# Patient Record
Sex: MALE | Race: WHITE | NOT HISPANIC OR LATINO
[De-identification: names, ages, dates, MRNs, and addresses within clinical notes are randomized per-mention and may not be internally consistent; named-entity substitution may affect disease eponyms.]

---

## 2017-01-04 ENCOUNTER — APPOINTMENT (OUTPATIENT)
Dept: CT IMAGING | Facility: CLINIC | Age: 67
End: 2017-01-04

## 2017-01-04 ENCOUNTER — OUTPATIENT (OUTPATIENT)
Dept: OUTPATIENT SERVICES | Facility: HOSPITAL | Age: 67
LOS: 1 days | End: 2017-01-04
Payer: MEDICARE

## 2017-01-04 DIAGNOSIS — K43.2 INCISIONAL HERNIA WITHOUT OBSTRUCTION OR GANGRENE: ICD-10-CM

## 2017-01-04 PROCEDURE — 74177 CT ABD & PELVIS W/CONTRAST: CPT

## 2017-01-04 PROCEDURE — 82565 ASSAY OF CREATININE: CPT

## 2017-01-05 ENCOUNTER — OUTPATIENT (OUTPATIENT)
Dept: OUTPATIENT SERVICES | Facility: HOSPITAL | Age: 67
LOS: 1 days | End: 2017-01-05
Payer: MEDICARE

## 2017-01-05 VITALS
SYSTOLIC BLOOD PRESSURE: 140 MMHG | WEIGHT: 214.95 LBS | DIASTOLIC BLOOD PRESSURE: 80 MMHG | HEART RATE: 58 BPM | HEIGHT: 70 IN | RESPIRATION RATE: 16 BRPM | TEMPERATURE: 98 F

## 2017-01-05 DIAGNOSIS — K43.2 INCISIONAL HERNIA WITHOUT OBSTRUCTION OR GANGRENE: ICD-10-CM

## 2017-01-05 DIAGNOSIS — R06.83 SNORING: ICD-10-CM

## 2017-01-05 DIAGNOSIS — Z98.890 OTHER SPECIFIED POSTPROCEDURAL STATES: Chronic | ICD-10-CM

## 2017-01-05 DIAGNOSIS — I63.9 CEREBRAL INFARCTION, UNSPECIFIED: ICD-10-CM

## 2017-01-05 DIAGNOSIS — I10 ESSENTIAL (PRIMARY) HYPERTENSION: ICD-10-CM

## 2017-01-05 DIAGNOSIS — F32.9 MAJOR DEPRESSIVE DISORDER, SINGLE EPISODE, UNSPECIFIED: ICD-10-CM

## 2017-01-05 DIAGNOSIS — E11.9 TYPE 2 DIABETES MELLITUS WITHOUT COMPLICATIONS: ICD-10-CM

## 2017-01-05 LAB
ALBUMIN SERPL ELPH-MCNC: 4.4 G/DL — SIGNIFICANT CHANGE UP (ref 3.3–5)
ALP SERPL-CCNC: 68 U/L — SIGNIFICANT CHANGE UP (ref 40–120)
ALT FLD-CCNC: 27 U/L — SIGNIFICANT CHANGE UP (ref 4–41)
AST SERPL-CCNC: 27 U/L — SIGNIFICANT CHANGE UP (ref 4–40)
BILIRUB SERPL-MCNC: 0.3 MG/DL — SIGNIFICANT CHANGE UP (ref 0.2–1.2)
BUN SERPL-MCNC: 18 MG/DL — SIGNIFICANT CHANGE UP (ref 7–23)
CALCIUM SERPL-MCNC: 9.9 MG/DL — SIGNIFICANT CHANGE UP (ref 8.4–10.5)
CHLORIDE SERPL-SCNC: 97 MMOL/L — LOW (ref 98–107)
CO2 SERPL-SCNC: 26 MMOL/L — SIGNIFICANT CHANGE UP (ref 22–31)
CREAT SERPL-MCNC: 0.77 MG/DL — SIGNIFICANT CHANGE UP (ref 0.5–1.3)
GLUCOSE SERPL-MCNC: 186 MG/DL — HIGH (ref 70–99)
HBA1C BLD-MCNC: 7 % — HIGH (ref 4–5.6)
HCT VFR BLD CALC: 44.1 % — SIGNIFICANT CHANGE UP (ref 39–50)
HGB BLD-MCNC: 14.9 G/DL — SIGNIFICANT CHANGE UP (ref 13–17)
MCHC RBC-ENTMCNC: 31.9 PG — SIGNIFICANT CHANGE UP (ref 27–34)
MCHC RBC-ENTMCNC: 33.8 % — SIGNIFICANT CHANGE UP (ref 32–36)
MCV RBC AUTO: 94.4 FL — SIGNIFICANT CHANGE UP (ref 80–100)
PLATELET # BLD AUTO: 232 K/UL — SIGNIFICANT CHANGE UP (ref 150–400)
PMV BLD: 11.2 FL — SIGNIFICANT CHANGE UP (ref 7–13)
POTASSIUM SERPL-MCNC: 4.7 MMOL/L — SIGNIFICANT CHANGE UP (ref 3.5–5.3)
POTASSIUM SERPL-SCNC: 4.7 MMOL/L — SIGNIFICANT CHANGE UP (ref 3.5–5.3)
PROT SERPL-MCNC: 6.8 G/DL — SIGNIFICANT CHANGE UP (ref 6–8.3)
RBC # BLD: 4.67 M/UL — SIGNIFICANT CHANGE UP (ref 4.2–5.8)
RBC # FLD: 13.1 % — SIGNIFICANT CHANGE UP (ref 10.3–14.5)
SODIUM SERPL-SCNC: 137 MMOL/L — SIGNIFICANT CHANGE UP (ref 135–145)
WBC # BLD: 8.23 K/UL — SIGNIFICANT CHANGE UP (ref 3.8–10.5)
WBC # FLD AUTO: 8.23 K/UL — SIGNIFICANT CHANGE UP (ref 3.8–10.5)

## 2017-01-05 PROCEDURE — 93010 ELECTROCARDIOGRAM REPORT: CPT

## 2017-01-05 RX ORDER — SODIUM CHLORIDE 9 MG/ML
1000 INJECTION, SOLUTION INTRAVENOUS
Qty: 0 | Refills: 0 | Status: DISCONTINUED | OUTPATIENT
Start: 2017-01-19 | End: 2017-01-19

## 2017-01-05 NOTE — H&P PST ADULT - PROBLEM SELECTOR PLAN 2
BP in PAST 140/80, no complaints.  Pt instructed to take AM Metoprolol and Losartan morning of surgery, pt verbalized good understanding.

## 2017-01-05 NOTE — H&P PST ADULT - RS GEN PE MLT RESP DETAILS PC
no wheezes/breath sounds equal/clear to auscultation bilaterally/respirations non-labored/good air movement/no rhonchi/airway patent/no rales

## 2017-01-05 NOTE — H&P PST ADULT - PMH
Acid Reflux    Anxiety    Benign Tumor of Adrenal Gland    CAD (coronary artery disease)    CVA (cerebrovascular accident)  04/8/2013- no residual effects  Depression    Diabetic neuropathy    DM type 2 (diabetes mellitus, type 2)    Gout    HTN (Hypertension)    Spinal stenosis of lumbar region Acid Reflux    Anxiety    Benign Tumor of Adrenal Gland    CAD (coronary artery disease)    CVA (cerebrovascular accident)  04/8/2013- no residual effects  Depression    Diabetic neuropathy    DM type 2 (diabetes mellitus, type 2)    Gout    Hernia  umbilical, inguinal, abdominal wall  HTN (Hypertension)    Spinal stenosis of lumbar region

## 2017-01-05 NOTE — H&P PST ADULT - MUSCULOSKELETAL
details… No joint pain, swelling or deformity; no limitation of movement no joint swelling/no joint erythema/no calf tenderness/no joint warmth/normal strength/ROM intact detailed exam

## 2017-01-05 NOTE — H&P PST ADULT - CARDIOVASCULAR SYMPTOMS
dyspnea on exertion/climbing stairs, "I had a full workup, it was normal" dyspnea with climbing stairs past 2 years , "I had a full workup, it was normal"/dyspnea on exertion

## 2017-01-05 NOTE — H&P PST ADULT - NEGATIVE ALLERGY TYPES
no reactions to animals/no reactions to food/no reactions to insect bites/no reactions to medicines/no outdoor environmental allergies/no indoor environmental allergies

## 2017-01-05 NOTE — H&P PST ADULT - NEGATIVE GENERAL SYMPTOMS
no fever/no chills no chills/no polyphagia/no polyuria/no polydipsia/no weight gain/no weight loss/no anorexia/no malaise/no sweating/no fever/no fatigue

## 2017-01-05 NOTE — H&P PST ADULT - ACTIVITY
PT 3 times per week, able to climb 12 steps without exertion PT 1 time per week, can climb a flight of steps without chest pain or palpitations PT 1 time per week, SOB with climbing steps past 2 years

## 2017-01-05 NOTE — H&P PST ADULT - PSH
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

## 2017-01-05 NOTE — H&P PST ADULT - NEGATIVE NEUROLOGICAL SYMPTOMS
no transient paralysis/no vertigo no facial palsy/no confusion/no focal seizures/no syncope/no headache/no vertigo/no tremors/no paresthesias/no loss of consciousness/no generalized seizures/no weakness/no difficulty walking/no transient paralysis

## 2017-01-05 NOTE — H&P PST ADULT - HISTORY OF PRESENT ILLNESS
63 year old male with history of DM, HTN, CAD, CABG, Gout, HLD, c/o low back pain x 3 years- no relief of pain with physical therapy- f/u ortho-neuro evaluation- MRI lumbar spine- spinal stenosis- for bilateral L1-2 L4-5 lumbar laminectomy. 67 yr old male, h/o DM, HTN, CAD, CABG, Gout, HLD, depression, OA, presents for pre-op exam for incisional hernia.  Pt reports adrenalectomy 24 years ago, abdominal hernia many years, recently c/o lower abdominal discomfort with bowel movements and sneezing.  Now  scheduled for incisional hernia repair on 1/19/17.

## 2017-01-05 NOTE — H&P PST ADULT - NEGATIVE OPHTHALMOLOGIC SYMPTOMS
no irritation L/no discharge R/no diplopia/no photophobia/no loss of vision L/no loss of vision R/no scleral injection R/no discharge L/no blurred vision L/no pain R/no pain L/no irritation R/no scleral injection L/no lacrimation L/no lacrimation R

## 2017-01-05 NOTE — H&P PST ADULT - NEGATIVE MUSCULOSKELETAL SYMPTOMS
no stiffness/no muscle weakness/no leg pain R/no neck pain/no arm pain L/no arm pain R/no leg pain L

## 2017-01-05 NOTE — H&P PST ADULT - NSANTHOSAYNRD_GEN_A_CORE
No. FREEDOM screening performed.  STOP BANG Legend: 0-2 = LOW Risk; 3-4 = INTERMEDIATE Risk; 5-8 = HIGH Risk/bite plate mouth guard/No. FREEDOM screening performed.  STOP BANG Legend: 0-2 = LOW Risk; 3-4 = INTERMEDIATE Risk; 5-8 = HIGH Risk

## 2017-01-05 NOTE — H&P PST ADULT - OTHER CARE PROVIDERS
Dr. Sutherland- [phone] Dr. Sutherland, cardiology- [phone]  Dr. Fahad Rosario, neurologist [phone]

## 2017-01-05 NOTE — H&P PST ADULT - NEGATIVE ENMT SYMPTOMS
no post-nasal discharge/no nasal congestion/no dysphagia/no vertigo/no sinus symptoms/no throat pain/no nose bleeds/no tinnitus/no gum bleeding

## 2017-01-05 NOTE — H&P PST ADULT - PROBLEM SELECTOR PLAN 5
HgA1c done, finger stick upon arrival to OR.  Pt instructed to hold PM Metformin and decrease Levemir 20% (56 units) night before surgery, pt verbalized good understanding.

## 2017-01-05 NOTE — H&P PST ADULT - NEGATIVE GENERAL GENITOURINARY SYMPTOMS
no urinary hesitancy/no flank pain R/no bladder infections/normal urinary frequency/no nocturia/no incontinence/no hematuria/no renal colic/no flank pain L/no dysuria

## 2017-01-05 NOTE — H&P PST ADULT - PROBLEM SELECTOR PLAN 6
Pt states he feels well in PAST today, instructed pt to take AM Klonopin, Wellbutrin, Tramadol day of surgery, pt verbalized good understanding.

## 2017-01-05 NOTE — H&P PST ADULT - FAMILY HISTORY
Father  Still living? No  Family history of early CAD, Age at diagnosis: Age Unknown     Mother  Still living? Yes, Estimated age: 81-90  Family history of borderline diabetes mellitus, Age at diagnosis: Age Unknown

## 2017-01-05 NOTE — H&P PST ADULT - PROBLEM SELECTOR PLAN 1
67 yr old male presents for pre-op exam for incisional hernia repair on 1/19/17.  EKG, cbc, cmp, HgA1c done.  Pre-op instructions and chlorhexidine provided, instructed pt to take AM Protonix morning of surgery, pt verbalized good understanding.

## 2017-01-05 NOTE — H&P PST ADULT - NEUROLOGICAL DETAILS
responds to pain/deep reflexes hypoactive/alert and oriented x 3 responds to pain/responds to verbal commands/alert and oriented x 3/normal strength

## 2017-01-18 NOTE — ASU PATIENT PROFILE, ADULT - ABILITY TO HEAR (WITH HEARING AID OR HEARING APPLIANCE IF NORMALLY USED):
Hearing Aid/Mildly to Moderately Impaired: difficulty hearing in some environments or speaker may need to increase volume or speak distinctly

## 2017-01-18 NOTE — ASU PATIENT PROFILE, ADULT - PMH
Acid Reflux    Anxiety    Benign Tumor of Adrenal Gland    CAD (coronary artery disease)    CVA (cerebrovascular accident)  04/8/2013- no residual effects  Depression    Diabetic neuropathy    DM type 2 (diabetes mellitus, type 2)    Gout    Hernia  umbilical, inguinal, abdominal wall  HTN (Hypertension)    Spinal stenosis of lumbar region

## 2017-01-18 NOTE — ASU PATIENT PROFILE, ADULT - PSH
History of Appendectomy    History of lumbar laminectomy  L1-2 & L4-5   2015  S/P CABG X 4  15 years ago  S/P Coronary Angiogram  12/26/2010 & 2015 at [place], [facility]  S/P Hernia Repair    Sudden Adrenal Gland Insufficiency  left adrenalectomy 24 years ago

## 2017-01-19 ENCOUNTER — INPATIENT (INPATIENT)
Facility: HOSPITAL | Age: 67
LOS: 1 days | Discharge: HOME | End: 2017-01-21
Attending: SURGERY | Admitting: SURGERY
Payer: MEDICARE

## 2017-01-19 ENCOUNTER — APPOINTMENT (OUTPATIENT)
Dept: SURGERY | Facility: HOSPITAL | Age: 67
End: 2017-01-19

## 2017-01-19 VITALS
HEIGHT: 70 IN | DIASTOLIC BLOOD PRESSURE: 79 MMHG | SYSTOLIC BLOOD PRESSURE: 154 MMHG | TEMPERATURE: 99 F | HEART RATE: 52 BPM | WEIGHT: 214.95 LBS | RESPIRATION RATE: 17 BRPM | OXYGEN SATURATION: 99 %

## 2017-01-19 DIAGNOSIS — K43.2 INCISIONAL HERNIA WITHOUT OBSTRUCTION OR GANGRENE: ICD-10-CM

## 2017-01-19 DIAGNOSIS — Z98.890 OTHER SPECIFIED POSTPROCEDURAL STATES: Chronic | ICD-10-CM

## 2017-01-19 PROCEDURE — 49568: CPT | Mod: GC

## 2017-01-19 PROCEDURE — 49560: CPT | Mod: GC

## 2017-01-19 RX ORDER — NALOXONE HYDROCHLORIDE 4 MG/.1ML
0.1 SPRAY NASAL
Qty: 0 | Refills: 0 | Status: DISCONTINUED | OUTPATIENT
Start: 2017-01-19 | End: 2017-01-21

## 2017-01-19 RX ORDER — PANTOPRAZOLE SODIUM 20 MG/1
40 TABLET, DELAYED RELEASE ORAL
Qty: 0 | Refills: 0 | Status: DISCONTINUED | OUTPATIENT
Start: 2017-01-19 | End: 2017-01-20

## 2017-01-19 RX ORDER — HYDROMORPHONE HYDROCHLORIDE 2 MG/ML
0.8 INJECTION INTRAMUSCULAR; INTRAVENOUS; SUBCUTANEOUS
Qty: 0 | Refills: 0 | Status: DISCONTINUED | OUTPATIENT
Start: 2017-01-19 | End: 2017-01-19

## 2017-01-19 RX ORDER — ASPIRIN/CALCIUM CARB/MAGNESIUM 324 MG
81 TABLET ORAL DAILY
Qty: 0 | Refills: 0 | Status: DISCONTINUED | OUTPATIENT
Start: 2017-01-20 | End: 2017-01-21

## 2017-01-19 RX ORDER — BUPROPION HYDROCHLORIDE 150 MG/1
150 TABLET, EXTENDED RELEASE ORAL
Qty: 0 | Refills: 0 | Status: DISCONTINUED | OUTPATIENT
Start: 2017-01-19 | End: 2017-01-19

## 2017-01-19 RX ORDER — ENOXAPARIN SODIUM 100 MG/ML
30 INJECTION SUBCUTANEOUS EVERY 24 HOURS
Qty: 0 | Refills: 0 | Status: DISCONTINUED | OUTPATIENT
Start: 2017-01-19 | End: 2017-01-19

## 2017-01-19 RX ORDER — ACETAMINOPHEN 500 MG
1000 TABLET ORAL ONCE
Qty: 0 | Refills: 0 | Status: COMPLETED | OUTPATIENT
Start: 2017-01-19 | End: 2017-01-19

## 2017-01-19 RX ORDER — DEXTROSE 50 % IN WATER 50 %
25 SYRINGE (ML) INTRAVENOUS ONCE
Qty: 0 | Refills: 0 | Status: DISCONTINUED | OUTPATIENT
Start: 2017-01-19 | End: 2017-01-21

## 2017-01-19 RX ORDER — LOSARTAN POTASSIUM 100 MG/1
50 TABLET, FILM COATED ORAL DAILY
Qty: 0 | Refills: 0 | Status: DISCONTINUED | OUTPATIENT
Start: 2017-01-19 | End: 2017-01-21

## 2017-01-19 RX ORDER — METOPROLOL TARTRATE 50 MG
25 TABLET ORAL DAILY
Qty: 0 | Refills: 0 | Status: DISCONTINUED | OUTPATIENT
Start: 2017-01-19 | End: 2017-01-21

## 2017-01-19 RX ORDER — SODIUM CHLORIDE 9 MG/ML
1000 INJECTION, SOLUTION INTRAVENOUS
Qty: 0 | Refills: 0 | Status: DISCONTINUED | OUTPATIENT
Start: 2017-01-19 | End: 2017-01-21

## 2017-01-19 RX ORDER — DEXTROSE 50 % IN WATER 50 %
12.5 SYRINGE (ML) INTRAVENOUS ONCE
Qty: 0 | Refills: 0 | Status: DISCONTINUED | OUTPATIENT
Start: 2017-01-19 | End: 2017-01-21

## 2017-01-19 RX ORDER — HYDROMORPHONE HYDROCHLORIDE 2 MG/ML
30 INJECTION INTRAMUSCULAR; INTRAVENOUS; SUBCUTANEOUS
Qty: 0 | Refills: 0 | Status: DISCONTINUED | OUTPATIENT
Start: 2017-01-19 | End: 2017-01-20

## 2017-01-19 RX ORDER — INSULIN GLARGINE 100 [IU]/ML
35 INJECTION, SOLUTION SUBCUTANEOUS AT BEDTIME
Qty: 0 | Refills: 0 | Status: DISCONTINUED | OUTPATIENT
Start: 2017-01-19 | End: 2017-01-20

## 2017-01-19 RX ORDER — ONDANSETRON 8 MG/1
4 TABLET, FILM COATED ORAL ONCE
Qty: 0 | Refills: 0 | Status: DISCONTINUED | OUTPATIENT
Start: 2017-01-19 | End: 2017-01-19

## 2017-01-19 RX ORDER — HYDROMORPHONE HYDROCHLORIDE 2 MG/ML
0.5 INJECTION INTRAMUSCULAR; INTRAVENOUS; SUBCUTANEOUS
Qty: 0 | Refills: 0 | Status: DISCONTINUED | OUTPATIENT
Start: 2017-01-19 | End: 2017-01-20

## 2017-01-19 RX ORDER — BUPROPION HYDROCHLORIDE 150 MG/1
300 TABLET, EXTENDED RELEASE ORAL DAILY
Qty: 0 | Refills: 0 | Status: DISCONTINUED | OUTPATIENT
Start: 2017-01-19 | End: 2017-01-20

## 2017-01-19 RX ORDER — GLUCAGON INJECTION, SOLUTION 0.5 MG/.1ML
1 INJECTION, SOLUTION SUBCUTANEOUS ONCE
Qty: 0 | Refills: 0 | Status: DISCONTINUED | OUTPATIENT
Start: 2017-01-19 | End: 2017-01-21

## 2017-01-19 RX ORDER — INSULIN LISPRO 100/ML
VIAL (ML) SUBCUTANEOUS AT BEDTIME
Qty: 0 | Refills: 0 | Status: DISCONTINUED | OUTPATIENT
Start: 2017-01-19 | End: 2017-01-21

## 2017-01-19 RX ORDER — ONDANSETRON 8 MG/1
4 TABLET, FILM COATED ORAL EVERY 6 HOURS
Qty: 0 | Refills: 0 | Status: DISCONTINUED | OUTPATIENT
Start: 2017-01-19 | End: 2017-01-21

## 2017-01-19 RX ORDER — ATORVASTATIN CALCIUM 80 MG/1
20 TABLET, FILM COATED ORAL AT BEDTIME
Qty: 0 | Refills: 0 | Status: DISCONTINUED | OUTPATIENT
Start: 2017-01-19 | End: 2017-01-21

## 2017-01-19 RX ORDER — INSULIN LISPRO 100/ML
VIAL (ML) SUBCUTANEOUS
Qty: 0 | Refills: 0 | Status: DISCONTINUED | OUTPATIENT
Start: 2017-01-19 | End: 2017-01-21

## 2017-01-19 RX ORDER — ENOXAPARIN SODIUM 100 MG/ML
40 INJECTION SUBCUTANEOUS DAILY
Qty: 0 | Refills: 0 | Status: DISCONTINUED | OUTPATIENT
Start: 2017-01-19 | End: 2017-01-21

## 2017-01-19 RX ORDER — FENTANYL CITRATE 50 UG/ML
50 INJECTION INTRAVENOUS
Qty: 0 | Refills: 0 | Status: DISCONTINUED | OUTPATIENT
Start: 2017-01-19 | End: 2017-01-19

## 2017-01-19 RX ORDER — DEXTROSE 50 % IN WATER 50 %
1 SYRINGE (ML) INTRAVENOUS ONCE
Qty: 0 | Refills: 0 | Status: DISCONTINUED | OUTPATIENT
Start: 2017-01-19 | End: 2017-01-21

## 2017-01-19 RX ORDER — SODIUM CHLORIDE 9 MG/ML
1000 INJECTION, SOLUTION INTRAVENOUS
Qty: 0 | Refills: 0 | Status: DISCONTINUED | OUTPATIENT
Start: 2017-01-19 | End: 2017-01-20

## 2017-01-19 RX ADMIN — HYDROMORPHONE HYDROCHLORIDE 0.5 MILLIGRAM(S): 2 INJECTION INTRAMUSCULAR; INTRAVENOUS; SUBCUTANEOUS at 18:15

## 2017-01-19 RX ADMIN — Medication 400 MILLIGRAM(S): at 13:38

## 2017-01-19 RX ADMIN — SODIUM CHLORIDE 30 MILLILITER(S): 9 INJECTION, SOLUTION INTRAVENOUS at 13:06

## 2017-01-19 RX ADMIN — HYDROMORPHONE HYDROCHLORIDE 30 MILLILITER(S): 2 INJECTION INTRAMUSCULAR; INTRAVENOUS; SUBCUTANEOUS at 20:33

## 2017-01-19 RX ADMIN — Medication 1000 MILLIGRAM(S): at 13:53

## 2017-01-19 RX ADMIN — SODIUM CHLORIDE 100 MILLILITER(S): 9 INJECTION, SOLUTION INTRAVENOUS at 18:05

## 2017-01-19 RX ADMIN — HYDROMORPHONE HYDROCHLORIDE 30 MILLILITER(S): 2 INJECTION INTRAMUSCULAR; INTRAVENOUS; SUBCUTANEOUS at 17:52

## 2017-01-19 NOTE — PATIENT PROFILE ADULT. - ABILITY TO HEAR (WITH HEARING AID OR HEARING APPLIANCE IF NORMALLY USED):
uses B/l hearing aids/Mildly to Moderately Impaired: difficulty hearing in some environments or speaker may need to increase volume or speak distinctly

## 2017-01-20 ENCOUNTER — TRANSCRIPTION ENCOUNTER (OUTPATIENT)
Age: 67
End: 2017-01-20

## 2017-01-20 LAB
BASOPHILS # BLD AUTO: 0.01 K/UL — SIGNIFICANT CHANGE UP (ref 0–0.2)
BASOPHILS NFR BLD AUTO: 0.1 % — SIGNIFICANT CHANGE UP (ref 0–2)
BUN SERPL-MCNC: 20 MG/DL — SIGNIFICANT CHANGE UP (ref 7–23)
BUN SERPL-MCNC: 21 MG/DL — SIGNIFICANT CHANGE UP (ref 7–23)
CALCIUM SERPL-MCNC: 8.8 MG/DL — SIGNIFICANT CHANGE UP (ref 8.4–10.5)
CALCIUM SERPL-MCNC: 8.9 MG/DL — SIGNIFICANT CHANGE UP (ref 8.4–10.5)
CHLORIDE SERPL-SCNC: 96 MMOL/L — LOW (ref 98–107)
CHLORIDE SERPL-SCNC: 97 MMOL/L — LOW (ref 98–107)
CO2 SERPL-SCNC: 24 MMOL/L — SIGNIFICANT CHANGE UP (ref 22–31)
CO2 SERPL-SCNC: 25 MMOL/L — SIGNIFICANT CHANGE UP (ref 22–31)
CREAT SERPL-MCNC: 0.9 MG/DL — SIGNIFICANT CHANGE UP (ref 0.5–1.3)
CREAT SERPL-MCNC: 0.96 MG/DL — SIGNIFICANT CHANGE UP (ref 0.5–1.3)
EOSINOPHIL # BLD AUTO: 0 K/UL — SIGNIFICANT CHANGE UP (ref 0–0.5)
EOSINOPHIL NFR BLD AUTO: 0 % — SIGNIFICANT CHANGE UP (ref 0–6)
GLUCOSE SERPL-MCNC: 177 MG/DL — HIGH (ref 70–99)
GLUCOSE SERPL-MCNC: 278 MG/DL — HIGH (ref 70–99)
HCT VFR BLD CALC: 40.9 % — SIGNIFICANT CHANGE UP (ref 39–50)
HGB BLD-MCNC: 13.4 G/DL — SIGNIFICANT CHANGE UP (ref 13–17)
IMM GRANULOCYTES NFR BLD AUTO: 0.2 % — SIGNIFICANT CHANGE UP (ref 0–1.5)
LYMPHOCYTES # BLD AUTO: 0.77 K/UL — LOW (ref 1–3.3)
LYMPHOCYTES # BLD AUTO: 6.1 % — LOW (ref 13–44)
MCHC RBC-ENTMCNC: 31.5 PG — SIGNIFICANT CHANGE UP (ref 27–34)
MCHC RBC-ENTMCNC: 32.8 % — SIGNIFICANT CHANGE UP (ref 32–36)
MCV RBC AUTO: 96 FL — SIGNIFICANT CHANGE UP (ref 80–100)
MONOCYTES # BLD AUTO: 0.81 K/UL — SIGNIFICANT CHANGE UP (ref 0–0.9)
MONOCYTES NFR BLD AUTO: 6.4 % — SIGNIFICANT CHANGE UP (ref 2–14)
NEUTROPHILS # BLD AUTO: 11.02 K/UL — HIGH (ref 1.8–7.4)
NEUTROPHILS NFR BLD AUTO: 87.2 % — HIGH (ref 43–77)
PLATELET # BLD AUTO: 215 K/UL — SIGNIFICANT CHANGE UP (ref 150–400)
PMV BLD: 11.3 FL — SIGNIFICANT CHANGE UP (ref 7–13)
POTASSIUM SERPL-MCNC: 4.8 MMOL/L — SIGNIFICANT CHANGE UP (ref 3.5–5.3)
POTASSIUM SERPL-MCNC: 5.8 MMOL/L — HIGH (ref 3.5–5.3)
POTASSIUM SERPL-SCNC: 4.8 MMOL/L — SIGNIFICANT CHANGE UP (ref 3.5–5.3)
POTASSIUM SERPL-SCNC: 5.8 MMOL/L — HIGH (ref 3.5–5.3)
RBC # BLD: 4.26 M/UL — SIGNIFICANT CHANGE UP (ref 4.2–5.8)
RBC # FLD: 13.2 % — SIGNIFICANT CHANGE UP (ref 10.3–14.5)
SODIUM SERPL-SCNC: 137 MMOL/L — SIGNIFICANT CHANGE UP (ref 135–145)
SODIUM SERPL-SCNC: 138 MMOL/L — SIGNIFICANT CHANGE UP (ref 135–145)
WBC # BLD: 12.64 K/UL — HIGH (ref 3.8–10.5)
WBC # FLD AUTO: 12.64 K/UL — HIGH (ref 3.8–10.5)

## 2017-01-20 RX ORDER — INSULIN DETEMIR 100/ML (3)
70 INSULIN PEN (ML) SUBCUTANEOUS AT BEDTIME
Qty: 0 | Refills: 0 | Status: DISCONTINUED | OUTPATIENT
Start: 2017-01-20 | End: 2017-01-21

## 2017-01-20 RX ORDER — BUPROPION HYDROCHLORIDE 150 MG/1
300 TABLET, EXTENDED RELEASE ORAL DAILY
Qty: 0 | Refills: 0 | Status: DISCONTINUED | OUTPATIENT
Start: 2017-01-20 | End: 2017-01-21

## 2017-01-20 RX ORDER — TRAMADOL HYDROCHLORIDE 50 MG/1
50 TABLET ORAL EVERY 8 HOURS
Qty: 0 | Refills: 0 | Status: DISCONTINUED | OUTPATIENT
Start: 2017-01-20 | End: 2017-01-20

## 2017-01-20 RX ORDER — LIDOCAINE 4 G/100G
1 CREAM TOPICAL ONCE
Qty: 0 | Refills: 0 | Status: COMPLETED | OUTPATIENT
Start: 2017-01-20 | End: 2017-01-20

## 2017-01-20 RX ORDER — TRAMADOL HYDROCHLORIDE 50 MG/1
100 TABLET ORAL EVERY 6 HOURS
Qty: 0 | Refills: 0 | Status: DISCONTINUED | OUTPATIENT
Start: 2017-01-20 | End: 2017-01-21

## 2017-01-20 RX ORDER — TAMSULOSIN HYDROCHLORIDE 0.4 MG/1
1 CAPSULE ORAL
Qty: 5 | Refills: 0
Start: 2017-01-20 | End: 2017-01-25

## 2017-01-20 RX ORDER — CLONAZEPAM 1 MG
1 TABLET ORAL THREE TIMES A DAY
Qty: 0 | Refills: 0 | Status: DISCONTINUED | OUTPATIENT
Start: 2017-01-20 | End: 2017-01-21

## 2017-01-20 RX ORDER — TAMSULOSIN HYDROCHLORIDE 0.4 MG/1
0.4 CAPSULE ORAL ONCE
Qty: 0 | Refills: 0 | Status: COMPLETED | OUTPATIENT
Start: 2017-01-20 | End: 2017-01-20

## 2017-01-20 RX ORDER — ACETAMINOPHEN 500 MG
650 TABLET ORAL EVERY 6 HOURS
Qty: 0 | Refills: 0 | Status: DISCONTINUED | OUTPATIENT
Start: 2017-01-20 | End: 2017-01-21

## 2017-01-20 RX ORDER — ACETAMINOPHEN 500 MG
2 TABLET ORAL
Qty: 0 | Refills: 0 | DISCHARGE
Start: 2017-01-20

## 2017-01-20 RX ORDER — TAMSULOSIN HYDROCHLORIDE 0.4 MG/1
0.4 CAPSULE ORAL AT BEDTIME
Qty: 0 | Refills: 0 | Status: DISCONTINUED | OUTPATIENT
Start: 2017-01-20 | End: 2017-01-21

## 2017-01-20 RX ORDER — PANTOPRAZOLE SODIUM 20 MG/1
40 TABLET, DELAYED RELEASE ORAL
Qty: 0 | Refills: 0 | Status: DISCONTINUED | OUTPATIENT
Start: 2017-01-20 | End: 2017-01-21

## 2017-01-20 RX ORDER — TRAMADOL HYDROCHLORIDE 50 MG/1
50 TABLET ORAL ONCE
Qty: 0 | Refills: 0 | Status: DISCONTINUED | OUTPATIENT
Start: 2017-01-20 | End: 2017-01-20

## 2017-01-20 RX ADMIN — PANTOPRAZOLE SODIUM 40 MILLIGRAM(S): 20 TABLET, DELAYED RELEASE ORAL at 21:24

## 2017-01-20 RX ADMIN — TRAMADOL HYDROCHLORIDE 50 MILLIGRAM(S): 50 TABLET ORAL at 15:20

## 2017-01-20 RX ADMIN — Medication 1: at 00:11

## 2017-01-20 RX ADMIN — TRAMADOL HYDROCHLORIDE 50 MILLIGRAM(S): 50 TABLET ORAL at 18:25

## 2017-01-20 RX ADMIN — ATORVASTATIN CALCIUM 20 MILLIGRAM(S): 80 TABLET, FILM COATED ORAL at 21:24

## 2017-01-20 RX ADMIN — PANTOPRAZOLE SODIUM 40 MILLIGRAM(S): 20 TABLET, DELAYED RELEASE ORAL at 06:43

## 2017-01-20 RX ADMIN — BUPROPION HYDROCHLORIDE 300 MILLIGRAM(S): 150 TABLET, EXTENDED RELEASE ORAL at 11:05

## 2017-01-20 RX ADMIN — Medication 1 MILLIGRAM(S): at 21:24

## 2017-01-20 RX ADMIN — Medication 650 MILLIGRAM(S): at 11:05

## 2017-01-20 RX ADMIN — TAMSULOSIN HYDROCHLORIDE 0.4 MILLIGRAM(S): 0.4 CAPSULE ORAL at 21:24

## 2017-01-20 RX ADMIN — Medication 650 MILLIGRAM(S): at 17:41

## 2017-01-20 RX ADMIN — Medication 1 MILLIGRAM(S): at 17:05

## 2017-01-20 RX ADMIN — Medication 650 MILLIGRAM(S): at 12:00

## 2017-01-20 RX ADMIN — Medication 25 MILLIGRAM(S): at 11:04

## 2017-01-20 RX ADMIN — Medication 650 MILLIGRAM(S): at 23:22

## 2017-01-20 RX ADMIN — Medication 650 MILLIGRAM(S): at 23:53

## 2017-01-20 RX ADMIN — Medication 81 MILLIGRAM(S): at 11:05

## 2017-01-20 RX ADMIN — TRAMADOL HYDROCHLORIDE 50 MILLIGRAM(S): 50 TABLET ORAL at 17:05

## 2017-01-20 RX ADMIN — Medication 650 MILLIGRAM(S): at 18:25

## 2017-01-20 RX ADMIN — TRAMADOL HYDROCHLORIDE 50 MILLIGRAM(S): 50 TABLET ORAL at 14:26

## 2017-01-20 RX ADMIN — Medication 4: at 17:41

## 2017-01-20 RX ADMIN — TRAMADOL HYDROCHLORIDE 100 MILLIGRAM(S): 50 TABLET ORAL at 21:22

## 2017-01-20 RX ADMIN — ENOXAPARIN SODIUM 40 MILLIGRAM(S): 100 INJECTION SUBCUTANEOUS at 13:21

## 2017-01-20 RX ADMIN — Medication 6: at 12:44

## 2017-01-20 RX ADMIN — TAMSULOSIN HYDROCHLORIDE 0.4 MILLIGRAM(S): 0.4 CAPSULE ORAL at 04:40

## 2017-01-20 RX ADMIN — SODIUM CHLORIDE 100 MILLILITER(S): 9 INJECTION, SOLUTION INTRAVENOUS at 09:05

## 2017-01-20 RX ADMIN — TRAMADOL HYDROCHLORIDE 100 MILLIGRAM(S): 50 TABLET ORAL at 21:20

## 2017-01-20 RX ADMIN — LIDOCAINE 1 APPLICATION(S): 4 CREAM TOPICAL at 09:05

## 2017-01-20 RX ADMIN — TAMSULOSIN HYDROCHLORIDE 0.4 MILLIGRAM(S): 0.4 CAPSULE ORAL at 11:05

## 2017-01-20 RX ADMIN — INSULIN GLARGINE 35 UNIT(S): 100 INJECTION, SOLUTION SUBCUTANEOUS at 00:12

## 2017-01-20 RX ADMIN — LOSARTAN POTASSIUM 50 MILLIGRAM(S): 100 TABLET, FILM COATED ORAL at 11:04

## 2017-01-20 RX ADMIN — Medication 70 UNIT(S): at 23:23

## 2017-01-20 NOTE — DISCHARGE NOTE ADULT - ABILITY TO HEAR (WITH HEARING AID OR HEARING APPLIANCE IF NORMALLY USED):
Mildly to Moderately Impaired: difficulty hearing in some environments or speaker may need to increase volume or speak distinctly/uses B/l hearing aids

## 2017-01-20 NOTE — DISCHARGE NOTE ADULT - ADDITIONAL INSTRUCTIONS
Please follow up with Dr. Rascon within 1-2 weeks after discharge from the hospital. You may call [phone] to schedule an appointment.     Do not lift anything over ten pounds. Do not strain. Do not drive as your movements will be restricted by pain. You will be discharged with pain medications, please do not drive while taking these. Do not drink alcohol while taking narcotic pain medications. Please follow up with Dr. Rascon within 1-2 weeks after discharge from the hospital. You may call [phone] to schedule an appointment.   Please restart your aspirin and Plavix on Sunday.  Do not lift anything over ten pounds. Do not strain. Do not drive as your movements will be restricted by pain. You will be discharged with pain medications, please do not drive while taking these. Do not drink alcohol while taking narcotic pain medications.    Your incision is covered by strips called steri-strips. These will fall off on their own. Please do not pick at or take them off. You may shower with them, just allow water to run over them and do not scrub or submerge them.

## 2017-01-20 NOTE — DISCHARGE NOTE ADULT - MEDICATION SUMMARY - MEDICATIONS TO TAKE
I will START or STAY ON the medications listed below when I get home from the hospital:    magnesium  -- 500 milligram(s) by mouth 2 times a day  -- Indication: For Home med    traMADol 50 mg oral tablet  -- 1 tab(s) by mouth 3 times a day  -- Indication: For Home Med    Percocet 5/325 oral tablet  -- 1 tab(s) by mouth every 6 hours, As Needed -for moderate pain MDD:8  -- Caution federal law prohibits the transfer of this drug to any person other  than the person for whom it was prescribed.  May cause drowsiness.  Alcohol may intensify this effect.  Use care when operating dangerous machinery.  This prescription cannot be refilled.  This product contains acetaminophen.  Do not use  with any other product containing acetaminophen to prevent possible liver damage.  Using more of this medication than prescribed may cause serious breathing problems.    -- Indication: For As needed for moderate pain    acetaminophen 325 mg oral tablet  -- 2 tab(s) by mouth every 6 hours  -- Indication: For As needed for pain    aspirin 81 mg oral tablet  -- 1 tab(s) by mouth once a day in morning  -- Indication: For Home med    Flomax 0.4 mg oral capsule  -- 1 cap(s) by mouth once a day (at bedtime)  -- It is very important that you take or use this exactly as directed.  Do not skip doses or discontinue unless directed by your doctor.  May cause drowsiness.  Alcohol may intensify this effect.  Use care when operating dangerous machinery.  Some non-prescription drugs may aggravate your condition.  Read all labels carefully.  If a warning appears, check with your doctor before taking.  Swallow whole.  Do not crush.  Take with food or milk.    -- Indication: For Urinary retention    KlonoPIN 0.5 mg oral tablet  --  1 tablet by mouth as needed up to 7 times daily  -- Indication: For Home med    metFORMIN 1000 mg oral tablet  -- 1 tab(s) by mouth 2 times a day  -- Indication: For Home med    Byetta Prefilled Pen 10 mcg/0.04 mL subcutaneous solution  -- 10 microgram(s) subcutaneous 2 times a day  -- Indication: For Home med    Levemir 100 units/mL subcutaneous solution  -- 70 unit(s) subcutaneous once a day (at bedtime)  -- Indication: For Home med    NovoLOG 100 units/mL subcutaneous solution  -- 6 unit(s) subcutaneous 3 times a day (before meals)  -- Indication: For Home med    Crestor 5 mg oral tablet  -- 1 tab(s) by mouth once a week - on Saturday morning  -- Indication: For Home med    losartan-hydroCHLOROthiazide 50mg-12.5mg oral tablet  -- 1 tab(s) by mouth once a day in morning  -- Indication: For Home med    Uloric 40 mg oral tablet  -- 1 tab(s) by mouth once a day in morning  -- Indication: For Home med    Plavix 75 mg oral tablet  -- 1 tab(s) by mouth once a day in morning  -- Indication: For Home med    Lunesta 3 mg oral tablet  -- 1 tab(s) by mouth once a day (at bedtime)  -- Indication: For Home med    metoprolol succinate 25 mg oral tablet, extended release  -- 1 tab(s) by mouth once a day in morning  -- Indication: For Home med    Protonix 40 mg oral granule, enteric coated  -- 1 each by mouth 2 times a day  -- Indication: For Home med    Wellbutrin  mg/24 hours oral tablet, extended release  -- 1 tab(s) by mouth every 24 hours in morning  -- Indication: For Home med    Deplin  -- 1 tab(s) by mouth once a  day in morning  -- Indication: For Home med

## 2017-01-20 NOTE — DISCHARGE NOTE ADULT - CARE PROVIDER_API CALL
Omar Rascon), ColonRectal Surgery; Surgery  [address] [place] [address]  Phone: [phone]  Fax: [phone]

## 2017-01-20 NOTE — DISCHARGE NOTE ADULT - HOSPITAL COURSE
67 yr old male, h/o DM, HTN, CAD, CABG, Gout, HLD, depression, OA, presented on 1/19 for incisional hernia repair. He tolerated the procedure well, was extubated at the end of the case and was transferred to the surgical floor, where his pain was controlled. His noriega was taken out, however he failed a trial of void so his noriega was reinserted and he was started on flomax, which he was instructed to take as an outpatient for 5 day. His diet was advanced and he had GI function on POD 1.    At the time of discharge, the patient was hemodynamically stable, was tolerating PO diet, was voiding urine and passing stool, was ambulating, and was comfortable with adequate pain control. The patient was instructed to follow up with Dr. Rascon  within 1-2 weeks after discharge from the hospital. The patient felt comfortable with discharge. The patient was discharged to home. The patient had no other issues.

## 2017-01-20 NOTE — DISCHARGE NOTE ADULT - CONDITIONS AT DISCHARGE
Vital signs stable, left  abdominal staples neal with 2 TAMIKA's to SS, tolerated consistent cho diet

## 2017-01-20 NOTE — DISCHARGE NOTE ADULT - CARE PLAN
Principal Discharge DX:	Incisional hernia  Goal:	Recover from Surgery  Instructions for follow-up, activity and diet:	1. Please follow up with Dr. Rascon within 1-2 weeks after discharge from the hospital. You may call [phone] to schedule an appointment.   2. Do not lift anything over ten pounds. Do not strain. Do not drive as your movements will be restricted by pain. You will be discharged with pain medications, please do not drive while taking these. Do not drink alcohol while taking narcotic pain medications.  3. Regular diet as tolerated  Goal:	Healthcare Maintenance  Instructions for follow-up, activity and diet:	Please follow up with your primary care physician regarding your hospitalization. Please schedule an appointment with your primary care provider within two weeks after your discharge to review your hospital course. Principal Discharge DX:	Incisional hernia  Goal:	Recover from Surgery  Instructions for follow-up, activity and diet:	1. Please follow up with Dr. Rascon within 1-2 weeks after discharge from the hospital. You may call [phone] to schedule an appointment.   2. Do not lift anything over ten pounds. Do not strain. Do not drive as your movements will be restricted by pain. You will be discharged with pain medications, please do not drive while taking these. Do not drink alcohol while taking narcotic pain medications.  3. Regular diet as tolerated  Goal:	Healthcare Maintenance  Instructions for follow-up, activity and diet:	Please follow up with your primary care physician regarding your hospitalization. Please schedule an appointment with your primary care provider within two weeks after your discharge to review your hospital course. Principal Discharge DX:	Incisional hernia  Goal:	Recover from Surgery  Instructions for follow-up, activity and diet:	1. Please follow up with Dr. Rascon within 1-2 weeks after discharge from the hospital. You may call [phone] to schedule an appointment.   2. Do not lift anything over ten pounds. Do not strain. Do not drive as your movements will be restricted by pain. You will be discharged with pain medications, please do not drive while taking these. Do not drink alcohol while taking narcotic pain medications.  3. Regular diet as tolerated  Goal:	Healthcare Maintenance  Instructions for follow-up, activity and diet:	Please follow up with your primary care physician regarding your hospitalization. Please schedule an appointment with your primary care provider within two weeks after your discharge to review your hospital course. Principal Discharge DX:	Incisional hernia  Goal:	Recover from Surgery  Instructions for follow-up, activity and diet:	1. Please follow up with Dr. Rascon within 1-2 weeks after discharge from the hospital. You may call [phone] to schedule an appointment.   2. Do not lift anything over ten pounds. Do not strain. Do not drive as your movements will be restricted by pain. You will be discharged with pain medications, please do not drive while taking these. Do not drink alcohol while taking narcotic pain medications.  3. Regular diet as tolerated  Goal:	Healthcare Maintenance  Instructions for follow-up, activity and diet:	Please follow up with your primary care physician regarding your hospitalization. Please schedule an appointment with your primary care provider within two weeks after your discharge to review your hospital course.

## 2017-01-20 NOTE — PROVIDER CONTACT NOTE (OTHER) - SITUATION
Patient was due to void at 1am ,couldn't urinate ,bladder scan performed as per order 669ml of urine noted on scan

## 2017-01-20 NOTE — DISCHARGE NOTE ADULT - PATIENT PORTAL LINK FT
“You can access the Follow[facility] Patient Portal, offered by [facility], by registering with the following website: [email or web address]”

## 2017-01-20 NOTE — DISCHARGE NOTE ADULT - OTHER SIGNIFICANT FINDINGS
67 yr old male, h/o DM, HTN, CAD, CABG, Gout, HLD, depression, OA, presented on 1/19 for incisional hernia repair. He tolerated the procedure well, was extubated at the end of the case and was transferred to the surgical floor, where his pain was controlled. His noriega was taken out, however he failed a trial of void so his noriega was reinserted and he was started on flomax, which he was instructed to take as an outpatient for 5 day.

## 2017-01-20 NOTE — DISCHARGE NOTE ADULT - PLAN OF CARE
Recover from Surgery 1. Please follow up with Dr. Rascon within 1-2 weeks after discharge from the hospital. You may call [phone] to schedule an appointment.   2. Do not lift anything over ten pounds. Do not strain. Do not drive as your movements will be restricted by pain. You will be discharged with pain medications, please do not drive while taking these. Do not drink alcohol while taking narcotic pain medications.  3. Regular diet as tolerated Healthcare Maintenance Please follow up with your primary care physician regarding your hospitalization. Please schedule an appointment with your primary care provider within two weeks after your discharge to review your hospital course.

## 2017-01-21 VITALS
HEART RATE: 72 BPM | TEMPERATURE: 97 F | OXYGEN SATURATION: 96 % | RESPIRATION RATE: 18 BRPM | SYSTOLIC BLOOD PRESSURE: 126 MMHG | DIASTOLIC BLOOD PRESSURE: 86 MMHG

## 2017-01-21 RX ORDER — CLOPIDOGREL BISULFATE 75 MG/1
75 TABLET, FILM COATED ORAL DAILY
Qty: 0 | Refills: 0 | Status: DISCONTINUED | OUTPATIENT
Start: 2017-01-21 | End: 2017-01-21

## 2017-01-21 RX ADMIN — Medication 650 MILLIGRAM(S): at 18:15

## 2017-01-21 RX ADMIN — Medication 25 MILLIGRAM(S): at 06:02

## 2017-01-21 RX ADMIN — Medication 650 MILLIGRAM(S): at 07:00

## 2017-01-21 RX ADMIN — Medication 650 MILLIGRAM(S): at 06:03

## 2017-01-21 RX ADMIN — TRAMADOL HYDROCHLORIDE 100 MILLIGRAM(S): 50 TABLET ORAL at 04:03

## 2017-01-21 RX ADMIN — Medication 2: at 08:58

## 2017-01-21 RX ADMIN — Medication 650 MILLIGRAM(S): at 12:30

## 2017-01-21 RX ADMIN — TRAMADOL HYDROCHLORIDE 100 MILLIGRAM(S): 50 TABLET ORAL at 11:47

## 2017-01-21 RX ADMIN — TRAMADOL HYDROCHLORIDE 100 MILLIGRAM(S): 50 TABLET ORAL at 04:17

## 2017-01-21 RX ADMIN — Medication 4: at 13:07

## 2017-01-21 RX ADMIN — Medication 81 MILLIGRAM(S): at 11:43

## 2017-01-21 RX ADMIN — Medication 1 MILLIGRAM(S): at 13:08

## 2017-01-21 RX ADMIN — TRAMADOL HYDROCHLORIDE 100 MILLIGRAM(S): 50 TABLET ORAL at 12:30

## 2017-01-21 RX ADMIN — CLOPIDOGREL BISULFATE 75 MILLIGRAM(S): 75 TABLET, FILM COATED ORAL at 11:43

## 2017-01-21 RX ADMIN — LOSARTAN POTASSIUM 50 MILLIGRAM(S): 100 TABLET, FILM COATED ORAL at 06:02

## 2017-01-21 RX ADMIN — BUPROPION HYDROCHLORIDE 300 MILLIGRAM(S): 150 TABLET, EXTENDED RELEASE ORAL at 11:43

## 2017-01-21 RX ADMIN — Medication 650 MILLIGRAM(S): at 17:36

## 2017-01-21 RX ADMIN — Medication 1 MILLIGRAM(S): at 06:02

## 2017-01-21 RX ADMIN — PANTOPRAZOLE SODIUM 40 MILLIGRAM(S): 20 TABLET, DELAYED RELEASE ORAL at 06:02

## 2017-01-21 RX ADMIN — PANTOPRAZOLE SODIUM 40 MILLIGRAM(S): 20 TABLET, DELAYED RELEASE ORAL at 17:36

## 2017-01-21 RX ADMIN — ENOXAPARIN SODIUM 40 MILLIGRAM(S): 100 INJECTION SUBCUTANEOUS at 11:43

## 2017-01-21 RX ADMIN — Medication 650 MILLIGRAM(S): at 11:42

## 2017-01-25 ENCOUNTER — APPOINTMENT (OUTPATIENT)
Dept: SURGERY | Facility: CLINIC | Age: 67
End: 2017-01-25

## 2017-01-25 VITALS
HEART RATE: 76 BPM | BODY MASS INDEX: 29.4 KG/M2 | HEIGHT: 71 IN | TEMPERATURE: 97.9 F | SYSTOLIC BLOOD PRESSURE: 157 MMHG | DIASTOLIC BLOOD PRESSURE: 98 MMHG | WEIGHT: 210 LBS

## 2017-02-01 ENCOUNTER — APPOINTMENT (OUTPATIENT)
Dept: SURGERY | Facility: CLINIC | Age: 67
End: 2017-02-01

## 2017-02-01 VITALS
HEART RATE: 54 BPM | BODY MASS INDEX: 29.4 KG/M2 | TEMPERATURE: 97.5 F | HEIGHT: 71 IN | SYSTOLIC BLOOD PRESSURE: 158 MMHG | WEIGHT: 210 LBS | DIASTOLIC BLOOD PRESSURE: 91 MMHG

## 2017-02-01 PROBLEM — K46.9 UNSPECIFIED ABDOMINAL HERNIA WITHOUT OBSTRUCTION OR GANGRENE: Chronic | Status: ACTIVE | Noted: 2017-01-05

## 2017-03-01 ENCOUNTER — APPOINTMENT (OUTPATIENT)
Dept: SURGERY | Facility: CLINIC | Age: 67
End: 2017-03-01

## 2017-03-01 VITALS
WEIGHT: 210 LBS | BODY MASS INDEX: 29.4 KG/M2 | SYSTOLIC BLOOD PRESSURE: 135 MMHG | DIASTOLIC BLOOD PRESSURE: 87 MMHG | TEMPERATURE: 97.9 F | HEART RATE: 80 BPM | HEIGHT: 71 IN

## 2017-03-01 DIAGNOSIS — Z09 ENCOUNTER FOR FOLLOW-UP EXAMINATION AFTER COMPLETED TREATMENT FOR CONDITIONS OTHER THAN MALIGNANT NEOPLASM: ICD-10-CM

## 2017-04-05 ENCOUNTER — APPOINTMENT (OUTPATIENT)
Dept: SURGERY | Facility: CLINIC | Age: 67
End: 2017-04-05

## 2017-04-05 VITALS
SYSTOLIC BLOOD PRESSURE: 128 MMHG | DIASTOLIC BLOOD PRESSURE: 84 MMHG | BODY MASS INDEX: 29.4 KG/M2 | HEART RATE: 65 BPM | TEMPERATURE: 98 F | HEIGHT: 71 IN | WEIGHT: 210 LBS

## 2018-09-21 ENCOUNTER — APPOINTMENT (OUTPATIENT)
Dept: SURGERY | Facility: CLINIC | Age: 68
End: 2018-09-21
Payer: MEDICARE

## 2018-09-21 VITALS
HEART RATE: 65 BPM | TEMPERATURE: 98.3 F | BODY MASS INDEX: 28.28 KG/M2 | OXYGEN SATURATION: 99 % | DIASTOLIC BLOOD PRESSURE: 83 MMHG | SYSTOLIC BLOOD PRESSURE: 163 MMHG | WEIGHT: 202 LBS | RESPIRATION RATE: 16 BRPM | HEIGHT: 71 IN

## 2018-09-21 PROCEDURE — 46600 DIAGNOSTIC ANOSCOPY SPX: CPT

## 2018-09-21 PROCEDURE — 99213 OFFICE O/P EST LOW 20 MIN: CPT | Mod: 25

## 2018-09-28 ENCOUNTER — APPOINTMENT (OUTPATIENT)
Dept: SURGERY | Facility: CLINIC | Age: 68
End: 2018-09-28
Payer: MEDICARE

## 2018-09-28 VITALS
TEMPERATURE: 98.1 F | HEART RATE: 63 BPM | OXYGEN SATURATION: 98 % | SYSTOLIC BLOOD PRESSURE: 148 MMHG | RESPIRATION RATE: 16 BRPM | DIASTOLIC BLOOD PRESSURE: 72 MMHG

## 2018-09-28 PROCEDURE — 99213 OFFICE O/P EST LOW 20 MIN: CPT | Mod: 25

## 2018-09-28 PROCEDURE — 46221 LIGATION OF HEMORRHOID(S): CPT

## 2018-10-12 ENCOUNTER — APPOINTMENT (OUTPATIENT)
Dept: SURGERY | Facility: CLINIC | Age: 68
End: 2018-10-12
Payer: MEDICARE

## 2018-10-12 VITALS
SYSTOLIC BLOOD PRESSURE: 134 MMHG | DIASTOLIC BLOOD PRESSURE: 70 MMHG | TEMPERATURE: 98.8 F | RESPIRATION RATE: 17 BRPM | HEART RATE: 80 BPM | OXYGEN SATURATION: 98 %

## 2018-10-12 PROCEDURE — 99213 OFFICE O/P EST LOW 20 MIN: CPT | Mod: 25

## 2018-10-12 PROCEDURE — 46614 ANOSCOPY CONTROL BLEEDING: CPT

## 2018-10-26 ENCOUNTER — TRANSCRIPTION ENCOUNTER (OUTPATIENT)
Age: 68
End: 2018-10-26

## 2019-03-15 ENCOUNTER — APPOINTMENT (OUTPATIENT)
Dept: SURGERY | Facility: CLINIC | Age: 69
End: 2019-03-15
Payer: MEDICARE

## 2019-03-15 VITALS
RESPIRATION RATE: 16 BRPM | OXYGEN SATURATION: 99 % | SYSTOLIC BLOOD PRESSURE: 130 MMHG | DIASTOLIC BLOOD PRESSURE: 84 MMHG | HEART RATE: 76 BPM

## 2019-03-15 PROCEDURE — 99213 OFFICE O/P EST LOW 20 MIN: CPT | Mod: 25

## 2019-03-15 PROCEDURE — 46600 DIAGNOSTIC ANOSCOPY SPX: CPT

## 2019-03-15 RX ORDER — SACUBITRIL AND VALSARTAN 49; 51 MG/1; MG/1
49-51 TABLET, FILM COATED ORAL
Refills: 0 | Status: ACTIVE | COMMUNITY

## 2019-03-15 RX ORDER — INSULIN GLARGINE AND LIXISENATIDE 100; 33 U/ML; UG/ML
INJECTION, SOLUTION SUBCUTANEOUS
Refills: 0 | Status: ACTIVE | COMMUNITY

## 2019-03-15 RX ORDER — FEBUXOSTAT 40 MG/1
40 TABLET ORAL
Refills: 0 | Status: ACTIVE | COMMUNITY

## 2019-03-15 NOTE — REASON FOR VISIT
[Follow-Up: _____] : a [unfilled] follow-up visit [FreeTextEntry1] : RBL done 9/28.18. Last seen 10/12/18 with BRB.

## 2019-03-15 NOTE — HISTORY OF PRESENT ILLNESS
[FreeTextEntry1] : The patient is a 69-year-old gentleman who underwent and ligation for bleeding internal hemorrhoids 6 months ago. He takes Plavix and baby aspirin for history of a stroke 6 years ago. In the office today he states that earlier this week he had bright red blood in the bowl and on the tissue with bowel movements. He also complains of prolapse of tissue with bowel movements in the left lateral position. He denies pain or fever. He last took Plavix and baby aspirin yesterday.

## 2019-03-15 NOTE — ASSESSMENT
[FreeTextEntry1] : In summary the patient has symptomatic prolapsing bleeding internal hemorrhoids. I instructed him to hold his Plavix for the next 5 days and remain on baby aspirin.  He will need to hold his Plavix for several days after the procedure as well. I will see him in the office in one week for rubber band ligations. I explained the risks benefits and alternatives including the risks of bleeding infection and recurrence.

## 2019-03-15 NOTE — CONSULT LETTER
[Dear  ___] : Dear  [unfilled], [Consult Letter:] : I had the pleasure of evaluating your patient, [unfilled]. [Please see my note below.] : Please see my note below. [Consult Closing:] : Thank you very much for allowing me to participate in the care of this patient.  If you have any questions, please do not hesitate to contact me. [Sincerely,] : Sincerely, [FreeTextEntry3] : Franky Martin M.D., F.A.C.S, F.A.S.C.R.S [DrRubi  ___] : Dr. JENNINGS [DrRubi ___] : Dr. JENNINGS

## 2019-03-15 NOTE — PHYSICAL EXAM
[de-identified] : Perianal inspection and digital rectal examination are normal. There is no fissure fistula or abscess. Anoscopy reveals friable enlarged internal hemorrhoids with evidence of bleeding and prolapse in the left lateral position. Because he is on Plavix no rubber band ligations were performed in the office today. There is no active bleeding at the present time.

## 2019-03-22 ENCOUNTER — APPOINTMENT (OUTPATIENT)
Dept: SURGERY | Facility: CLINIC | Age: 69
End: 2019-03-22
Payer: MEDICARE

## 2019-03-22 VITALS
DIASTOLIC BLOOD PRESSURE: 70 MMHG | OXYGEN SATURATION: 97 % | TEMPERATURE: 98.2 F | SYSTOLIC BLOOD PRESSURE: 130 MMHG | RESPIRATION RATE: 17 BRPM | HEART RATE: 80 BPM

## 2019-03-22 DIAGNOSIS — K62.5 HEMORRHAGE OF ANUS AND RECTUM: ICD-10-CM

## 2019-03-22 PROCEDURE — 99213 OFFICE O/P EST LOW 20 MIN: CPT | Mod: 25

## 2019-03-22 PROCEDURE — 46221 LIGATION OF HEMORRHOID(S): CPT

## 2019-03-22 NOTE — ASSESSMENT
[FreeTextEntry1] : In summary the patient has intermittently bleeding internal hemorrhoids. Rubber band ligations were performed in the office today. He will followup with me as needed if his bleeding recurs. He will resume Plavix in 4 days and remain on baby aspirin only until then.

## 2019-03-22 NOTE — HISTORY OF PRESENT ILLNESS
[FreeTextEntry1] : The patient is a 69-year-old gentleman who underwent and ligation for bleeding internal hemorrhoids 6 months ago. He takes Plavix and baby aspirin for history of a stroke 6 years ago. He he presents for a repeat rubber band ligations as he recently had rectal bleeding.

## 2019-03-22 NOTE — PHYSICAL EXAM
[No Rash or Lesion] : No rash or lesion [Alert] : alert [Calm] : calm [de-identified] : Perianal inspection digital rectal examination and anoscopy revealed enlarged internal hemorrhoids with evidence of recent bleeding. Rubber band ligations were performed in the right anterior right posterior and left lateral positions. He tolerated the procedure well and a post procedure instruction he was given. [de-identified] : nad [de-identified] : nl

## 2019-08-27 NOTE — H&P PST ADULT - URINARY CATHETER
Paraesthesias  Paraesthesia is a burning or prickling sensation that is sometimes felt in the hands, arms, legs or feet. It can also occur in other parts of the body. It can also feel like tingling or numbness, skin crawling, or itching. The feeling is not comfortable, but it is not painful. (The \"pins and needles\" feeling that happens when a foot or hand \"falls asleep\" is a temporary paraesthesia.)  Paraesthesias that last or come and go may be caused by medical issues that need to be treated. These include stroke, a bulging disk pressing on a nerve, a trapped nerve, vitamin deficiencies, uncontrolled diabetes, alcohol abuse, or even certain medicines.  Tests are often done. These tests may include blood tests, X-ray, CT (computerized tomography) scan, nerve conduction studies (NCS), or a muscle test (electromyography). Depending on the cause, treatment may include physical therapy.  Home care  · Tell your healthcare provider about all medicines you take. This includes prescription and over-the-counter medicines, vitamins, and herbs. Ask if any of the medicines may be causing your problems. Don't make any changes to prescription medicines without talking to your healthcare provider first.  · You may be prescribed medicines to help relieve the tingling feeling or for pain. Take all medicines as directed.  · A numb hand or foot may be more prone to injury. To help protect it:  ? Always use oven mitts.  ? Test water with an unaffected hand or foot.  ? Use caution when trimming nails. File sharp areas.  ? Wear shoes that fit well to avoid pressure points, blisters, and ulcers.  ? Inspect your hands and feet carefully (including the soles of your feet and between your toes) daily. If you see red areas, sores, or other problems, tell your healthcare provider.  Follow-up care  Follow up with your doctor, or as advised. You may need further testing or evaluation.     When to seek medical advice  Call your healthcare  provider right away if any of the following occur:  · Numbness or weakness of the face, one arm, or one leg  · Slurred speech, confusion, trouble speaking, walking, or seeing  · Severe headache, fainting spell, dizziness, or seizure  · Chest, arm, neck, or upper back pain  · Loss of bladder or bowel control  · Open wound with redness, swelling, or pus         Date Last Reviewed: 4/1/2018  © 6988-8415 [employer]. [address] [place] [id number]. All rights reserved. This information is not intended as a substitute for professional medical care. Always follow your healthcare professional's instructions.         no

## 2019-12-09 ENCOUNTER — APPOINTMENT (OUTPATIENT)
Dept: ORTHOPEDIC SURGERY | Facility: CLINIC | Age: 69
End: 2019-12-09
Payer: MEDICARE

## 2019-12-09 VITALS
HEIGHT: 71 IN | HEART RATE: 90 BPM | SYSTOLIC BLOOD PRESSURE: 144 MMHG | WEIGHT: 208 LBS | BODY MASS INDEX: 29.12 KG/M2 | DIASTOLIC BLOOD PRESSURE: 85 MMHG

## 2019-12-09 DIAGNOSIS — Z82.61 FAMILY HISTORY OF ARTHRITIS: ICD-10-CM

## 2019-12-09 PROCEDURE — 72110 X-RAY EXAM L-2 SPINE 4/>VWS: CPT

## 2019-12-09 PROCEDURE — 99204 OFFICE O/P NEW MOD 45 MIN: CPT

## 2019-12-09 RX ORDER — SILDENAFIL 20 MG/1
TABLET ORAL
Refills: 0 | Status: ACTIVE | COMMUNITY

## 2019-12-09 RX ORDER — ZOLPIDEM TARTRATE 5 MG/1
TABLET, FILM COATED ORAL
Refills: 0 | Status: ACTIVE | COMMUNITY

## 2019-12-09 RX ORDER — TRAMADOL HYDROCHLORIDE 100 MG/1
100 CAPSULE ORAL
Refills: 0 | Status: ACTIVE | COMMUNITY

## 2019-12-09 RX ORDER — FUROSEMIDE 80 MG/1
TABLET ORAL
Refills: 0 | Status: ACTIVE | COMMUNITY

## 2019-12-09 RX ORDER — LOSARTAN POTASSIUM AND HYDROCHLOROTHIAZIDE 12.5; 1 MG/1; MG/1
TABLET ORAL
Refills: 0 | Status: ACTIVE | COMMUNITY

## 2019-12-17 ENCOUNTER — FORM ENCOUNTER (OUTPATIENT)
Age: 69
End: 2019-12-17

## 2019-12-18 ENCOUNTER — APPOINTMENT (OUTPATIENT)
Dept: RADIOLOGY | Facility: HOSPITAL | Age: 69
End: 2019-12-18

## 2019-12-18 ENCOUNTER — OUTPATIENT (OUTPATIENT)
Dept: OUTPATIENT SERVICES | Facility: HOSPITAL | Age: 69
LOS: 1 days | End: 2019-12-18
Payer: MEDICARE

## 2019-12-18 ENCOUNTER — INPATIENT (INPATIENT)
Facility: HOSPITAL | Age: 69
LOS: 4 days | Discharge: HOME | DRG: 194 | End: 2019-12-23
Attending: HOSPITALIST | Admitting: HOSPITALIST
Payer: MEDICARE

## 2019-12-18 VITALS
DIASTOLIC BLOOD PRESSURE: 59 MMHG | TEMPERATURE: 100 F | OXYGEN SATURATION: 96 % | HEART RATE: 89 BPM | SYSTOLIC BLOOD PRESSURE: 94 MMHG | RESPIRATION RATE: 16 BRPM | HEIGHT: 71 IN | WEIGHT: 203.93 LBS

## 2019-12-18 DIAGNOSIS — M54.16 RADICULOPATHY, LUMBAR REGION: ICD-10-CM

## 2019-12-18 DIAGNOSIS — Z98.890 OTHER SPECIFIED POSTPROCEDURAL STATES: Chronic | ICD-10-CM

## 2019-12-18 DIAGNOSIS — Z00.00 ENCOUNTER FOR GENERAL ADULT MEDICAL EXAMINATION WITHOUT ABNORMAL FINDINGS: ICD-10-CM

## 2019-12-18 DIAGNOSIS — M48.061 SPINAL STENOSIS, LUMBAR REGION WITHOUT NEUROGENIC CLAUDICATION: ICD-10-CM

## 2019-12-18 LAB — GLUCOSE BLDC GLUCOMTR-MCNC: 144 MG/DL — HIGH (ref 70–99)

## 2019-12-18 PROCEDURE — 62305 MYELOGRAPHY LUMBAR INJECTION: CPT

## 2019-12-18 PROCEDURE — 72132 CT LUMBAR SPINE W/DYE: CPT | Mod: 26

## 2019-12-18 PROCEDURE — 72129 CT CHEST SPINE W/DYE: CPT | Mod: 26

## 2019-12-18 PROCEDURE — 72129 CT CHEST SPINE W/DYE: CPT

## 2019-12-18 PROCEDURE — 82962 GLUCOSE BLOOD TEST: CPT

## 2019-12-18 PROCEDURE — 99285 EMERGENCY DEPT VISIT HI MDM: CPT

## 2019-12-18 PROCEDURE — 72132 CT LUMBAR SPINE W/DYE: CPT

## 2019-12-18 NOTE — ED PROVIDER NOTE - PHYSICAL EXAMINATION
General: Patient awake alert NAD.   HEENT: normocephalic, atraumatic, EOMI, XOCHITL, no scleral icterus.   Cardiac: RRR, S1, S2, no murmur, rubs, gallop.   LUNGS: + diminished breath sounds diffusely, mild rhonchi, and exp wheeeze at b/l bases. no crackles.   Abdomen: soft NT, ND, no rebound no guarding, no CVA tenderness.   EXT: Moving all extremities, 5/5 strength in all extremities.   Back: +midline Lumbar tenderness, no midline thoracic and cervical spine tenderness.   Neuro: A&Ox3, gait normal, no focal neurological deficits, CN 2-12 intact  Skin: warm, dry, no rash, no lesions

## 2019-12-18 NOTE — ED PROVIDER NOTE - NS ED ROS FT
GENERAL: + fever, no chills  EYES: no vision changes, no discharge.   HEENT: no difficulty swallowing or speaking   CARDIAC: no chest pain/pressure, SOB, lower ex edema  PULMONARY: +cough, no SOB  GI: no abdominal pain, n/v/d/c  : no dysuria, frequency, hematuria  SKIN: no rashes, lesions, vesicles  NEURO: no headache, lightheadedness, paraesthesias.   MSK: + back pain, no myalgia, + bilateral lower extremities weakness.

## 2019-12-18 NOTE — ED ADULT NURSE NOTE - PSH
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

## 2019-12-18 NOTE — ED ADULT NURSE NOTE - OBJECTIVE STATEMENT
Pt is a 69y Male c/o fever since this afternoon at home was 103.3 s/p CT Myelogram this morning. Pt PMH diabetes, CVA, CAD, Anxiety, HTN, Spinal Stenosis of lumbar spine. Pt states he fell out of bed this morning and has a laceration on his L elbow. Pt states after the myelogram this morning he started to get feverish with chills. Pt took his fever and contacted the IR MD who told him to follow up in a few hours and then referred to the ED. Pt has had a cough with sputum for the past 3 days. Pt denies SOB, chest pain, dizziness, N/V/D, constipation. Pt is comfortable with family at bedside. IV started.

## 2019-12-18 NOTE — ED ADULT NURSE NOTE - NSIMPLEMENTINTERV_GEN_ALL_ED
Implemented All Fall Risk Interventions:  [place] to call system. Call bell, personal items and telephone within reach. Instruct patient to call for assistance. Room bathroom lighting operational. Non-slip footwear when patient is off stretcher. Physically safe environment: no spills, clutter or unnecessary equipment. Stretcher in lowest position, wheels locked, appropriate side rails in place. Provide visual cue, wrist band, yellow gown, etc. Monitor gait and stability. Monitor for mental status changes and reorient to person, place, and time. Review medications for side effects contributing to fall risk. Reinforce activity limits and safety measures with patient and family.

## 2019-12-18 NOTE — ED PROVIDER NOTE - CLINICAL SUMMARY MEDICAL DECISION MAKING FREE TEXT BOX
Elderly male pw fever, instrumentation to back and episode of leg weakness causing him to fall. cannot rule out epidural hematoma or abscess- obtain MR. Possible other source of fever respiratory or UTI. Likely require MR and stay in CDU.

## 2019-12-18 NOTE — ED PROVIDER NOTE - ATTENDING CONTRIBUTION TO CARE
Pt s/p myelogram today now with fever and reported fall from standing. 2 days of URi sx but no fever till today. very well appearing on exam, neuro intact. will assess for source of infection, could be flu vs uri vs pna given preceding sx but given procedure today will also need spine imaging to r/o hematoma vs abscess (although low suspicion given timeframe). clinically pt does not appear to have meningitis but if no clear source this may need to be considered. will hold abx for now, expect CDU for MRI, re-eval. will touch base with IR team.

## 2019-12-18 NOTE — ED PROVIDER NOTE - OBJECTIVE STATEMENT
69 M, pmhx Benign adrenal tumor, HTN, CAD, CVA, DM2, CABG, lumbar laminectomy, pw fever sp myelogram this afternoon with Neuroradiologist Dr. Stinson. Fever as high as 103.3 at home. Of note, pt fell out of bed this AM, landed on his L elbow, no LOC, no head trauma + left elbow skin lacertion. 69 M, pmhx Benign adrenal tumor, HTN, CAD, CVA, DM2, CABG, lumbar laminectomy, pw fever sp myelogram this afternoon with Neuroradiologist Dr. Stinson. Fever as high as 103.3 at home. Pt then had an episode of weakness in b/l legs causing him to fall and land on his buttock while trying to get out of bed. Pt endorse mildly productive cough and generalized malaise for last 3 days, pt son is sick contact. Of note, pt also had an episode of fell out of bed this AM (prior to myelogram), landed on his L elbow, no LOC, no head trauma + left elbow skin tear.

## 2019-12-18 NOTE — ED PROVIDER NOTE - PSH
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

## 2019-12-19 DIAGNOSIS — Z29.9 ENCOUNTER FOR PROPHYLACTIC MEASURES, UNSPECIFIED: ICD-10-CM

## 2019-12-19 DIAGNOSIS — I25.10 ATHEROSCLEROTIC HEART DISEASE OF NATIVE CORONARY ARTERY WITHOUT ANGINA PECTORIS: ICD-10-CM

## 2019-12-19 DIAGNOSIS — M48.00 SPINAL STENOSIS, SITE UNSPECIFIED: ICD-10-CM

## 2019-12-19 DIAGNOSIS — Z95.0 PRESENCE OF CARDIAC PACEMAKER: Chronic | ICD-10-CM

## 2019-12-19 DIAGNOSIS — I63.9 CEREBRAL INFARCTION, UNSPECIFIED: ICD-10-CM

## 2019-12-19 DIAGNOSIS — I10 ESSENTIAL (PRIMARY) HYPERTENSION: ICD-10-CM

## 2019-12-19 DIAGNOSIS — J11.1 INFLUENZA DUE TO UNIDENTIFIED INFLUENZA VIRUS WITH OTHER RESPIRATORY MANIFESTATIONS: ICD-10-CM

## 2019-12-19 DIAGNOSIS — M48.061 SPINAL STENOSIS, LUMBAR REGION WITHOUT NEUROGENIC CLAUDICATION: ICD-10-CM

## 2019-12-19 DIAGNOSIS — R06.02 SHORTNESS OF BREATH: ICD-10-CM

## 2019-12-19 DIAGNOSIS — Z02.9 ENCOUNTER FOR ADMINISTRATIVE EXAMINATIONS, UNSPECIFIED: ICD-10-CM

## 2019-12-19 DIAGNOSIS — I27.9 PULMONARY HEART DISEASE, UNSPECIFIED: ICD-10-CM

## 2019-12-19 DIAGNOSIS — R50.9 FEVER, UNSPECIFIED: ICD-10-CM

## 2019-12-19 DIAGNOSIS — E11.9 TYPE 2 DIABETES MELLITUS WITHOUT COMPLICATIONS: ICD-10-CM

## 2019-12-19 DIAGNOSIS — F41.9 ANXIETY DISORDER, UNSPECIFIED: ICD-10-CM

## 2019-12-19 LAB
ALBUMIN SERPL ELPH-MCNC: 3.9 G/DL — SIGNIFICANT CHANGE UP (ref 3.3–5)
ALP SERPL-CCNC: 54 U/L — SIGNIFICANT CHANGE UP (ref 40–120)
ALT FLD-CCNC: 21 U/L — SIGNIFICANT CHANGE UP (ref 10–45)
ANION GAP SERPL CALC-SCNC: 15 MMOL/L — SIGNIFICANT CHANGE UP (ref 5–17)
APPEARANCE UR: CLEAR — SIGNIFICANT CHANGE UP
APTT BLD: 31.1 SEC — SIGNIFICANT CHANGE UP (ref 27.5–36.3)
AST SERPL-CCNC: 27 U/L — SIGNIFICANT CHANGE UP (ref 10–40)
BASOPHILS # BLD AUTO: 0.03 K/UL — SIGNIFICANT CHANGE UP (ref 0–0.2)
BASOPHILS NFR BLD AUTO: 0.4 % — SIGNIFICANT CHANGE UP (ref 0–2)
BILIRUB SERPL-MCNC: 0.1 MG/DL — LOW (ref 0.2–1.2)
BILIRUB UR-MCNC: NEGATIVE — SIGNIFICANT CHANGE UP
BLD GP AB SCN SERPL QL: NEGATIVE — SIGNIFICANT CHANGE UP
BUN SERPL-MCNC: 21 MG/DL — SIGNIFICANT CHANGE UP (ref 7–23)
CALCIUM SERPL-MCNC: 9.8 MG/DL — SIGNIFICANT CHANGE UP (ref 8.4–10.5)
CHLORIDE SERPL-SCNC: 98 MMOL/L — SIGNIFICANT CHANGE UP (ref 96–108)
CO2 SERPL-SCNC: 22 MMOL/L — SIGNIFICANT CHANGE UP (ref 22–31)
COLOR SPEC: SIGNIFICANT CHANGE UP
CREAT SERPL-MCNC: 1.15 MG/DL — SIGNIFICANT CHANGE UP (ref 0.5–1.3)
CRP SERPL-MCNC: 5.34 MG/DL — HIGH (ref 0–0.4)
DIFF PNL FLD: NEGATIVE — SIGNIFICANT CHANGE UP
EOSINOPHIL # BLD AUTO: 0.01 K/UL — SIGNIFICANT CHANGE UP (ref 0–0.5)
EOSINOPHIL NFR BLD AUTO: 0.1 % — SIGNIFICANT CHANGE UP (ref 0–6)
ERYTHROCYTE [SEDIMENTATION RATE] IN BLOOD: 12 MM/HR — SIGNIFICANT CHANGE UP (ref 0–20)
FLUAV H3 RNA SPEC QL NAA+PROBE: DETECTED
GAS PNL BLDV: SIGNIFICANT CHANGE UP
GAS PNL BLDV: SIGNIFICANT CHANGE UP
GLUCOSE BLDC GLUCOMTR-MCNC: 117 MG/DL — HIGH (ref 70–99)
GLUCOSE BLDC GLUCOMTR-MCNC: 212 MG/DL — HIGH (ref 70–99)
GLUCOSE SERPL-MCNC: 136 MG/DL — HIGH (ref 70–99)
GLUCOSE UR QL: ABNORMAL
HCT VFR BLD CALC: 32.9 % — LOW (ref 39–50)
HGB BLD-MCNC: 10.3 G/DL — LOW (ref 13–17)
IMM GRANULOCYTES NFR BLD AUTO: 0.5 % — SIGNIFICANT CHANGE UP (ref 0–1.5)
INR BLD: 1.24 RATIO — HIGH (ref 0.88–1.16)
KETONES UR-MCNC: NEGATIVE — SIGNIFICANT CHANGE UP
LEUKOCYTE ESTERASE UR-ACNC: NEGATIVE — SIGNIFICANT CHANGE UP
LYMPHOCYTES # BLD AUTO: 0.61 K/UL — LOW (ref 1–3.3)
LYMPHOCYTES # BLD AUTO: 7.6 % — LOW (ref 13–44)
MCHC RBC-ENTMCNC: 26.6 PG — LOW (ref 27–34)
MCHC RBC-ENTMCNC: 31.3 GM/DL — LOW (ref 32–36)
MCV RBC AUTO: 85 FL — SIGNIFICANT CHANGE UP (ref 80–100)
MONOCYTES # BLD AUTO: 0.93 K/UL — HIGH (ref 0–0.9)
MONOCYTES NFR BLD AUTO: 11.7 % — SIGNIFICANT CHANGE UP (ref 2–14)
NEUTROPHILS # BLD AUTO: 6.36 K/UL — SIGNIFICANT CHANGE UP (ref 1.8–7.4)
NEUTROPHILS NFR BLD AUTO: 79.7 % — HIGH (ref 43–77)
NITRITE UR-MCNC: NEGATIVE — SIGNIFICANT CHANGE UP
NRBC # BLD: 0 /100 WBCS — SIGNIFICANT CHANGE UP (ref 0–0)
PH UR: 6 — SIGNIFICANT CHANGE UP (ref 5–8)
PLATELET # BLD AUTO: 279 K/UL — SIGNIFICANT CHANGE UP (ref 150–400)
POTASSIUM SERPL-MCNC: 4.4 MMOL/L — SIGNIFICANT CHANGE UP (ref 3.5–5.3)
POTASSIUM SERPL-SCNC: 4.4 MMOL/L — SIGNIFICANT CHANGE UP (ref 3.5–5.3)
PROT SERPL-MCNC: 6.6 G/DL — SIGNIFICANT CHANGE UP (ref 6–8.3)
PROT UR-MCNC: SIGNIFICANT CHANGE UP
PROTHROM AB SERPL-ACNC: 14.3 SEC — HIGH (ref 10–12.9)
RAPID RVP RESULT: DETECTED
RBC # BLD: 3.87 M/UL — LOW (ref 4.2–5.8)
RBC # FLD: 15 % — HIGH (ref 10.3–14.5)
RH IG SCN BLD-IMP: POSITIVE — SIGNIFICANT CHANGE UP
SODIUM SERPL-SCNC: 135 MMOL/L — SIGNIFICANT CHANGE UP (ref 135–145)
SP GR SPEC: 1.02 — SIGNIFICANT CHANGE UP (ref 1.01–1.02)
UROBILINOGEN FLD QL: NEGATIVE — SIGNIFICANT CHANGE UP
WBC # BLD: 7.98 K/UL — SIGNIFICANT CHANGE UP (ref 3.8–10.5)
WBC # FLD AUTO: 7.98 K/UL — SIGNIFICANT CHANGE UP (ref 3.8–10.5)

## 2019-12-19 PROCEDURE — 71046 X-RAY EXAM CHEST 2 VIEWS: CPT | Mod: 26

## 2019-12-19 PROCEDURE — 99223 1ST HOSP IP/OBS HIGH 75: CPT | Mod: GC

## 2019-12-19 PROCEDURE — 70450 CT HEAD/BRAIN W/O DYE: CPT | Mod: 26

## 2019-12-19 PROCEDURE — 99222 1ST HOSP IP/OBS MODERATE 55: CPT

## 2019-12-19 RX ORDER — METFORMIN HYDROCHLORIDE 850 MG/1
1 TABLET ORAL
Qty: 0 | Refills: 0 | DISCHARGE

## 2019-12-19 RX ORDER — BUPROPION HYDROCHLORIDE 150 MG/1
300 TABLET, EXTENDED RELEASE ORAL DAILY
Refills: 0 | Status: DISCONTINUED | OUTPATIENT
Start: 2019-12-19 | End: 2019-12-23

## 2019-12-19 RX ORDER — FAMOTIDINE 10 MG/ML
40 INJECTION INTRAVENOUS
Refills: 0 | Status: DISCONTINUED | OUTPATIENT
Start: 2019-12-19 | End: 2019-12-23

## 2019-12-19 RX ORDER — CLOPIDOGREL BISULFATE 75 MG/1
75 TABLET, FILM COATED ORAL DAILY
Refills: 0 | Status: DISCONTINUED | OUTPATIENT
Start: 2019-12-19 | End: 2019-12-23

## 2019-12-19 RX ORDER — METHYLPREDNISOLONE 4 MG
500 TABLET ORAL
Refills: 0 | Status: DISCONTINUED | OUTPATIENT
Start: 2019-12-19 | End: 2019-12-23

## 2019-12-19 RX ORDER — TRAMADOL HYDROCHLORIDE 50 MG/1
100 TABLET ORAL THREE TIMES A DAY
Refills: 0 | Status: DISCONTINUED | OUTPATIENT
Start: 2019-12-19 | End: 2019-12-19

## 2019-12-19 RX ORDER — SODIUM CHLORIDE 9 MG/ML
1000 INJECTION INTRAMUSCULAR; INTRAVENOUS; SUBCUTANEOUS ONCE
Refills: 0 | Status: COMPLETED | OUTPATIENT
Start: 2019-12-19 | End: 2019-12-19

## 2019-12-19 RX ORDER — ZOLPIDEM TARTRATE 10 MG/1
5 TABLET ORAL AT BEDTIME
Refills: 0 | Status: DISCONTINUED | OUTPATIENT
Start: 2019-12-19 | End: 2019-12-19

## 2019-12-19 RX ORDER — SODIUM CHLORIDE 9 MG/ML
1000 INJECTION, SOLUTION INTRAVENOUS
Refills: 0 | Status: DISCONTINUED | OUTPATIENT
Start: 2019-12-19 | End: 2019-12-23

## 2019-12-19 RX ORDER — TRAMADOL HYDROCHLORIDE 50 MG/1
1 TABLET ORAL
Qty: 0 | Refills: 0 | DISCHARGE

## 2019-12-19 RX ORDER — IPRATROPIUM/ALBUTEROL SULFATE 18-103MCG
3 AEROSOL WITH ADAPTER (GRAM) INHALATION ONCE
Refills: 0 | Status: COMPLETED | OUTPATIENT
Start: 2019-12-19 | End: 2019-12-19

## 2019-12-19 RX ORDER — ASPIRIN/CALCIUM CARB/MAGNESIUM 324 MG
81 TABLET ORAL DAILY
Refills: 0 | Status: DISCONTINUED | OUTPATIENT
Start: 2019-12-19 | End: 2019-12-23

## 2019-12-19 RX ORDER — ACETAMINOPHEN 500 MG
650 TABLET ORAL EVERY 6 HOURS
Refills: 0 | Status: DISCONTINUED | OUTPATIENT
Start: 2019-12-19 | End: 2019-12-23

## 2019-12-19 RX ORDER — TRAMADOL HYDROCHLORIDE 50 MG/1
100 TABLET ORAL THREE TIMES A DAY
Refills: 0 | Status: DISCONTINUED | OUTPATIENT
Start: 2019-12-19 | End: 2019-12-23

## 2019-12-19 RX ORDER — FUROSEMIDE 40 MG
20 TABLET ORAL
Refills: 0 | Status: DISCONTINUED | OUTPATIENT
Start: 2019-12-19 | End: 2019-12-23

## 2019-12-19 RX ORDER — FUROSEMIDE 40 MG
40 TABLET ORAL
Refills: 0 | Status: DISCONTINUED | OUTPATIENT
Start: 2019-12-19 | End: 2019-12-23

## 2019-12-19 RX ORDER — GLUCAGON INJECTION, SOLUTION 0.5 MG/.1ML
1 INJECTION, SOLUTION SUBCUTANEOUS ONCE
Refills: 0 | Status: DISCONTINUED | OUTPATIENT
Start: 2019-12-19 | End: 2019-12-23

## 2019-12-19 RX ORDER — DEXTROSE 50 % IN WATER 50 %
25 SYRINGE (ML) INTRAVENOUS ONCE
Refills: 0 | Status: DISCONTINUED | OUTPATIENT
Start: 2019-12-19 | End: 2019-12-23

## 2019-12-19 RX ORDER — CLONAZEPAM 1 MG
0 TABLET ORAL
Qty: 0 | Refills: 0 | DISCHARGE

## 2019-12-19 RX ORDER — BUDESONIDE AND FORMOTEROL FUMARATE DIHYDRATE 160; 4.5 UG/1; UG/1
2 AEROSOL RESPIRATORY (INHALATION)
Refills: 0 | Status: DISCONTINUED | OUTPATIENT
Start: 2019-12-19 | End: 2019-12-23

## 2019-12-19 RX ORDER — INSULIN LISPRO 100/ML
6 VIAL (ML) SUBCUTANEOUS
Refills: 0 | Status: DISCONTINUED | OUTPATIENT
Start: 2019-12-19 | End: 2019-12-20

## 2019-12-19 RX ORDER — ATORVASTATIN CALCIUM 80 MG/1
20 TABLET, FILM COATED ORAL
Refills: 0 | Status: DISCONTINUED | OUTPATIENT
Start: 2019-12-19 | End: 2019-12-23

## 2019-12-19 RX ORDER — CALCIUM CARBONATE 500(1250)
1 TABLET ORAL THREE TIMES A DAY
Refills: 0 | Status: DISCONTINUED | OUTPATIENT
Start: 2019-12-19 | End: 2019-12-23

## 2019-12-19 RX ORDER — DEXTROSE 50 % IN WATER 50 %
12.5 SYRINGE (ML) INTRAVENOUS ONCE
Refills: 0 | Status: DISCONTINUED | OUTPATIENT
Start: 2019-12-19 | End: 2019-12-23

## 2019-12-19 RX ORDER — CLONAZEPAM 1 MG
1 TABLET ORAL
Refills: 0 | Status: DISCONTINUED | OUTPATIENT
Start: 2019-12-19 | End: 2019-12-20

## 2019-12-19 RX ORDER — ZOLPIDEM TARTRATE 10 MG/1
5 TABLET ORAL AT BEDTIME
Refills: 0 | Status: DISCONTINUED | OUTPATIENT
Start: 2019-12-19 | End: 2019-12-23

## 2019-12-19 RX ORDER — DEXTROSE 50 % IN WATER 50 %
15 SYRINGE (ML) INTRAVENOUS ONCE
Refills: 0 | Status: DISCONTINUED | OUTPATIENT
Start: 2019-12-19 | End: 2019-12-23

## 2019-12-19 RX ORDER — INSULIN GLARGINE 100 [IU]/ML
25 INJECTION, SOLUTION SUBCUTANEOUS AT BEDTIME
Refills: 0 | Status: DISCONTINUED | OUTPATIENT
Start: 2019-12-19 | End: 2019-12-20

## 2019-12-19 RX ORDER — ACETAMINOPHEN 500 MG
650 TABLET ORAL ONCE
Refills: 0 | Status: COMPLETED | OUTPATIENT
Start: 2019-12-19 | End: 2019-12-19

## 2019-12-19 RX ADMIN — Medication 3 MILLILITER(S): at 20:25

## 2019-12-19 RX ADMIN — Medication 75 MILLIGRAM(S): at 20:29

## 2019-12-19 RX ADMIN — Medication 500 MILLIGRAM(S): at 20:28

## 2019-12-19 RX ADMIN — CLOPIDOGREL BISULFATE 75 MILLIGRAM(S): 75 TABLET, FILM COATED ORAL at 13:25

## 2019-12-19 RX ADMIN — Medication 6 UNIT(S): at 09:23

## 2019-12-19 RX ADMIN — Medication 75 MILLIGRAM(S): at 09:22

## 2019-12-19 RX ADMIN — Medication 1 MILLIGRAM(S): at 13:29

## 2019-12-19 RX ADMIN — ZOLPIDEM TARTRATE 5 MILLIGRAM(S): 10 TABLET ORAL at 22:11

## 2019-12-19 RX ADMIN — ZOLPIDEM TARTRATE 5 MILLIGRAM(S): 10 TABLET ORAL at 22:45

## 2019-12-19 RX ADMIN — Medication 650 MILLIGRAM(S): at 20:28

## 2019-12-19 RX ADMIN — Medication 1 MILLIGRAM(S): at 20:42

## 2019-12-19 RX ADMIN — BUPROPION HYDROCHLORIDE 300 MILLIGRAM(S): 150 TABLET, EXTENDED RELEASE ORAL at 20:29

## 2019-12-19 RX ADMIN — FAMOTIDINE 40 MILLIGRAM(S): 10 INJECTION INTRAVENOUS at 13:29

## 2019-12-19 RX ADMIN — INSULIN GLARGINE 25 UNIT(S): 100 INJECTION, SOLUTION SUBCUTANEOUS at 22:13

## 2019-12-19 RX ADMIN — Medication 650 MILLIGRAM(S): at 01:30

## 2019-12-19 RX ADMIN — Medication 650 MILLIGRAM(S): at 11:30

## 2019-12-19 RX ADMIN — FAMOTIDINE 40 MILLIGRAM(S): 10 INJECTION INTRAVENOUS at 22:11

## 2019-12-19 RX ADMIN — Medication 81 MILLIGRAM(S): at 13:25

## 2019-12-19 RX ADMIN — SODIUM CHLORIDE 1000 MILLILITER(S): 9 INJECTION INTRAMUSCULAR; INTRAVENOUS; SUBCUTANEOUS at 01:04

## 2019-12-19 RX ADMIN — Medication 3 MILLILITER(S): at 03:02

## 2019-12-19 RX ADMIN — Medication 650 MILLIGRAM(S): at 01:05

## 2019-12-19 RX ADMIN — BUDESONIDE AND FORMOTEROL FUMARATE DIHYDRATE 2 PUFF(S): 160; 4.5 AEROSOL RESPIRATORY (INHALATION) at 22:12

## 2019-12-19 NOTE — H&P ADULT - PROBLEM SELECTOR PLAN 10
Discharge planning issues.  Plan: Transitions of Care Status:  1.  Name of PCP: Dr. Manolo Rice  2.  PCP Contacted on Admission: [ ] Y    [ ] N    3.  PCP contacted at Discharge: [ ] Y    [ ] N    [ ] N/A  4.  Post-Discharge Appointment Date and Location:  5.  Summary of Handoff given to PCP: Discharge planning issues.  Plan: Transitions of Care Status:  1.  Name of PCP: Dr. Manolo Rice  2.  PCP Contacted on Admission: [ ] Y    [ ] N  - attempted  3.  PCP contacted at Discharge: [ ] Y    [ ] N    [ ] N/A  4.  Post-Discharge Appointment Date and Location:  5.  Summary of Handoff given to PCP:

## 2019-12-19 NOTE — PHYSICAL THERAPY INITIAL EVALUATION ADULT - PLANNED THERAPY INTERVENTIONS, PT EVAL
Pt will negotiate 1 flight of stairs indepenedently in 2 weeks./balance training/bed mobility training/transfer training/gait training

## 2019-12-19 NOTE — H&P ADULT - ASSESSMENT
69 PMHx spinal stenosis s/p lumbar laminectomy (2015), CVA (2013, no residual deficits), CAD s/p CABG (4-vessel), DM2 (on insulin), benign adrenal tumor s/p L adrenalectomy, HTN (no longer on meds to treat), p/w fever and fall s/p myelogram on 12/16 a/f influenza (+RVP) management and r/o of abnormal spinal etiology such as hematoma or abscess.

## 2019-12-19 NOTE — PHYSICAL THERAPY INITIAL EVALUATION ADULT - PERTINENT HX OF CURRENT PROBLEM, REHAB EVAL
69 PMHx spinal stenosis s/p lumbar laminectomy (2015), CVA (2013, no residual deficits), CAD s/p CABG (4-vessel), DM2 (on insulin), benign adrenal tumor s/p L adrenalectomy, HTN (no longer on meds to treat), p/w fever at home to 103.5 and fall s/p myelogram on 12/18 with Neuroradiologist, CXR no clear cut PNA

## 2019-12-19 NOTE — PHYSICAL THERAPY INITIAL EVALUATION ADULT - PRECAUTIONS/LIMITATIONS, REHAB EVAL
falls-chronic back pain-no worsening-myelogram site with no s/s external infection, CT with no new findings to suggest infection/fall precautions

## 2019-12-19 NOTE — H&P ADULT - PROBLEM SELECTOR PLAN 9
DVT: SCDs until CTH negative  Diet: DASH/TLC/CC  Dispo: clinical improvement, ortho clearance & PT eval DVT: SCDs until head imaging negative  Diet: DASH/TLC/CC  Dispo: clinical improvement, ortho clearance & PT eval DVT: SCDs until head imaging negative for acute bleed  Diet: DASH/TLC/CC  Dispo: clinical improvement, ortho clearance & PT eval DVT: SCDs until CTH  negative for acute bleed  Diet: DASH/TLC/CC  Dispo: clinical improvement, ortho clearance & PT eval DVT: SCDs until CTH  negative for acute bleed  Diet: DASH/TLC/CC  GERD ppx: c/w home famotidine, TUMS PRN  Dispo: clinical improvement, ortho clearance & PT eval

## 2019-12-19 NOTE — H&P ADULT - NSHPREVIEWOFSYSTEMS_GEN_ALL_CORE
REVIEW OF SYSTEMS:    CONSTITUTIONAL: +fevers, No weakness or chills  EYES/ENT: +eye burning, No visual changes;  No vertigo or throat pain   NECK: No pain or stiffness  RESPIRATORY: +cough, +SOB, No wheezing, hemoptysis  CARDIOVASCULAR: No chest pain or palpitations  GASTROINTESTINAL: No abdominal or epigastric pain. No nausea, vomiting, or hematemesis; No diarrhea or constipation. No melena or hematochezia.  GENITOURINARY: No dysuria, frequency or hematuria  NEUROLOGICAL: +numbness on LEs b/l and tips of last 3 digits, +weakness from fall  SKIN: No itching, rashes

## 2019-12-19 NOTE — CONSULT NOTE ADULT - ASSESSMENT
69 PMHx spinal stenosis s/p lumbar laminectomy (2015), CVA (2013, no residual deficits), CAD s/p CABG (4-vessel), DM2 (on insulin), benign adrenal tumor s/p L adrenalectomy, HTN (no longer on meds to treat), p/w fever at home to 103.5 and fall s/p myelogram on 12/18 with Neuroradiologist Dr. Stinson.     Fevers  + cough  + rhinorrhea    RVP + for influenza  CXR no clear cut PNA  falls-chronic back pain-no worsening-myelogram site with no s/s external infection, CT with no new findings to suggest infection  Clinically no s/s any other foci    Overall-fevers, influenza, no s/s pna, s/p recent myelogram clinically no s/s any spinal infection or acute process  Also DM, h/o adrenal tumor-not on steroids     hemodynamically stable    Rec:  1) Follow clinically  2) Follow blood Cx, urine Cx  3) If any increase in back pain or other s/s infection obtain MRI  4) Tamiflu   5) Droplet precautions  6) No ant bacterial coverage at present but is any s/s worsening/spinal infection obtain CT chesta nd start on vanco + zosyn  7) Optimize DM control and monitor sugars  8) Will tailor plan for ID issues  per course,results.Will defer to primary team on management of other issues.    case d/w Dr Powers    Will Follow.  Beeper [id number]-gkvu/afterhours/No response-[id number]

## 2019-12-19 NOTE — H&P ADULT - HISTORY OF PRESENT ILLNESS
69 M, pmhx Benign adrenal tumor, HTN, CAD, CVA, DM2, CABG, lumbar laminectomy, pw fever sp myelogram this afternoon with Neuroradiologist Dr. Stinson. Fever as high as 103.3 at home. Pt then had an episode of weakness in b/l legs causing him to fall and land on his buttock while trying to get out of bed. Pt endorse mildly productive cough and generalized malaise for last 3 days, pt son is sick contact. Of note, pt also had an episode of fell out of bed this AM (prior to myelogram), landed on his L elbow, no LOC, no head trauma + left elbow skin tear. 69 PMHx spinal stenosis s/p lumbar laminectomy (2015), CVA (2013, no residual deficits), CAD s/p CABG (4-vessel), DM2 (on insulin), benign adrenal tumor s/p L adrenalectomy, HTN (no longer on meds to treat), p/w fever at home to 103.5 and fall s/p myelogram on 12/18 with Neuroradiologist Dr. Stinson. Prior to the myelogram the patient fell out of bed around 4AM while sleeping and hurt his L elbow. After the myleogram the patient was febrile and fell again while sitting down and was unable to get up on his own. He called his daughter and she called EMT for assistance. Pt endorses cough for a few days that was formerly mildly productive but cannot recall the color of the sputum. Patient denies LOC or feeling dizzy or lightheaded prior to the fall.    Of note, patient sees a pulmonologist for unclear reasons but endorses SOB that is chronic. Per patient he was put on lasix for the SOB and also LE edema. Patient firmly denies h/o of CHF even when asked multiple times. Patient had a PPM put in on April 2019. 69 PMHx spinal stenosis s/p lumbar laminectomy (2015), CVA (2013, no residual deficits), CAD s/p CABG (4-vessel), DM2 (on insulin), benign adrenal tumor s/p L adrenalectomy, HTN (no longer on meds to treat), p/w fever at home to 103.5 and fall s/p myelogram on 12/18 with Neuroradiologist Dr. Stinson. Prior to the myelogram the patient fell out of bed around 4AM while sleeping and hurt his L elbow. After the myleogram the patient was febrile and fell again while sitting down and was unable to get up on his own. He called his daughter and she called EMT for assistance. Pt endorses cough for a few days that was formerly mildly productive but cannot recall the color of the sputum. Patient denies LOC or feeling dizzy or lightheaded prior to the fall.    Of note, patient sees a pulmonologist for unclear reasons but endorses SOB that is chronic. Per patient he was put on lasix for the SOB and also LE edema. Was also given Spiriva to use which helps per patient. Patient firmly denies h/o of CHF even when asked multiple times. Patient had a PPM put in on April 2019 for unclear reasons.

## 2019-12-19 NOTE — CHART NOTE - NSCHARTNOTEFT_GEN_A_CORE
Implanted on 4/4/19 if Medical Questions call Wilver Selby [phone]    Kristina XT DR MRI SureScan Model #[id number] Serial # [id number]  Complete MRI conditional system w/SureScan device and leads  SureScan leads Serial # [id number] Model # 5076-45 and [id number] Model # 5086-52

## 2019-12-19 NOTE — H&P ADULT - NSICDXPASTMEDICALHX_GEN_ALL_CORE_FT
PAST MEDICAL HISTORY:  Acid Reflux     Anxiety     Benign Tumor of Adrenal Gland     CAD (coronary artery disease)     CVA (cerebrovascular accident) 04/8/2013- no residual effects    Depression     Diabetic neuropathy     DM type 2 (diabetes mellitus, type 2)     Gout     Hernia umbilical, inguinal, abdominal wall    HTN (Hypertension)     Spinal stenosis of lumbar region

## 2019-12-19 NOTE — CONSULT NOTE ADULT - SUBJECTIVE AND OBJECTIVE BOX
Patient is a 69y old  Male who presents with a chief complaint of Fever (19 Dec 2019 13:07)    From HPI" HPI:  69 PMHx spinal stenosis s/p lumbar laminectomy (2015), CVA (2013, no residual deficits), CAD s/p CABG (4-vessel), DM2 (on insulin), benign adrenal tumor s/p L adrenalectomy, HTN (no longer on meds to treat), p/w fever at home to 103.5 and fall s/p myelogram on 12/18 with Neuroradiologist Dr. Stinson. Prior to the myelogram the patient fell out of bed around 4AM while sleeping and hurt his L elbow. After the myleogram the patient was febrile and fell again while sitting down and was unable to get up on his own. He called his daughter and she called EMT for assistance. Pt endorses cough for a few days that was formerly mildly productive but cannot recall the color of the sputum. Patient denies LOC or feeling dizzy or lightheaded prior to the fall.    Of note, patient sees a pulmonologist for unclear reasons but endorses SOB that is chronic. Per patient he was put on lasix for the SOB and also LE edema. Was also given Spiriva to use which helps per patient. Patient firmly denies h/o of CHF even when asked multiple times. Patient had a PPM put in on April 2019 for unclear reasons. (19 Dec 2019 07:47)  "    Above verified-agree with above unless noted below.    ID consulted     PAST MEDICAL & SURGICAL HISTORY:  Hernia: umbilical, inguinal, abdominal wall  CVA (cerebrovascular accident): 04/8/2013- no residual effects  Diabetic neuropathy  Spinal stenosis of lumbar region  DM type 2 (diabetes mellitus, type 2)  CAD (coronary artery disease)  Anxiety  Depression  Acid Reflux  Gout  Benign Tumor of Adrenal Gland  HTN (Hypertension)  History of permanent cardiac pacemaker placement: placed [employer] [place] PPM , April 2019  History of lumbar laminectomy: L1-2 &amp; L4-5   2015  S/P Coronary Angiogram: 12/26/2010 &amp; 2015 at [facility], neg  S/P Hernia Repair  History of Appendectomy  S/P CABG X 4: 15 years ago  Sudden Adrenal Gland Insufficiency: left adrenalectomy 24 years ago      Social history:  ex   Smoking,no    ETOH,      IVDU       FAMILY HISTORY:  Family history of borderline diabetes mellitus  Family history of early CAD  - Family history of medical problems in all first degree relatives reviewed.None of these were found to be related to patients current illness.    REVIEW OF SYSTEMS  General:	Denies any malaise fatigue or chills. Fevers+     Skin:No rash  	  Ophthalmologic:Denies any visual complaints,discharge redness or photophobia  	  ENMT:+ nasal discharge,headache,+ sinus congestionno throat pain.No dental complaints    Respiratory and Thorax:+ cough,sputum no  chest pain.Denies shortness of breath  	  Cardiovascular:	No chest pain,palpitaions or dizziness    Gastrointestinal:	NO nausea,abdominal pain or diarrhea.    Genitourinary:	No dysuria,frequency. No flank pain    Musculoskeletal: + back pain-but says chronic  Now new myelogram site pain     Neurological:No confusion,diziness.No extremity weakness.No bladder or bowel incontinence	    Psychiatric:No delusions or hallucinations	    Hematology/Lymphatics:	No LN swelling.No gum bleeding     Endocrine:	No recent weight gain or loss.No abnormal heat/cold intolerance    Allergic/Immunologic:	No hives or rash   Allergies    No Known Allergies    Intolerances        Antimicrobials:    oseltamivir 75 milliGRAM(s) Oral two times a day      Prior Antimicrobials:  MEDICATIONS  (STANDING):    oseltamivir   75 milliGRAM(s) Oral (12-19-19 @ 09:22)      Other medications reviewed  MEDICATIONS  (STANDING):  aspirin enteric coated 81 milliGRAM(s) Oral daily  atorvastatin 20 milliGRAM(s) Oral <User Schedule>  buPROPion XL . 300 milliGRAM(s) Oral daily  clopidogrel Tablet 75 milliGRAM(s) Oral daily  dextrose 5%. 1000 milliLiter(s) (50 mL/Hr) IV Continuous <Continuous>  dextrose 50% Injectable 12.5 Gram(s) IV Push once  dextrose 50% Injectable 25 Gram(s) IV Push once  dextrose 50% Injectable 25 Gram(s) IV Push once  famotidine    Tablet 40 milliGRAM(s) Oral <User Schedule>  furosemide    Tablet 20 milliGRAM(s) Oral <User Schedule>  furosemide    Tablet 40 milliGRAM(s) Oral <User Schedule>  insulin glargine Injectable (LANTUS) 25 Unit(s) SubCutaneous at bedtime  insulin lispro Injectable (HumaLOG) 6 Unit(s) SubCutaneous three times a day before meals  magnesium gluconate 500 milliGRAM(s) Oral two times a day  oseltamivir 75 milliGRAM(s) Oral two times a day        Vital Signs Last 24 Hrs  T(C): 37.9 (19 Dec 2019 07:00), Max: 37.9 (19 Dec 2019 07:00)  T(F): 100.3 (19 Dec 2019 07:00), Max: 100.3 (19 Dec 2019 07:00)  HR: 89 (19 Dec 2019 07:00) (89 - 91)  BP: 150/70 (19 Dec 2019 07:00) (94/59 - 150/70)  BP(mean): --  RR: 16 (19 Dec 2019 07:00) (16 - 16)  SpO2: 95% (19 Dec 2019 07:00) (94% - 96%)    PHYSICAL EXAM:Pleasant patient in no acute distress.      Constitutional:Comfortable.Awake and alert  No cachexia     Eyes:PERRL EOMI.NO discharge or conjunctival injection    ENMT:No sinus tenderness.No thrush.No pharyngeal exudate or erythema.Fair dental hygiene  + nasal discharge   Neck:Supple,No LN,no JVD      Respiratory:Good air entry bilaterally,CTA    Cardiovascular:S1 S2 wnl, No murmurs,rub or gallops    Gastrointestinal:Soft BS(+) no tenderness no masses ,No rebound or guarding    Genitourinary:No CVA tendereness     back myelogram site no discharge or erythema    Extremities:No cyanosis,clubbing or edema.    Vascular:peripheral pulses felt    Neurological:AAO X 3,No grossly focal deficits          Musculoskeletal:No joint swelling or LOM    Psychiatric:Affect normal.          Labs:                            10.3   7.98  )-----------( 279      ( 18 Dec 2019 23:52 )             32.9     WBC Count: 7.98 (12-18-19 @ 23:52)      12-18    135  |  98  |  21  ----------------------------<  136<H>  4.4   |  22  |  1.15    Ca    9.8      18 Dec 2019 23:52    TPro  6.6  /  Alb  3.9  /  TBili  0.1<L>  /  DBili  x   /  AST  27  /  ALT  21  /  AlkPhos  54  12-18          Rapid Respiratory Viral Panel (12.19.19 @ 02:32)    Rapid RVP Result: Detected: This Respiratory Panel uses polymerase chain reaction (PCR) to detect for  adenovirus; coronavirus (HKU1, NL63, 229E, OC43); human metapneumovirus  (hMPV); human enterovirus/rhinovirus (Entero/RV); influenza A; influenza  A/H1; influenza A/H3; influenza A/H1-2009; influenza B; parainfluenza  viruses 1, 2, 3, 4; respiratory syncytial virus; Mycoplasma pneumoniae;  and Chlamydophila pneumoniae.    Influenza AH1 2009 (RapRVP): Detected      < from: Xray Chest 2 Views PA/Lat (12.19.19 @ 00:56) >  IMPRESSION:  No focal consolidation.        < end of copied text >           CXR (films) were independently reviewed as well      < from: CT Thoracic Spine w/ IV Cont (12.18.19 @ 11:36) >  IMPRESSION:    THORACIC SPINE:    Vertebral body height and alignment are maintained. No spinal cord compression. Spinal cord normal in size and contour.    Mild multilevel degenerative changes as detailed in the body the report.    LUMBAR SPINE:    Status post L5 total laminectomy and partial resection of the L4 spinous process.    Similar nonspecific clumping of the descending lumbosacral nerve roots at the L4 and L5 levels.    Multilevel degenerative changes as detailed in the body of the report, concordant with the prior MRI.    L2-L3 moderate thecal sac compression, L3-L4 moderate thecal sac compression.    L1-L2 moderate left neural foraminal narrowing, L2-L3 mild to moderate left neural foraminal narrowing, L3-L4 moderate right and mild to moderate left neural foraminal narrowing, L5-S1 at least moderate right neural foraminal narrowing.      < end of copied text >

## 2019-12-19 NOTE — H&P ADULT - NSHPPHYSICALEXAM_GEN_ALL_CORE
General: NAD, appears comfortable  Skin: Warm and dry, laceration on L elbow c/d/i  Neuro: AAOx3, nonfocal  HEENT: PERRL, EOMI, no oral lesions  Neck: Full range of motion, no cervical lymphadenopathy or JVD  Lungs: CTAB, no wheezes, rales, or rhonchi   Heart: Regular rate and rhythm, no murmurs  Abdomen: Normoactive bowl sounds, soft, nontender, nondistended  Extremities: No lower extremity tenderness, erythema, or edema   Psych: normal mood and affect

## 2019-12-19 NOTE — H&P ADULT - PROBLEM SELECTOR PLAN 6
On Soliqua 50U in AM and novolg 6U qac, endo doc Dr. Joycelyn Avalos [phone]  - c/w humalog 6U qac  - BIA, G qac, qhs s/p CABG (4-vessel)  - c/w home Crestor

## 2019-12-19 NOTE — H&P ADULT - PROBLEM SELECTOR PLAN 7
Per patient On Soliqua 50U in AM and novolg 6U qac, endo doc Dr. Joycelyn Avalos [phone]  - c/w humalog 6U qac  - BIA, G qac, qhs On Soliqua 50U in AM and Novolg 6U qac, endo doc Dr. Joycelyn Avalos [phone]  - c/w humalog 6U qac  - BIA, G qac, qhs

## 2019-12-19 NOTE — CHART NOTE - NSCHARTNOTEFT_GEN_A_CORE
Patient asked to discuss test results. Discussed results with patient at bedside: explained that myelogram showed no sign of collection and that CT head showed no acute pathology with some residual contrast from the myelogram. Explained that fevers were most likely due to flu but there was still an unlikely possibility of a chemical meningitis. Patient requested that his symbicort be restarted, which was clarified with primary team before initiation.

## 2019-12-19 NOTE — H&P ADULT - NSHPOUTPATIENTPROVIDERS_GEN_ALL_CORE
Dr. Manolo Rice (PCP) [phone]  Dr. Joycelyn Avalos (Endocrine) [phone]  Dr. Renato Yanez (Nephro) [phone]  Dr. Gena Rubalcava (pulm) [phone]  Dr. Fahad Rosario (neuro)  Dr. Aston Faustin (ortho)

## 2019-12-19 NOTE — H&P ADULT - PROBLEM SELECTOR PROBLEM 2
HTN (hypertension) Diabetes Spinal stenosis of lumbar region, unspecified whether neurogenic claudication present

## 2019-12-19 NOTE — H&P ADULT - NSICDXPASTSURGICALHX_GEN_ALL_CORE_FT
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

## 2019-12-19 NOTE — H&P ADULT - ATTENDING COMMENTS
Patient seen and examined. Resident assessment and plan reviewed  1. Flu A+: Symptom onset less than 24 hours. will start on tamiflu x 5 days  2. Falls: likely related to acute viral illness less likely spine/neurologic presentation. clinical exam nonfocal. CT head to r/o any cerebellar issues.   3. Spinal stenosis: ortho-spine eval appreciated. No need for additional MRI imaging. doubt infection/hemorrhage s/p recent myelogram  4. CVA: h/o CVA resume back on asa and plavix. CT head to r/o cerebellar findings.  5. HTN: not on home BP meds. monitor  6. DM2: resume back on home insulin regiments.

## 2019-12-19 NOTE — CONSULT NOTE ADULT - SUBJECTIVE AND OBJECTIVE BOX
Patient is a 69y Male w/ hx of benign adrenal tumor, HTN, CAD, CBA, DM, CABG, and L1-2 and L4-5 lumbar laminectomies (Dr. Faustin, 2015) who presents with generalized weakness and fever 1 day s/p CT myelogram of the lumbar spine. Patient fell on buttocks yesterday. Came to ED and was found to have fever of 103.3, influenza positive. Presently denies pain other than his chronic LBP. Denies HS/LOC. Denies pain/injury elsewhere. Denies numbness/tingling/paresthesias. Denies bowel/bladder incontinence. Patient has noted gait instability over the past few days, but does note that he has not been feeling well.    HEALTH ISSUES - PROBLEM Dx:  Cardiopulmonary disorder: Cardiopulmonary disorder  SOB (shortness of breath) on exertion: SOB (shortness of breath) on exertion  Spinal stenosis of lumbar region, unspecified whether neurogenic claudication present: Spinal stenosis of lumbar region, unspecified whether neurogenic claudication present  Influenza: Influenza  Discharge planning issues: Discharge planning issues  Need for prophylactic measure: Need for prophylactic measure  Spinal stenosis: Spinal stenosis  CAD (coronary artery disease): CAD (coronary artery disease)  CVA (cerebral vascular accident): CVA (cerebral vascular accident)  Anxiety and depression: Anxiety and depression  HTN (hypertension): HTN (hypertension)  Diabetes: Diabetes    MEDICATIONS  (STANDING):  atorvastatin 20 milliGRAM(s) Oral <User Schedule>  buPROPion XL . 300 milliGRAM(s) Oral daily  dextrose 5%. 1000 milliLiter(s) IV Continuous <Continuous>  dextrose 50% Injectable 12.5 Gram(s) IV Push once  dextrose 50% Injectable 25 Gram(s) IV Push once  dextrose 50% Injectable 25 Gram(s) IV Push once  famotidine    Tablet 40 milliGRAM(s) Oral <User Schedule>  insulin glargine Injectable (LANTUS) 25 Unit(s) SubCutaneous at bedtime  insulin lispro Injectable (HumaLOG) 6 Unit(s) SubCutaneous three times a day before meals  magnesium gluconate 500 milliGRAM(s) Oral two times a day  oseltamivir 75 milliGRAM(s) Oral two times a day  traMADol 100 milliGRAM(s) Oral three times a day    Allergies  No Known Allergies    PAST MEDICAL & SURGICAL HISTORY:  Hernia: umbilical, inguinal, abdominal wall  CVA (cerebrovascular accident): 2013- no residual effects  Diabetic neuropathy  Spinal stenosis of lumbar region  DM type 2 (diabetes mellitus, type 2)  CAD (coronary artery disease)  Anxiety  Depression  Acid Reflux  Gout  Adrenal Insufficiency  Benign Tumor of Adrenal Gland  HTN (Hypertension)  Diabetes  History of lumbar laminectomy: L1-2 &amp; L4-5     S/P Coronary Angiogram: 2010 &amp;  at [facility] [place]  S/P Hernia Repair  History of Appendectomy  S/P CABG X 4: 15 years ago  Sudden Adrenal Gland Insufficiency: left adrenalectomy 24 years ago                      10.3   7.98  )-----------( 279      ( 18 Dec 2019 23:52 )             32.9     18 Dec 2019 23:52    135    |  98     |  21     ----------------------------<  136    4.4     |  22     |  1.15     Ca    9.8        18 Dec 2019 23:52    TPro  6.6    /  Alb  3.9    /  TBili  0.1    /  DBili  x      /  AST  27     /  ALT  21     /  AlkPhos  54     18 Dec 2019 23:52    PT/INR - ( 18 Dec 2019 23:52 )   PT: 14.3 sec;   INR: 1.24 ratio      PTT - ( 18 Dec 2019 23:52 )  PTT:31.1 sec    Urinalysis Basic - ( 19 Dec 2019 01:51 )  Color: Light Yellow / Appearance: Clear / S.017 / pH: x  Gluc: x / Ketone: Negative  / Bili: Negative / Urobili: Negative   Blood: x / Protein: Trace / Nitrite: Negative   Leuk Esterase: Negative / RBC: x / WBC x   Sq Epi: x / Non Sq Epi: x / Bacteria: x    Vital Signs Last 24 Hrs  T(C): 37.9 (19 @ 07:00), Max: 37.9 (19 @ 07:00)  T(F): 100.3 (19 @ 07:00), Max: 100.3 (19 @ 07:00)  HR: 89 (19 @ 07:00) (89 - 91)  BP: 150/70 (19 @ 07:00) (94/59 - 150/70)  RR: 16 (19 @ 07:00) (16 - 16)  SpO2: 95% (19 @ 07:00) (94% - 96%)    Gen: NAD  Spine PE:  Skin intact  No gross deformity  No midline TTP C/T/L/S spine  No bony step offs  Negative clonus  Negative vazquez  No saddle anesthesia    Motor:                   C5                C6              C7               C8           T1   R            5/5                5/5            5/5             5/5          5/5  L             5/5               5/5             5/5             5/5          5/5                L2             L3             L4               L5            S1  R         5/5           5/5          5/5             5/5           5/5  L          5/5          5/5           5/5             5/5           5/5    Sensory:            C5         C6         C7      C8       T1        (0=absent, 1=impaired, 2=normal, NT=not testable)  R         2            2           2        2         2  L          2            2           2        2         2               L2          L3         L4      L5       S1         (0=absent, 1=impaired, 2=normal, NT=not testable)  R         2            2            2        2        2  L          2            2           2        2         2    Imaging:   CT myelogram 19  MRI L spine 19    Both studies show maintained decompression at levels of L1-2 and L4-5, however there are degenerative changes causing central and foraminal stenosis at L2-3 and L3-4    XRs of the L spine in orthoPACS  L3-L4 lateral listhesis with significant angulation    A/P: 69y Male with generalized weakness and fever 1 day s/p CT myelogram of the lumbar spine. Imaging was performed as part of surgical planning for laminectomy and fusion with Dr. Roberts scheduled for January. Patient is influenza positive, which is the likely cause of his fever and generalized weakness. Given his normal ESR, normal neurologic exam, and lack of findings suggestive of collection on CT myelo, no concern for epidural pathology. Patient's instability likely 2/2 dehydration given current illness.    Pain control  WBAT with assistive devices as needed  No further imaging needed per orthopaedics  FU outpatient  Surgery planned with Dr. Roberts for January    Patient and imaging discussed with Dr. Faustin who agrees with plan    Peter Gerardo MD

## 2019-12-19 NOTE — PHYSICAL THERAPY INITIAL EVALUATION ADULT - CRITERIA FOR SKILLED THERAPEUTIC INTERVENTIONS
predicted duration of therapy intervention/impairments found/rehab potential/anticipated equipment needs at discharge/functional limitations in following categories/risk reduction/prevention/therapy frequency/anticipated discharge recommendation

## 2019-12-19 NOTE — H&P ADULT - PROBLEM SELECTOR PLAN 5
s/p CABG (4-vessel)  - c/w home Crestor h/o of CVA in 2013 w/no residual deficits  - f/u CTH non-con  - c/w home plavix/ASA if CTH negative h/o of CVA in 2013 w/no residual deficits  - f/u MRI head  - c/w home plavix/ASA if imaging negative for acute bleed h/o of CVA in 2013 w/no residual deficits  - f/u CTH non-con  - c/w home DAPT

## 2019-12-19 NOTE — CHART NOTE - NSCHARTNOTEFT_GEN_A_CORE
Reference #: [id number]     Patient Name: Fahad Leon YOB: 1950   Address: [address] [place] [address] Sex: Male       12/16/2019 12/16/2019 tramadol hcl 50 mg tablet  180 30 BeerChris R     11/11/2019 11/11/2019 clonazepam 0.5 mg tablet  180 30 Ashwini, Nicholas FULLER MD     11/11/2019 11/11/2019 zolpidem tartrate 10 mg tablet  30 30 Ashwini, Nicholas FULLER MD     11/11/2019 11/11/2019 tramadol hcl 50 mg tablet  180 30 BeerChris R     10/10/2019 10/11/2019 tramadol hcl 50 mg tablet  180 30 BeerChris R     10/03/2019 10/03/2019 clonazepam 0.5 mg tablet  180 30 Ashwini, Nicholas FULLER MD     10/03/2019 10/03/2019 zolpidem tartrate 10 mg tablet  30 30 Ashwini, Nicholas FULLER MD     09/10/2019 09/10/2019 tramadol hcl 50 mg tablet  180 30 BeerChris R     09/02/2019 09/05/2019 clonazepam 0.5 mg tablet  180 30 Ashwini, Nicholas FULLER MD     09/02/2019 09/05/2019 zolpidem tartrate 10 mg tablet  30 30 Ashwini, Nicholas FULLER MD     08/11/2019 08/11/2019 clonazepam 0.5 mg tablet  180 30 Ashwini, Nicholas FULLER MD     08/11/2019 08/11/2019 zolpidem tartrate 10 mg tablet  30 30 Ashwini, Nicholas FULLER MD     08/08/2019 08/08/2019 tramadol hcl 50 mg tablet  180 30 BeerChris R     07/19/2019 07/19/2019 zolpidem tartrate 10 mg tablet  30 30 Ashwini, Nicholas FULLER MD     07/19/2019 07/19/2019 clonazepam 0.5 mg tablet  180 30 Ashwini, Nicholas FULLER MD     07/09/2019 07/11/2019 tramadol hcl 50 mg tablet  180 30 BeerChris R     06/18/2019 06/18/2019 clonazepam 0.5 mg tablet  180 30 Ashwini, Nicholas FULLER MD     06/18/2019 06/18/2019 zolpidem tartrate 10 mg tablet  30 30 Ashwini, Nicholas FULLER MD     06/11/2019 06/11/2019 tramadol hcl 50 mg tablet  180 30 Beer Chris ARAGON     05/14/2019 05/15/2019 zolpidem tartrate 10 mg tablet  30 30 Ashwini, Nicholas FULLER MD     05/14/2019 05/15/2019 clonazepam 0.5 mg tablet  180 30 Ashwini, Nicholas FULLER MD     05/05/2019 05/05/2019 tramadol hcl 50 mg tablet  180 30 Beer Chris ARAGON     04/12/2019 04/12/2019 tramadol hcl 50 mg tablet  180 30 BeerChris     04/10/2019 04/11/2019 clonazepam 0.5 mg tablet  180 30 Ashwini, Nicholas FULLER MD     04/10/2019 04/11/2019 zolpidem tartrate 10 mg tablet  30 30 Ashwini, Nicholas FULLER MD     03/15/2019 03/17/2019 zolpidem tartrate 10 mg tablet  30 30 Ashwini, Nicholas FULLER MD     03/11/2019 03/11/2019 tramadol hcl 50 mg tablet  180 30 BeerChris     03/06/2019 03/10/2019 clonazepam 0.5 mg tablet  180 30 Ashwini, Nicholas FULLER MD     02/08/2019 02/13/2019 clonazepam 0.5 mg tablet  180 30 Ashwini, Nicholas FULLER MD     02/08/2019 02/13/2019 zolpidem tartrate 10 mg tablet  30 30 Ashwini, Nicholas FULLER MD     02/03/2019 02/04/2019 tramadol hcl 50 mg tablet  180 30 BeerChris     01/10/2019 01/11/2019 zolpidem tartrate 10 mg tablet  30 30 Ashwini, Nicholas FULLER MD     01/08/2019 01/08/2019 clonazepam 0.5 mg tablet  180 30 Ashwini, Nicholas FULLER MD     01/01/2019 01/07/2019 tramadol hcl 50 mg tablet  180 30 BeerChris

## 2019-12-19 NOTE — H&P ADULT - PROBLEM SELECTOR PLAN 1
RVP + for influenza [id number], febrile at home to 103.3 but not febrile this admission  - s/p 1L NS bolus, tylenol 650mg x1, duonebs x1 in ED  - tamiflu 75mg ordered RVP + for influenza [id number], febrile at home to 103.5, not febrile this admission  - s/p 1L NS bolus, tylenol 650mg x1, duonebs x1 in ED  - tamiflu 75mg ordered, will give for 5 days RVP + for influenza [id number], febrile at home to 103.5, not febrile this admission  - s/p 1L NS bolus, tylenol 650mg x1, duonebs x1 in ED  - Tamiflu 75mg ordered, will give for 5 days total BID

## 2019-12-19 NOTE — H&P ADULT - NSHPLABSRESULTS_GEN_ALL_CORE
10.3   7.98  )-----------( 279      ( 18 Dec 2019 23:52 )             32.9     CBC Full  -  ( 18 Dec 2019 23:52 )  WBC Count : 7.98 K/uL  RBC Count : 3.87 M/uL  Hemoglobin : 10.3 g/dL  Hematocrit : 32.9 %  Platelet Count - Automated : 279 K/uL  Mean Cell Volume : 85.0 fl  Mean Cell Hemoglobin : 26.6 pg  Mean Cell Hemoglobin Concentration : 31.3 gm/dL  Auto Neutrophil # : 6.36 K/uL  Auto Lymphocyte # : 0.61 K/uL  Auto Monocyte # : 0.93 K/uL  Auto Eosinophil # : 0.01 K/uL  Auto Basophil # : 0.03 K/uL  Auto Neutrophil % : 79.7 %  Auto Lymphocyte % : 7.6 %  Auto Monocyte % : 11.7 %  Auto Eosinophil % : 0.1 %  Auto Basophil % : 0.4 %        135  |  98  |  21  ----------------------------<  136<H>  4.4   |  22  |  1.15    Ca    9.8      18 Dec 2019 23:52    TPro  6.6  /  Alb  3.9  /  TBili  0.1<L>  /  DBili  x   /  AST  27  /  ALT  21  /  AlkPhos  54      PT/INR - ( 18 Dec 2019 23:52 )   PT: 14.3 sec;   INR: 1.24 ratio         PTT - ( 18 Dec 2019 23:52 )  PTT:31.1 sec    RVP+ for Influenza AH1     Urinalysis Basic - ( 19 Dec 2019 01:51 )    Color: Light Yellow / Appearance: Clear / S.017 / pH: x  Gluc: x / Ketone: Negative  / Bili: Negative / Urobili: Negative   Blood: x / Protein: Trace / Nitrite: Negative   Leuk Esterase: Negative / RBC: x / WBC x   Sq Epi: x / Non Sq Epi: x / Bacteria: x    ESR 12  CRP 5.34        < from: Xray Chest 2 Views PA/Lat (19 @ 00:56) >    ******PRELIMINARY REPORT******    ******PRELIMINARY REPORT******              INTERPRETATION:  No focal consolidation.    Follow up final report.              ******PRELIMINARY REPORT******    ******PRELIMINARY REPORT******          < end of copied text >    < from: CT Thoracic Spine w/ IV Cont (19 @ 11:36) >    IMPRESSION:    THORACIC SPINE:    Vertebral body height and alignment are maintained. No spinal cord compression. Spinal cord normal in size and contour.    Mild multilevel degenerative changes as detailed in the body the report.    LUMBAR SPINE:    Status post L5 total laminectomy and partial resection of the L4 spinous process.    Similar nonspecific clumping of the descending lumbosacral nerve roots at the L4 and L5 levels.    Multilevel degenerative changes as detailed in the body of the report, concordant with the prior MRI.    L2-L3 moderate thecal sac compression, L3-L4 moderate thecal sac compression.    L1-L2 moderate left neural foraminal narrowing, L2-L3 mild to moderate left neural foraminal narrowing, L3-L4 moderate right and mild to moderate left neural foraminal narrowing, L5-S1 at least moderate right neural foraminal narrowing.    < end of copied text >

## 2019-12-19 NOTE — H&P ADULT - NSHPSOCIALHISTORY_GEN_ALL_CORE
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  Former smoker who quit in 1981, used to smoke 2ppd for a total of 7 years.  Denies alcohol or illicit drug use.

## 2019-12-19 NOTE — H&P ADULT - PROBLEM SELECTOR PLAN 4
- c/w home wellbutrin h/o of CVA in 2013 w/no residual deficits  - f/u CTH non-con  - c/w home plavix/ASA if CTH negative Patient endorses ANDREA and sees an outpatient cardiologist and pulmonologist  - f/u O/P cardiology i Patient endorses ANDREA and sees an outpatient cardiologist and pulmonologist  SOB/ANDREA per patient is due to pulm dz of unclear etiology but with h/o LE edema sx sound cardiac in nature  - f/u O/P cardiology about why PPM was placed  - f/u O/P pulm on condition being treated with Spiriva  - may get TTE depending on when last one was done  - will consider CT chest if conditions warrants it o/w may f/u O/P Patient endorses ANDREA and sees an outpatient cardiologist and pulmonologist  SOB/ANDREA per patient is due to pulm dz of unclear etiology but with h/o LE edema sx sound cardiac in nature  - f/u w/PCP on why PPM was placed and why on Spiriva  - for now will c/w home lasix dosing  - may get TTE depending on when last one was done  - will consider CT chest if conditions warrants it o/w may f/u O/P

## 2019-12-19 NOTE — H&P ADULT - PROBLEM SELECTOR PLAN 2
s/p lumbar laminectomy in 2015 w/Dr. Aston Faustin, h/o balance issues, no p/w an acute change of falling.  - ED had c/f hematoma vs. abscess s/p xray myelography on 12/18  - MRI spine ordered  - chronic back pain with this condition, c/w home tramadol (i-STOP in chart)  - f/u ortho c/s appreciate recs s/p lumbar laminectomy in 2015 w/Dr. Aston Faustin, h/o balance issues, no p/w an acute change of falling.  - ED originally had c/f hematoma vs. abscess s/p xray myelography on 12/18  - chronic back pain with this condition, c/w home tramadol (i-STOP in chart)  - ortho c/s appreciate recs: no addt'l imaging needed, surgery planned for Satya

## 2019-12-19 NOTE — H&P ADULT - PROBLEM SELECTOR PLAN 8
- c/w home wellbutrin  - c/w home klonipin as needed (i-STOP in chart) - c/w home Wellbutrin  - c/w home Klonopin as needed (i-STOP in chart) - c/w home Wellbutrin  - c/w home Klonopin as needed (i-STOP in chart)  - c/w home Ambien as needed

## 2019-12-20 DIAGNOSIS — R29.6 REPEATED FALLS: ICD-10-CM

## 2019-12-20 LAB
ANION GAP SERPL CALC-SCNC: 12 MMOL/L — SIGNIFICANT CHANGE UP (ref 5–17)
BASOPHILS # BLD AUTO: 0.02 K/UL — SIGNIFICANT CHANGE UP (ref 0–0.2)
BASOPHILS NFR BLD AUTO: 0.2 % — SIGNIFICANT CHANGE UP (ref 0–2)
BUN SERPL-MCNC: 17 MG/DL — SIGNIFICANT CHANGE UP (ref 7–23)
CALCIUM SERPL-MCNC: 9.5 MG/DL — SIGNIFICANT CHANGE UP (ref 8.4–10.5)
CHLORIDE SERPL-SCNC: 98 MMOL/L — SIGNIFICANT CHANGE UP (ref 96–108)
CO2 SERPL-SCNC: 25 MMOL/L — SIGNIFICANT CHANGE UP (ref 22–31)
CREAT SERPL-MCNC: 0.99 MG/DL — SIGNIFICANT CHANGE UP (ref 0.5–1.3)
CULTURE RESULTS: SIGNIFICANT CHANGE UP
EOSINOPHIL # BLD AUTO: 0 K/UL — SIGNIFICANT CHANGE UP (ref 0–0.5)
EOSINOPHIL NFR BLD AUTO: 0 % — SIGNIFICANT CHANGE UP (ref 0–6)
GLUCOSE BLDC GLUCOMTR-MCNC: 291 MG/DL — HIGH (ref 70–99)
GLUCOSE BLDC GLUCOMTR-MCNC: 318 MG/DL — HIGH (ref 70–99)
GLUCOSE BLDC GLUCOMTR-MCNC: 318 MG/DL — HIGH (ref 70–99)
GLUCOSE BLDC GLUCOMTR-MCNC: 379 MG/DL — HIGH (ref 70–99)
GLUCOSE BLDC GLUCOMTR-MCNC: 399 MG/DL — HIGH (ref 70–99)
GLUCOSE SERPL-MCNC: 241 MG/DL — HIGH (ref 70–99)
HBA1C BLD-MCNC: 7.2 % — HIGH (ref 4–5.6)
HCT VFR BLD CALC: 35 % — LOW (ref 39–50)
HCV AB S/CO SERPL IA: 0.06 S/CO — SIGNIFICANT CHANGE UP (ref 0–0.99)
HCV AB SERPL-IMP: SIGNIFICANT CHANGE UP
HGB BLD-MCNC: 11 G/DL — LOW (ref 13–17)
IMM GRANULOCYTES NFR BLD AUTO: 0.7 % — SIGNIFICANT CHANGE UP (ref 0–1.5)
LYMPHOCYTES # BLD AUTO: 1.42 K/UL — SIGNIFICANT CHANGE UP (ref 1–3.3)
LYMPHOCYTES # BLD AUTO: 17 % — SIGNIFICANT CHANGE UP (ref 13–44)
MAGNESIUM SERPL-MCNC: 1.7 MG/DL — SIGNIFICANT CHANGE UP (ref 1.6–2.6)
MCHC RBC-ENTMCNC: 27 PG — SIGNIFICANT CHANGE UP (ref 27–34)
MCHC RBC-ENTMCNC: 31.4 GM/DL — LOW (ref 32–36)
MCV RBC AUTO: 85.8 FL — SIGNIFICANT CHANGE UP (ref 80–100)
MONOCYTES # BLD AUTO: 0.84 K/UL — SIGNIFICANT CHANGE UP (ref 0–0.9)
MONOCYTES NFR BLD AUTO: 10 % — SIGNIFICANT CHANGE UP (ref 2–14)
NEUTROPHILS # BLD AUTO: 6.03 K/UL — SIGNIFICANT CHANGE UP (ref 1.8–7.4)
NEUTROPHILS NFR BLD AUTO: 72.1 % — SIGNIFICANT CHANGE UP (ref 43–77)
NRBC # BLD: 0 /100 WBCS — SIGNIFICANT CHANGE UP (ref 0–0)
PHOSPHATE SERPL-MCNC: 3.1 MG/DL — SIGNIFICANT CHANGE UP (ref 2.5–4.5)
PLATELET # BLD AUTO: 274 K/UL — SIGNIFICANT CHANGE UP (ref 150–400)
POTASSIUM SERPL-MCNC: 4.1 MMOL/L — SIGNIFICANT CHANGE UP (ref 3.5–5.3)
POTASSIUM SERPL-SCNC: 4.1 MMOL/L — SIGNIFICANT CHANGE UP (ref 3.5–5.3)
RBC # BLD: 4.08 M/UL — LOW (ref 4.2–5.8)
RBC # FLD: 15.1 % — HIGH (ref 10.3–14.5)
SODIUM SERPL-SCNC: 135 MMOL/L — SIGNIFICANT CHANGE UP (ref 135–145)
SPECIMEN SOURCE: SIGNIFICANT CHANGE UP
WBC # BLD: 8.37 K/UL — SIGNIFICANT CHANGE UP (ref 3.8–10.5)
WBC # FLD AUTO: 8.37 K/UL — SIGNIFICANT CHANGE UP (ref 3.8–10.5)

## 2019-12-20 PROCEDURE — 99233 SBSQ HOSP IP/OBS HIGH 50: CPT | Mod: GC

## 2019-12-20 PROCEDURE — 99232 SBSQ HOSP IP/OBS MODERATE 35: CPT

## 2019-12-20 RX ORDER — BACITRACIN ZINC 500 UNIT/G
1 OINTMENT IN PACKET (EA) TOPICAL DAILY
Refills: 0 | Status: DISCONTINUED | OUTPATIENT
Start: 2019-12-20 | End: 2019-12-23

## 2019-12-20 RX ORDER — INSULIN LISPRO 100/ML
VIAL (ML) SUBCUTANEOUS AT BEDTIME
Refills: 0 | Status: DISCONTINUED | OUTPATIENT
Start: 2019-12-20 | End: 2019-12-23

## 2019-12-20 RX ORDER — INSULIN GLARGINE 100 [IU]/ML
35 INJECTION, SOLUTION SUBCUTANEOUS AT BEDTIME
Refills: 0 | Status: DISCONTINUED | OUTPATIENT
Start: 2019-12-20 | End: 2019-12-22

## 2019-12-20 RX ORDER — INSULIN LISPRO 100/ML
VIAL (ML) SUBCUTANEOUS
Refills: 0 | Status: DISCONTINUED | OUTPATIENT
Start: 2019-12-20 | End: 2019-12-23

## 2019-12-20 RX ORDER — CLONAZEPAM 1 MG
1 TABLET ORAL THREE TIMES A DAY
Refills: 0 | Status: DISCONTINUED | OUTPATIENT
Start: 2019-12-20 | End: 2019-12-23

## 2019-12-20 RX ORDER — INSULIN LISPRO 100/ML
4 VIAL (ML) SUBCUTANEOUS ONCE
Refills: 0 | Status: COMPLETED | OUTPATIENT
Start: 2019-12-20 | End: 2019-12-20

## 2019-12-20 RX ORDER — INSULIN LISPRO 100/ML
8 VIAL (ML) SUBCUTANEOUS
Refills: 0 | Status: DISCONTINUED | OUTPATIENT
Start: 2019-12-20 | End: 2019-12-21

## 2019-12-20 RX ADMIN — Medication 8 UNIT(S): at 18:03

## 2019-12-20 RX ADMIN — Medication 1 MILLIGRAM(S): at 18:03

## 2019-12-20 RX ADMIN — Medication 6: at 22:44

## 2019-12-20 RX ADMIN — Medication 650 MILLIGRAM(S): at 01:00

## 2019-12-20 RX ADMIN — Medication 75 MILLIGRAM(S): at 10:30

## 2019-12-20 RX ADMIN — ZOLPIDEM TARTRATE 5 MILLIGRAM(S): 10 TABLET ORAL at 21:19

## 2019-12-20 RX ADMIN — BUPROPION HYDROCHLORIDE 300 MILLIGRAM(S): 150 TABLET, EXTENDED RELEASE ORAL at 10:38

## 2019-12-20 RX ADMIN — Medication 650 MILLIGRAM(S): at 13:52

## 2019-12-20 RX ADMIN — BUDESONIDE AND FORMOTEROL FUMARATE DIHYDRATE 2 PUFF(S): 160; 4.5 AEROSOL RESPIRATORY (INHALATION) at 10:30

## 2019-12-20 RX ADMIN — ATORVASTATIN CALCIUM 20 MILLIGRAM(S): 80 TABLET, FILM COATED ORAL at 21:19

## 2019-12-20 RX ADMIN — Medication 8: at 18:02

## 2019-12-20 RX ADMIN — Medication 500 MILLIGRAM(S): at 10:30

## 2019-12-20 RX ADMIN — Medication 6 UNIT(S): at 10:28

## 2019-12-20 RX ADMIN — FAMOTIDINE 40 MILLIGRAM(S): 10 INJECTION INTRAVENOUS at 10:29

## 2019-12-20 RX ADMIN — Medication 40 MILLIGRAM(S): at 10:29

## 2019-12-20 RX ADMIN — FAMOTIDINE 40 MILLIGRAM(S): 10 INJECTION INTRAVENOUS at 18:03

## 2019-12-20 RX ADMIN — Medication 6 UNIT(S): at 13:51

## 2019-12-20 RX ADMIN — Medication 4 UNIT(S): at 23:55

## 2019-12-20 RX ADMIN — BUDESONIDE AND FORMOTEROL FUMARATE DIHYDRATE 2 PUFF(S): 160; 4.5 AEROSOL RESPIRATORY (INHALATION) at 18:15

## 2019-12-20 RX ADMIN — Medication 1 MILLIGRAM(S): at 10:37

## 2019-12-20 RX ADMIN — Medication 75 MILLIGRAM(S): at 21:19

## 2019-12-20 RX ADMIN — INSULIN GLARGINE 35 UNIT(S): 100 INJECTION, SOLUTION SUBCUTANEOUS at 22:43

## 2019-12-20 RX ADMIN — CLOPIDOGREL BISULFATE 75 MILLIGRAM(S): 75 TABLET, FILM COATED ORAL at 10:38

## 2019-12-20 RX ADMIN — Medication 81 MILLIGRAM(S): at 10:37

## 2019-12-20 NOTE — PROGRESS NOTE ADULT - PROBLEM SELECTOR PLAN 7
On Soliqua 50U in AM and Novolg 6U qac, endo doc Dr. Joycelyn Avalos [phone]  - c/w humalog 6U qac  - BIA, G qac, qhs s/p CABG (4-vessel)  - c/w home Crestor

## 2019-12-20 NOTE — PROGRESS NOTE ADULT - ASSESSMENT
69 PMHx spinal stenosis s/p lumbar laminectomy (2015), CVA (2013, no residual deficits), CAD s/p CABG (4-vessel), DM2 (on insulin), benign adrenal tumor s/p L adrenalectomy, HTN (no longer on meds to treat), p/w fever at home to 103.5 and fall s/p myelogram on 12/18 with Neuroradiologist Dr. Stinson.     Fevers  + cough  + rhinorrhea    RVP + for influenza  CXR  PNA  falls-chronic back pain-no worsening-myelogram site with no s/s external infection, CT with no new findings to suggest infection  Clinically no s/s any other foci  blood cx negative     Overall-fevers, influenza, no s/s pna, s/p recent myelogram clinically no s/s any spinal infection or acute process  Also DM, h/o adrenal tumor-not on steroids     hemodynamically stable    Rec:  1) Follow clinically  2) Follow blood Cx, urine Cx  3) If any increase in back pain or other s/s infection obtain MRI  4) Tamiflu   5) Droplet precautions  6) No ant bacterial coverage at present but is any s/s worsening/spinal infection obtain CT chesta nd start on vanco + zosyn  7) Optimize DM control and monitor sugars  8) Will tailor plan for ID issues  per course,results.Will defer to primary team on management of other issues.    case d/w Dr Powers  Infectious Diseases Service will cover over weekend.  Please call [phone] if issues

## 2019-12-20 NOTE — PROGRESS NOTE ADULT - PROBLEM SELECTOR PLAN 9
DVT: SCDs until CTH  negative for acute bleed  Diet: DASH/TLC/CC  GERD ppx: c/w home famotidine, TUMS PRN  Dispo: clinical improvement, ortho clearance & PT eval - c/w home Wellbutrin  - c/w home Klonopin as needed (i-STOP in chart)  - c/w home Ambien as needed

## 2019-12-20 NOTE — PROGRESS NOTE ADULT - SUBJECTIVE AND OBJECTIVE BOX
Patient is a 69y old  Male who presents with a chief complaint of Fever (20 Dec 2019 07:08)    Being followed by ID for fever, influenza    Interval history:feels better  cough improved  had fever overnight   no back pain    No acute events      ROS:  No ,SOB,CP  No N/V/D./abd pain  No other complaints      Antimicrobials:    oseltamivir 75 milliGRAM(s) Oral two times a day    Other medications reviewed    Vital Signs Last 24 Hrs  T(C): 36.8 (12-20-19 @ 12:09), Max: 39.6 (12-19-19 @ 20:30)  T(F): 98.3 (12-20-19 @ 12:09), Max: 103.2 (12-19-19 @ 20:30)  HR: 90 (12-20-19 @ 12:09) (87 - 99)  BP: 138/83 (12-20-19 @ 12:09) (115/86 - 151/77)  BP(mean): --  RR: 18 (12-20-19 @ 12:09) (18 - 18)  SpO2: 95% (12-20-19 @ 12:09) (93% - 100%)    Physical Exam:        HEENT PERRLA EOMI    No oral exudate or erythema    Chest Good AE,minimal rhonchi    CVS RRR S1 S2 WNl No murmur or rub or gallop    Abd soft BS normal No tenderness no masses    IV site no erythema tenderness or discharge  back myelogram site no erythema or tenderness    CNS AAO X 3 no focal    Lab Data:                          11.0   8.37  )-----------( 274      ( 20 Dec 2019 07:04 )             35.0     WBC Count: 8.37 (12-20-19 @ 07:04)  WBC Count: 7.98 (12-18-19 @ 23:52)    12-20    135  |  98  |  17  ----------------------------<  241<H>  4.1   |  25  |  0.99    Ca    9.5      20 Dec 2019 07:03  Phos  3.1     12-20  Mg     1.7     12-20    TPro  6.6  /  Alb  3.9  /  TBili  0.1<L>  /  DBili  x   /  AST  27  /  ALT  21  /  AlkPhos  54  12-18        Culture - Urine (collected 19 Dec 2019 05:18)  Source: .Urine Clean Catch (Midstream)  Final Report (20 Dec 2019 02:06):    <10,000 CFU/mL Normal Urogenital Jyoti    Culture - Blood (collected 19 Dec 2019 05:07)  Source: .Blood Blood-Peripheral  Preliminary Report (20 Dec 2019 06:00):    No growth to date.    Culture - Blood (collected 19 Dec 2019 05:06)  Source: .Blood Blood-Venous  Preliminary Report (20 Dec 2019 06:00):    No growth to date.          < from: Xray Chest 2 Views PA/Lat (12.19.19 @ 00:56) >    IMPRESSION:  No focal consolidation.            < end of copied text >

## 2019-12-20 NOTE — PROGRESS NOTE ADULT - PROBLEM SELECTOR PLAN 6
s/p CABG (4-vessel)  - c/w home Crestor h/o of CVA in 2013 w/no residual deficits  - f/u CTH non-con  - c/w home DAPT

## 2019-12-20 NOTE — PROGRESS NOTE ADULT - SUBJECTIVE AND OBJECTIVE BOX
Febrile to 103.2 was given Tylenol, was also wheezing and was given Symbicort PROGRESS NOTE:   Authored by Sammie Hawthorne MD, PGY1, Internal Medicine, pager [phone]     Patient is a 69y old  Male who presents with a chief complaint of Fever (20 Dec 2019 07:08)      SUBJECTIVE / OVERNIGHT EVENTS: Febrile to 103.2 was given Tylenol, was also wheezing and was given Symbicort.  Patient endorses some SOB but denies C/P, N/V/D/C. Last BM was yesterday.    ADDITIONAL REVIEW OF SYSTEMS:    MEDICATIONS  (STANDING):  aspirin enteric coated 81 milliGRAM(s) Oral daily  atorvastatin 20 milliGRAM(s) Oral <User Schedule>  BACItracin   Ointment 1 Application(s) Topical daily  budesonide  80 MICROgram(s)/formoterol 4.5 MICROgram(s) Inhaler 2 Puff(s) Inhalation two times a day  buPROPion XL . 300 milliGRAM(s) Oral daily  clopidogrel Tablet 75 milliGRAM(s) Oral daily  dextrose 5%. 1000 milliLiter(s) (50 mL/Hr) IV Continuous <Continuous>  dextrose 50% Injectable 12.5 Gram(s) IV Push once  dextrose 50% Injectable 25 Gram(s) IV Push once  dextrose 50% Injectable 25 Gram(s) IV Push once  famotidine    Tablet 40 milliGRAM(s) Oral <User Schedule>  furosemide    Tablet 20 milliGRAM(s) Oral <User Schedule>  furosemide    Tablet 40 milliGRAM(s) Oral <User Schedule>  insulin glargine Injectable (LANTUS) 35 Unit(s) SubCutaneous at bedtime  insulin lispro (HumaLOG) corrective regimen sliding scale   SubCutaneous three times a day before meals  insulin lispro (HumaLOG) corrective regimen sliding scale   SubCutaneous at bedtime  insulin lispro Injectable (HumaLOG) 6 Unit(s) SubCutaneous three times a day before meals  magnesium gluconate 500 milliGRAM(s) Oral two times a day  oseltamivir 75 milliGRAM(s) Oral two times a day    MEDICATIONS  (PRN):  acetaminophen   Tablet .. 650 milliGRAM(s) Oral every 6 hours PRN Temp greater or equal to 38C (100.4F), Mild Pain (1 - 3), Moderate Pain (4 - 6), Severe Pain (7 - 10)  calcium carbonate    500 mG (Tums) Chewable 1 Tablet(s) Chew three times a day PRN Indigestion  clonazePAM  Tablet 1 milliGRAM(s) Oral two times a day PRN Anxiety  dextrose 40% Gel 15 Gram(s) Oral once PRN Blood Glucose LESS THAN 70 milliGRAM(s)/deciliter  glucagon  Injectable 1 milliGRAM(s) IntraMuscular once PRN Glucose LESS THAN 70 milligrams/deciliter  traMADol 100 milliGRAM(s) Oral three times a day PRN Moderate Pain (4 - 6) Severe Pain (7-10)  zolpidem 5 milliGRAM(s) Oral at bedtime PRN Insomnia  zolpidem 5 milliGRAM(s) Oral at bedtime PRN Insomnia      CAPILLARY BLOOD GLUCOSE      POCT Blood Glucose.: 318 mg/dL (20 Dec 2019 13:44)  POCT Blood Glucose.: 291 mg/dL (20 Dec 2019 10:23)  POCT Blood Glucose.: 212 mg/dL (19 Dec 2019 22:03)  POCT Blood Glucose.: 117 mg/dL (19 Dec 2019 17:06)    I&O's Summary    20 Dec 2019 07:01  -  20 Dec 2019 14:24  --------------------------------------------------------  IN: 0 mL / OUT: 200 mL / NET: -200 mL        PHYSICAL EXAM:  Vital Signs Last 24 Hrs  T(C): 36.8 (20 Dec 2019 12:09), Max: 39.6 (19 Dec 2019 20:30)  T(F): 98.3 (20 Dec 2019 12:09), Max: 103.2 (19 Dec 2019 20:30)  HR: 90 (20 Dec 2019 12:09) (87 - 99)  BP: 138/83 (20 Dec 2019 12:09) (115/86 - 151/77)  BP(mean): --  RR: 18 (20 Dec 2019 12:09) (18 - 18)  SpO2: 95% (20 Dec 2019 12:09) (93% - 100%)    CONSTITUTIONAL: NAD, well-developed  RESPIRATORY: Normal respiratory effort; lungs are clear to auscultation bilaterally  CARDIOVASCULAR: Regular rate and rhythm, normal S1 and S2, no murmur/rub/gallop; No lower extremity edema  ABDOMEN: Nontender to palpation, normoactive bowel sounds  MUSCLOSKELETAL: no clubbing or cyanosis of digits  PSYCH: A+O to person, place, and time; affect appropriate    LABS:                        11.0   8.37  )-----------( 274      ( 20 Dec 2019 07:04 )             35.0     12-20    135  |  98  |  17  ----------------------------<  241<H>  4.1   |  25  |  0.99    Ca    9.5      20 Dec 2019 07:03  Phos  3.1     12-20  Mg     1.7     12-20    TPro  6.6  /  Alb  3.9  /  TBili  0.1<L>  /  DBili  x   /  AST  27  /  ALT  21  /  AlkPhos  54  12-18    PT/INR - ( 18 Dec 2019 23:52 )   PT: 14.3 sec;   INR: 1.24 ratio         PTT - ( 18 Dec 2019 23:52 )  PTT:31.1 sec      Urinalysis Basic - ( 19 Dec 2019 01:51 )    Color: Light Yellow / Appearance: Clear / S.017 / pH: x  Gluc: x / Ketone: Negative  / Bili: Negative / Urobili: Negative   Blood: x / Protein: Trace / Nitrite: Negative   Leuk Esterase: Negative / RBC: x / WBC x   Sq Epi: x / Non Sq Epi: x / Bacteria: x        Culture - Urine (collected 19 Dec 2019 05:18)  Source: .Urine Clean Catch (Midstream)  Final Report (20 Dec 2019 02:06):    <10,000 CFU/mL Normal Urogenital Jyoti    Culture - Blood (collected 19 Dec 2019 05:07)  Source: .Blood Blood-Peripheral  Preliminary Report (20 Dec 2019 06:00):    No growth to date.    Culture - Blood (collected 19 Dec 2019 05:06)  Source: .Blood Blood-Venous  Preliminary Report (20 Dec 2019 06:00):    No growth to date.        RADIOLOGY & ADDITIONAL TESTS:  Results Reviewed:   Imaging Personally Reviewed:  Electrocardiogram Personally Reviewed:    COORDINATION OF CARE:  Care Discussed with Consultants/Other Providers [Y/N]:  Prior or Outpatient Records Reviewed [Y/N]:

## 2019-12-20 NOTE — PROGRESS NOTE ADULT - PROBLEM SELECTOR PLAN 2
s/p lumbar laminectomy in 2015 w/Dr. Aston Faustin, h/o balance issues, no p/w an acute change of falling.  - ED originally had c/f hematoma vs. abscess s/p xray myelography on 12/18  - chronic back pain with this condition, c/w home tramadol (i-STOP in chart)  - ortho c/s appreciate recs: no addt'l imaging needed, surgery planned for [facility] Patient fell 2x at home w/unclear reasons, h/o CVA w/no residual deficits  - CTH negative for acute pathology  - could be d/t dehydration Patient fell 2x at home w/unclear reasons, h/o CVA w/no residual deficits  - CTH negative for acute pathology  - could be d/t dehydration 2/2 flu  - c/w IVF maintenance  - no acute pathology a/w spinal stenosis  - PT eval appreciate recs

## 2019-12-20 NOTE — PROGRESS NOTE ADULT - PROBLEM SELECTOR PLAN 1
RVP + for influenza [id number], febrile at home to 103.5, not febrile this admission  - s/p 1L NS bolus, tylenol 650mg x1, duonebs x1 in ED  - Tamiflu 75mg ordered, will give for 5 days total BID RVP + for influenza [id number], febrile at home to 103.5, not febrile this admission  - s/p 1L NS bolus, tylenol 650mg x1, duonebs x1 in ED  - c/w Tamiflu 75mg BID for 5 days with the last done given on 12/23

## 2019-12-20 NOTE — PROGRESS NOTE ADULT - PROBLEM SELECTOR PLAN 8
- c/w home Wellbutrin  - c/w home Klonopin as needed (i-STOP in chart)  - c/w home Ambien as needed On Soliqua 50U in AM and Novolg 6U qac, endo doc Dr. Joycelyn Avalos [phone]  - c/w humalog 6U qac  - Moderate ISS, FSG qac, qhs

## 2019-12-20 NOTE — PROGRESS NOTE ADULT - PROBLEM SELECTOR PLAN 10
Discharge planning issues.  Plan: Transitions of Care Status:  1.  Name of PCP: Dr. Manolo Rice  2.  PCP Contacted on Admission: [ ] Y    [ ] N  - attempted  3.  PCP contacted at Discharge: [ ] Y    [ ] N    [ ] N/A  4.  Post-Discharge Appointment Date and Location:  5.  Summary of Handoff given to PCP: DVT: SCDs until CTH  negative for acute bleed  Diet: DASH/TLC/CC  GERD ppx: c/w home famotidine, TUMS PRN  Dispo: clinical improvement, ortho clearance & PT eval    Discharge planning issues.  Plan: Transitions of Care Status:  1.  Name of PCP: Dr. Manolo Rice  2.  PCP Contacted on Admission: [ ] Y    [ ] N  - attempted  3.  PCP contacted at Discharge: [ ] Y    [ ] N    [ ] N/A  4.  Post-Discharge Appointment Date and Location:  5.  Summary of Handoff given to PCP:

## 2019-12-20 NOTE — PROGRESS NOTE ADULT - PROBLEM SELECTOR PROBLEM 3
SOB (shortness of breath) on exertion Spinal stenosis of lumbar region, unspecified whether neurogenic claudication present

## 2019-12-20 NOTE — PROGRESS NOTE ADULT - PROBLEM SELECTOR PLAN 4
Patient endorses ANDREA and sees an outpatient cardiologist and pulmonologist  SOB/ANDREA per patient is due to pulm dz of unclear etiology but with h/o LE edema sx sound cardiac in nature  - f/u w/PCP on why PPM was placed and why on Spiriva  - for now will c/w home lasix dosing  - may get TTE depending on when last one was done  - will consider CT chest if conditions warrants it o/w may f/u O/P Chronic issue and seen O/P by pulmonology  - s/p baljeet x1 in ED, may add baljeet q6h PRN  - c/w Symbicort

## 2019-12-20 NOTE — PROGRESS NOTE ADULT - PROBLEM SELECTOR PLAN 5
h/o of CVA in 2013 w/no residual deficits  - f/u CTH non-con  - c/w home DAPT Patient endorses ANDREA and sees an outpatient cardiologist and pulmonologist  SOB/ANDREA per patient is due to pulm dz of unclear etiology but with h/o LE edema sx sound cardiac in nature  - f/u w/PCP on why PPM was placed and why on Spiriva  - for now will c/w home lasix dosing  - may get TTE depending on when last one was done  - will consider CT chest if conditions warrants it o/w may f/u O/P

## 2019-12-20 NOTE — PROGRESS NOTE ADULT - PROBLEM SELECTOR PLAN 3
Chronic issue and seen O/P by pulmonology  - s/p baljeet x1 in ED  - c/w Symbicort s/p lumbar laminectomy in 2015 w/Dr. Aston Faustin, h/o balance issues, no p/w an acute change of falling.  - ED originally had c/f hematoma vs. abscess s/p xray myelography on 12/18  - chronic back pain with this condition, c/w home tramadol (i-STOP in chart)  - ortho c/s appreciate recs: no addt'l imaging needed, surgery planned for [facility]

## 2019-12-21 LAB
ANION GAP SERPL CALC-SCNC: 14 MMOL/L — SIGNIFICANT CHANGE UP (ref 5–17)
BASOPHILS # BLD AUTO: 0.01 K/UL — SIGNIFICANT CHANGE UP (ref 0–0.2)
BASOPHILS NFR BLD AUTO: 0.1 % — SIGNIFICANT CHANGE UP (ref 0–2)
BUN SERPL-MCNC: 21 MG/DL — SIGNIFICANT CHANGE UP (ref 7–23)
CALCIUM SERPL-MCNC: 9.1 MG/DL — SIGNIFICANT CHANGE UP (ref 8.4–10.5)
CHLORIDE SERPL-SCNC: 104 MMOL/L — SIGNIFICANT CHANGE UP (ref 96–108)
CO2 SERPL-SCNC: 23 MMOL/L — SIGNIFICANT CHANGE UP (ref 22–31)
CREAT SERPL-MCNC: 0.93 MG/DL — SIGNIFICANT CHANGE UP (ref 0.5–1.3)
EOSINOPHIL # BLD AUTO: 0.06 K/UL — SIGNIFICANT CHANGE UP (ref 0–0.5)
EOSINOPHIL NFR BLD AUTO: 0.9 % — SIGNIFICANT CHANGE UP (ref 0–6)
GLUCOSE BLDC GLUCOMTR-MCNC: 175 MG/DL — HIGH (ref 70–99)
GLUCOSE BLDC GLUCOMTR-MCNC: 231 MG/DL — HIGH (ref 70–99)
GLUCOSE BLDC GLUCOMTR-MCNC: 298 MG/DL — HIGH (ref 70–99)
GLUCOSE BLDC GLUCOMTR-MCNC: 304 MG/DL — HIGH (ref 70–99)
GLUCOSE BLDC GLUCOMTR-MCNC: 324 MG/DL — HIGH (ref 70–99)
GLUCOSE SERPL-MCNC: 152 MG/DL — HIGH (ref 70–99)
HCT VFR BLD CALC: 35.6 % — LOW (ref 39–50)
HGB BLD-MCNC: 10.7 G/DL — LOW (ref 13–17)
IMM GRANULOCYTES NFR BLD AUTO: 0.3 % — SIGNIFICANT CHANGE UP (ref 0–1.5)
LYMPHOCYTES # BLD AUTO: 1.39 K/UL — SIGNIFICANT CHANGE UP (ref 1–3.3)
LYMPHOCYTES # BLD AUTO: 20 % — SIGNIFICANT CHANGE UP (ref 13–44)
MAGNESIUM SERPL-MCNC: 1.9 MG/DL — SIGNIFICANT CHANGE UP (ref 1.6–2.6)
MCHC RBC-ENTMCNC: 26.1 PG — LOW (ref 27–34)
MCHC RBC-ENTMCNC: 30.1 GM/DL — LOW (ref 32–36)
MCV RBC AUTO: 86.8 FL — SIGNIFICANT CHANGE UP (ref 80–100)
MONOCYTES # BLD AUTO: 0.63 K/UL — SIGNIFICANT CHANGE UP (ref 0–0.9)
MONOCYTES NFR BLD AUTO: 9.1 % — SIGNIFICANT CHANGE UP (ref 2–14)
NEUTROPHILS # BLD AUTO: 4.84 K/UL — SIGNIFICANT CHANGE UP (ref 1.8–7.4)
NEUTROPHILS NFR BLD AUTO: 69.6 % — SIGNIFICANT CHANGE UP (ref 43–77)
NRBC # BLD: 0 /100 WBCS — SIGNIFICANT CHANGE UP (ref 0–0)
PHOSPHATE SERPL-MCNC: 3.3 MG/DL — SIGNIFICANT CHANGE UP (ref 2.5–4.5)
PLATELET # BLD AUTO: 283 K/UL — SIGNIFICANT CHANGE UP (ref 150–400)
POTASSIUM SERPL-MCNC: 3.6 MMOL/L — SIGNIFICANT CHANGE UP (ref 3.5–5.3)
POTASSIUM SERPL-SCNC: 3.6 MMOL/L — SIGNIFICANT CHANGE UP (ref 3.5–5.3)
RBC # BLD: 4.1 M/UL — LOW (ref 4.2–5.8)
RBC # FLD: 14.6 % — HIGH (ref 10.3–14.5)
SODIUM SERPL-SCNC: 141 MMOL/L — SIGNIFICANT CHANGE UP (ref 135–145)
TROPONIN T, HIGH SENSITIVITY RESULT: 51 NG/L — SIGNIFICANT CHANGE UP (ref 0–51)
WBC # BLD: 6.95 K/UL — SIGNIFICANT CHANGE UP (ref 3.8–10.5)
WBC # FLD AUTO: 6.95 K/UL — SIGNIFICANT CHANGE UP (ref 3.8–10.5)

## 2019-12-21 PROCEDURE — 93010 ELECTROCARDIOGRAM REPORT: CPT | Mod: 76

## 2019-12-21 PROCEDURE — 99233 SBSQ HOSP IP/OBS HIGH 50: CPT | Mod: GC

## 2019-12-21 RX ORDER — INSULIN LISPRO 100/ML
12 VIAL (ML) SUBCUTANEOUS
Refills: 0 | Status: DISCONTINUED | OUTPATIENT
Start: 2019-12-21 | End: 2019-12-21

## 2019-12-21 RX ORDER — IPRATROPIUM/ALBUTEROL SULFATE 18-103MCG
3 AEROSOL WITH ADAPTER (GRAM) INHALATION EVERY 6 HOURS
Refills: 0 | Status: DISCONTINUED | OUTPATIENT
Start: 2019-12-21 | End: 2019-12-23

## 2019-12-21 RX ORDER — INSULIN LISPRO 100/ML
10 VIAL (ML) SUBCUTANEOUS
Refills: 0 | Status: DISCONTINUED | OUTPATIENT
Start: 2019-12-21 | End: 2019-12-23

## 2019-12-21 RX ADMIN — Medication 1 MILLIGRAM(S): at 00:00

## 2019-12-21 RX ADMIN — Medication 81 MILLIGRAM(S): at 11:44

## 2019-12-21 RX ADMIN — Medication 500 MILLIGRAM(S): at 06:50

## 2019-12-21 RX ADMIN — Medication 1 APPLICATION(S): at 11:44

## 2019-12-21 RX ADMIN — BUDESONIDE AND FORMOTEROL FUMARATE DIHYDRATE 2 PUFF(S): 160; 4.5 AEROSOL RESPIRATORY (INHALATION) at 17:32

## 2019-12-21 RX ADMIN — ATORVASTATIN CALCIUM 20 MILLIGRAM(S): 80 TABLET, FILM COATED ORAL at 21:43

## 2019-12-21 RX ADMIN — ZOLPIDEM TARTRATE 5 MILLIGRAM(S): 10 TABLET ORAL at 21:43

## 2019-12-21 RX ADMIN — Medication 4: at 08:59

## 2019-12-21 RX ADMIN — Medication 10 UNIT(S): at 17:32

## 2019-12-21 RX ADMIN — Medication 3 MILLILITER(S): at 17:48

## 2019-12-21 RX ADMIN — ZOLPIDEM TARTRATE 5 MILLIGRAM(S): 10 TABLET ORAL at 03:17

## 2019-12-21 RX ADMIN — Medication 1 MILLIGRAM(S): at 11:44

## 2019-12-21 RX ADMIN — Medication 8: at 12:54

## 2019-12-21 RX ADMIN — BUDESONIDE AND FORMOTEROL FUMARATE DIHYDRATE 2 PUFF(S): 160; 4.5 AEROSOL RESPIRATORY (INHALATION) at 06:51

## 2019-12-21 RX ADMIN — Medication 500 MILLIGRAM(S): at 17:31

## 2019-12-21 RX ADMIN — FAMOTIDINE 40 MILLIGRAM(S): 10 INJECTION INTRAVENOUS at 17:31

## 2019-12-21 RX ADMIN — Medication 650 MILLIGRAM(S): at 11:44

## 2019-12-21 RX ADMIN — Medication 75 MILLIGRAM(S): at 17:31

## 2019-12-21 RX ADMIN — Medication 1 MILLIGRAM(S): at 21:57

## 2019-12-21 RX ADMIN — Medication 650 MILLIGRAM(S): at 02:00

## 2019-12-21 RX ADMIN — Medication 3 MILLILITER(S): at 22:53

## 2019-12-21 RX ADMIN — Medication 40 MILLIGRAM(S): at 09:01

## 2019-12-21 RX ADMIN — Medication 8: at 17:32

## 2019-12-21 RX ADMIN — INSULIN GLARGINE 35 UNIT(S): 100 INJECTION, SOLUTION SUBCUTANEOUS at 21:56

## 2019-12-21 RX ADMIN — Medication 1 MILLIGRAM(S): at 17:48

## 2019-12-21 RX ADMIN — CLOPIDOGREL BISULFATE 75 MILLIGRAM(S): 75 TABLET, FILM COATED ORAL at 11:44

## 2019-12-21 RX ADMIN — Medication 10 UNIT(S): at 12:54

## 2019-12-21 RX ADMIN — ZOLPIDEM TARTRATE 5 MILLIGRAM(S): 10 TABLET ORAL at 22:52

## 2019-12-21 RX ADMIN — BUPROPION HYDROCHLORIDE 300 MILLIGRAM(S): 150 TABLET, EXTENDED RELEASE ORAL at 11:44

## 2019-12-21 RX ADMIN — FAMOTIDINE 40 MILLIGRAM(S): 10 INJECTION INTRAVENOUS at 11:44

## 2019-12-21 RX ADMIN — Medication 75 MILLIGRAM(S): at 06:51

## 2019-12-21 RX ADMIN — Medication 650 MILLIGRAM(S): at 01:35

## 2019-12-21 RX ADMIN — Medication 2: at 21:53

## 2019-12-21 RX ADMIN — FAMOTIDINE 40 MILLIGRAM(S): 10 INJECTION INTRAVENOUS at 09:01

## 2019-12-21 NOTE — PROGRESS NOTE ADULT - PROBLEM SELECTOR PLAN 4
Chronic issue and seen O/P by pulmonology  - s/p baljeet x1 in ED, may add baljeet q6h PRN  - c/w Symbicort Chronic issue and seen O/P by pulmonology  - s/p baljeet x1 in ED, may add baljeet q6h PRN  - c/w Symbicort  - baljeet q6h standing

## 2019-12-21 NOTE — PROGRESS NOTE ADULT - PROBLEM SELECTOR PLAN 2
Patient fell 2x at home w/unclear reasons, h/o CVA w/no residual deficits  - CTH negative for acute pathology  - could be d/t dehydration 2/2 flu  - c/w IVF maintenance  - no acute pathology a/w spinal stenosis  - PT eval appreciate recs

## 2019-12-21 NOTE — PROGRESS NOTE ADULT - PROBLEM SELECTOR PLAN 1
RVP+ for influenza [id number], febrile at home to 103.5, not febrile this admission  - s/p 1L NS bolus, tylenol 650mg x1, duonebs x1 in ED  - c/w Tamiflu 75mg BID for 5 days with the last done given on 12/23 RVP+ for influenza [id number], febrile at home to 103.5, also febrile this admission w/Tmax of 103.2  - s/p 1L NS bolus, tylenol 650mg x1, duonebs x1 in ED  - c/w Tamiflu 75mg BID for 5 days with the last done given on 12/23

## 2019-12-21 NOTE — PROGRESS NOTE ADULT - PROBLEM SELECTOR PLAN 7
s/p CABG (4-vessel)  - c/w home Crestor s/p CABG (4-vessel)  - c/w home Crestor  - C/P AM of 12/21, EKG done which was relatively unchanged, Trop 51 (nl)

## 2019-12-21 NOTE — PROGRESS NOTE ADULT - PROBLEM SELECTOR PLAN 10
DVT: SCDs until CTH  negative for acute bleed  Diet: DASH/TLC/CC  GERD ppx: c/w home famotidine, TUMS PRN  Dispo: clinical improvement, ortho clearance & PT eval    Discharge planning issues.  Plan: Transitions of Care Status:  1.  Name of PCP: Dr. Manolo Rice  2.  PCP Contacted on Admission: [ ] Y    [ ] N  - attempted  3.  PCP contacted at Discharge: [ ] Y    [ ] N    [ ] N/A  4.  Post-Discharge Appointment Date and Location:  5.  Summary of Handoff given to PCP:

## 2019-12-21 NOTE — PROGRESS NOTE ADULT - PROBLEM SELECTOR PLAN 6
h/o of CVA in 2013 w/no residual deficits  - CTH non-con negative for acute pathology  - c/w home DAPT

## 2019-12-21 NOTE — PROGRESS NOTE ADULT - PROBLEM SELECTOR PLAN 5
Patient endorses ANDREA and sees an outpatient cardiologist and pulmonologist  SOB/ANDREA per patient is due to pulm dz of unclear etiology but with h/o LE edema sx sound cardiac in nature  - f/u w/PCP on why PPM was placed and why on Spiriva  - for now will c/w home lasix dosing  - may get TTE depending on when last one was done  - will consider CT chest if conditions warrants it o/w may f/u O/P

## 2019-12-21 NOTE — PROGRESS NOTE ADULT - PROBLEM SELECTOR PLAN 3
s/p lumbar laminectomy in 2015 w/Dr. Aston Faustin, h/o balance issues, no p/w an acute change of falling.  - ED originally had c/f hematoma vs. abscess s/p xray myelography on 12/18  - chronic back pain with this condition, c/w home tramadol (i-STOP in chart)  - ortho c/s appreciate recs: no addt'l imaging needed, surgery planned for [facility]

## 2019-12-21 NOTE — PROGRESS NOTE ADULT - PROBLEM SELECTOR PLAN 8
On Soliqua 50U in AM and Novolg 6U qac, endo doc Dr. Joycelyn Avalos [phone]  - c/w humalog 8U qac, giving 35U lantus qhs  - Moderate ISS, FSG qac, qhs

## 2019-12-21 NOTE — PROGRESS NOTE ADULT - SUBJECTIVE AND OBJECTIVE BOX
PROGRESS NOTE:   Authored by Sammie Hawthorne MD, PGY1, Internal Medicine, pager [phone]     Patient is a 69y old  Male who presents with a chief complaint of Fever (21 Dec 2019 07:12)      SUBJECTIVE / OVERNIGHT EVENTS:     ADDITIONAL REVIEW OF SYSTEMS:    MEDICATIONS  (STANDING):  aspirin enteric coated 81 milliGRAM(s) Oral daily  atorvastatin 20 milliGRAM(s) Oral <User Schedule>  BACItracin   Ointment 1 Application(s) Topical daily  budesonide  80 MICROgram(s)/formoterol 4.5 MICROgram(s) Inhaler 2 Puff(s) Inhalation two times a day  buPROPion XL . 300 milliGRAM(s) Oral daily  clopidogrel Tablet 75 milliGRAM(s) Oral daily  dextrose 5%. 1000 milliLiter(s) (50 mL/Hr) IV Continuous <Continuous>  dextrose 50% Injectable 12.5 Gram(s) IV Push once  dextrose 50% Injectable 25 Gram(s) IV Push once  dextrose 50% Injectable 25 Gram(s) IV Push once  famotidine    Tablet 40 milliGRAM(s) Oral <User Schedule>  furosemide    Tablet 20 milliGRAM(s) Oral <User Schedule>  furosemide    Tablet 40 milliGRAM(s) Oral <User Schedule>  insulin glargine Injectable (LANTUS) 35 Unit(s) SubCutaneous at bedtime  insulin lispro (HumaLOG) corrective regimen sliding scale   SubCutaneous three times a day before meals  insulin lispro (HumaLOG) corrective regimen sliding scale   SubCutaneous at bedtime  insulin lispro Injectable (HumaLOG) 10 Unit(s) SubCutaneous three times a day before meals  magnesium gluconate 500 milliGRAM(s) Oral two times a day  oseltamivir 75 milliGRAM(s) Oral two times a day    MEDICATIONS  (PRN):  acetaminophen   Tablet .. 650 milliGRAM(s) Oral every 6 hours PRN Temp greater or equal to 38C (100.4F), Mild Pain (1 - 3), Moderate Pain (4 - 6), Severe Pain (7 - 10)  calcium carbonate    500 mG (Tums) Chewable 1 Tablet(s) Chew three times a day PRN Indigestion  clonazePAM  Tablet 1 milliGRAM(s) Oral three times a day PRN Anxiety  dextrose 40% Gel 15 Gram(s) Oral once PRN Blood Glucose LESS THAN 70 milliGRAM(s)/deciliter  glucagon  Injectable 1 milliGRAM(s) IntraMuscular once PRN Glucose LESS THAN 70 milligrams/deciliter  traMADol 100 milliGRAM(s) Oral three times a day PRN Moderate Pain (4 - 6) Severe Pain (7-10)  zolpidem 5 milliGRAM(s) Oral at bedtime PRN Insomnia  zolpidem 5 milliGRAM(s) Oral at bedtime PRN Insomnia      CAPILLARY BLOOD GLUCOSE      POCT Blood Glucose.: 231 mg/dL (21 Dec 2019 08:52)  POCT Blood Glucose.: 379 mg/dL (20 Dec 2019 23:31)  POCT Blood Glucose.: 399 mg/dL (20 Dec 2019 21:53)  POCT Blood Glucose.: 318 mg/dL (20 Dec 2019 17:43)  POCT Blood Glucose.: 318 mg/dL (20 Dec 2019 13:44)    I&O's Summary    20 Dec 2019 07:01  -  21 Dec 2019 07:00  --------------------------------------------------------  IN: 0 mL / OUT: 200 mL / NET: -200 mL        PHYSICAL EXAM:  Vital Signs Last 24 Hrs  T(C): 37.2 (21 Dec 2019 06:21), Max: 37.2 (21 Dec 2019 06:21)  T(F): 99 (21 Dec 2019 06:21), Max: 99 (21 Dec 2019 06:21)  HR: 89 (21 Dec 2019 06:21) (88 - 92)  BP: 156/82 (21 Dec 2019 06:21) (126/88 - 156/83)  BP(mean): --  RR: 18 (21 Dec 2019 06:21) (18 - 18)  SpO2: 95% (21 Dec 2019 06:21) (95% - 99%)    CONSTITUTIONAL: NAD, well-developed  RESPIRATORY: Normal respiratory effort; lungs are clear to auscultation bilaterally  CARDIOVASCULAR: Regular rate and rhythm, normal S1 and S2, no murmur/rub/gallop; No lower extremity edema; Peripheral pulses are 2+ bilaterally  ABDOMEN: Nontender to palpation, normoactive bowel sounds, no rebound/guarding; No hepatosplenomegaly  MUSCLOSKELETAL: no clubbing or cyanosis of digits; no joint swelling or tenderness to palpation  PSYCH: A+O to person, place, and time; affect appropriate    LABS:                        10.7   6.95  )-----------( 283      ( 21 Dec 2019 06:57 )             35.6     12-21    141  |  104  |  21  ----------------------------<  152<H>  3.6   |  23  |  0.93    Ca    9.1      21 Dec 2019 06:59  Phos  3.3     12-21  Mg     1.9     12-21                Culture - Urine (collected 19 Dec 2019 05:18)  Source: .Urine Clean Catch (Midstream)  Final Report (20 Dec 2019 02:06):    <10,000 CFU/mL Normal Urogenital Jyoti    Culture - Blood (collected 19 Dec 2019 05:07)  Source: .Blood Blood-Peripheral  Preliminary Report (20 Dec 2019 06:00):    No growth to date.    Culture - Blood (collected 19 Dec 2019 05:06)  Source: .Blood Blood-Venous  Preliminary Report (20 Dec 2019 06:00):    No growth to date.        RADIOLOGY & ADDITIONAL TESTS:  Results Reviewed:   Imaging Personally Reviewed:  Electrocardiogram Personally Reviewed:    COORDINATION OF CARE:  Care Discussed with Consultants/Other Providers [Y/N]:  Prior or Outpatient Records Reviewed [Y/N]: PROGRESS NOTE:   Authored by Sammie Hawthorne MD, PGY1, Internal Medicine, pager [phone]     Patient is a 69y old  Male who presents with a chief complaint of Fever (21 Dec 2019 07:12)      SUBJECTIVE / OVERNIGHT EVENTS: SOB ON was given duonebs, home Klonipin wa ordered for anxiety and addt'l lantus had to be given per NF. FSG this AM is 231  Still SOB, c/o nonpleuritic C/P. Denies N/V/D/C    ADDITIONAL REVIEW OF SYSTEMS:    MEDICATIONS  (STANDING):  aspirin enteric coated 81 milliGRAM(s) Oral daily  atorvastatin 20 milliGRAM(s) Oral <User Schedule>  BACItracin   Ointment 1 Application(s) Topical daily  budesonide  80 MICROgram(s)/formoterol 4.5 MICROgram(s) Inhaler 2 Puff(s) Inhalation two times a day  buPROPion XL . 300 milliGRAM(s) Oral daily  clopidogrel Tablet 75 milliGRAM(s) Oral daily  dextrose 5%. 1000 milliLiter(s) (50 mL/Hr) IV Continuous <Continuous>  dextrose 50% Injectable 12.5 Gram(s) IV Push once  dextrose 50% Injectable 25 Gram(s) IV Push once  dextrose 50% Injectable 25 Gram(s) IV Push once  famotidine    Tablet 40 milliGRAM(s) Oral <User Schedule>  furosemide    Tablet 20 milliGRAM(s) Oral <User Schedule>  furosemide    Tablet 40 milliGRAM(s) Oral <User Schedule>  insulin glargine Injectable (LANTUS) 35 Unit(s) SubCutaneous at bedtime  insulin lispro (HumaLOG) corrective regimen sliding scale   SubCutaneous three times a day before meals  insulin lispro (HumaLOG) corrective regimen sliding scale   SubCutaneous at bedtime  insulin lispro Injectable (HumaLOG) 10 Unit(s) SubCutaneous three times a day before meals  magnesium gluconate 500 milliGRAM(s) Oral two times a day  oseltamivir 75 milliGRAM(s) Oral two times a day    MEDICATIONS  (PRN):  acetaminophen   Tablet .. 650 milliGRAM(s) Oral every 6 hours PRN Temp greater or equal to 38C (100.4F), Mild Pain (1 - 3), Moderate Pain (4 - 6), Severe Pain (7 - 10)  calcium carbonate    500 mG (Tums) Chewable 1 Tablet(s) Chew three times a day PRN Indigestion  clonazePAM  Tablet 1 milliGRAM(s) Oral three times a day PRN Anxiety  dextrose 40% Gel 15 Gram(s) Oral once PRN Blood Glucose LESS THAN 70 milliGRAM(s)/deciliter  glucagon  Injectable 1 milliGRAM(s) IntraMuscular once PRN Glucose LESS THAN 70 milligrams/deciliter  traMADol 100 milliGRAM(s) Oral three times a day PRN Moderate Pain (4 - 6) Severe Pain (7-10)  zolpidem 5 milliGRAM(s) Oral at bedtime PRN Insomnia  zolpidem 5 milliGRAM(s) Oral at bedtime PRN Insomnia      CAPILLARY BLOOD GLUCOSE      POCT Blood Glucose.: 231 mg/dL (21 Dec 2019 08:52)  POCT Blood Glucose.: 379 mg/dL (20 Dec 2019 23:31)  POCT Blood Glucose.: 399 mg/dL (20 Dec 2019 21:53)  POCT Blood Glucose.: 318 mg/dL (20 Dec 2019 17:43)  POCT Blood Glucose.: 318 mg/dL (20 Dec 2019 13:44)    I&O's Summary    20 Dec 2019 07:01  -  21 Dec 2019 07:00  --------------------------------------------------------  IN: 0 mL / OUT: 200 mL / NET: -200 mL        PHYSICAL EXAM:  Vital Signs Last 24 Hrs  T(C): 37.2 (21 Dec 2019 06:21), Max: 37.2 (21 Dec 2019 06:21)  T(F): 99 (21 Dec 2019 06:21), Max: 99 (21 Dec 2019 06:21)  HR: 89 (21 Dec 2019 06:21) (88 - 92)  BP: 156/82 (21 Dec 2019 06:21) (126/88 - 156/83)  BP(mean): --  RR: 18 (21 Dec 2019 06:21) (18 - 18)  SpO2: 95% (21 Dec 2019 06:21) (95% - 99%)    CONSTITUTIONAL: NAD, well-developed  RESPIRATORY: Normal respiratory effort; diffuse wheezing b/l  CARDIOVASCULAR: Regular rate and rhythm, normal S1 and S2, no murmur/rub/gallop; No lower extremity edema, site of C/P non-tender on palpation  ABDOMEN: Nontender to palpation, normoactive bowel sounds, no rebound/guarding; No hepatosplenomegaly  MUSCLOSKELETAL: no clubbing or cyanosis of digits; no joint swelling or tenderness to palpation  PSYCH: A+O to person, place, and time; affect appropriate    LABS:                        10.7   6.95  )-----------( 283      ( 21 Dec 2019 06:57 )             35.6     12-21    141  |  104  |  21  ----------------------------<  152<H>  3.6   |  23  |  0.93    Ca    9.1      21 Dec 2019 06:59  Phos  3.3     12-21  Mg     1.9     12-21                Culture - Urine (collected 19 Dec 2019 05:18)  Source: .Urine Clean Catch (Midstream)  Final Report (20 Dec 2019 02:06):    <10,000 CFU/mL Normal Urogenital Jyoti    Culture - Blood (collected 19 Dec 2019 05:07)  Source: .Blood Blood-Peripheral  Preliminary Report (20 Dec 2019 06:00):    No growth to date.    Culture - Blood (collected 19 Dec 2019 05:06)  Source: .Blood Blood-Venous  Preliminary Report (20 Dec 2019 06:00):    No growth to date.        RADIOLOGY & ADDITIONAL TESTS:  Results Reviewed:   Imaging Personally Reviewed:  Electrocardiogram Personally Reviewed:    COORDINATION OF CARE:  Care Discussed with Consultants/Other Providers [Y/N]:  Prior or Outpatient Records Reviewed [Y/N]:

## 2019-12-22 ENCOUNTER — TRANSCRIPTION ENCOUNTER (OUTPATIENT)
Age: 69
End: 2019-12-22

## 2019-12-22 LAB
ANION GAP SERPL CALC-SCNC: 14 MMOL/L — SIGNIFICANT CHANGE UP (ref 5–17)
BASOPHILS # BLD AUTO: 0.02 K/UL — SIGNIFICANT CHANGE UP (ref 0–0.2)
BASOPHILS NFR BLD AUTO: 0.2 % — SIGNIFICANT CHANGE UP (ref 0–2)
BUN SERPL-MCNC: 18 MG/DL — SIGNIFICANT CHANGE UP (ref 7–23)
CALCIUM SERPL-MCNC: 9.1 MG/DL — SIGNIFICANT CHANGE UP (ref 8.4–10.5)
CHLORIDE SERPL-SCNC: 101 MMOL/L — SIGNIFICANT CHANGE UP (ref 96–108)
CO2 SERPL-SCNC: 24 MMOL/L — SIGNIFICANT CHANGE UP (ref 22–31)
CREAT SERPL-MCNC: 0.89 MG/DL — SIGNIFICANT CHANGE UP (ref 0.5–1.3)
EOSINOPHIL # BLD AUTO: 0.11 K/UL — SIGNIFICANT CHANGE UP (ref 0–0.5)
EOSINOPHIL NFR BLD AUTO: 1.2 % — SIGNIFICANT CHANGE UP (ref 0–6)
GLUCOSE BLDC GLUCOMTR-MCNC: 128 MG/DL — HIGH (ref 70–99)
GLUCOSE BLDC GLUCOMTR-MCNC: 201 MG/DL — HIGH (ref 70–99)
GLUCOSE BLDC GLUCOMTR-MCNC: 201 MG/DL — HIGH (ref 70–99)
GLUCOSE BLDC GLUCOMTR-MCNC: 222 MG/DL — HIGH (ref 70–99)
GLUCOSE BLDC GLUCOMTR-MCNC: 287 MG/DL — HIGH (ref 70–99)
GLUCOSE SERPL-MCNC: 259 MG/DL — HIGH (ref 70–99)
HCT VFR BLD CALC: 35.5 % — LOW (ref 39–50)
HGB BLD-MCNC: 10.8 G/DL — LOW (ref 13–17)
IMM GRANULOCYTES NFR BLD AUTO: 0.3 % — SIGNIFICANT CHANGE UP (ref 0–1.5)
LYMPHOCYTES # BLD AUTO: 1.53 K/UL — SIGNIFICANT CHANGE UP (ref 1–3.3)
LYMPHOCYTES # BLD AUTO: 16.7 % — SIGNIFICANT CHANGE UP (ref 13–44)
MAGNESIUM SERPL-MCNC: 1.9 MG/DL — SIGNIFICANT CHANGE UP (ref 1.6–2.6)
MCHC RBC-ENTMCNC: 26.5 PG — LOW (ref 27–34)
MCHC RBC-ENTMCNC: 30.4 GM/DL — LOW (ref 32–36)
MCV RBC AUTO: 87.2 FL — SIGNIFICANT CHANGE UP (ref 80–100)
MONOCYTES # BLD AUTO: 0.75 K/UL — SIGNIFICANT CHANGE UP (ref 0–0.9)
MONOCYTES NFR BLD AUTO: 8.2 % — SIGNIFICANT CHANGE UP (ref 2–14)
NEUTROPHILS # BLD AUTO: 6.74 K/UL — SIGNIFICANT CHANGE UP (ref 1.8–7.4)
NEUTROPHILS NFR BLD AUTO: 73.4 % — SIGNIFICANT CHANGE UP (ref 43–77)
NRBC # BLD: 0 /100 WBCS — SIGNIFICANT CHANGE UP (ref 0–0)
PHOSPHATE SERPL-MCNC: 2.8 MG/DL — SIGNIFICANT CHANGE UP (ref 2.5–4.5)
PLATELET # BLD AUTO: 295 K/UL — SIGNIFICANT CHANGE UP (ref 150–400)
POTASSIUM SERPL-MCNC: 4.3 MMOL/L — SIGNIFICANT CHANGE UP (ref 3.5–5.3)
POTASSIUM SERPL-SCNC: 4.3 MMOL/L — SIGNIFICANT CHANGE UP (ref 3.5–5.3)
RBC # BLD: 4.07 M/UL — LOW (ref 4.2–5.8)
RBC # FLD: 14.8 % — HIGH (ref 10.3–14.5)
SODIUM SERPL-SCNC: 139 MMOL/L — SIGNIFICANT CHANGE UP (ref 135–145)
WBC # BLD: 9.18 K/UL — SIGNIFICANT CHANGE UP (ref 3.8–10.5)
WBC # FLD AUTO: 9.18 K/UL — SIGNIFICANT CHANGE UP (ref 3.8–10.5)

## 2019-12-22 PROCEDURE — 99232 SBSQ HOSP IP/OBS MODERATE 35: CPT | Mod: GC

## 2019-12-22 RX ORDER — INSULIN GLARGINE 100 [IU]/ML
45 INJECTION, SOLUTION SUBCUTANEOUS AT BEDTIME
Refills: 0 | Status: DISCONTINUED | OUTPATIENT
Start: 2019-12-22 | End: 2019-12-23

## 2019-12-22 RX ADMIN — FAMOTIDINE 40 MILLIGRAM(S): 10 INJECTION INTRAVENOUS at 09:19

## 2019-12-22 RX ADMIN — Medication 1 MILLIGRAM(S): at 15:43

## 2019-12-22 RX ADMIN — INSULIN GLARGINE 45 UNIT(S): 100 INJECTION, SOLUTION SUBCUTANEOUS at 22:17

## 2019-12-22 RX ADMIN — ZOLPIDEM TARTRATE 5 MILLIGRAM(S): 10 TABLET ORAL at 22:16

## 2019-12-22 RX ADMIN — Medication 3 MILLILITER(S): at 06:36

## 2019-12-22 RX ADMIN — Medication 10 UNIT(S): at 12:48

## 2019-12-22 RX ADMIN — BUDESONIDE AND FORMOTEROL FUMARATE DIHYDRATE 2 PUFF(S): 160; 4.5 AEROSOL RESPIRATORY (INHALATION) at 17:45

## 2019-12-22 RX ADMIN — Medication 6: at 09:15

## 2019-12-22 RX ADMIN — ZOLPIDEM TARTRATE 5 MILLIGRAM(S): 10 TABLET ORAL at 23:19

## 2019-12-22 RX ADMIN — FAMOTIDINE 40 MILLIGRAM(S): 10 INJECTION INTRAVENOUS at 17:44

## 2019-12-22 RX ADMIN — TRAMADOL HYDROCHLORIDE 100 MILLIGRAM(S): 50 TABLET ORAL at 17:40

## 2019-12-22 RX ADMIN — Medication 75 MILLIGRAM(S): at 17:44

## 2019-12-22 RX ADMIN — BUPROPION HYDROCHLORIDE 300 MILLIGRAM(S): 150 TABLET, EXTENDED RELEASE ORAL at 12:49

## 2019-12-22 RX ADMIN — Medication 10 UNIT(S): at 17:44

## 2019-12-22 RX ADMIN — ATORVASTATIN CALCIUM 20 MILLIGRAM(S): 80 TABLET, FILM COATED ORAL at 22:16

## 2019-12-22 RX ADMIN — Medication 4: at 12:48

## 2019-12-22 RX ADMIN — Medication 500 MILLIGRAM(S): at 06:34

## 2019-12-22 RX ADMIN — Medication 81 MILLIGRAM(S): at 12:48

## 2019-12-22 RX ADMIN — Medication 500 MILLIGRAM(S): at 17:44

## 2019-12-22 RX ADMIN — Medication 3 MILLILITER(S): at 17:44

## 2019-12-22 RX ADMIN — Medication 75 MILLIGRAM(S): at 06:34

## 2019-12-22 RX ADMIN — TRAMADOL HYDROCHLORIDE 100 MILLIGRAM(S): 50 TABLET ORAL at 16:49

## 2019-12-22 RX ADMIN — Medication 3 MILLILITER(S): at 12:51

## 2019-12-22 RX ADMIN — Medication 0: at 22:19

## 2019-12-22 RX ADMIN — Medication 40 MILLIGRAM(S): at 09:19

## 2019-12-22 RX ADMIN — BUDESONIDE AND FORMOTEROL FUMARATE DIHYDRATE 2 PUFF(S): 160; 4.5 AEROSOL RESPIRATORY (INHALATION) at 06:35

## 2019-12-22 RX ADMIN — Medication 10 UNIT(S): at 09:16

## 2019-12-22 RX ADMIN — CLOPIDOGREL BISULFATE 75 MILLIGRAM(S): 75 TABLET, FILM COATED ORAL at 12:48

## 2019-12-22 RX ADMIN — FAMOTIDINE 40 MILLIGRAM(S): 10 INJECTION INTRAVENOUS at 12:49

## 2019-12-22 RX ADMIN — Medication 1 MILLIGRAM(S): at 22:16

## 2019-12-22 RX ADMIN — Medication 1 APPLICATION(S): at 12:33

## 2019-12-22 NOTE — PROGRESS NOTE ADULT - PROBLEM SELECTOR PLAN 2
Patient fell 2x at home w/unclear reasons, h/o CVA w/no residual deficits  - CTH negative for acute pathology  - could be d/t dehydration 2/2 flu  - c/w IVF maintenance  - no acute pathology a/w spinal stenosis  - Outpatient PT

## 2019-12-22 NOTE — PROGRESS NOTE ADULT - PROBLEM SELECTOR PLAN 10
DVT: OOB   Diet: DASH/TLC/CC  GERD ppx: c/w home famotidine, TUMS PRN  Dispo: likely Monday     Discharge planning issues.  Plan: Transitions of Care Status:  1.  Name of PCP: Dr. Manolo Rice  2.  PCP Contacted on Admission: [ ] Y    [ ] N  - attempted  3.  PCP contacted at Discharge: [ ] Y    [ ] N    [ ] N/A  4.  Post-Discharge Appointment Date and Location:  5.  Summary of Handoff given to PCP:

## 2019-12-22 NOTE — PROGRESS NOTE ADULT - PROBLEM SELECTOR PLAN 1
RVP+ for influenza [id number], febrile at home to 103.5, also febrile this admission w/Tmax of 103.2  - s/p 1L NS bolus, tylenol 650mg x1, duonebs x1 in ED  - c/w Tamiflu 75mg BID for 5 days with the last done given on 12/23

## 2019-12-22 NOTE — PROGRESS NOTE ADULT - PROBLEM SELECTOR PLAN 8
On Soliqua 50U in AM and Novolg 6U qac, endo doc Dr. Joycelyn Avalos [phone]  - c/w humalog 8U qac, fingersticks remain elevated increased Lantus to 45U QHS   - Moderate ISS, FSG qac, qhs

## 2019-12-22 NOTE — PROGRESS NOTE ADULT - ASSESSMENT
69 PMHx spinal stenosis s/p lumbar laminectomy (2015), PPM, CVA (2013, no residual deficits), CAD s/p CABG (4-vessel), DM2 (on insulin), benign adrenal tumor s/p L adrenalectomy, HTN (no longer on meds to treat), p/w fever and fall s/p myelogram on 12/16 a/f influenza (+RVP). Ortho consulted no need for further imaging as no concern for epidural hematoma/abscess.

## 2019-12-22 NOTE — PROGRESS NOTE ADULT - SUBJECTIVE AND OBJECTIVE BOX
Department if Internal Medicine  Kenneth Benjamin M.D. | PGY-2  Pager: [phone] (NS), [phone] (MARLA)        Patient is a 69y old  Male who presents with a chief complaint of Fever (21 Dec 2019 07:12)      SUBJECTIVE / OVERNIGHT EVENTS:  No acute events overnight. Patient seen at bedside. Reports having significant back pain, Tramadol PRN has been ordered patient made aware can ask for pain. Still has a slightly productive cough which patient cannot clarify which color however has improved with nebulizer treatments. Urinating well. Had a BM yesterday. Requesting to be weighed on a scale rather than the bed.     ADDITIONAL REVIEW OF SYSTEMS:    No fevers/chills, chest pain, shortness of breath, abdominal pain, nausea, vomiting, diarrhea or constipation.     MEDICATIONS  (STANDING):  albuterol/ipratropium for Nebulization 3 milliLiter(s) Nebulizer every 6 hours  aspirin enteric coated 81 milliGRAM(s) Oral daily  atorvastatin 20 milliGRAM(s) Oral <User Schedule>  BACItracin   Ointment 1 Application(s) Topical daily  budesonide  80 MICROgram(s)/formoterol 4.5 MICROgram(s) Inhaler 2 Puff(s) Inhalation two times a day  buPROPion XL . 300 milliGRAM(s) Oral daily  clopidogrel Tablet 75 milliGRAM(s) Oral daily  dextrose 5%. 1000 milliLiter(s) (50 mL/Hr) IV Continuous <Continuous>  dextrose 50% Injectable 12.5 Gram(s) IV Push once  dextrose 50% Injectable 25 Gram(s) IV Push once  dextrose 50% Injectable 25 Gram(s) IV Push once  famotidine    Tablet 40 milliGRAM(s) Oral <User Schedule>  furosemide    Tablet 20 milliGRAM(s) Oral <User Schedule>  furosemide    Tablet 40 milliGRAM(s) Oral <User Schedule>  insulin glargine Injectable (LANTUS) 45 Unit(s) SubCutaneous at bedtime  insulin lispro (HumaLOG) corrective regimen sliding scale   SubCutaneous three times a day before meals  insulin lispro (HumaLOG) corrective regimen sliding scale   SubCutaneous at bedtime  insulin lispro Injectable (HumaLOG) 10 Unit(s) SubCutaneous three times a day before meals  magnesium gluconate 500 milliGRAM(s) Oral two times a day  oseltamivir 75 milliGRAM(s) Oral two times a day    MEDICATIONS  (PRN):  acetaminophen   Tablet .. 650 milliGRAM(s) Oral every 6 hours PRN Temp greater or equal to 38C (100.4F), Mild Pain (1 - 3), Moderate Pain (4 - 6), Severe Pain (7 - 10)  calcium carbonate    500 mG (Tums) Chewable 1 Tablet(s) Chew three times a day PRN Indigestion  clonazePAM  Tablet 1 milliGRAM(s) Oral three times a day PRN Anxiety  dextrose 40% Gel 15 Gram(s) Oral once PRN Blood Glucose LESS THAN 70 milliGRAM(s)/deciliter  glucagon  Injectable 1 milliGRAM(s) IntraMuscular once PRN Glucose LESS THAN 70 milligrams/deciliter  traMADol 100 milliGRAM(s) Oral three times a day PRN Moderate Pain (4 - 6) Severe Pain (7-10)  zolpidem 5 milliGRAM(s) Oral at bedtime PRN Insomnia  zolpidem 5 milliGRAM(s) Oral at bedtime PRN Insomnia      CAPILLARY BLOOD GLUCOSE      POCT Blood Glucose.: 287 mg/dL (22 Dec 2019 08:27)  POCT Blood Glucose.: 222 mg/dL (22 Dec 2019 02:45)  POCT Blood Glucose.: 175 mg/dL (21 Dec 2019 23:33)  POCT Blood Glucose.: 298 mg/dL (21 Dec 2019 21:45)  POCT Blood Glucose.: 304 mg/dL (21 Dec 2019 17:28)  POCT Blood Glucose.: 324 mg/dL (21 Dec 2019 12:50)    I&O's Summary      PHYSICAL EXAM:  Vital Signs Last 24 Hrs  T(C): 37.2 (22 Dec 2019 09:15), Max: 37.2 (22 Dec 2019 09:15)  T(F): 99 (22 Dec 2019 09:15), Max: 99 (22 Dec 2019 09:15)  HR: 89 (22 Dec 2019 09:15) (88 - 91)  BP: 124/77 (22 Dec 2019 09:15) (124/77 - 146/81)  BP(mean): --  RR: 16 (22 Dec 2019 09:15) (16 - 18)  SpO2: 97% (22 Dec 2019 09:15) (96% - 99%)    CONSTITUTIONAL: NAD, well-developed, well-groomed  EYES: conjunctiva and sclera clear  HEENT: NC/AT, neck supple   RESPIRATORY: Normal respiratory effort; mild rhonchi but no wheezing   CARDIOVASCULAR: Regular rate and rhythm, normal S1 and S2, no murmur; No lower extremity edema; Peripheral pulses are 2+ bilaterally  ABDOMEN: Nontender to palpation, normoactive bowel sounds, no rebound/guarding; No hepatosplenomegaly  PSYCH: A+O to person, place, and time; affect appropriate  NEUROLOGY: No focal deficits  BACK: No spinal or paraspinal tenderness   SKIN: No rashes    LABS:                        10.8   9.18  )-----------( 295      ( 22 Dec 2019 06:11 )             35.5     12-22    139  |  101  |  18  ----------------------------<  259<H>  4.3   |  24  |  0.89    Ca    9.1      22 Dec 2019 06:11  Phos  2.8     12-22  Mg     1.9     12-22

## 2019-12-22 NOTE — PROGRESS NOTE ADULT - PROBLEM SELECTOR PLAN 5
Patient endorses ANDREA and sees an outpatient cardiologist and pulmonologist  SOB/ANDREA per patient is due to pulm dz of unclear etiology but with h/o LE edema sx sound cardiac in nature  - for now will c/w home lasix dosing  - may get TTE depending on when last one was done  - will consider CT chest if conditions warrants it o/w may f/u O/P

## 2019-12-23 ENCOUNTER — INBOUND DOCUMENT (OUTPATIENT)
Age: 69
End: 2019-12-23

## 2019-12-23 ENCOUNTER — CLINICAL ADVICE (OUTPATIENT)
Age: 69
End: 2019-12-23

## 2019-12-23 ENCOUNTER — TRANSCRIPTION ENCOUNTER (OUTPATIENT)
Age: 69
End: 2019-12-23

## 2019-12-23 VITALS
SYSTOLIC BLOOD PRESSURE: 147 MMHG | DIASTOLIC BLOOD PRESSURE: 88 MMHG | HEART RATE: 90 BPM | RESPIRATION RATE: 18 BRPM | OXYGEN SATURATION: 95 % | TEMPERATURE: 98 F

## 2019-12-23 PROCEDURE — 83036 HEMOGLOBIN GLYCOSYLATED A1C: CPT

## 2019-12-23 PROCEDURE — 82330 ASSAY OF CALCIUM: CPT

## 2019-12-23 PROCEDURE — 84295 ASSAY OF SERUM SODIUM: CPT

## 2019-12-23 PROCEDURE — 83735 ASSAY OF MAGNESIUM: CPT

## 2019-12-23 PROCEDURE — 87633 RESP VIRUS 12-25 TARGETS: CPT

## 2019-12-23 PROCEDURE — 87486 CHLMYD PNEUM DNA AMP PROBE: CPT

## 2019-12-23 PROCEDURE — 94640 AIRWAY INHALATION TREATMENT: CPT

## 2019-12-23 PROCEDURE — 86850 RBC ANTIBODY SCREEN: CPT

## 2019-12-23 PROCEDURE — 86803 HEPATITIS C AB TEST: CPT

## 2019-12-23 PROCEDURE — 97116 GAIT TRAINING THERAPY: CPT

## 2019-12-23 PROCEDURE — 82803 BLOOD GASES ANY COMBINATION: CPT

## 2019-12-23 PROCEDURE — 87040 BLOOD CULTURE FOR BACTERIA: CPT

## 2019-12-23 PROCEDURE — 99232 SBSQ HOSP IP/OBS MODERATE 35: CPT

## 2019-12-23 PROCEDURE — 84484 ASSAY OF TROPONIN QUANT: CPT

## 2019-12-23 PROCEDURE — 97530 THERAPEUTIC ACTIVITIES: CPT

## 2019-12-23 PROCEDURE — 93005 ELECTROCARDIOGRAM TRACING: CPT

## 2019-12-23 PROCEDURE — 86901 BLOOD TYPING SEROLOGIC RH(D): CPT

## 2019-12-23 PROCEDURE — 82435 ASSAY OF BLOOD CHLORIDE: CPT

## 2019-12-23 PROCEDURE — 81003 URINALYSIS AUTO W/O SCOPE: CPT

## 2019-12-23 PROCEDURE — 87086 URINE CULTURE/COLONY COUNT: CPT

## 2019-12-23 PROCEDURE — 85652 RBC SED RATE AUTOMATED: CPT

## 2019-12-23 PROCEDURE — 85014 HEMATOCRIT: CPT

## 2019-12-23 PROCEDURE — G0378: CPT

## 2019-12-23 PROCEDURE — 85027 COMPLETE CBC AUTOMATED: CPT

## 2019-12-23 PROCEDURE — 87798 DETECT AGENT NOS DNA AMP: CPT

## 2019-12-23 PROCEDURE — 70450 CT HEAD/BRAIN W/O DYE: CPT

## 2019-12-23 PROCEDURE — 85610 PROTHROMBIN TIME: CPT

## 2019-12-23 PROCEDURE — 83605 ASSAY OF LACTIC ACID: CPT

## 2019-12-23 PROCEDURE — 71046 X-RAY EXAM CHEST 2 VIEWS: CPT

## 2019-12-23 PROCEDURE — 86900 BLOOD TYPING SEROLOGIC ABO: CPT

## 2019-12-23 PROCEDURE — 80048 BASIC METABOLIC PNL TOTAL CA: CPT

## 2019-12-23 PROCEDURE — 84100 ASSAY OF PHOSPHORUS: CPT

## 2019-12-23 PROCEDURE — 82962 GLUCOSE BLOOD TEST: CPT

## 2019-12-23 PROCEDURE — 97161 PT EVAL LOW COMPLEX 20 MIN: CPT

## 2019-12-23 PROCEDURE — 87581 M.PNEUMON DNA AMP PROBE: CPT

## 2019-12-23 PROCEDURE — 99285 EMERGENCY DEPT VISIT HI MDM: CPT | Mod: 25

## 2019-12-23 PROCEDURE — 80053 COMPREHEN METABOLIC PANEL: CPT

## 2019-12-23 PROCEDURE — 82947 ASSAY GLUCOSE BLOOD QUANT: CPT

## 2019-12-23 PROCEDURE — 99239 HOSP IP/OBS DSCHRG MGMT >30: CPT

## 2019-12-23 PROCEDURE — 84132 ASSAY OF SERUM POTASSIUM: CPT

## 2019-12-23 PROCEDURE — 85730 THROMBOPLASTIN TIME PARTIAL: CPT

## 2019-12-23 PROCEDURE — 86140 C-REACTIVE PROTEIN: CPT

## 2019-12-23 RX ADMIN — Medication 3 MILLILITER(S): at 11:56

## 2019-12-23 RX ADMIN — BUPROPION HYDROCHLORIDE 300 MILLIGRAM(S): 150 TABLET, EXTENDED RELEASE ORAL at 08:42

## 2019-12-23 RX ADMIN — Medication 75 MILLIGRAM(S): at 13:56

## 2019-12-23 RX ADMIN — FAMOTIDINE 40 MILLIGRAM(S): 10 INJECTION INTRAVENOUS at 06:15

## 2019-12-23 RX ADMIN — Medication 3 MILLILITER(S): at 03:06

## 2019-12-23 RX ADMIN — CLOPIDOGREL BISULFATE 75 MILLIGRAM(S): 75 TABLET, FILM COATED ORAL at 08:43

## 2019-12-23 RX ADMIN — Medication 20 MILLIGRAM(S): at 08:42

## 2019-12-23 RX ADMIN — Medication 10 UNIT(S): at 10:10

## 2019-12-23 RX ADMIN — FAMOTIDINE 40 MILLIGRAM(S): 10 INJECTION INTRAVENOUS at 11:55

## 2019-12-23 RX ADMIN — TRAMADOL HYDROCHLORIDE 100 MILLIGRAM(S): 50 TABLET ORAL at 11:55

## 2019-12-23 RX ADMIN — Medication 81 MILLIGRAM(S): at 08:42

## 2019-12-23 RX ADMIN — Medication 1 MILLIGRAM(S): at 08:42

## 2019-12-23 RX ADMIN — Medication 1 APPLICATION(S): at 08:42

## 2019-12-23 RX ADMIN — TRAMADOL HYDROCHLORIDE 100 MILLIGRAM(S): 50 TABLET ORAL at 12:55

## 2019-12-23 RX ADMIN — Medication 500 MILLIGRAM(S): at 06:15

## 2019-12-23 RX ADMIN — Medication 4: at 10:10

## 2019-12-23 RX ADMIN — Medication 75 MILLIGRAM(S): at 06:15

## 2019-12-23 NOTE — DISCHARGE NOTE PROVIDER - PROVIDER TOKENS
FREE:[LAST:[Krystal],FIRST:[Manolo],PHONE:[phone]],FAX:[(   )    -],ADDRESS:[[address] [place] [address]]] FREE:[LAST:[Krystal],FIRST:[Manolo],PHONE:[phone]],FAX:[(   )    -],ADDRESS:[[address] [place] [address]]],PROVIDER:[TOKEN:[[id number]]]

## 2019-12-23 NOTE — DISCHARGE NOTE PROVIDER - NSDCFUSCHEDAPPT_GEN_ALL_CORE_FT
BHUPENDRA THOMPSON ; 12/24/2019 ; [facility] [address] [facility] BHUPENDRA THOMPSON ; 12/24/2019 ; [facility] [address] [facility] BHUPENDRA THOMPSON ; 12/24/2019 ; [facility] [address] [facility] BHUPEDNRA THOMPSON ; 12/24/2019 ; [facility] [address] [facility] BHUPENDRA THOMPSON ; 12/24/2019 ; [facility] [address] [facility]

## 2019-12-23 NOTE — PROGRESS NOTE ADULT - ATTENDING COMMENTS
Pt seen and examined. Reports marked improvement in wheezing with duonebs. Denies sob, fever/chills, CP at this time. Cough is improving. On day 4 of Tamiflu for  Influenza A. Bld cx, urine cx negative. Likely d/c home tomorrow.
Pt seen at bedside. Appears well clinically. Faint end expiratory wheeze on exam  Completing Tamiflu today  Medically stable for discharge home today.    d/c time spent 37 minutes
Pt seen and examined. c/o sob/wheezing. Denies fever/chills. Pt denies CP at this time.  CXR negative for PNA. RVP +ve for Influenza A. Bld cx, urine cx negative. c/w Tamiflu. start Duonebs ATC. ID reccs noted.
Patient seen and examined. Resident assessment and plan reviewed  1. Flu A+: Cont on tamiflu x 5 days  2. Falls: likely related to acute viral illness less likely spine/neurologic presentation. clinical exam nonfocal. CT head neg.   3. Spinal stenosis: ortho-spine eval appreciated. No need for additional MRI imaging. doubt infection/hemorrhage s/p recent myelogram  4. CVA: h/o CVA resume back on asa and plavix. CT neg  5. HTN: not on home BP meds. monitor  6. DM2: resume back on home insulin regiments.    PT eval pending.  D/c planning

## 2019-12-23 NOTE — PROGRESS NOTE ADULT - PROBLEM SELECTOR PLAN 8
On Soliqua 50U in AM and Novolg 6U qac, endo doc Dr. Joycelyn Avalos [phone]  - c/w humalog 8U qac, fingersticks remain elevated increased Lantus to 45U QHS   - Moderate ISS, FSG qac, qhs

## 2019-12-23 NOTE — PROGRESS NOTE ADULT - SUBJECTIVE AND OBJECTIVE BOX
Patient is a 69y old  Male who presents with a chief complaint of Fever (23 Dec 2019 07:00)    Being followed by ID for influenza    Interval history:feels much better  back pain is better   No acute events      ROS:  No soutum ,SOB,CP  No N/V/D./abd pain  No other complaints      Antimicrobials:      Other medications reviewed    Vital Signs Last 24 Hrs  T(C): 36.7 (12-23-19 @ 05:35), Max: 37.1 (12-22-19 @ 16:23)  T(F): 98 (12-23-19 @ 05:35), Max: 98.7 (12-22-19 @ 16:23)  HR: 90 (12-23-19 @ 05:35) (89 - 90)  BP: 147/88 (12-23-19 @ 05:35) (135/81 - 147/88)  BP(mean): --  RR: 18 (12-23-19 @ 05:35) (18 - 18)  SpO2: 95% (12-23-19 @ 05:35) (95% - 98%)    Physical Exam:        HEENT PERRLA EOMI    No oral exudate or erythema    Chest Good AE,CTA    CVS RRR S1 S2 WNl No murmur or rub or gallop    Abd soft BS normal No tenderness no masses    IV site no erythema tenderness or discharge    CNS AAO X 3 no focal    Lab Data:                          10.8   9.18  )-----------( 295      ( 22 Dec 2019 06:11 )             35.5       12-22    139  |  101  |  18  ----------------------------<  259<H>  4.3   |  24  |  0.89    Ca    9.1      22 Dec 2019 06:11  Phos  2.8     12-22  Mg     1.9     12-22          Culture - Urine (collected 19 Dec 2019 05:18)  Source: .Urine Clean Catch (Midstream)  Final Report (20 Dec 2019 02:06):    <10,000 CFU/mL Normal Urogenital Jyoti    Culture - Blood (collected 19 Dec 2019 05:07)  Source: .Blood Blood-Peripheral  Preliminary Report (20 Dec 2019 06:00):    No growth to date.    Culture - Blood (collected 19 Dec 2019 05:06)  Source: .Blood Blood-Venous  Preliminary Report (20 Dec 2019 06:00):    No growth to date.        Rapid Respiratory Viral Panel (12.19.19 @ 02:32)    Rapid RVP Result: Detected: This Respiratory Panel uses polymerase chain reaction (PCR) to detect for  adenovirus; coronavirus (HKU1, NL63, 229E, OC43); human metapneumovirus  (hMPV); human enterovirus/rhinovirus (Entero/RV); influenza A; influenza  A/H1; influenza A/H3; influenza A/H1-2009; influenza B; parainfluenza  viruses 1, 2, 3, 4; respiratory syncytial virus; Mycoplasma pneumoniae;  and Chlamydophila pneumoniae.    Influenza AH1 2009 (RapRVP): Detected      < from: Xray Chest 2 Views PA/Lat (12.19.19 @ 00:56) >  IMPRESSION:  No focal consolidation.              < end of copied text > Patient is a 69y old  Male who presents with a chief complaint of Fever (23 Dec 2019 07:00)    Being followed by ID for influenza    Interval history:feels much better  back pain is better   No acute events      ROS:  No soutum ,SOB,CP  No N/V/D./abd pain  No other complaints      Antimicrobials:  tamiflu     Other medications reviewed    Vital Signs Last 24 Hrs  T(C): 36.7 (12-23-19 @ 05:35), Max: 37.1 (12-22-19 @ 16:23)  T(F): 98 (12-23-19 @ 05:35), Max: 98.7 (12-22-19 @ 16:23)  HR: 90 (12-23-19 @ 05:35) (89 - 90)  BP: 147/88 (12-23-19 @ 05:35) (135/81 - 147/88)  BP(mean): --  RR: 18 (12-23-19 @ 05:35) (18 - 18)  SpO2: 95% (12-23-19 @ 05:35) (95% - 98%)    Physical Exam:        HEENT PERRLA EOMI    No oral exudate or erythema    Chest Good AE,CTA    CVS RRR S1 S2 WNl No murmur or rub or gallop    Abd soft BS normal No tenderness no masses    IV site no erythema tenderness or discharge    CNS AAO X 3 no focal    Lab Data:                          10.8   9.18  )-----------( 295      ( 22 Dec 2019 06:11 )             35.5       12-22    139  |  101  |  18  ----------------------------<  259<H>  4.3   |  24  |  0.89    Ca    9.1      22 Dec 2019 06:11  Phos  2.8     12-22  Mg     1.9     12-22          Culture - Urine (collected 19 Dec 2019 05:18)  Source: .Urine Clean Catch (Midstream)  Final Report (20 Dec 2019 02:06):    <10,000 CFU/mL Normal Urogenital Jyoti    Culture - Blood (collected 19 Dec 2019 05:07)  Source: .Blood Blood-Peripheral  Preliminary Report (20 Dec 2019 06:00):    No growth to date.    Culture - Blood (collected 19 Dec 2019 05:06)  Source: .Blood Blood-Venous  Preliminary Report (20 Dec 2019 06:00):    No growth to date.        Rapid Respiratory Viral Panel (12.19.19 @ 02:32)    Rapid RVP Result: Detected: This Respiratory Panel uses polymerase chain reaction (PCR) to detect for  adenovirus; coronavirus (HKU1, NL63, 229E, OC43); human metapneumovirus  (hMPV); human enterovirus/rhinovirus (Entero/RV); influenza A; influenza  A/H1; influenza A/H3; influenza A/H1-2009; influenza B; parainfluenza  viruses 1, 2, 3, 4; respiratory syncytial virus; Mycoplasma pneumoniae;  and Chlamydophila pneumoniae.    Influenza AH1 2009 (RapRVP): Detected      < from: Xray Chest 2 Views PA/Lat (12.19.19 @ 00:56) >  IMPRESSION:  No focal consolidation.              < end of copied text >        Image reviewed

## 2019-12-23 NOTE — DISCHARGE NOTE PROVIDER - CARE PROVIDER_API CALL
Manolo Rice  [address] [place] [address]  Phone: [phone]  Fax: (   )    -  Follow Up Time: Manolo Rice  [address] [facility] [address]  Phone: [phone]  Fax: (   )    -  Follow Up Time:     Aston Faustin)  Orthopaedic Surgery  [address] [facility] [address]  [place] [address]  Phone: [phone]  Fax: [phone]  Follow Up Time:

## 2019-12-23 NOTE — PROGRESS NOTE ADULT - PROBLEM SELECTOR PLAN 1
RVP+ for influenza [id number], febrile at home to 103.5, also febrile this admission w/Tmax of 103.2  - s/p 1L NS bolus, tylenol 650mg x1, duonebs x1 in ED  - c/w Tamiflu 75mg BID for 5 days with the last done given on 12/23

## 2019-12-23 NOTE — DISCHARGE NOTE PROVIDER - NSDCMRMEDTOKEN_GEN_ALL_CORE_FT
acetaminophen 325 mg oral tablet: 2 tab(s) orally every 6 hours  Ambien 10 mg oral tablet: 1 tab(s) orally once a day (at bedtime)  aspirin 81 mg oral tablet: 1 tab(s) orally once a day in morning  Crestor 5 mg oral tablet: 1 tab(s) orally 3 times a week (Fri/Sat/Sun)  Deplin: 1 tab(s) orally once a  day in morning  famotidine 40 mg oral tablet: 1 tab(s) orally 3 times a day    Note: RX written for famotidine 40 mg BID  KlonoPIN 1 mg oral tablet: 1.5 tab(s) orally 2 times a day    Note: RX written for 1 mg TID  Lasix 20 mg oral tablet: 1 tab(s) orally once a day on Monday-Thursday    Note: Pt takes Lasix 40 mg daily (Friday to Sunday)  Lasix 40 mg oral tablet: 1 tab(s) orally once a day (Friday to Sunday)    Note: Pt takes Lasix 20 mg daily on Monday to Thursday  magnesium: 500 milligram(s) orally 2 times a day  metFORMIN 500 mg oral tablet, extended release: 2 tab(s) orally 2 times a day  NovoLOG 100 units/mL subcutaneous solution: 6 unit(s) subcutaneous 3 times a day (before meals)  Plavix 75 mg oral tablet: 1 tab(s) orally once a day in morning  Soliqua 100/33 subcutaneous solution: 50 unit(s) subcutaneous  traMADol 50 mg oral tablet: 2 tab(s) orally 3 times a day  Wellbutrin  mg/24 hours oral tablet, extended release: 1 tab(s) orally every 24 hours in morning acetaminophen 325 mg oral tablet: 2 tab(s) orally every 6 hours  Ambien 10 mg oral tablet: 1 tab(s) orally once a day (at bedtime)  aspirin 81 mg oral tablet: 1 tab(s) orally once a day in morning  Crestor 5 mg oral tablet: 1 tab(s) orally 3 times a week (Fri/Sat/Sun)  Deplin: 1 tab(s) orally once a  day in morning  famotidine 40 mg oral tablet: 1 tab(s) orally 3 times a day    Note: RX written for famotidine 40 mg BID  KlonoPIN 1 mg oral tablet: 1.5 tab(s) orally 2 times a day    Note: RX written for 1 mg TID  Lasix 20 mg oral tablet: 1 tab(s) orally once a day on Monday-Thursday    Note: Pt takes Lasix 40 mg daily (Friday to Sunday)  Lasix 40 mg oral tablet: 1 tab(s) orally once a day (Friday to Sunday)    Note: Pt takes Lasix 20 mg daily on Monday to Thursday  magnesium: 500 milligram(s) orally 2 times a day  metFORMIN 500 mg oral tablet, extended release: 2 tab(s) orally 2 times a day  NovoLOG 100 units/mL subcutaneous solution: 6 unit(s) subcutaneous 3 times a day (before meals)  Outpatient PT: Outpatient PT  Dx: Gait abnormality, chronic back pain  2-3 times per week for 2 weeks  Plavix 75 mg oral tablet: 1 tab(s) orally once a day in morning  Soliqua 100/33 subcutaneous solution: 50 unit(s) subcutaneous  traMADol 50 mg oral tablet: 2 tab(s) orally 3 times a day  Wellbutrin  mg/24 hours oral tablet, extended release: 1 tab(s) orally every 24 hours in morning

## 2019-12-23 NOTE — DISCHARGE NOTE PROVIDER - NSDCCPCAREPLAN_GEN_ALL_CORE_FT
PRINCIPAL DISCHARGE DIAGNOSIS  Diagnosis: Fever  Assessment and Plan of Treatment: You came into the hospital because you had a fever at home and fell and was not able to get up likely due to severe dehydration. You were tested for several viruses and you were found be positive for influenza (the flu). To treat this we gave you a medication called tamiflu. While hospitalized you were having some problems with wheezing and shortness of breath. Kevin treat this we gave you symbicort as well as a nebulizing treatment.      SECONDARY DISCHARGE DIAGNOSES  Diagnosis: Fall  Assessment and Plan of Treatment: When you came into the hospital you had fallen twice at home. We had orthopedic surgery come and evaluate you to make sure it was not associated with your spinal stenosis and it was not. We also took a CT of your head and it did not show a potential cause of your fall. Your fall was likely due to dehydration associated with your flu infection. To see if you had a physical therapy needs we had physical therapy evaluate you while you were inpatient and they recommended outpatient physical therapy.    Diagnosis: Weakness  Assessment and Plan of Treatment: PRINCIPAL DISCHARGE DIAGNOSIS  Diagnosis: Fever  Assessment and Plan of Treatment: You came into the hospital because you had a fever at home. You were tested for several viruses and you were found be positive for influenza (the flu). To treat this we gave you a medication called tamiflu. While hospitalized you were having some problems with wheezing and shortness of breath. Kevin treat this we gave you symbicort as well as a nebulizing treatment.      SECONDARY DISCHARGE DIAGNOSES  Diagnosis: Fall  Assessment and Plan of Treatment: When you came into the hospital you had fallen twice at home. We had orthopedic surgery come and evaluate you to make sure it was not associated with your spinal stenosis and it was not. We also took a CT of your head and it did not show a potential cause of your fall. Your fall was likely due to dehydration associated with your flu infection. To see if you had a physical therapy needs we had physical therapy evaluate you while you were inpatient and they recommended outpatient physical therapy. PRINCIPAL DISCHARGE DIAGNOSIS  Diagnosis: Fever  Assessment and Plan of Treatment: You came into the hospital because you had a fever at home. You were tested for several viruses and you were found be positive for influenza (the flu). To treat this we gave you a medication called tamiflu. While hospitalized you were having some problems with wheezing and shortness of breath and we treated you with symbicort as well as a nebulizing treatment.      SECONDARY DISCHARGE DIAGNOSES  Diagnosis: Fall  Assessment and Plan of Treatment: When you came into the hospital you had fallen twice at home. We had orthopedic surgery come and evaluate you to make sure it was not associated with your spinal stenosis and it was not. We also took a CT of your head and it did not show a potential cause of your fall. Your fall was likely due to dehydration associated with your flu infection. To see if you had a physical therapy needs we had physical therapy evaluate you while you were inpatient and they recommended outpatient physical therapy.    Diagnosis: Spinal stenosis of lumbar region, unspecified whether neurogenic claudication present  Assessment and Plan of Treatment: Please follow up with your spinal doctor Dr. Faustin for the planned surgery in January. Please attend outpatient physical therapy.

## 2019-12-23 NOTE — DISCHARGE NOTE PROVIDER - CARE PROVIDERS DIRECT ADDRESSES
,DirectAddress_Unknown ,DirectAddress_Unknown,[email or web address]

## 2019-12-23 NOTE — PROGRESS NOTE ADULT - ASSESSMENT
69 PMHx spinal stenosis s/p lumbar laminectomy (2015), CVA (2013, no residual deficits), CAD s/p CABG (4-vessel), DM2 (on insulin), benign adrenal tumor s/p L adrenalectomy, HTN (no longer on meds to treat), p/w fever at home to 103.5 and fall s/p myelogram on 12/18 with Neuroradiologist Dr. Stinson.     Fevers  + cough  + rhinorrhea    RVP + for influenza  CXR no   PNA  falls-chronic back pain-no worsening-myelogram site with no s/s external infection, CT with no new findings to suggest infection  Clinically no s/s any other foci  blood cx negative     Overall-fevers, influenza, no s/s pna, s/p recent myelogram clinically no s/s any spinal infection or acute process  Also DM, h/o adrenal tumor-not on steroids     hemodynamically stable  can Dc tamiflu today  No other antimicrobials indicated presently   will defer to primary team on further plan    ID will follow as needed,please call [phone] if any questions or issues. 69 PMHx spinal stenosis s/p lumbar laminectomy (2015), CVA (2013, no residual deficits), CAD s/p CABG (4-vessel), DM2 (on insulin), benign adrenal tumor s/p L adrenalectomy, HTN (no longer on meds to treat), p/w fever at home to 103.5 and fall s/p myelogram on 12/18 with Neuroradiologist Dr. Stinson.     Fevers  + cough  + rhinorrhea  improving   RVP + for influenza  CXR no   PNA  falls-chronic back pain-no worsening-myelogram site with no s/s external infection, CT with no new findings to suggest infection  Clinically no s/s any other foci  blood cx negative     Overall-fevers, influenza, cough no s/s pna, s/p recent myelogram clinically no s/s any spinal infection or acute process  Also DM, h/o adrenal tumor-not on steroids     hemodynamically stable  Off abx(other than tamiflu)  can Dc tamiflu today  No other antimicrobials indicated presently   will defer to primary team on further plan  case d/w Dr Jones    ID will follow as needed,please call [phone] if any questions or issues.

## 2019-12-23 NOTE — PROGRESS NOTE ADULT - ASSESSMENT
69 PMHx spinal stenosis s/p lumbar laminectomy (2015), PPM, CVA (2013, no residual deficits), CAD s/p CABG (4-vessel), DM2 (on insulin), benign adrenal tumor s/p L adrenalectomy, HTN (no longer on meds to treat), p/w fever and fall s/p myelogram on 12/16 a/f influenza (+RVP). Ortho consulted no need for further imaging as no concern for epidural hematoma/abscess. 69 PMHx spinal stenosis s/p lumbar laminectomy (2015), PPM, CVA (2013, no residual deficits), CAD s/p CABG (4-vessel), DM2 (on insulin), benign adrenal tumor s/p L adrenalectomy, HTN (no longer on meds to treat), p/w fever and fall s/p myelogram on 12/16 a/f influenza (+RVP). Ortho consulted no need for further imaging as no concern for epidural hematoma/abscess. Discharge today

## 2019-12-23 NOTE — DISCHARGE NOTE NURSING/CASE MANAGEMENT/SOCIAL WORK - PATIENT PORTAL LINK FT
You can access the FollowMyHealth Patient Portal offered by [facility] by registering at the following website: [email or web address]. By joining [facility]’s FollowMyHealth portal, you will also be able to view your health information using other applications (apps) compatible with our system.

## 2019-12-23 NOTE — PROGRESS NOTE ADULT - SUBJECTIVE AND OBJECTIVE BOX
Authored by Harry Jones MD, PGY1 Internal Medicine      Patient is a 69y old  Male who presents with a chief complaint of Fever (23 Dec 2019 00:45)      SUBJECTIVE / OVERNIGHT EVENTS:  -no acute events overnight. no SOB, chest pain, n/v/abdominal pain    MEDICATIONS  (STANDING):  albuterol/ipratropium for Nebulization 3 milliLiter(s) Nebulizer every 6 hours  aspirin enteric coated 81 milliGRAM(s) Oral daily  atorvastatin 20 milliGRAM(s) Oral <User Schedule>  BACItracin   Ointment 1 Application(s) Topical daily  budesonide  80 MICROgram(s)/formoterol 4.5 MICROgram(s) Inhaler 2 Puff(s) Inhalation two times a day  buPROPion XL . 300 milliGRAM(s) Oral daily  clopidogrel Tablet 75 milliGRAM(s) Oral daily  dextrose 5%. 1000 milliLiter(s) (50 mL/Hr) IV Continuous <Continuous>  dextrose 50% Injectable 12.5 Gram(s) IV Push once  dextrose 50% Injectable 25 Gram(s) IV Push once  dextrose 50% Injectable 25 Gram(s) IV Push once  famotidine    Tablet 40 milliGRAM(s) Oral <User Schedule>  furosemide    Tablet 20 milliGRAM(s) Oral <User Schedule>  furosemide    Tablet 40 milliGRAM(s) Oral <User Schedule>  insulin glargine Injectable (LANTUS) 45 Unit(s) SubCutaneous at bedtime  insulin lispro (HumaLOG) corrective regimen sliding scale   SubCutaneous three times a day before meals  insulin lispro (HumaLOG) corrective regimen sliding scale   SubCutaneous at bedtime  insulin lispro Injectable (HumaLOG) 10 Unit(s) SubCutaneous three times a day before meals  magnesium gluconate 500 milliGRAM(s) Oral two times a day  oseltamivir 75 milliGRAM(s) Oral two times a day    MEDICATIONS  (PRN):  acetaminophen   Tablet .. 650 milliGRAM(s) Oral every 6 hours PRN Temp greater or equal to 38C (100.4F), Mild Pain (1 - 3), Moderate Pain (4 - 6), Severe Pain (7 - 10)  calcium carbonate    500 mG (Tums) Chewable 1 Tablet(s) Chew three times a day PRN Indigestion  clonazePAM  Tablet 1 milliGRAM(s) Oral three times a day PRN Anxiety  dextrose 40% Gel 15 Gram(s) Oral once PRN Blood Glucose LESS THAN 70 milliGRAM(s)/deciliter  glucagon  Injectable 1 milliGRAM(s) IntraMuscular once PRN Glucose LESS THAN 70 milligrams/deciliter  traMADol 100 milliGRAM(s) Oral three times a day PRN Moderate Pain (4 - 6) Severe Pain (7-10)  zolpidem 5 milliGRAM(s) Oral at bedtime PRN Insomnia  zolpidem 5 milliGRAM(s) Oral at bedtime PRN Insomnia      CAPILLARY BLOOD GLUCOSE      POCT Blood Glucose.: 201 mg/dL (22 Dec 2019 21:25)  POCT Blood Glucose.: 128 mg/dL (22 Dec 2019 17:27)  POCT Blood Glucose.: 201 mg/dL (22 Dec 2019 12:29)  POCT Blood Glucose.: 287 mg/dL (22 Dec 2019 08:27)    I&O's Summary    22 Dec 2019 07:01  -  23 Dec 2019 07:00  --------------------------------------------------------  IN: 0 mL / OUT: 300 mL / NET: -300 mL        PHYSICAL EXAM:  Vital Signs Last 24 Hrs  T(C): 36.7 (23 Dec 2019 05:35), Max: 37.2 (22 Dec 2019 09:15)  T(F): 98 (23 Dec 2019 05:35), Max: 99 (22 Dec 2019 09:15)  HR: 90 (23 Dec 2019 05:35) (89 - 90)  BP: 147/88 (23 Dec 2019 05:35) (124/77 - 147/88)  BP(mean): --  RR: 18 (23 Dec 2019 05:35) (16 - 18)  SpO2: 95% (23 Dec 2019 05:35) (95% - 98%)    CONSTITUTIONAL: NAD, well-developed, well-groomed  EYES: PERRLA; conjunctiva and sclera clear  ENMT: Moist oral mucosa, no pharyngeal injection or exudates; normal dentition  NECK: Supple, no palpable masses; no thyromegaly  RESPIRATORY: Normal respiratory effort; +mild rhonchi but no wheezing   CARDIOVASCULAR: Regular rate and rhythm, normal S1 and S2, no murmur/rub/gallop; No lower extremity edema  ABDOMEN: Nontender to palpation, normoactive bowel sounds, no rebound/guarding  MUSCULOSKELETAL:  no clubbing or cyanosis of digits; no joint swelling or tenderness to palpation  PSYCH: A+O to person, place, and time; affect appropriate  NEUROLOGY: no focal deficits  SKIN: No rashes; no palpable lesions    LABS:                        10.8   9.18  )-----------( 295      ( 22 Dec 2019 06:11 )             35.5     12-22    139  |  101  |  18  ----------------------------<  259<H>  4.3   |  24  |  0.89    Ca    9.1      22 Dec 2019 06:11  Phos  2.8     12-22  Mg     1.9     12-22                  RADIOLOGY & ADDITIONAL TESTS:  Results Reviewed:   Imaging Personally Reviewed:  Electrocardiogram Personally Reviewed:    COORDINATION OF CARE:  Care Discussed with Consultants/Other Providers [Y/N]:  Prior or Outpatient Records Reviewed [Y/N]:  PCP contacted on admission: Yes/No  PCP contacted on dischage: Yes/No

## 2019-12-23 NOTE — DISCHARGE NOTE PROVIDER - HOSPITAL COURSE
69 PMHx spinal stenosis s/p lumbar laminectomy (2015), CVA (2013, no residual deficits), CAD s/p CABG (4-vessel), DM2 (on insulin), benign adrenal tumor s/p L adrenalectomy, HTN (no longer on meds to treat), p/w fever at home to 103.5 and fall s/p myelogram on 12/18 with Neuroradiologist Dr. Stinson. Prior to the myelogram the patient fell out of bed around 4AM while sleeping and hurt his L elbow. After the myleogram the patient was febrile and fell again while sitting down and was unable to get up on his own. He called his daughter and she called EMT for assistance. Pt endorses cough for a few days that was formerly mildly productive but cannot recall the color of the sputum. Patient denies LOC or feeling dizzy or lightheaded prior to the fall.        Of note, patient sees a pulmonologist for unclear reasons but endorses SOB that is chronic. Per patient he was put on lasix for the SOB and also LE edema. Was also given Spiriva to use which helps per patient. Patient firmly denies h/o of CHF even when asked multiple times. Patient had a PPM put in on April 2019 for unclear reasons.         The patient was seen by orthopedic surgery due to concern for a hematoma or abscess after the myleogram. The did not believe this patient's admission or fall was due to any spinal pathology and no additional spinal imaging was done this admission. CTH was done to r/o CVA/TIA which was negative for acute pathology. There was concern for chemical meningitis in the CTH but it was likely due to the contrast used in the patient's recent myelogram.        The patient's RVP was positive for influenza and was treated with 5 days of tamiflu 75mg BID. The patient's falls were likely 2/2 dehydration from the flu. The patient was given fluids this admission and also duonebs and symbioct because the patient's hospital course was c/b wheezing. 69 PMHx spinal stenosis s/p lumbar laminectomy (2015), CVA (2013, no residual deficits), CAD s/p CABG (4-vessel), DM2 (on insulin), benign adrenal tumor s/p L adrenalectomy, HTN (no longer on meds to treat), p/w fever at home to 103.5 and fall s/p myelogram on 12/18 with Neuroradiologist Dr. Stinson. Prior to the myelogram the patient fell out of bed around 4AM while sleeping and hurt his L elbow. After the myleogram the patient was febrile and fell again while sitting down and was unable to get up on his own. He called his daughter and she called EMT for assistance. Pt endorses cough for a few days that was formerly mildly productive but cannot recall the color of the sputum. Patient denies LOC or feeling dizzy or lightheaded prior to the fall.        Of note, patient sees a pulmonologist for unclear reasons but endorses SOB that is chronic. Per patient he was put on lasix for the SOB and also LE edema. Was also given Spiriva to use which helps per patient. Patient firmly denies h/o of CHF even when asked multiple times. Patient had a PPM put in on April 2019 for unclear reasons.         The patient was seen by orthopedic surgery due to concern for a hematoma or abscess after the myleogram. The did not believe this patient's admission or fall was due to any spinal pathology and no additional spinal imaging was done this admission. CTH was done to r/o CVA/TIA which was negative for acute pathology. There was concern for chemical meningitis in the CTH but it was likely due to the contrast used in the patient's recent myelogram.        The patient's RVP was positive for influenza and was treated with 5 days of tamiflu 75mg BID. The patient's falls were likely 2/2 dehydration from the flu. The patient was given fluids this admission and also duonebs and symbioct because the patient's hospital course was c/b wheezing.        He is now stable and ready for discharge. 69 PMHx spinal stenosis s/p lumbar laminectomy (2015), CVA (2013, no residual deficits), CAD s/p CABG (4-vessel), DM2 (on insulin), benign adrenal tumor s/p L adrenalectomy, HTN (no longer on meds to treat), p/w fever at home to 103.5 and fall s/p myelogram on 12/18 with Neuroradiologist Dr. Stinson. Prior to the myelogram the patient fell out of bed around 4AM while sleeping and hurt his L elbow. After the myleogram the patient was febrile and fell again while sitting down and was unable to get up on his own. He called his daughter and she called EMT for assistance. Pt endorses cough for a few days that was formerly mildly productive but cannot recall the color of the sputum. Patient denies LOC or feeling dizzy or lightheaded prior to the fall.        Of note, patient sees a pulmonologist for unclear reasons but endorses SOB that is chronic. Per patient he was put on lasix for the SOB and also LE edema. Was also given Spiriva to use which helps per patient. Patient firmly denies h/o of CHF even when asked multiple times. Patient had a PPM put in on April 2019 for unclear reasons.         The patient was seen by orthopedic surgery due to concern for a hematoma or abscess after the myleogram. The did not believe this patient's admission or fall was due to any spinal pathology and no additional spinal imaging was done this admission. CTH was done to r/o CVA/TIA which was negative for acute pathology. There was concern for chemical meningitis in the CTH but it was likely due to the contrast used in the patient's recent myelogram.        The patient's RVP was positive for influenza and was treated with 5 days of tamiflu 75mg BID. The patient's falls were likely 2/2 dehydration from the flu. The patient was given fluids this admission and also Duonebs and Symbioct because the patient's hospital course was c/b wheezing.         Patient's respiratory status has now improved and back pain controlled on home pain regimen. Patient is hemodynamically stable for discharge.

## 2019-12-24 ENCOUNTER — APPOINTMENT (OUTPATIENT)
Dept: ORTHOPEDIC SURGERY | Facility: CLINIC | Age: 69
End: 2019-12-24
Payer: MEDICARE

## 2019-12-24 VITALS
SYSTOLIC BLOOD PRESSURE: 147 MMHG | HEART RATE: 91 BPM | HEIGHT: 71 IN | WEIGHT: 208 LBS | DIASTOLIC BLOOD PRESSURE: 77 MMHG | BODY MASS INDEX: 29.12 KG/M2

## 2019-12-24 LAB
CULTURE RESULTS: SIGNIFICANT CHANGE UP
CULTURE RESULTS: SIGNIFICANT CHANGE UP
SPECIMEN SOURCE: SIGNIFICANT CHANGE UP
SPECIMEN SOURCE: SIGNIFICANT CHANGE UP

## 2019-12-24 PROCEDURE — 99214 OFFICE O/P EST MOD 30 MIN: CPT

## 2019-12-24 NOTE — HISTORY OF PRESENT ILLNESS
[de-identified] :  69 year old male being seen for an initial consultation for lower back pain. Referred here by Dr. Faustin to discuss fusion.  He has a history of lumbar laminectomy in 2015. He did very well for about 1 year. Pain has been getting worse over the years. He is here today with constant pain in his lower and mid back. Pain is worse with walking. He has difficulty with his balance an using walker. He has done physical therapy, injections, ablation without relief. Pain today is 8-9/10.

## 2019-12-24 NOTE — PHYSICAL EXAM
[Walker] : ambulates with walker [Stooped] : stooped [de-identified] : He is well healed the lumbar incisions. He stands with obvious sagittal imbalance. Grossly preserved strength and sensation in his lower extremities without hyperreflexia or long track signs. [de-identified] : AP lateral scoliosis x-rays demonstrates lumbar scoliosis with kyphosis and positive sagittal balance\par \par Review a CT myelogram demonstrates severe degenerative disc changes at L1-2 and L4-5. He does have some areas of  foraminal stenosis

## 2019-12-24 NOTE — DISCUSSION/SUMMARY
[de-identified] : I will discuss with the patient going his symptoms. He no longer has any leg pain. His main complaint is back pain sagittal balance. We discussed surgical options and let us failure of therapy medications and injections. We discussed thoracolumbar reconstruction along with its risks and benefits. She like to proceed with this option. He'll be scheduled accordingly in followup for a preoperative appointment.

## 2019-12-27 ENCOUNTER — CHART COPY (OUTPATIENT)
Age: 69
End: 2019-12-27

## 2020-01-02 ENCOUNTER — OUTPATIENT (OUTPATIENT)
Dept: OUTPATIENT SERVICES | Facility: HOSPITAL | Age: 70
LOS: 1 days | End: 2020-01-02
Payer: MEDICARE

## 2020-01-02 VITALS
SYSTOLIC BLOOD PRESSURE: 142 MMHG | DIASTOLIC BLOOD PRESSURE: 89 MMHG | HEART RATE: 93 BPM | RESPIRATION RATE: 15 BRPM | OXYGEN SATURATION: 98 % | WEIGHT: 203.93 LBS | HEIGHT: 71 IN | TEMPERATURE: 98 F

## 2020-01-02 DIAGNOSIS — Z01.818 ENCOUNTER FOR OTHER PREPROCEDURAL EXAMINATION: ICD-10-CM

## 2020-01-02 DIAGNOSIS — Z98.890 OTHER SPECIFIED POSTPROCEDURAL STATES: Chronic | ICD-10-CM

## 2020-01-02 DIAGNOSIS — I25.10 ATHEROSCLEROTIC HEART DISEASE OF NATIVE CORONARY ARTERY WITHOUT ANGINA PECTORIS: ICD-10-CM

## 2020-01-02 DIAGNOSIS — Z95.0 PRESENCE OF CARDIAC PACEMAKER: ICD-10-CM

## 2020-01-02 DIAGNOSIS — G47.33 OBSTRUCTIVE SLEEP APNEA (ADULT) (PEDIATRIC): ICD-10-CM

## 2020-01-02 DIAGNOSIS — E11.9 TYPE 2 DIABETES MELLITUS WITHOUT COMPLICATIONS: ICD-10-CM

## 2020-01-02 DIAGNOSIS — Z29.9 ENCOUNTER FOR PROPHYLACTIC MEASURES, UNSPECIFIED: ICD-10-CM

## 2020-01-02 DIAGNOSIS — Z95.0 PRESENCE OF CARDIAC PACEMAKER: Chronic | ICD-10-CM

## 2020-01-02 DIAGNOSIS — M40.209 UNSPECIFIED KYPHOSIS, SITE UNSPECIFIED: ICD-10-CM

## 2020-01-02 DIAGNOSIS — M41.9 SCOLIOSIS, UNSPECIFIED: ICD-10-CM

## 2020-01-02 DIAGNOSIS — M48.00 SPINAL STENOSIS, SITE UNSPECIFIED: ICD-10-CM

## 2020-01-02 DIAGNOSIS — I10 ESSENTIAL (PRIMARY) HYPERTENSION: ICD-10-CM

## 2020-01-02 LAB
ANION GAP SERPL CALC-SCNC: 17 MMOL/L — SIGNIFICANT CHANGE UP (ref 5–17)
BLD GP AB SCN SERPL QL: NEGATIVE — SIGNIFICANT CHANGE UP
BUN SERPL-MCNC: 19 MG/DL — SIGNIFICANT CHANGE UP (ref 7–23)
CALCIUM SERPL-MCNC: 10.3 MG/DL — SIGNIFICANT CHANGE UP (ref 8.4–10.5)
CHLORIDE SERPL-SCNC: 100 MMOL/L — SIGNIFICANT CHANGE UP (ref 96–108)
CO2 SERPL-SCNC: 21 MMOL/L — LOW (ref 22–31)
CREAT SERPL-MCNC: 1.04 MG/DL — SIGNIFICANT CHANGE UP (ref 0.5–1.3)
GLUCOSE SERPL-MCNC: 207 MG/DL — HIGH (ref 70–99)
HBA1C BLD-MCNC: 7.7 % — HIGH (ref 4–5.6)
HCT VFR BLD CALC: 39.7 % — SIGNIFICANT CHANGE UP (ref 39–50)
HGB BLD-MCNC: 12 G/DL — LOW (ref 13–17)
MCHC RBC-ENTMCNC: 26.3 PG — LOW (ref 27–34)
MCHC RBC-ENTMCNC: 30.2 GM/DL — LOW (ref 32–36)
MCV RBC AUTO: 86.9 FL — SIGNIFICANT CHANGE UP (ref 80–100)
PLATELET # BLD AUTO: 460 K/UL — HIGH (ref 150–400)
POTASSIUM SERPL-MCNC: 4.4 MMOL/L — SIGNIFICANT CHANGE UP (ref 3.5–5.3)
POTASSIUM SERPL-SCNC: 4.4 MMOL/L — SIGNIFICANT CHANGE UP (ref 3.5–5.3)
RBC # BLD: 4.57 M/UL — SIGNIFICANT CHANGE UP (ref 4.2–5.8)
RBC # FLD: 15 % — HIGH (ref 10.3–14.5)
RH IG SCN BLD-IMP: POSITIVE — SIGNIFICANT CHANGE UP
SODIUM SERPL-SCNC: 138 MMOL/L — SIGNIFICANT CHANGE UP (ref 135–145)
WBC # BLD: 10.82 K/UL — HIGH (ref 3.8–10.5)
WBC # FLD AUTO: 10.82 K/UL — HIGH (ref 3.8–10.5)

## 2020-01-02 PROCEDURE — 86901 BLOOD TYPING SEROLOGIC RH(D): CPT

## 2020-01-02 PROCEDURE — 85027 COMPLETE CBC AUTOMATED: CPT

## 2020-01-02 PROCEDURE — G0463: CPT

## 2020-01-02 PROCEDURE — 86850 RBC ANTIBODY SCREEN: CPT

## 2020-01-02 PROCEDURE — 80048 BASIC METABOLIC PNL TOTAL CA: CPT

## 2020-01-02 PROCEDURE — 86900 BLOOD TYPING SEROLOGIC ABO: CPT

## 2020-01-02 PROCEDURE — 87640 STAPH A DNA AMP PROBE: CPT

## 2020-01-02 PROCEDURE — 83036 HEMOGLOBIN GLYCOSYLATED A1C: CPT

## 2020-01-02 PROCEDURE — 87641 MR-STAPH DNA AMP PROBE: CPT

## 2020-01-02 RX ORDER — INSULIN GLARGINE AND LIXISENATIDE 100; 33 U/ML; UG/ML
50 INJECTION, SOLUTION SUBCUTANEOUS
Qty: 0 | Refills: 0 | DISCHARGE

## 2020-01-02 RX ORDER — CLONAZEPAM 1 MG
1.5 TABLET ORAL
Qty: 0 | Refills: 0 | DISCHARGE

## 2020-01-02 RX ORDER — METFORMIN HYDROCHLORIDE 850 MG/1
2 TABLET ORAL
Qty: 0 | Refills: 0 | DISCHARGE

## 2020-01-02 RX ORDER — ROSUVASTATIN CALCIUM 5 MG/1
1 TABLET ORAL
Qty: 0 | Refills: 0 | DISCHARGE

## 2020-01-02 NOTE — H&P PST ADULT - NEGATIVE ENMT SYMPTOMS
no vertigo/no nasal congestion/no nose bleeds/no tinnitus/no sinus symptoms/no post-nasal discharge/no gum bleeding/no throat pain/no dysphagia

## 2020-01-02 NOTE — H&P PST ADULT - NSICDXPROBLEM_GEN_ALL_CORE_FT
PROBLEM DIAGNOSES  Problem: Spinal stenosis  Assessment and Plan: Scheduled T10-S1 spinal fusion with osteotomies 1/10/2020  Pre-op instructions given to pt, chlorhexidine soap provided  [facility]    Problem: CAD (coronary artery disease)  Assessment and Plan: Pt instructed to hold Plavix 7 days pre-op, last dose1/3/20. Spoke to Anushka (surgical coordinator) at Dr. Roberts's office. Unable to get in touch with Dr. Roberts at this time- she will speak to Dr Roberts and confirm plan with patient. Pt was instructed to continue low-dose aspirin for now and will follow-up with Dr. Roberts tomorrow.     Problem: HTN (hypertension)  Assessment and Plan: Pt instructed to c/w medication    Problem: T2DM (type 2 diabetes mellitus)  Assessment and Plan: Pt to hold Metformin 24hrs pre-op, last dose 1/8 pm. Pt to hold Novolog on DOS. Pt will take FS in am of surgery and reduce Soliqua dose to 32 units m of sx.   HgA1c sent at [facility]  FS on arrival to [facility]    Problem: Need for prophylactic measure  Assessment and Plan: The Caprini score indicates this patient is at risk for a VTE event (score 3-5).  Most surgical patients in this group would benefit from pharmacologic prophylaxis.  The surgical team will determine the balance between VTE risk and bleeding risk PROBLEM DIAGNOSES  Problem: Spinal stenosis  Assessment and Plan: Scheduled T10-S1 spinal fusion with osteotomies 1/10/2020  Pre-op instructions given to pt, chlorhexidine soap provided  Lab work sent at [facility]  Pt reports resolving toe infection being treated with antibiotics, will obtain podiatry note    Problem: CAD (coronary artery disease)  Assessment and Plan: Pt instructed to hold Plavix 7 days pre-op, last dose1/3/20. Spoke to Anushka (surgical coordinator) at Dr. Roberts's office. Unable to get in touch with Dr. Roberts at this time- she will speak to Dr Roberts and confirm plan with patient. Pt was instructed to continue low-dose aspirin for now and will follow-up with Dr. Roberts tomorrow.     Problem: HTN (hypertension)  Assessment and Plan: Pt instructed to c/w medication    Problem: FREEDOM (obstructive sleep apnea)  Assessment and Plan: OR booking notified     Problem: T2DM (type 2 diabetes mellitus)  Assessment and Plan: Pt to hold Metformin 24hrs pre-op, last dose 1/8 pm. Pt to hold Novolog on DOS. Pt will take FS in am of surgery and reduce Soliqua dose to 32 units m of sx.   HgA1c sent at [facility]  FS on arrival to [facility]    Problem: Need for prophylactic measure  Assessment and Plan: The Caprini score indicates this patient is at risk for a VTE event (score 3-5).  Most surgical patients in this group would benefit from pharmacologic prophylaxis.  The surgical team will determine the balance between VTE risk and bleeding risk PROBLEM DIAGNOSES  Problem: Spinal stenosis  Assessment and Plan: Scheduled T10-S1 spinal fusion with osteotomies 1/10/2020  Pre-op instructions given to pt, chlorhexidine soap provided  Lab work sent at [facility]  Pt reports resolving toe infection being treated with antibiotics, will obtain podiatry note    Problem: Cardiac pacemaker  Assessment and Plan: Interrogation report to be faxed to [facility]    Problem: CAD (coronary artery disease)  Assessment and Plan: Pt instructed to hold Plavix 7 days pre-op, last dose1/3/20. Spoke to Anushka (surgical coordinator) at Dr. Roberts's office. Unable to get in touch with Dr. Roberts at this time- she will speak to Dr Roberts and confirm plan with patient. Pt was instructed to continue low-dose aspirin for now and will follow-up with Dr. Roberts tomorrow.   Cardiac Eval to be faxed to [facility]    Problem: HTN (hypertension)  Assessment and Plan: Pt instructed to c/w medication    Problem: FREEDOM (obstructive sleep apnea)  Assessment and Plan: OR booking notified     Problem: T2DM (type 2 diabetes mellitus)  Assessment and Plan: Pt to hold Metformin 24hrs pre-op, last dose 1/8 pm. Pt to hold Novolog on DOS. Pt will take FS in am of surgery and reduce Soliqua dose to 32 units m of sx.   HgA1c sent at [facility]  FS on arrival to [facility]    Problem: Need for prophylactic measure  Assessment and Plan: The Caprini score indicates this patient is at risk for a VTE event (score 3-5).  Most surgical patients in this group would benefit from pharmacologic prophylaxis.  The surgical team will determine the balance between VTE risk and bleeding risk

## 2020-01-02 NOTE — H&P PST ADULT - ASSESSMENT
incisional hernia CAPRINI SCORE [CLOT updated 18]    AGE RELATED RISK FACTORS                                                       MOBILITY RELATED FACTORS  [ ] Age 41-60 years                                            (1 Point)                    [ ] Bed rest                                                        (1 Point)  [x] Age: 61-74 years                                           (2 Points)                  [ ] Plaster cast                                                   (2 Points)  [ ] Age= 75 years                                              (3 Points)                    [ ] Bed bound for more than 72 hours                 (2 Points)    DISEASE RELATED RISK FACTORS                                               GENDER SPECIFIC FACTORS  [ ] Edema in the lower extremities                       (1 Point)              [ ] Pregnancy                                                     (1 Point)  [ ] Varicose veins                                               (1 Point)                     [ ] Post-partum < 6 weeks                                   (1 Point)             [x] BMI > 25 Kg/m2                                            (1 Point)                     [ ] Hormonal therapy  or oral contraception          (1 Point)                 [ ] Sepsis (in the previous month)                        (1 Point)               [ ] History of pregnancy complications                 (1 point)  [ ] Pneumonia or serious lung disease                                               [ ] Unexplained or recurrent                     (1 Point)           (in the previous month)                               (1 Point)  [ ] Abnormal pulmonary function test                     (1 Point)                 SURGERY RELATED RISK FACTORS  [ ] Acute myocardial infarction                              (1 Point)               [ ]  Section                                             (1 Point)  [ ] Congestive heart failure (in the previous month)  (1 Point)      [ ] Minor surgery                                                  (1 Point)   [ ] Inflammatory bowel disease                             (1 Point)               [ ] Arthroscopic surgery                                        (2 Points)  [ ] Central venous access                                      (2 Points)                [x] General surgery lasting more than 45 minutes (2 points)  [ ] Present or previous malignancy                     (2 Points)                [ ] Elective arthroplasty                                         (5 points)    [ ] Stroke (in the previous month)                          (5 Points)                                                                                                                                                           HEMATOLOGY RELATED FACTORS                                                 TRAUMA RELATED RISK FACTORS  [ ] Prior episodes of VTE                                     (3 Points)                [ ] Fracture of the hip, pelvis, or leg                       (5 Points)  [ ] Positive family history for VTE                         (3 Points)             [ ] Acute spinal cord injury (in the previous month)  (5 Points)  [ ] Prothrombin 68620 A                                     (3 Points)               [ ] Paralysis  (less than 1 month)                             (5 Points)  [ ] Factor V Leiden                                             (3 Points)                  [ ] Multiple Trauma within 1 month                        (5 Points)  [ ] Lupus anticoagulants                                     (3 Points)                                                           [ ] Anticardiolipin antibodies                               (3 Points)                                                       [ ] High homocysteine in the blood                      (3 Points)                                             [ ] Other congenital or acquired thrombophilia      (3 Points)                                                [ ] Heparin induced thrombocytopenia                  (3 Points)                                     Total Score [    5     ]

## 2020-01-02 NOTE — H&P PST ADULT - BACK EXAM
normal shape/ROM intact/strength intact/no CVA tenderness on exam strength intact/vertebral tenderness/kyphosis/ROM intact

## 2020-01-02 NOTE — H&P PST ADULT - NS MD HP SKIN WOUND STATUS
left foot 3rd toe ?infection, seen by podiatrist today, betadine and band-aid in place, pt on antibiotics

## 2020-01-02 NOTE — H&P PST ADULT - NEGATIVE OPHTHALMOLOGIC SYMPTOMS
no blurred vision L/no discharge R/no pain L/no irritation L/no loss of vision R/no diplopia/no photophobia/no discharge L/no pain R/no loss of vision L/no lacrimation L/no lacrimation R/no scleral injection L/no scleral injection R/no irritation R

## 2020-01-02 NOTE — H&P PST ADULT - MUSCULOSKELETAL
details… detailed exam no calf tenderness/no joint warmth/normal strength/ROM intact/no joint swelling/no joint erythema no calf tenderness/ROM intact/normal strength

## 2020-01-02 NOTE — H&P PST ADULT - NEGATIVE MUSCULOSKELETAL SYMPTOMS
no leg pain R/no muscle weakness/no neck pain/no leg pain L/no stiffness/no arm pain L/no arm pain R no neck pain/no arm pain L/no stiffness/no muscle weakness/no leg pain R

## 2020-01-02 NOTE — H&P PST ADULT - NSICDXPASTSURGICALHX_GEN_ALL_CORE_FT
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 PAST SURGICAL HISTORY:  History of Appendectomy     History of lumbar laminectomy L1-2 & L4-5   2015    History of permanent cardiac pacemaker placement placed Medtronic Painter PPM Model# [id number] , implanted April 4, 2019    S/P CABG X 4 ~25 years ago    S/P Coronary Angiogram 12/26/2010 & 2015 at [place], 4/2019 @[place]    S/P Hernia Repair incisional hernia repair on 1/19/17    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

## 2020-01-02 NOTE — H&P PST ADULT - OTHER CARE PROVIDERS
Mathieu Sutherland (cardiology) [phone] // Kuldip Cochran (podiatry) [phone] // Wilver Selby (EP) [phone]

## 2020-01-02 NOTE — H&P PST ADULT - NSICDXPASTMEDICALHX_GEN_ALL_CORE_FT
PAST MEDICAL HISTORY:  Acid Reflux     Anxiety     Benign Tumor of Adrenal Gland     CAD (coronary artery disease)     CVA (cerebrovascular accident) 04/8/2013- no residual effects    Depression     Diabetic neuropathy     DM type 2 (diabetes mellitus, type 2)     Gout     Hernia umbilical, inguinal, abdominal wall    HTN (Hypertension)     Spinal stenosis of lumbar region PAST MEDICAL HISTORY:  Acid Reflux     Anxiety     Benign Tumor of Adrenal Gland     CAD (coronary artery disease)     Cardiac pacemaker     CVA (cerebrovascular accident) 04/8/2013- no residual effects    Depression     Diabetic neuropathy     DM type 2 (diabetes mellitus, type 2)     Gout     Hernia umbilical, inguinal, abdominal wall    HTN (Hypertension)     Spinal stenosis of lumbar region PAST MEDICAL HISTORY:  Acid Reflux     Anxiety     Benign Tumor of Adrenal Gland     CAD (coronary artery disease)     Cardiac pacemaker     CVA (cerebrovascular accident) 04/8/2013- no residual effects    Depression     Diabetic neuropathy     DM type 2 (diabetes mellitus, type 2)     Gout     Hernia umbilical, inguinal, abdominal wall    HTN (Hypertension)     FREEDOM (obstructive sleep apnea)     Spinal stenosis of lumbar region

## 2020-01-02 NOTE — H&P PST ADULT - RS GEN PE MLT RESP DETAILS PC
no rhonchi/no wheezes/breath sounds equal/good air movement/no rales/clear to auscultation bilaterally/respirations non-labored/airway patent no rhonchi/no wheezes/respirations non-labored/clear to auscultation bilaterally/no rales

## 2020-01-02 NOTE — H&P PST ADULT - NEUROLOGICAL DETAILS
alert and oriented x 3/normal strength/responds to pain/responds to verbal commands alert and oriented x 3/responds to pain/responds to verbal commands

## 2020-01-02 NOTE — H&P PST ADULT - NEGATIVE GENERAL GENITOURINARY SYMPTOMS
no hematuria/no renal colic/no flank pain R/no incontinence/normal urinary frequency/no bladder infections/no urinary hesitancy/no flank pain L/no dysuria/no nocturia

## 2020-01-02 NOTE — H&P PST ADULT - NEGATIVE GENERAL SYMPTOMS
no weight gain/no polyphagia/no polyuria/no fever/no malaise/no chills/no sweating/no anorexia/no weight loss/no polydipsia/no fatigue

## 2020-01-02 NOTE — H&P PST ADULT - HISTORY OF PRESENT ILLNESS
67 yr old male, h/o DM, HTN, CAD, CABG, Gout, HLD, depression, OA,    ****Of note, pt reports left foot 3rd toe infection being treated with Avelox PO, last dose 1/5/19. Spoke to  at Dr. Cochran's office- she will fax over medical clearance*** 70 y/o male, with PMHx of T2DM, HTN, CABG x 4 (>25 years ago), HLD, CVA (no residual -2013), depression, FREEDOM (mouthpiece), S/P lumbar laminectomy (2015), c/o lower and mid back pain, progressively worsening over the past year. Patient reports pain worsens while ambulating and has difficulty with balance while using his walker. Patient has done physical therapy and steroid injections without relief. Diagnostic imaging revealed severe degenerative disc disease and some areas of stenosis. Evaluated by Dr. Roberts. Presents to PST for a scheduled T10-S1 pelvis posterior spinal fusion with osteotomies on 1/10/2020.     ****Of note, pt reports left foot 3rd toe infection being treated with Avelox PO, last dose 1/5/19. Spoke to  at Dr. Cochran's office- she will fax over medical clearance*** 68 y/o male, with PMHx of T2DM, HTN, CABG x 4 (>25 years ago), HLD, CVA (no residual -2013), depression, FREEDOM (mouthpiece), S/P lumbar laminectomy (2015), c/o lower and mid back pain, progressively worsening over the past year. Patient reports pain worsens while ambulating and has difficulty with balance while using his walker. Patient has done physical therapy and steroid injections without relief. Diagnostic imaging revealed severe degenerative disc disease and some areas of stenosis. Evaluated by Dr. Roberts. Presents to PST for a scheduled T10-S1 pelvis posterior spinal fusion with osteotomies on 1/10/2020.     ****Of note, pt reports left foot 3rd toe infection being treated with Avelox PO, last dose 1/5/19. Spoke to  at Dr. Cochran's office- she will fax over podiatry note***

## 2020-01-02 NOTE — H&P PST ADULT - GASTROINTESTINAL DETAILS
no masses palpable/bowel sounds normal/no distention/nontender/soft bowel sounds normal/soft/nontender

## 2020-01-02 NOTE — H&P PST ADULT - ANESTHESIA, PREVIOUS REACTION, PROFILE
Final Anesthesia Post-op Assessment    Patient: Parker Araiza  Procedure(s) Performed: MYOCARDIAL REVASCULARIZATION WITH ENDOSCOPIC VEIN HARVESTING; OK  Anesthesia type: General FM w/Koppel    Vital Last Value   Temperature 37 °C (98.6 °F) (07/26/18 1900)   Pulse 74 (07/26/18 1816)   Respiratory Rate 15 (07/26/18 1816)   Non-Invasive   Blood Pressure 102/56 (07/25/18 1738)   Arterial  Blood Pressure 133/61 (07/26/18 1800)   Pulse Oximetry 97 % (07/26/18 1816)     Last 24 I/O:   Intake/Output Summary (Last 24 hours) at 07/26/18 1932  Last data filed at 07/26/18 1800   Gross per 24 hour   Intake          4116.64 ml   Output             2556 ml   Net          1560.64 ml       PATIENT LOCATION: ICU  LEVEL OF CONSCIOUSNESS: participates in exam, awake and answers questions appropriately  RESPIRATORY STATUS: spontaneous ventilation and nasal cannula  CARDIOVASCULAR: stable  PAIN MANAGEMENT: adequately controlled  AIRWAY PATENCY: patent  POST-OP ASSESSMENT: no complications, patient tolerated procedure well with no complications, no evidence of recall and sufficiently recovered from acute administration of anesthesia effects and able to participate in evaluation  COMPLICATIONS: none      
nausea/vomiting

## 2020-01-03 LAB
MRSA PCR RESULT.: SIGNIFICANT CHANGE UP
S AUREUS DNA NOSE QL NAA+PROBE: SIGNIFICANT CHANGE UP

## 2020-01-06 ENCOUNTER — CHART COPY (OUTPATIENT)
Age: 70
End: 2020-01-06

## 2020-01-06 PROBLEM — Z95.0 PRESENCE OF CARDIAC PACEMAKER: Chronic | Status: ACTIVE | Noted: 2020-01-02

## 2020-01-06 PROBLEM — G47.33 OBSTRUCTIVE SLEEP APNEA (ADULT) (PEDIATRIC): Chronic | Status: ACTIVE | Noted: 2020-01-02

## 2020-01-08 ENCOUNTER — APPOINTMENT (OUTPATIENT)
Dept: ORTHOPEDIC SURGERY | Facility: CLINIC | Age: 70
End: 2020-01-08
Payer: MEDICARE

## 2020-01-08 DIAGNOSIS — M54.16 RADICULOPATHY, LUMBAR REGION: ICD-10-CM

## 2020-01-08 PROCEDURE — 99214 OFFICE O/P EST MOD 30 MIN: CPT

## 2020-01-08 NOTE — HISTORY OF PRESENT ILLNESS
[de-identified] :  Mr. Leon is a 69 year old male who returns for a follow-up visit to discuss surgery in more detail.  He continues to have significant back pain.  He feels very limited by his symptoms.  He does not have any leg symptoms when he walks.

## 2020-01-08 NOTE — PHYSICAL EXAM
[Walker] : ambulates with walker [Stooped] : stooped [de-identified] : He is well healed the lumbar incisions. He stands with obvious sagittal imbalance. Grossly preserved strength and sensation in his lower extremities without hyperreflexia or long track signs. [de-identified] : AP lateral scoliosis x-rays demonstrates lumbar scoliosis with kyphosis and positive sagittal balance\par \par Review a CT myelogram demonstrates severe degenerative disc changes at L1-2 and L4-5. He does have some areas of  foraminal stenosis

## 2020-01-08 NOTE — DISCUSSION/SUMMARY
[de-identified] : His complaints at this time are related to a chemical back pain and sagittal imbalance. No symptoms of neurogenic claudication or radicular complaints. We discussed the nature and purpose of a thoracolumbar reconstruction for treatment of his scoliosis and kyphosis.  He wishes to proceed with this option. Risks of scoliosis surgery were discussed including but not limited to bleeding, infection, pain, damage to nerves and vessels, continued pain, continued weakness, blindness, paralysis, dural injury, hardware related complications, pseudoarthrosis, persistent or worsening spinal deformity, proximal/distal junctional kyphosis, fractrure, CSF leak, need for further procedures, off label use and risks of bone morphogenic protein, plastic surgery closure, PE/DVT, GI, , pulmonary, and cardiac complications,  anesthetic risks, and death.

## 2020-01-09 ENCOUNTER — TRANSCRIPTION ENCOUNTER (OUTPATIENT)
Age: 70
End: 2020-01-09

## 2020-01-09 ENCOUNTER — INPATIENT (INPATIENT)
Facility: HOSPITAL | Age: 70
LOS: 12 days | Discharge: NURSING, REHAB OR LONG-TERM CARE | DRG: 456 | End: 2020-01-22
Attending: STUDENT IN AN ORGANIZED HEALTH CARE EDUCATION/TRAINING PROGRAM | Admitting: STUDENT IN AN ORGANIZED HEALTH CARE EDUCATION/TRAINING PROGRAM
Payer: MEDICARE

## 2020-01-09 VITALS
DIASTOLIC BLOOD PRESSURE: 98 MMHG | WEIGHT: 203.93 LBS | TEMPERATURE: 98 F | HEIGHT: 71 IN | HEART RATE: 89 BPM | OXYGEN SATURATION: 99 % | SYSTOLIC BLOOD PRESSURE: 178 MMHG | RESPIRATION RATE: 16 BRPM

## 2020-01-09 DIAGNOSIS — Z95.0 PRESENCE OF CARDIAC PACEMAKER: Chronic | ICD-10-CM

## 2020-01-09 DIAGNOSIS — Z98.890 OTHER SPECIFIED POSTPROCEDURAL STATES: Chronic | ICD-10-CM

## 2020-01-09 DIAGNOSIS — M48.00 SPINAL STENOSIS, SITE UNSPECIFIED: ICD-10-CM

## 2020-01-09 LAB
ALBUMIN SERPL ELPH-MCNC: 4.3 G/DL — SIGNIFICANT CHANGE UP (ref 3.3–5)
ALP SERPL-CCNC: 68 U/L — SIGNIFICANT CHANGE UP (ref 40–120)
ALT FLD-CCNC: 20 U/L — SIGNIFICANT CHANGE UP (ref 10–45)
ANION GAP SERPL CALC-SCNC: 13 MMOL/L — SIGNIFICANT CHANGE UP (ref 5–17)
ANION GAP SERPL CALC-SCNC: 15 MMOL/L — SIGNIFICANT CHANGE UP (ref 5–17)
APTT BLD: 28.5 SEC — SIGNIFICANT CHANGE UP (ref 27.5–36.3)
AST SERPL-CCNC: 52 U/L — HIGH (ref 10–40)
BASOPHILS # BLD AUTO: 0.04 K/UL — SIGNIFICANT CHANGE UP (ref 0–0.2)
BASOPHILS NFR BLD AUTO: 0.4 % — SIGNIFICANT CHANGE UP (ref 0–2)
BILIRUB SERPL-MCNC: 0.2 MG/DL — SIGNIFICANT CHANGE UP (ref 0.2–1.2)
BLD GP AB SCN SERPL QL: NEGATIVE — SIGNIFICANT CHANGE UP
BUN SERPL-MCNC: 14 MG/DL — SIGNIFICANT CHANGE UP (ref 7–23)
BUN SERPL-MCNC: 14 MG/DL — SIGNIFICANT CHANGE UP (ref 7–23)
CALCIUM SERPL-MCNC: 10.2 MG/DL — SIGNIFICANT CHANGE UP (ref 8.4–10.5)
CALCIUM SERPL-MCNC: 10.5 MG/DL — SIGNIFICANT CHANGE UP (ref 8.4–10.5)
CHLORIDE SERPL-SCNC: 95 MMOL/L — LOW (ref 96–108)
CHLORIDE SERPL-SCNC: 97 MMOL/L — SIGNIFICANT CHANGE UP (ref 96–108)
CO2 SERPL-SCNC: 25 MMOL/L — SIGNIFICANT CHANGE UP (ref 22–31)
CO2 SERPL-SCNC: 28 MMOL/L — SIGNIFICANT CHANGE UP (ref 22–31)
CREAT SERPL-MCNC: 0.83 MG/DL — SIGNIFICANT CHANGE UP (ref 0.5–1.3)
CREAT SERPL-MCNC: 0.87 MG/DL — SIGNIFICANT CHANGE UP (ref 0.5–1.3)
EOSINOPHIL # BLD AUTO: 0.06 K/UL — SIGNIFICANT CHANGE UP (ref 0–0.5)
EOSINOPHIL NFR BLD AUTO: 0.5 % — SIGNIFICANT CHANGE UP (ref 0–6)
GLUCOSE BLDC GLUCOMTR-MCNC: 136 MG/DL — HIGH (ref 70–99)
GLUCOSE SERPL-MCNC: 113 MG/DL — HIGH (ref 70–99)
GLUCOSE SERPL-MCNC: 84 MG/DL — SIGNIFICANT CHANGE UP (ref 70–99)
HCT VFR BLD CALC: 40.6 % — SIGNIFICANT CHANGE UP (ref 39–50)
HGB BLD-MCNC: 12 G/DL — LOW (ref 13–17)
IMM GRANULOCYTES NFR BLD AUTO: 0.4 % — SIGNIFICANT CHANGE UP (ref 0–1.5)
INR BLD: 1.01 RATIO — SIGNIFICANT CHANGE UP (ref 0.88–1.16)
LYMPHOCYTES # BLD AUTO: 1.82 K/UL — SIGNIFICANT CHANGE UP (ref 1–3.3)
LYMPHOCYTES # BLD AUTO: 16.6 % — SIGNIFICANT CHANGE UP (ref 13–44)
MCHC RBC-ENTMCNC: 25.7 PG — LOW (ref 27–34)
MCHC RBC-ENTMCNC: 29.6 GM/DL — LOW (ref 32–36)
MCV RBC AUTO: 86.9 FL — SIGNIFICANT CHANGE UP (ref 80–100)
MONOCYTES # BLD AUTO: 0.82 K/UL — SIGNIFICANT CHANGE UP (ref 0–0.9)
MONOCYTES NFR BLD AUTO: 7.5 % — SIGNIFICANT CHANGE UP (ref 2–14)
NEUTROPHILS # BLD AUTO: 8.19 K/UL — HIGH (ref 1.8–7.4)
NEUTROPHILS NFR BLD AUTO: 74.6 % — SIGNIFICANT CHANGE UP (ref 43–77)
NRBC # BLD: 0 /100 WBCS — SIGNIFICANT CHANGE UP (ref 0–0)
PLATELET # BLD AUTO: 302 K/UL — SIGNIFICANT CHANGE UP (ref 150–400)
POTASSIUM SERPL-MCNC: 4.2 MMOL/L — SIGNIFICANT CHANGE UP (ref 3.5–5.3)
POTASSIUM SERPL-MCNC: 6.2 MMOL/L — CRITICAL HIGH (ref 3.5–5.3)
POTASSIUM SERPL-SCNC: 4.2 MMOL/L — SIGNIFICANT CHANGE UP (ref 3.5–5.3)
POTASSIUM SERPL-SCNC: 6.2 MMOL/L — CRITICAL HIGH (ref 3.5–5.3)
PROT SERPL-MCNC: 7.7 G/DL — SIGNIFICANT CHANGE UP (ref 6–8.3)
PROTHROM AB SERPL-ACNC: 11.5 SEC — SIGNIFICANT CHANGE UP (ref 10–12.9)
RBC # BLD: 4.67 M/UL — SIGNIFICANT CHANGE UP (ref 4.2–5.8)
RBC # FLD: 14.9 % — HIGH (ref 10.3–14.5)
RH IG SCN BLD-IMP: POSITIVE — SIGNIFICANT CHANGE UP
SODIUM SERPL-SCNC: 135 MMOL/L — SIGNIFICANT CHANGE UP (ref 135–145)
SODIUM SERPL-SCNC: 138 MMOL/L — SIGNIFICANT CHANGE UP (ref 135–145)
WBC # BLD: 10.97 K/UL — HIGH (ref 3.8–10.5)
WBC # FLD AUTO: 10.97 K/UL — HIGH (ref 3.8–10.5)

## 2020-01-09 PROCEDURE — 99285 EMERGENCY DEPT VISIT HI MDM: CPT

## 2020-01-09 PROCEDURE — 71045 X-RAY EXAM CHEST 1 VIEW: CPT | Mod: 26

## 2020-01-09 RX ORDER — ACETAMINOPHEN 500 MG
650 TABLET ORAL EVERY 6 HOURS
Refills: 0 | Status: DISCONTINUED | OUTPATIENT
Start: 2020-01-09 | End: 2020-01-10

## 2020-01-09 RX ORDER — INSULIN LISPRO 100/ML
VIAL (ML) SUBCUTANEOUS
Refills: 0 | Status: DISCONTINUED | OUTPATIENT
Start: 2020-01-09 | End: 2020-01-10

## 2020-01-09 RX ORDER — POTASSIUM CHLORIDE 20 MEQ
10 PACKET (EA) ORAL DAILY
Refills: 0 | Status: DISCONTINUED | OUTPATIENT
Start: 2020-01-09 | End: 2020-01-10

## 2020-01-09 RX ORDER — FAMOTIDINE 10 MG/ML
40 INJECTION INTRAVENOUS
Refills: 0 | Status: DISCONTINUED | OUTPATIENT
Start: 2020-01-09 | End: 2020-01-10

## 2020-01-09 RX ORDER — ZOLPIDEM TARTRATE 10 MG/1
5 TABLET ORAL AT BEDTIME
Refills: 0 | Status: DISCONTINUED | OUTPATIENT
Start: 2020-01-09 | End: 2020-01-10

## 2020-01-09 RX ORDER — GLUCAGON INJECTION, SOLUTION 0.5 MG/.1ML
1 INJECTION, SOLUTION SUBCUTANEOUS ONCE
Refills: 0 | Status: DISCONTINUED | OUTPATIENT
Start: 2020-01-09 | End: 2020-01-10

## 2020-01-09 RX ORDER — MAGNESIUM OXIDE 400 MG ORAL TABLET 241.3 MG
400 TABLET ORAL DAILY
Refills: 0 | Status: DISCONTINUED | OUTPATIENT
Start: 2020-01-09 | End: 2020-01-10

## 2020-01-09 RX ORDER — CLONAZEPAM 1 MG
1 TABLET ORAL THREE TIMES A DAY
Refills: 0 | Status: DISCONTINUED | OUTPATIENT
Start: 2020-01-09 | End: 2020-01-10

## 2020-01-09 RX ORDER — MORPHINE SULFATE 50 MG/1
2 CAPSULE, EXTENDED RELEASE ORAL EVERY 4 HOURS
Refills: 0 | Status: DISCONTINUED | OUTPATIENT
Start: 2020-01-09 | End: 2020-01-10

## 2020-01-09 RX ORDER — DEXTROSE 50 % IN WATER 50 %
25 SYRINGE (ML) INTRAVENOUS ONCE
Refills: 0 | Status: DISCONTINUED | OUTPATIENT
Start: 2020-01-09 | End: 2020-01-10

## 2020-01-09 RX ORDER — ONDANSETRON 8 MG/1
4 TABLET, FILM COATED ORAL EVERY 6 HOURS
Refills: 0 | Status: DISCONTINUED | OUTPATIENT
Start: 2020-01-09 | End: 2020-01-10

## 2020-01-09 RX ORDER — BUPROPION HYDROCHLORIDE 150 MG/1
300 TABLET, EXTENDED RELEASE ORAL DAILY
Refills: 0 | Status: DISCONTINUED | OUTPATIENT
Start: 2020-01-09 | End: 2020-01-10

## 2020-01-09 RX ORDER — SODIUM CHLORIDE 9 MG/ML
1000 INJECTION, SOLUTION INTRAVENOUS
Refills: 0 | Status: DISCONTINUED | OUTPATIENT
Start: 2020-01-09 | End: 2020-01-10

## 2020-01-09 RX ORDER — DIPHENHYDRAMINE HCL 50 MG
25 CAPSULE ORAL AT BEDTIME
Refills: 0 | Status: DISCONTINUED | OUTPATIENT
Start: 2020-01-09 | End: 2020-01-09

## 2020-01-09 RX ORDER — OXYCODONE HYDROCHLORIDE 5 MG/1
5 TABLET ORAL EVERY 4 HOURS
Refills: 0 | Status: DISCONTINUED | OUTPATIENT
Start: 2020-01-09 | End: 2020-01-10

## 2020-01-09 RX ORDER — INSULIN GLARGINE 100 [IU]/ML
25 INJECTION, SOLUTION SUBCUTANEOUS ONCE
Refills: 0 | Status: COMPLETED | OUTPATIENT
Start: 2020-01-10 | End: 2020-01-10

## 2020-01-09 RX ORDER — SODIUM CHLORIDE 9 MG/ML
1000 INJECTION INTRAMUSCULAR; INTRAVENOUS; SUBCUTANEOUS
Refills: 0 | Status: DISCONTINUED | OUTPATIENT
Start: 2020-01-10 | End: 2020-01-10

## 2020-01-09 RX ORDER — INSULIN LISPRO 100/ML
VIAL (ML) SUBCUTANEOUS AT BEDTIME
Refills: 0 | Status: DISCONTINUED | OUTPATIENT
Start: 2020-01-09 | End: 2020-01-10

## 2020-01-09 RX ORDER — DEXTROSE 50 % IN WATER 50 %
12.5 SYRINGE (ML) INTRAVENOUS ONCE
Refills: 0 | Status: DISCONTINUED | OUTPATIENT
Start: 2020-01-09 | End: 2020-01-10

## 2020-01-09 RX ORDER — FUROSEMIDE 40 MG
40 TABLET ORAL
Refills: 0 | Status: DISCONTINUED | OUTPATIENT
Start: 2020-01-09 | End: 2020-01-10

## 2020-01-09 RX ORDER — CHLORHEXIDINE GLUCONATE 213 G/1000ML
1 SOLUTION TOPICAL ONCE
Refills: 0 | Status: COMPLETED | OUTPATIENT
Start: 2020-01-09 | End: 2020-01-10

## 2020-01-09 RX ORDER — OXYCODONE HYDROCHLORIDE 5 MG/1
10 TABLET ORAL EVERY 4 HOURS
Refills: 0 | Status: DISCONTINUED | OUTPATIENT
Start: 2020-01-09 | End: 2020-01-10

## 2020-01-09 RX ORDER — DEXTROSE 50 % IN WATER 50 %
15 SYRINGE (ML) INTRAVENOUS ONCE
Refills: 0 | Status: DISCONTINUED | OUTPATIENT
Start: 2020-01-09 | End: 2020-01-10

## 2020-01-09 RX ORDER — ZOLPIDEM TARTRATE 10 MG/1
5 TABLET ORAL AT BEDTIME
Refills: 0 | Status: DISCONTINUED | OUTPATIENT
Start: 2020-01-09 | End: 2020-01-09

## 2020-01-09 RX ORDER — HALOPERIDOL DECANOATE 100 MG/ML
0.5 INJECTION INTRAMUSCULAR ONCE
Refills: 0 | Status: DISCONTINUED | OUTPATIENT
Start: 2020-01-09 | End: 2020-01-09

## 2020-01-09 RX ORDER — CHLORHEXIDINE GLUCONATE 213 G/1000ML
1 SOLUTION TOPICAL ONCE
Refills: 0 | Status: DISCONTINUED | OUTPATIENT
Start: 2020-01-09 | End: 2020-01-09

## 2020-01-09 RX ORDER — ATORVASTATIN CALCIUM 80 MG/1
40 TABLET, FILM COATED ORAL AT BEDTIME
Refills: 0 | Status: DISCONTINUED | OUTPATIENT
Start: 2020-01-09 | End: 2020-01-10

## 2020-01-09 RX ORDER — BUDESONIDE AND FORMOTEROL FUMARATE DIHYDRATE 160; 4.5 UG/1; UG/1
2 AEROSOL RESPIRATORY (INHALATION)
Refills: 0 | Status: DISCONTINUED | OUTPATIENT
Start: 2020-01-09 | End: 2020-01-10

## 2020-01-09 RX ORDER — FUROSEMIDE 40 MG
20 TABLET ORAL
Refills: 0 | Status: DISCONTINUED | OUTPATIENT
Start: 2020-01-09 | End: 2020-01-10

## 2020-01-09 RX ADMIN — Medication 1 MILLIGRAM(S): at 22:08

## 2020-01-09 RX ADMIN — FAMOTIDINE 40 MILLIGRAM(S): 10 INJECTION INTRAVENOUS at 22:18

## 2020-01-09 RX ADMIN — BUDESONIDE AND FORMOTEROL FUMARATE DIHYDRATE 2 PUFF(S): 160; 4.5 AEROSOL RESPIRATORY (INHALATION) at 22:08

## 2020-01-09 RX ADMIN — ZOLPIDEM TARTRATE 5 MILLIGRAM(S): 10 TABLET ORAL at 22:56

## 2020-01-09 RX ADMIN — ZOLPIDEM TARTRATE 5 MILLIGRAM(S): 10 TABLET ORAL at 22:08

## 2020-01-09 NOTE — H&P ADULT - NSHPPHYSICALEXAM_GEN_ALL_CORE
Gen: NAD, AAOx3    Spine  +R plantar foot pressure ulcer  +paraspinal hypertonicity  No midline TTP  5/5 bilateral LE throughout  SILT bilateral LE throughout  +dp pulse bilaterally  Negative clonus/babinski

## 2020-01-09 NOTE — H&P ADULT - ASSESSMENT
70M with intractable back pain   Pain control  WBAT  Hold all chemical DVT ppx  NPO after midnight  IVF while NPO  Plan for OR tomorrow with Dr. Roberts and Dr. Faustin  Discussed with Dr. Roberts

## 2020-01-09 NOTE — ED PROVIDER NOTE - OBJECTIVE STATEMENT
70y M with PMHx of CVA, hernia, FREEDOM, DMT2 with neuropathy, spinal stenosis of lumbar region S/P lumbar laminectomy in 2015, CAD S/P pacemaker c/o back pain for 1 year, worsened in the past 3-4 days. Been F/U with Orthopedic Surgeon, Dr. Roberts, with CT, MRI, myelography, and XR done. Denies recent trauma to affected area or MVC. Denies fever, numbness in genitals, abd pain, urinary incontinence, or bowel incontinence. Last took Tramadol at 2pm with some improvement of pain.

## 2020-01-09 NOTE — ED PROVIDER NOTE - CLINICAL SUMMARY MEDICAL DECISION MAKING FREE TEXT BOX
Fam Braga (MD): 70y M with PMHx of CVA, hernia, FREEDOM, DMT2 with neuropathy, spinal stenosis of lumbar region S/P lumbar laminectomy in 2015, CAD S/P pacemaker c/o back pain for 1 year, worsened in the past 3-4 days. Will review previous documentation and imaging and admit to Dr. Roberts.

## 2020-01-09 NOTE — ED PROVIDER NOTE - PHYSICAL EXAMINATION
Gen: WNWD NAD  HEENT: NCAT PERRL EOMI  Neck: supple  CV: RRR  Lung: CTA BL  Abd: +BS soft NTND  Ext: wwp, palp pulses, FROMx4, no cce  MSK: no midline spinal TTP, no perispinal TTP, straight leg raise test performed with no abnormalities  Neuro: no sensation to toes, motor 5/5 throughout

## 2020-01-09 NOTE — ED PROVIDER NOTE - PMH
Acid Reflux    Anxiety    Benign Tumor of Adrenal Gland    CAD (coronary artery disease)    Cardiac pacemaker    CVA (cerebrovascular accident)  04/8/2013- no residual effects  Depression    Diabetic neuropathy    DM type 2 (diabetes mellitus, type 2)    Gout    Hernia  umbilical, inguinal, abdominal wall  HTN (Hypertension)    FREEDOM (obstructive sleep apnea)    Spinal stenosis of lumbar region

## 2020-01-09 NOTE — H&P ADULT - HISTORY OF PRESENT ILLNESS
70M admitted for LBP. Pt has a history of chronic low back pain, has had previous laminectomy about 5 years ago with Dr. Faustin. Presents today because the pain has been getting worse, especially with ambulation. Denies bowel/bladder habit changes. Denies numbness/tingling down the legs. Denies weakness to LE. No other complaints.

## 2020-01-09 NOTE — H&P ADULT - NSICDXPASTSURGICALHX_GEN_ALL_CORE_FT
PAST SURGICAL HISTORY:  History of Appendectomy     History of lumbar laminectomy L1-2 & L4-5   2015    History of permanent cardiac pacemaker placement placed Medtronic [place] PPM Model# [id number] , implanted April 4, 2019    S/P CABG X 4 ~25 years ago    S/P Coronary Angiogram 12/26/2010 & 2015 at [place], 4/2019 @[place]    S/P Hernia Repair incisional hernia repair on 1/19/17    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

## 2020-01-09 NOTE — H&P ADULT - NSICDXPASTMEDICALHX_GEN_ALL_CORE_FT
PAST MEDICAL HISTORY:  Acid Reflux     Anxiety     Benign Tumor of Adrenal Gland     CAD (coronary artery disease)     Cardiac pacemaker     CVA (cerebrovascular accident) 04/8/2013- no residual effects    Depression     Diabetic neuropathy     DM type 2 (diabetes mellitus, type 2)     Gout     Hernia umbilical, inguinal, abdominal wall    HTN (Hypertension)     FREEDOM (obstructive sleep apnea)     Spinal stenosis of lumbar region

## 2020-01-09 NOTE — ED ADULT NURSE NOTE - OBJECTIVE STATEMENT
patient is a 70 year old male with chronic back pain who presents to the ED complaining of general back pain that has become worse in the past 3 days. patient has a hx of laminectomy, scoliosis. patient denies any physical activity/ trauma that could have brought on this lower back pain. patient took tramadol and tylenol and denies relief at this time. patient walks with a walker. son at bedside.  patient comfort and safety provided. patient is a 70 year old male with a PMH of pacemaker, scoliosis, spinal fusion, spinal stenosis. chronic back pain who presents to the ED complaining of general back pain that has become worse in the past 3 days. patient has a hx of laminectomy, scoliosis. no redness, swelling, bruising or warmth or pain upon palpation noted upon assessment. patient is aaox3, lungs CTA bilaterally, abd soft, nondistended, nontender, radial pulses +2 bilaterally, cap refill <3. patient denies any physical activity/ trauma that could have brought on this lower back pain. patient took tramadol and tylenol and denies relief at this time. patient walks with a walker. son at bedside. patient denies chest pain, shortness of breath, ha, n/v/d, abd pain, cough, fever, chills, cough, weakness, numbness or tingling, changes in bowel or bladder. patient comfort and safety provided.

## 2020-01-09 NOTE — ED PROVIDER NOTE - PSH
History of Appendectomy    History of lumbar laminectomy  L1-2 & L4-5   2015  History of permanent cardiac pacemaker placement  placed Medtronic [place] PPM Model# [id number] , implanted April 4, 2019  S/P CABG X 4  ~25 years ago  S/P Coronary Angiogram  12/26/2010 & 2015 at [place], 4/2019 @[place]  S/P Hernia Repair  incisional hernia repair on 1/19/17  Sudden Adrenal Gland Insufficiency  left adrenalectomy >20 years ago

## 2020-01-10 ENCOUNTER — APPOINTMENT (OUTPATIENT)
Dept: ORTHOPEDIC SURGERY | Facility: HOSPITAL | Age: 70
End: 2020-01-10
Payer: MEDICARE

## 2020-01-10 LAB
ANION GAP SERPL CALC-SCNC: 13 MMOL/L — SIGNIFICANT CHANGE UP (ref 5–17)
APPEARANCE UR: CLEAR — SIGNIFICANT CHANGE UP
APTT BLD: 26.7 SEC — LOW (ref 27.5–36.3)
BILIRUB UR-MCNC: NEGATIVE — SIGNIFICANT CHANGE UP
BUN SERPL-MCNC: 11 MG/DL — SIGNIFICANT CHANGE UP (ref 7–23)
CALCIUM SERPL-MCNC: 7.8 MG/DL — LOW (ref 8.4–10.5)
CHLORIDE SERPL-SCNC: 104 MMOL/L — SIGNIFICANT CHANGE UP (ref 96–108)
CO2 SERPL-SCNC: 21 MMOL/L — LOW (ref 22–31)
COLOR SPEC: YELLOW — SIGNIFICANT CHANGE UP
CREAT SERPL-MCNC: 1 MG/DL — SIGNIFICANT CHANGE UP (ref 0.5–1.3)
DIFF PNL FLD: NEGATIVE — SIGNIFICANT CHANGE UP
GAS PNL BLDA: SIGNIFICANT CHANGE UP
GLUCOSE BLDC GLUCOMTR-MCNC: 154 MG/DL — HIGH (ref 70–99)
GLUCOSE BLDC GLUCOMTR-MCNC: 84 MG/DL — SIGNIFICANT CHANGE UP (ref 70–99)
GLUCOSE SERPL-MCNC: 218 MG/DL — HIGH (ref 70–99)
GLUCOSE UR QL: ABNORMAL
HCT VFR BLD CALC: 39.7 % — SIGNIFICANT CHANGE UP (ref 39–50)
HGB BLD-MCNC: 12.2 G/DL — LOW (ref 13–17)
INR BLD: 1.1 RATIO — SIGNIFICANT CHANGE UP (ref 0.88–1.16)
KETONES UR-MCNC: NEGATIVE — SIGNIFICANT CHANGE UP
LEUKOCYTE ESTERASE UR-ACNC: NEGATIVE — SIGNIFICANT CHANGE UP
MAGNESIUM SERPL-MCNC: 2 MG/DL — SIGNIFICANT CHANGE UP (ref 1.6–2.6)
MCHC RBC-ENTMCNC: 26.8 PG — LOW (ref 27–34)
MCHC RBC-ENTMCNC: 30.7 GM/DL — LOW (ref 32–36)
MCV RBC AUTO: 87.3 FL — SIGNIFICANT CHANGE UP (ref 80–100)
NITRITE UR-MCNC: NEGATIVE — SIGNIFICANT CHANGE UP
NRBC # BLD: 0 /100 WBCS — SIGNIFICANT CHANGE UP (ref 0–0)
PH UR: 6 — SIGNIFICANT CHANGE UP (ref 5–8)
PHOSPHATE SERPL-MCNC: 2.3 MG/DL — LOW (ref 2.5–4.5)
PLATELET # BLD AUTO: 275 K/UL — SIGNIFICANT CHANGE UP (ref 150–400)
POTASSIUM SERPL-MCNC: 5.1 MMOL/L — SIGNIFICANT CHANGE UP (ref 3.5–5.3)
POTASSIUM SERPL-SCNC: 5.1 MMOL/L — SIGNIFICANT CHANGE UP (ref 3.5–5.3)
PROT UR-MCNC: NEGATIVE — SIGNIFICANT CHANGE UP
PROTHROM AB SERPL-ACNC: 12.7 SEC — SIGNIFICANT CHANGE UP (ref 10–12.9)
RBC # BLD: 4.55 M/UL — SIGNIFICANT CHANGE UP (ref 4.2–5.8)
RBC # FLD: 14.5 % — SIGNIFICANT CHANGE UP (ref 10.3–14.5)
SODIUM SERPL-SCNC: 138 MMOL/L — SIGNIFICANT CHANGE UP (ref 135–145)
SP GR SPEC: 1.02 — SIGNIFICANT CHANGE UP (ref 1.01–1.02)
UROBILINOGEN FLD QL: NEGATIVE — SIGNIFICANT CHANGE UP
WBC # BLD: 27.74 K/UL — HIGH (ref 3.8–10.5)
WBC # FLD AUTO: 27.74 K/UL — HIGH (ref 3.8–10.5)

## 2020-01-10 PROCEDURE — 22802 ARTHRD PST DFRM 7-12 VRT SGM: CPT | Mod: 82

## 2020-01-10 PROCEDURE — 22216 INCIS ADDL SPINE SEGMENT: CPT | Mod: 82

## 2020-01-10 PROCEDURE — 22848 INSERT PELV FIXATION DEVICE: CPT | Mod: 82

## 2020-01-10 PROCEDURE — 22216 INCIS ADDL SPINE SEGMENT: CPT

## 2020-01-10 PROCEDURE — 22843 INSERT SPINE FIXATION DEVICE: CPT

## 2020-01-10 PROCEDURE — 22843 INSERT SPINE FIXATION DEVICE: CPT | Mod: 82

## 2020-01-10 PROCEDURE — 22212 INCIS 1 VERTEBRAL SEG THORAC: CPT | Mod: 82

## 2020-01-10 PROCEDURE — 22212 INCIS 1 VERTEBRAL SEG THORAC: CPT

## 2020-01-10 PROCEDURE — 22848 INSERT PELV FIXATION DEVICE: CPT

## 2020-01-10 PROCEDURE — 22802 ARTHRD PST DFRM 7-12 VRT SGM: CPT

## 2020-01-10 RX ORDER — HYDROMORPHONE HYDROCHLORIDE 2 MG/ML
0.5 INJECTION INTRAMUSCULAR; INTRAVENOUS; SUBCUTANEOUS ONCE
Refills: 0 | Status: DISCONTINUED | OUTPATIENT
Start: 2020-01-10 | End: 2020-01-10

## 2020-01-10 RX ORDER — INSULIN LISPRO 100/ML
VIAL (ML) SUBCUTANEOUS EVERY 6 HOURS
Refills: 0 | Status: DISCONTINUED | OUTPATIENT
Start: 2020-01-10 | End: 2020-01-10

## 2020-01-10 RX ORDER — CHLORHEXIDINE GLUCONATE 213 G/1000ML
15 SOLUTION TOPICAL EVERY 12 HOURS
Refills: 0 | Status: DISCONTINUED | OUTPATIENT
Start: 2020-01-10 | End: 2020-01-10

## 2020-01-10 RX ORDER — DEXMEDETOMIDINE HYDROCHLORIDE IN 0.9% SODIUM CHLORIDE 4 UG/ML
0.2 INJECTION INTRAVENOUS
Qty: 200 | Refills: 0 | Status: DISCONTINUED | OUTPATIENT
Start: 2020-01-10 | End: 2020-01-11

## 2020-01-10 RX ORDER — CALCIUM GLUCONATE 100 MG/ML
2 VIAL (ML) INTRAVENOUS ONCE
Refills: 0 | Status: COMPLETED | OUTPATIENT
Start: 2020-01-10 | End: 2020-01-10

## 2020-01-10 RX ORDER — ACETAMINOPHEN 500 MG
975 TABLET ORAL EVERY 8 HOURS
Refills: 0 | Status: DISCONTINUED | OUTPATIENT
Start: 2020-01-13 | End: 2020-01-22

## 2020-01-10 RX ORDER — SODIUM CHLORIDE 9 MG/ML
1000 INJECTION, SOLUTION INTRAVENOUS
Refills: 0 | Status: DISCONTINUED | OUTPATIENT
Start: 2020-01-10 | End: 2020-01-11

## 2020-01-10 RX ADMIN — HYDROMORPHONE HYDROCHLORIDE 0.5 MILLIGRAM(S): 2 INJECTION INTRAMUSCULAR; INTRAVENOUS; SUBCUTANEOUS at 21:45

## 2020-01-10 RX ADMIN — Medication 2: at 05:52

## 2020-01-10 RX ADMIN — FAMOTIDINE 40 MILLIGRAM(S): 10 INJECTION INTRAVENOUS at 05:47

## 2020-01-10 RX ADMIN — Medication 1 MILLIGRAM(S): at 05:48

## 2020-01-10 RX ADMIN — SODIUM CHLORIDE 100 MILLILITER(S): 9 INJECTION INTRAMUSCULAR; INTRAVENOUS; SUBCUTANEOUS at 07:00

## 2020-01-10 RX ADMIN — CHLORHEXIDINE GLUCONATE 1 APPLICATION(S): 213 SOLUTION TOPICAL at 07:24

## 2020-01-10 RX ADMIN — ATORVASTATIN CALCIUM 40 MILLIGRAM(S): 80 TABLET, FILM COATED ORAL at 05:47

## 2020-01-10 RX ADMIN — SODIUM CHLORIDE 100 MILLILITER(S): 9 INJECTION, SOLUTION INTRAVENOUS at 22:13

## 2020-01-10 RX ADMIN — INSULIN GLARGINE 25 UNIT(S): 100 INJECTION, SOLUTION SUBCUTANEOUS at 05:48

## 2020-01-10 RX ADMIN — Medication 200 GRAM(S): at 22:12

## 2020-01-10 RX ADMIN — HYDROMORPHONE HYDROCHLORIDE 0.5 MILLIGRAM(S): 2 INJECTION INTRAMUSCULAR; INTRAVENOUS; SUBCUTANEOUS at 22:00

## 2020-01-10 RX ADMIN — BUDESONIDE AND FORMOTEROL FUMARATE DIHYDRATE 2 PUFF(S): 160; 4.5 AEROSOL RESPIRATORY (INHALATION) at 07:26

## 2020-01-10 NOTE — PROGRESS NOTE ADULT - ASSESSMENT
Pt is a 70y Male POD 0 from T10-Pelvis PSF .  -Stable  -Tolerated procedure well  -Pain Control  -Resume A81 POD1  -Continue Post-Op Abx   -Encourage Incentive Spirometry  -WBAT RLE/LLE  -PT/OT  -Care per SICU  -Extubated successfully shortly after arrival to SICU  -Will discuss with attending and advise if plan changes.    Dina Greene M.D.  PGY-2 Orthopaedic Surgery

## 2020-01-10 NOTE — BRIEF OPERATIVE NOTE - NSICDXBRIEFPREOP_GEN_ALL_CORE_FT
PRE-OP DIAGNOSIS:  Postural kyphosis of lumbar region 10-Satya-2020 21:40:49  Gianni Garcia  Lumbar scoliosis 10-Satya-2020 21:40:19  Gianni Garcia

## 2020-01-10 NOTE — PROGRESS NOTE ADULT - ASSESSMENT
70M with intractable back pain for OR today    ·	Neuro: Pain control  ·	Resp: IS  ·	GI: NPO, IVF  ·	MSK: WBAT, PT/OT  ·	Heme: DVT PPX on hold for OR    Ortho [id number]

## 2020-01-10 NOTE — BRIEF OPERATIVE NOTE - NSICDXBRIEFPOSTOP_GEN_ALL_CORE_FT
POST-OP DIAGNOSIS:  Lumbar scoliosis 10-Satya-2020 21:41:15  Gianni Garcia  Postural kyphosis of lumbar region 10-Satya-2020 21:41:07  Gianni Garcia

## 2020-01-11 LAB
ANION GAP SERPL CALC-SCNC: 13 MMOL/L — SIGNIFICANT CHANGE UP (ref 5–17)
ANION GAP SERPL CALC-SCNC: 15 MMOL/L — SIGNIFICANT CHANGE UP (ref 5–17)
APTT BLD: 23.9 SEC — LOW (ref 27.5–36.3)
BASE EXCESS BLDV CALC-SCNC: -0.5 MMOL/L — SIGNIFICANT CHANGE UP (ref -2–2)
BASE EXCESS BLDV CALC-SCNC: -1.1 MMOL/L — SIGNIFICANT CHANGE UP (ref -2–2)
BASE EXCESS BLDV CALC-SCNC: -2.7 MMOL/L — LOW (ref -2–2)
BUN SERPL-MCNC: 15 MG/DL — SIGNIFICANT CHANGE UP (ref 7–23)
BUN SERPL-MCNC: 17 MG/DL — SIGNIFICANT CHANGE UP (ref 7–23)
BUN SERPL-MCNC: 23 MG/DL — SIGNIFICANT CHANGE UP (ref 7–23)
BUN SERPL-MCNC: 28 MG/DL — HIGH (ref 7–23)
CA-I SERPL-SCNC: 1.14 MMOL/L — SIGNIFICANT CHANGE UP (ref 1.12–1.3)
CA-I SERPL-SCNC: 1.18 MMOL/L — SIGNIFICANT CHANGE UP (ref 1.12–1.3)
CA-I SERPL-SCNC: 1.22 MMOL/L — SIGNIFICANT CHANGE UP (ref 1.12–1.3)
CALCIUM SERPL-MCNC: 8.4 MG/DL — SIGNIFICANT CHANGE UP (ref 8.4–10.5)
CALCIUM SERPL-MCNC: 8.4 MG/DL — SIGNIFICANT CHANGE UP (ref 8.4–10.5)
CALCIUM SERPL-MCNC: 8.5 MG/DL — SIGNIFICANT CHANGE UP (ref 8.4–10.5)
CALCIUM SERPL-MCNC: 8.6 MG/DL — SIGNIFICANT CHANGE UP (ref 8.4–10.5)
CHLORIDE BLDV-SCNC: 103 MMOL/L — SIGNIFICANT CHANGE UP (ref 96–108)
CHLORIDE BLDV-SCNC: 104 MMOL/L — SIGNIFICANT CHANGE UP (ref 96–108)
CHLORIDE BLDV-SCNC: 107 MMOL/L — SIGNIFICANT CHANGE UP (ref 96–108)
CHLORIDE SERPL-SCNC: 100 MMOL/L — SIGNIFICANT CHANGE UP (ref 96–108)
CHLORIDE SERPL-SCNC: 103 MMOL/L — SIGNIFICANT CHANGE UP (ref 96–108)
CHLORIDE SERPL-SCNC: 103 MMOL/L — SIGNIFICANT CHANGE UP (ref 96–108)
CHLORIDE SERPL-SCNC: 97 MMOL/L — SIGNIFICANT CHANGE UP (ref 96–108)
CO2 BLDV-SCNC: 24 MMOL/L — SIGNIFICANT CHANGE UP (ref 22–30)
CO2 BLDV-SCNC: 25 MMOL/L — SIGNIFICANT CHANGE UP (ref 22–30)
CO2 BLDV-SCNC: 26 MMOL/L — SIGNIFICANT CHANGE UP (ref 22–30)
CO2 SERPL-SCNC: 20 MMOL/L — LOW (ref 22–31)
CO2 SERPL-SCNC: 21 MMOL/L — LOW (ref 22–31)
CREAT ?TM UR-MCNC: 140 MG/DL — SIGNIFICANT CHANGE UP
CREAT SERPL-MCNC: 1.13 MG/DL — SIGNIFICANT CHANGE UP (ref 0.5–1.3)
CREAT SERPL-MCNC: 1.26 MG/DL — SIGNIFICANT CHANGE UP (ref 0.5–1.3)
CREAT SERPL-MCNC: 1.71 MG/DL — HIGH (ref 0.5–1.3)
CREAT SERPL-MCNC: 2.01 MG/DL — HIGH (ref 0.5–1.3)
GAS PNL BLDA: SIGNIFICANT CHANGE UP
GAS PNL BLDV: 131 MMOL/L — LOW (ref 135–145)
GAS PNL BLDV: 133 MMOL/L — LOW (ref 135–145)
GAS PNL BLDV: 135 MMOL/L — SIGNIFICANT CHANGE UP (ref 135–145)
GAS PNL BLDV: SIGNIFICANT CHANGE UP
GLUCOSE BLDC GLUCOMTR-MCNC: 185 MG/DL — HIGH (ref 70–99)
GLUCOSE BLDC GLUCOMTR-MCNC: 219 MG/DL — HIGH (ref 70–99)
GLUCOSE BLDC GLUCOMTR-MCNC: 226 MG/DL — HIGH (ref 70–99)
GLUCOSE BLDC GLUCOMTR-MCNC: 238 MG/DL — HIGH (ref 70–99)
GLUCOSE BLDC GLUCOMTR-MCNC: 244 MG/DL — HIGH (ref 70–99)
GLUCOSE BLDC GLUCOMTR-MCNC: 249 MG/DL — HIGH (ref 70–99)
GLUCOSE BLDC GLUCOMTR-MCNC: 337 MG/DL — HIGH (ref 70–99)
GLUCOSE BLDV-MCNC: 232 MG/DL — HIGH (ref 70–99)
GLUCOSE BLDV-MCNC: 249 MG/DL — HIGH (ref 70–99)
GLUCOSE BLDV-MCNC: 331 MG/DL — HIGH (ref 70–99)
GLUCOSE SERPL-MCNC: 260 MG/DL — HIGH (ref 70–99)
GLUCOSE SERPL-MCNC: 269 MG/DL — HIGH (ref 70–99)
GLUCOSE SERPL-MCNC: 294 MG/DL — HIGH (ref 70–99)
GLUCOSE SERPL-MCNC: 337 MG/DL — HIGH (ref 70–99)
HCO3 BLDV-SCNC: 23 MMOL/L — SIGNIFICANT CHANGE UP (ref 21–29)
HCO3 BLDV-SCNC: 24 MMOL/L — SIGNIFICANT CHANGE UP (ref 21–29)
HCO3 BLDV-SCNC: 25 MMOL/L — SIGNIFICANT CHANGE UP (ref 21–29)
HCT VFR BLD CALC: 28.5 % — LOW (ref 39–50)
HCT VFR BLD CALC: 30.1 % — LOW (ref 39–50)
HCT VFR BLD CALC: 33.7 % — LOW (ref 39–50)
HCT VFR BLD CALC: 36.9 % — LOW (ref 39–50)
HCT VFR BLDA CALC: 27 % — LOW (ref 39–50)
HCT VFR BLDA CALC: 30 % — LOW (ref 39–50)
HCT VFR BLDA CALC: 34 % — LOW (ref 39–50)
HGB BLD CALC-MCNC: 10.9 G/DL — LOW (ref 13–17)
HGB BLD CALC-MCNC: 8.8 G/DL — LOW (ref 13–17)
HGB BLD CALC-MCNC: 9.7 G/DL — LOW (ref 13–17)
HGB BLD-MCNC: 10.5 G/DL — LOW (ref 13–17)
HGB BLD-MCNC: 11.4 G/DL — LOW (ref 13–17)
HGB BLD-MCNC: 8.5 G/DL — LOW (ref 13–17)
HGB BLD-MCNC: 9.1 G/DL — LOW (ref 13–17)
HOROWITZ INDEX BLDV+IHG-RTO: 21 — SIGNIFICANT CHANGE UP
HOROWITZ INDEX BLDV+IHG-RTO: 21 — SIGNIFICANT CHANGE UP
HOROWITZ INDEX BLDV+IHG-RTO: 32 — SIGNIFICANT CHANGE UP
INR BLD: 1.08 RATIO — SIGNIFICANT CHANGE UP (ref 0.88–1.16)
LACTATE BLDV-MCNC: 3.4 MMOL/L — HIGH (ref 0.7–2)
LACTATE BLDV-MCNC: 3.5 MMOL/L — HIGH (ref 0.7–2)
LACTATE BLDV-MCNC: 4.4 MMOL/L — CRITICAL HIGH (ref 0.7–2)
MAGNESIUM SERPL-MCNC: 1.8 MG/DL — SIGNIFICANT CHANGE UP (ref 1.6–2.6)
MAGNESIUM SERPL-MCNC: 1.9 MG/DL — SIGNIFICANT CHANGE UP (ref 1.6–2.6)
MAGNESIUM SERPL-MCNC: 2.1 MG/DL — SIGNIFICANT CHANGE UP (ref 1.6–2.6)
MCHC RBC-ENTMCNC: 26.5 PG — LOW (ref 27–34)
MCHC RBC-ENTMCNC: 26.6 PG — LOW (ref 27–34)
MCHC RBC-ENTMCNC: 26.9 PG — LOW (ref 27–34)
MCHC RBC-ENTMCNC: 27.2 PG — SIGNIFICANT CHANGE UP (ref 27–34)
MCHC RBC-ENTMCNC: 29.8 GM/DL — LOW (ref 32–36)
MCHC RBC-ENTMCNC: 30.2 GM/DL — LOW (ref 32–36)
MCHC RBC-ENTMCNC: 30.9 GM/DL — LOW (ref 32–36)
MCHC RBC-ENTMCNC: 31.2 GM/DL — LOW (ref 32–36)
MCV RBC AUTO: 87 FL — SIGNIFICANT CHANGE UP (ref 80–100)
MCV RBC AUTO: 87.3 FL — SIGNIFICANT CHANGE UP (ref 80–100)
MCV RBC AUTO: 87.8 FL — SIGNIFICANT CHANGE UP (ref 80–100)
MCV RBC AUTO: 89.3 FL — SIGNIFICANT CHANGE UP (ref 80–100)
NRBC # BLD: 0 /100 WBCS — SIGNIFICANT CHANGE UP (ref 0–0)
OTHER CELLS CSF MANUAL: 11 ML/DL — LOW (ref 18–22)
OTHER CELLS CSF MANUAL: 12 ML/DL — LOW (ref 18–22)
OTHER CELLS CSF MANUAL: 6 ML/DL — LOW (ref 18–22)
PCO2 BLDV: 42 MMHG — SIGNIFICANT CHANGE UP (ref 35–50)
PCO2 BLDV: 47 MMHG — SIGNIFICANT CHANGE UP (ref 35–50)
PCO2 BLDV: 48 MMHG — SIGNIFICANT CHANGE UP (ref 35–50)
PH BLDV: 7.31 — LOW (ref 7.35–7.45)
PH BLDV: 7.34 — LOW (ref 7.35–7.45)
PH BLDV: 7.37 — SIGNIFICANT CHANGE UP (ref 7.35–7.45)
PHOSPHATE SERPL-MCNC: 2.7 MG/DL — SIGNIFICANT CHANGE UP (ref 2.5–4.5)
PHOSPHATE SERPL-MCNC: 3.1 MG/DL — SIGNIFICANT CHANGE UP (ref 2.5–4.5)
PHOSPHATE SERPL-MCNC: 3.6 MG/DL — SIGNIFICANT CHANGE UP (ref 2.5–4.5)
PLATELET # BLD AUTO: 196 K/UL — SIGNIFICANT CHANGE UP (ref 150–400)
PLATELET # BLD AUTO: 197 K/UL — SIGNIFICANT CHANGE UP (ref 150–400)
PLATELET # BLD AUTO: 220 K/UL — SIGNIFICANT CHANGE UP (ref 150–400)
PLATELET # BLD AUTO: 231 K/UL — SIGNIFICANT CHANGE UP (ref 150–400)
PO2 BLDV: 31 MMHG — SIGNIFICANT CHANGE UP (ref 25–45)
PO2 BLDV: 42 MMHG — SIGNIFICANT CHANGE UP (ref 25–45)
PO2 BLDV: 60 MMHG — HIGH (ref 25–45)
POTASSIUM BLDV-SCNC: 4.8 MMOL/L — SIGNIFICANT CHANGE UP (ref 3.5–5.3)
POTASSIUM BLDV-SCNC: 5.1 MMOL/L — SIGNIFICANT CHANGE UP (ref 3.5–5.3)
POTASSIUM BLDV-SCNC: 5.2 MMOL/L — SIGNIFICANT CHANGE UP (ref 3.5–5.3)
POTASSIUM SERPL-MCNC: 5 MMOL/L — SIGNIFICANT CHANGE UP (ref 3.5–5.3)
POTASSIUM SERPL-MCNC: 5.2 MMOL/L — SIGNIFICANT CHANGE UP (ref 3.5–5.3)
POTASSIUM SERPL-MCNC: 5.4 MMOL/L — HIGH (ref 3.5–5.3)
POTASSIUM SERPL-MCNC: 5.6 MMOL/L — HIGH (ref 3.5–5.3)
POTASSIUM SERPL-SCNC: 5 MMOL/L — SIGNIFICANT CHANGE UP (ref 3.5–5.3)
POTASSIUM SERPL-SCNC: 5.2 MMOL/L — SIGNIFICANT CHANGE UP (ref 3.5–5.3)
POTASSIUM SERPL-SCNC: 5.4 MMOL/L — HIGH (ref 3.5–5.3)
POTASSIUM SERPL-SCNC: 5.6 MMOL/L — HIGH (ref 3.5–5.3)
PROTHROM AB SERPL-ACNC: 12.5 SEC — SIGNIFICANT CHANGE UP (ref 10–12.9)
RBC # BLD: 3.19 M/UL — LOW (ref 4.2–5.8)
RBC # BLD: 3.43 M/UL — LOW (ref 4.2–5.8)
RBC # BLD: 3.86 M/UL — LOW (ref 4.2–5.8)
RBC # BLD: 4.24 M/UL — SIGNIFICANT CHANGE UP (ref 4.2–5.8)
RBC # FLD: 14.6 % — HIGH (ref 10.3–14.5)
RBC # FLD: 14.6 % — HIGH (ref 10.3–14.5)
RBC # FLD: 14.7 % — HIGH (ref 10.3–14.5)
RBC # FLD: 14.8 % — HIGH (ref 10.3–14.5)
SAO2 % BLDV: 48 % — LOW (ref 67–88)
SAO2 % BLDV: 72 % — SIGNIFICANT CHANGE UP (ref 67–88)
SAO2 % BLDV: 89 % — HIGH (ref 67–88)
SODIUM SERPL-SCNC: 132 MMOL/L — LOW (ref 135–145)
SODIUM SERPL-SCNC: 134 MMOL/L — LOW (ref 135–145)
SODIUM SERPL-SCNC: 137 MMOL/L — SIGNIFICANT CHANGE UP (ref 135–145)
SODIUM SERPL-SCNC: 137 MMOL/L — SIGNIFICANT CHANGE UP (ref 135–145)
SODIUM UR-SCNC: <35 MMOL/L — SIGNIFICANT CHANGE UP
WBC # BLD: 19.26 K/UL — HIGH (ref 3.8–10.5)
WBC # BLD: 19.82 K/UL — HIGH (ref 3.8–10.5)
WBC # BLD: 22.36 K/UL — HIGH (ref 3.8–10.5)
WBC # BLD: 22.5 K/UL — HIGH (ref 3.8–10.5)
WBC # FLD AUTO: 19.26 K/UL — HIGH (ref 3.8–10.5)
WBC # FLD AUTO: 19.82 K/UL — HIGH (ref 3.8–10.5)
WBC # FLD AUTO: 22.36 K/UL — HIGH (ref 3.8–10.5)
WBC # FLD AUTO: 22.5 K/UL — HIGH (ref 3.8–10.5)

## 2020-01-11 PROCEDURE — 71045 X-RAY EXAM CHEST 1 VIEW: CPT | Mod: 26

## 2020-01-11 PROCEDURE — 99291 CRITICAL CARE FIRST HOUR: CPT

## 2020-01-11 RX ORDER — CEFAZOLIN SODIUM 1 G
2000 VIAL (EA) INJECTION EVERY 8 HOURS
Refills: 0 | Status: COMPLETED | OUTPATIENT
Start: 2020-01-11 | End: 2020-01-11

## 2020-01-11 RX ORDER — CHLORHEXIDINE GLUCONATE 213 G/1000ML
1 SOLUTION TOPICAL DAILY
Refills: 0 | Status: DISCONTINUED | OUTPATIENT
Start: 2020-01-11 | End: 2020-01-13

## 2020-01-11 RX ORDER — INSULIN GLARGINE 100 [IU]/ML
32 INJECTION, SOLUTION SUBCUTANEOUS EVERY MORNING
Refills: 0 | Status: DISCONTINUED | OUTPATIENT
Start: 2020-01-11 | End: 2020-01-11

## 2020-01-11 RX ORDER — BUPROPION HYDROCHLORIDE 150 MG/1
300 TABLET, EXTENDED RELEASE ORAL DAILY
Refills: 0 | Status: DISCONTINUED | OUTPATIENT
Start: 2020-01-11 | End: 2020-01-22

## 2020-01-11 RX ORDER — CLONAZEPAM 1 MG
1 TABLET ORAL THREE TIMES A DAY
Refills: 0 | Status: DISCONTINUED | OUTPATIENT
Start: 2020-01-11 | End: 2020-01-11

## 2020-01-11 RX ORDER — INSULIN LISPRO 100/ML
VIAL (ML) SUBCUTANEOUS
Refills: 0 | Status: DISCONTINUED | OUTPATIENT
Start: 2020-01-11 | End: 2020-01-22

## 2020-01-11 RX ORDER — SODIUM CHLORIDE 9 MG/ML
1000 INJECTION INTRAMUSCULAR; INTRAVENOUS; SUBCUTANEOUS
Refills: 0 | Status: DISCONTINUED | OUTPATIENT
Start: 2020-01-11 | End: 2020-01-11

## 2020-01-11 RX ORDER — ACETAMINOPHEN 500 MG
1000 TABLET ORAL ONCE
Refills: 0 | Status: COMPLETED | OUTPATIENT
Start: 2020-01-11 | End: 2020-01-11

## 2020-01-11 RX ORDER — ATORVASTATIN CALCIUM 80 MG/1
40 TABLET, FILM COATED ORAL AT BEDTIME
Refills: 0 | Status: DISCONTINUED | OUTPATIENT
Start: 2020-01-11 | End: 2020-01-22

## 2020-01-11 RX ORDER — DEXTROSE 50 % IN WATER 50 %
50 SYRINGE (ML) INTRAVENOUS ONCE
Refills: 0 | Status: COMPLETED | OUTPATIENT
Start: 2020-01-11 | End: 2020-01-11

## 2020-01-11 RX ORDER — HYDROMORPHONE HYDROCHLORIDE 2 MG/ML
0.5 INJECTION INTRAMUSCULAR; INTRAVENOUS; SUBCUTANEOUS ONCE
Refills: 0 | Status: DISCONTINUED | OUTPATIENT
Start: 2020-01-11 | End: 2020-01-11

## 2020-01-11 RX ORDER — INSULIN GLARGINE 100 [IU]/ML
40 INJECTION, SOLUTION SUBCUTANEOUS EVERY MORNING
Refills: 0 | Status: DISCONTINUED | OUTPATIENT
Start: 2020-01-12 | End: 2020-01-22

## 2020-01-11 RX ORDER — FAMOTIDINE 10 MG/ML
20 INJECTION INTRAVENOUS DAILY
Refills: 0 | Status: DISCONTINUED | OUTPATIENT
Start: 2020-01-11 | End: 2020-01-13

## 2020-01-11 RX ORDER — TRAMADOL HYDROCHLORIDE 50 MG/1
25 TABLET ORAL EVERY 6 HOURS
Refills: 0 | Status: DISCONTINUED | OUTPATIENT
Start: 2020-01-11 | End: 2020-01-18

## 2020-01-11 RX ORDER — CLONAZEPAM 1 MG
0.5 TABLET ORAL THREE TIMES A DAY
Refills: 0 | Status: DISCONTINUED | OUTPATIENT
Start: 2020-01-11 | End: 2020-01-18

## 2020-01-11 RX ORDER — MAGNESIUM SULFATE 500 MG/ML
2 VIAL (ML) INJECTION ONCE
Refills: 0 | Status: COMPLETED | OUTPATIENT
Start: 2020-01-11 | End: 2020-01-11

## 2020-01-11 RX ORDER — SODIUM CHLORIDE 9 MG/ML
500 INJECTION INTRAMUSCULAR; INTRAVENOUS; SUBCUTANEOUS ONCE
Refills: 0 | Status: COMPLETED | OUTPATIENT
Start: 2020-01-11 | End: 2020-01-11

## 2020-01-11 RX ORDER — SODIUM CHLORIDE 9 MG/ML
500 INJECTION, SOLUTION INTRAVENOUS ONCE
Refills: 0 | Status: COMPLETED | OUTPATIENT
Start: 2020-01-11 | End: 2020-01-11

## 2020-01-11 RX ORDER — INSULIN LISPRO 100/ML
VIAL (ML) SUBCUTANEOUS AT BEDTIME
Refills: 0 | Status: DISCONTINUED | OUTPATIENT
Start: 2020-01-11 | End: 2020-01-11

## 2020-01-11 RX ORDER — HYDROMORPHONE HYDROCHLORIDE 2 MG/ML
0.5 INJECTION INTRAMUSCULAR; INTRAVENOUS; SUBCUTANEOUS EVERY 4 HOURS
Refills: 0 | Status: DISCONTINUED | OUTPATIENT
Start: 2020-01-11 | End: 2020-01-11

## 2020-01-11 RX ORDER — INSULIN LISPRO 100/ML
VIAL (ML) SUBCUTANEOUS EVERY 6 HOURS
Refills: 0 | Status: DISCONTINUED | OUTPATIENT
Start: 2020-01-11 | End: 2020-01-11

## 2020-01-11 RX ORDER — ONDANSETRON 8 MG/1
4 TABLET, FILM COATED ORAL ONCE
Refills: 0 | Status: COMPLETED | OUTPATIENT
Start: 2020-01-11 | End: 2020-01-11

## 2020-01-11 RX ORDER — TRAMADOL HYDROCHLORIDE 50 MG/1
50 TABLET ORAL EVERY 6 HOURS
Refills: 0 | Status: DISCONTINUED | OUTPATIENT
Start: 2020-01-11 | End: 2020-01-18

## 2020-01-11 RX ORDER — HYDROMORPHONE HYDROCHLORIDE 2 MG/ML
0.5 INJECTION INTRAMUSCULAR; INTRAVENOUS; SUBCUTANEOUS
Refills: 0 | Status: DISCONTINUED | OUTPATIENT
Start: 2020-01-11 | End: 2020-01-18

## 2020-01-11 RX ORDER — ACETAMINOPHEN 500 MG
1000 TABLET ORAL EVERY 6 HOURS
Refills: 0 | Status: COMPLETED | OUTPATIENT
Start: 2020-01-11 | End: 2020-01-12

## 2020-01-11 RX ORDER — ACETAMINOPHEN 500 MG
975 TABLET ORAL EVERY 6 HOURS
Refills: 0 | Status: DISCONTINUED | OUTPATIENT
Start: 2020-01-12 | End: 2020-01-13

## 2020-01-11 RX ORDER — ASPIRIN/CALCIUM CARB/MAGNESIUM 324 MG
81 TABLET ORAL DAILY
Refills: 0 | Status: DISCONTINUED | OUTPATIENT
Start: 2020-01-11 | End: 2020-01-22

## 2020-01-11 RX ORDER — SODIUM CHLORIDE 9 MG/ML
1000 INJECTION INTRAMUSCULAR; INTRAVENOUS; SUBCUTANEOUS
Refills: 0 | Status: DISCONTINUED | OUTPATIENT
Start: 2020-01-11 | End: 2020-01-12

## 2020-01-11 RX ORDER — ZOLPIDEM TARTRATE 10 MG/1
5 TABLET ORAL AT BEDTIME
Refills: 0 | Status: DISCONTINUED | OUTPATIENT
Start: 2020-01-11 | End: 2020-01-13

## 2020-01-11 RX ORDER — INSULIN LISPRO 100/ML
VIAL (ML) SUBCUTANEOUS AT BEDTIME
Refills: 0 | Status: DISCONTINUED | OUTPATIENT
Start: 2020-01-11 | End: 2020-01-22

## 2020-01-11 RX ORDER — BUDESONIDE AND FORMOTEROL FUMARATE DIHYDRATE 160; 4.5 UG/1; UG/1
2 AEROSOL RESPIRATORY (INHALATION)
Refills: 0 | Status: DISCONTINUED | OUTPATIENT
Start: 2020-01-11 | End: 2020-01-14

## 2020-01-11 RX ORDER — INSULIN HUMAN 100 [IU]/ML
10 INJECTION, SOLUTION SUBCUTANEOUS ONCE
Refills: 0 | Status: COMPLETED | OUTPATIENT
Start: 2020-01-11 | End: 2020-01-11

## 2020-01-11 RX ORDER — CALCIUM GLUCONATE 100 MG/ML
2 VIAL (ML) INTRAVENOUS ONCE
Refills: 0 | Status: COMPLETED | OUTPATIENT
Start: 2020-01-11 | End: 2020-01-11

## 2020-01-11 RX ADMIN — Medication 0.5 MILLIGRAM(S): at 21:32

## 2020-01-11 RX ADMIN — INSULIN HUMAN 10 UNIT(S): 100 INJECTION, SOLUTION SUBCUTANEOUS at 02:10

## 2020-01-11 RX ADMIN — HYDROMORPHONE HYDROCHLORIDE 0.5 MILLIGRAM(S): 2 INJECTION INTRAMUSCULAR; INTRAVENOUS; SUBCUTANEOUS at 09:52

## 2020-01-11 RX ADMIN — TRAMADOL HYDROCHLORIDE 50 MILLIGRAM(S): 50 TABLET ORAL at 16:01

## 2020-01-11 RX ADMIN — HYDROMORPHONE HYDROCHLORIDE 0.5 MILLIGRAM(S): 2 INJECTION INTRAMUSCULAR; INTRAVENOUS; SUBCUTANEOUS at 14:34

## 2020-01-11 RX ADMIN — Medication 4: at 12:50

## 2020-01-11 RX ADMIN — TRAMADOL HYDROCHLORIDE 50 MILLIGRAM(S): 50 TABLET ORAL at 16:31

## 2020-01-11 RX ADMIN — Medication 50 MILLILITER(S): at 02:10

## 2020-01-11 RX ADMIN — Medication 62.5 MILLIMOLE(S): at 01:30

## 2020-01-11 RX ADMIN — Medication 4: at 05:20

## 2020-01-11 RX ADMIN — Medication 400 MILLIGRAM(S): at 11:35

## 2020-01-11 RX ADMIN — FAMOTIDINE 20 MILLIGRAM(S): 10 INJECTION INTRAVENOUS at 11:35

## 2020-01-11 RX ADMIN — HYDROMORPHONE HYDROCHLORIDE 0.5 MILLIGRAM(S): 2 INJECTION INTRAMUSCULAR; INTRAVENOUS; SUBCUTANEOUS at 02:26

## 2020-01-11 RX ADMIN — HYDROMORPHONE HYDROCHLORIDE 0.5 MILLIGRAM(S): 2 INJECTION INTRAMUSCULAR; INTRAVENOUS; SUBCUTANEOUS at 05:44

## 2020-01-11 RX ADMIN — TRAMADOL HYDROCHLORIDE 50 MILLIGRAM(S): 50 TABLET ORAL at 10:22

## 2020-01-11 RX ADMIN — HYDROMORPHONE HYDROCHLORIDE 0.5 MILLIGRAM(S): 2 INJECTION INTRAMUSCULAR; INTRAVENOUS; SUBCUTANEOUS at 03:25

## 2020-01-11 RX ADMIN — BUDESONIDE AND FORMOTEROL FUMARATE DIHYDRATE 2 PUFF(S): 160; 4.5 AEROSOL RESPIRATORY (INHALATION) at 06:11

## 2020-01-11 RX ADMIN — BUPROPION HYDROCHLORIDE 300 MILLIGRAM(S): 150 TABLET, EXTENDED RELEASE ORAL at 12:15

## 2020-01-11 RX ADMIN — Medication: at 09:08

## 2020-01-11 RX ADMIN — Medication 81 MILLIGRAM(S): at 11:35

## 2020-01-11 RX ADMIN — ONDANSETRON 4 MILLIGRAM(S): 8 TABLET, FILM COATED ORAL at 01:30

## 2020-01-11 RX ADMIN — INSULIN GLARGINE 32 UNIT(S): 100 INJECTION, SOLUTION SUBCUTANEOUS at 09:08

## 2020-01-11 RX ADMIN — TRAMADOL HYDROCHLORIDE 50 MILLIGRAM(S): 50 TABLET ORAL at 09:52

## 2020-01-11 RX ADMIN — Medication 50 GRAM(S): at 04:43

## 2020-01-11 RX ADMIN — Medication 100 MILLIGRAM(S): at 05:16

## 2020-01-11 RX ADMIN — Medication 200 GRAM(S): at 02:10

## 2020-01-11 RX ADMIN — Medication 4: at 21:52

## 2020-01-11 RX ADMIN — HYDROMORPHONE HYDROCHLORIDE 0.5 MILLIGRAM(S): 2 INJECTION INTRAMUSCULAR; INTRAVENOUS; SUBCUTANEOUS at 06:00

## 2020-01-11 RX ADMIN — SODIUM CHLORIDE 2000 MILLILITER(S): 9 INJECTION INTRAMUSCULAR; INTRAVENOUS; SUBCUTANEOUS at 18:27

## 2020-01-11 RX ADMIN — Medication 400 MILLIGRAM(S): at 17:05

## 2020-01-11 RX ADMIN — HYDROMORPHONE HYDROCHLORIDE 0.5 MILLIGRAM(S): 2 INJECTION INTRAMUSCULAR; INTRAVENOUS; SUBCUTANEOUS at 02:11

## 2020-01-11 RX ADMIN — Medication 8: at 17:49

## 2020-01-11 RX ADMIN — ATORVASTATIN CALCIUM 40 MILLIGRAM(S): 80 TABLET, FILM COATED ORAL at 21:33

## 2020-01-11 RX ADMIN — Medication 400 MILLIGRAM(S): at 01:30

## 2020-01-11 RX ADMIN — ZOLPIDEM TARTRATE 5 MILLIGRAM(S): 10 TABLET ORAL at 21:32

## 2020-01-11 RX ADMIN — SODIUM CHLORIDE 125 MILLILITER(S): 9 INJECTION INTRAMUSCULAR; INTRAVENOUS; SUBCUTANEOUS at 19:30

## 2020-01-11 RX ADMIN — HYDROMORPHONE HYDROCHLORIDE 0.5 MILLIGRAM(S): 2 INJECTION INTRAMUSCULAR; INTRAVENOUS; SUBCUTANEOUS at 14:49

## 2020-01-11 RX ADMIN — SODIUM CHLORIDE 2000 MILLILITER(S): 9 INJECTION INTRAMUSCULAR; INTRAVENOUS; SUBCUTANEOUS at 14:11

## 2020-01-11 RX ADMIN — Medication 100 MILLIGRAM(S): at 14:06

## 2020-01-11 RX ADMIN — Medication 1000 MILLIGRAM(S): at 12:05

## 2020-01-11 RX ADMIN — Medication 1000 MILLIGRAM(S): at 17:35

## 2020-01-11 RX ADMIN — TRAMADOL HYDROCHLORIDE 50 MILLIGRAM(S): 50 TABLET ORAL at 23:29

## 2020-01-11 RX ADMIN — Medication 1 MILLIGRAM(S): at 14:16

## 2020-01-11 RX ADMIN — HYDROMORPHONE HYDROCHLORIDE 0.5 MILLIGRAM(S): 2 INJECTION INTRAMUSCULAR; INTRAVENOUS; SUBCUTANEOUS at 03:10

## 2020-01-11 RX ADMIN — HYDROMORPHONE HYDROCHLORIDE 0.5 MILLIGRAM(S): 2 INJECTION INTRAMUSCULAR; INTRAVENOUS; SUBCUTANEOUS at 10:07

## 2020-01-11 RX ADMIN — SODIUM CHLORIDE 500 MILLILITER(S): 9 INJECTION INTRAMUSCULAR; INTRAVENOUS; SUBCUTANEOUS at 20:32

## 2020-01-11 RX ADMIN — BUDESONIDE AND FORMOTEROL FUMARATE DIHYDRATE 2 PUFF(S): 160; 4.5 AEROSOL RESPIRATORY (INHALATION) at 17:41

## 2020-01-11 RX ADMIN — SODIUM CHLORIDE 500 MILLILITER(S): 9 INJECTION, SOLUTION INTRAVENOUS at 01:31

## 2020-01-11 RX ADMIN — Medication 1000 MILLIGRAM(S): at 02:00

## 2020-01-11 NOTE — CONSULT NOTE ADULT - ASSESSMENT
69 y/o with PMHX of CABG, PPM, DM, FREEDOM presenting with lower back pain, taken to OR for T10-pelvis laminectomy     Neuro:   - off precedex   - tylenol PRN   - Dilaudid pRN     Resp:   - extubated   - f/u AM CXR   - f/u ABG   - c/w Symbicort     Cardiac:   - wean twila as tolerated   - monitor vital signs   - restart ASA today     GI:   - NPO   - c/w home pepcid   - advance diet as tolerated   - bowel regime     Renal   - monitor UOP   - noriega in place  - monitor Cr  - replete electrolytes as necessary     Heme:   - monitor H/H   - holding chemical DVT ppx     ID:   - monitor WBC   - ancef for 2 more doses     Endo:   - FSG   - ISS

## 2020-01-11 NOTE — PROGRESS NOTE ADULT - ASSESSMENT
Pt is a 70y Male POD 1 from T10-Pelvis PSF .  -Pain Control  -Resume A81 POD1  -Continue Post-Op Abx   -Encourage Incentive Spirometry  -WBAT RLE/LLE  -PT/OT  -Care per SICU  -Extubated successfully shortly after arrival to SICU  -Will discuss with attending and advise if plan changes.    Dina Greene M.D.  PGY-2 Orthopaedic Surgery

## 2020-01-11 NOTE — CONSULT NOTE ADULT - SUBJECTIVE AND OBJECTIVE BOX
SICU CONSULT NOTE    HPI  70M with PMhx of CABG, PPM (Medtronic), DM, and FREEDOM admitted for lower back pain. Pt has a history of chronic low back pain, has had previous laminectomy about 5 years ago with Dr. Faustin. Presents today because the pain has been getting worse, especially with ambulation. Denies bowel/bladder habit changes. Denies numbness/tingling down the legs. Denies weakness to LE. No other complaints. Patient was taken to the OR for a T10 to pelvis laminectomy. EBL 1200mL, 3500mL of crystalloids given, and 2U pRBCs and 500cc from cell saver given. Patient transferred to SICU post-operatively intubated and sedated. Patient on pressors during surgery but weaned off on arrival to SICU. Patient was given SBT in the SICU and extubated.     PAST MEDICAL HISTORY: FREEDOM (obstructive sleep apnea)  Cardiac pacemaker  Hernia  CVA (cerebrovascular accident)  Diabetic neuropathy  Spinal stenosis of lumbar region  DM type 2 (diabetes mellitus, type 2)  CAD (coronary artery disease)  Anxiety  Depression  Acid Reflux  Gout  Adrenal Insufficiency  Benign Tumor of Adrenal Gland  HTN (Hypertension)  Diabetes      PAST SURGICAL HISTORY: History of permanent cardiac pacemaker placement  Presence of permanent cardiac pacemaker  History of lumbar laminectomy  S/P Coronary Angiogram  S/P Hernia Repair  History of Appendectomy  S/P CABG X 4  Sudden Adrenal Gland Insufficiency      FAMILY HISTORY: Family history of borderline diabetes mellitus  Family history of early CAD    CODE STATUS:   full code   ALLERGIES: No Known Allergies      VITAL SIGNS:  ICU Vital Signs Last 24 Hrs  T(C): 35.8 (10 Satya 2020 23:00), Max: 36.9 (10 Satya 2020 04:45)  T(F): 96.4 (10 Satya 2020 23:00), Max: 98.4 (10 Satya 2020 04:45)  HR: 89 (11 Jan 2020 00:00) (88 - 93)  BP: 99/61 (11 Jan 2020 00:00) (86/59 - 159/81)  BP(mean): 76 (11 Jan 2020 00:00) (66 - 82)  ABP: 108/65 (11 Jan 2020 00:00) (89/59 - 108/75)  ABP(mean): 79 (11 Jan 2020 00:00) (69 - 87)  RR: 23 (11 Jan 2020 00:00) (16 - 23)  SpO2: 99% (11 Jan 2020 00:00) (98% - 100%)    GEN: laying comfortably in bed     NEURO  Exam: AAOx3, following all commands   acetaminophen  IVPB .. 1000 milliGRAM(s) IV Intermittent once  dexMEDEtomidine Infusion 0.2 MICROgram(s)/kG/Hr IV Continuous <Continuous>  HYDROmorphone  Injectable 0.5 milliGRAM(s) IV Push every 4 hours PRN Moderate Pain (4 - 6)      RESPIRATORY  Mechanical Ventilation: Mode: CPAP with PS, RR (patient): 15, FiO2: 40, PEEP: 5, PS: 5, MAP: 6, PIP: 11  ABG - ( 11 Jan 2020 00:45 )  pH: 7.36  /  pCO2: 39    /  pO2: 135   / HCO3: 21    / Base Excess: -3.1  /  SaO2: 99      Lactate: x      Exam: CTABL, unlabored breathing   budesonide 160 MICROgram(s)/formoterol 4.5 MICROgram(s) Inhaler 2 Puff(s) Inhalation two times a day      CARDIOVASCULAR    Exam: RRR  Cardiac Rhythm: normal sinus rhythm       GI/NUTRITION  Exam: soft, NT, ND   Diet:NPO   famotidine Injectable 20 milliGRAM(s) IV Push daily      GENITOURINARY/RENAL  lactated ringers Bolus 500 milliLiter(s) IV Bolus once  lactated ringers. 1000 milliLiter(s) IV Continuous <Continuous>  sodium phosphate IVPB 15 milliMole(s) IV Intermittent once      01-09 @ 07:01  -  01-10 @ 07:00  --------------------------------------------------------  IN:  Total IN: 0 mL    OUT:    Voided: 200 mL  Total OUT: 200 mL    Total NET: -200 mL      01-10 @ 07:01  -  01-11 @ 01:14  --------------------------------------------------------  IN:    dexmedetomidine Infusion: 11 mL    IV PiggyBack: 100 mL    lactated ringers.: 400 mL  Total IN: 511 mL    OUT:    Accordian: 325 mL    Bulb: 30 mL    Indwelling Catheter - Urethral: 65 mL  Total OUT: 420 mL    Total NET: 91 mL        Weight (kg): 92.5 (01-10 @ 10:52)  01-10    138  |  104  |  11  ----------------------------<  218<H>  5.1   |  21<L>  |  1.00    Ca    7.8<L>      10 Satya 2020 21:10  Phos  2.3     01-10  Mg     2.0     01-10    TPro  7.7  /  Alb  4.3  /  TBili  0.2  /  DBili  x   /  AST  52<H>  /  ALT  20  /  AlkPhos  68  01-09    [ x] Kiran catheter, indication: urine output monitoring in critically ill patient    HEMATOLOGIC  [ ] VTE Prophylaxis:                          11.4   22.50 )-----------( 231      ( 11 Jan 2020 00:49 )             36.9     PT/INR - ( 11 Jan 2020 00:49 )   PT: 12.5 sec;   INR: 1.08 ratio         PTT - ( 11 Jan 2020 00:49 )  PTT:23.9 sec  Transfusion: [x ] PRBC	[ ] Platelets	[ ] FFP	[ ] Cryoprecipitate      INFECTIOUS DISEASES  ceFAZolin   IVPB 2000 milliGRAM(s) IV Intermittent every 8 hours    RECENT CULTURES:      ENDOCRINE  atorvastatin 40 milliGRAM(s) Oral at bedtime  insulin lispro (HumaLOG) corrective regimen sliding scale   SubCutaneous every 6 hours    CAPILLARY BLOOD GLUCOSE      POCT Blood Glucose.: 154 mg/dL (10 Satya 2020 05:44)      PATIENT CARE ACCESS DEVICES:  [ ] Peripheral IV  [ ] Central Venous Line	[ ] R	[ ] L	[ ] IJ	[ ] Fem	[ ] SC	Placed:   [ ] Arterial Line		[ ] R	[ ] L	[ ] Fem	[ ] Rad	[ ] Ax	Placed:   [ ] PICC:					[ ] Mediport  [ ] Urinary Catheter, Date Placed:   [x] Necessity of urinary, arterial, and venous catheters discussed    OTHER MEDICATIONS:     IMAGING STUDIES:  < from: CT Thoracic Spine w/ IV Cont (12.18.19 @ 11:36) >  IMPRESSION:    THORACIC SPINE:    Vertebral body height and alignment are maintained. No spinal cord compression. Spinal cord normal in size and contour.    Mild multilevel degenerative changes as detailed in the body the report.    LUMBAR SPINE:    Status post L5 total laminectomy and partial resection of the L4 spinous process.    Similar nonspecific clumping of the descending lumbosacral nerve roots at the L4 and L5 levels.    Multilevel degenerative changes as detailed in the body of the report, concordant with the prior MRI.    L2-L3 moderate thecal sac compression, L3-L4 moderate thecal sac compression.    L1-L2 moderate left neural foraminal narrowing, L2-L3 mild to moderate left neural foraminal narrowing, L3-L4 moderate right and mild to moderate left neural foraminal narrowing, L5-S1 at least moderate right neural foraminal narrowing.

## 2020-01-11 NOTE — PROGRESS NOTE ADULT - ASSESSMENT
A/P: S/P posterior spine fusion with muscle flap reconstruction.  - Diet  - Pain control  - Drain Monitoring  - IV abx  - DVT PPx: SCD, chemoprophylaxis as per spine service  - Will Follow    Thank You  Curt Storey MD  Plastic Surgery  [phone]

## 2020-01-12 DIAGNOSIS — N17.9 ACUTE KIDNEY FAILURE, UNSPECIFIED: ICD-10-CM

## 2020-01-12 LAB
ANION GAP SERPL CALC-SCNC: 12 MMOL/L — SIGNIFICANT CHANGE UP (ref 5–17)
ANION GAP SERPL CALC-SCNC: 14 MMOL/L — SIGNIFICANT CHANGE UP (ref 5–17)
APTT BLD: 25.1 SEC — LOW (ref 27.5–36.3)
BASE EXCESS BLDV CALC-SCNC: -1.1 MMOL/L — SIGNIFICANT CHANGE UP (ref -2–2)
BASE EXCESS BLDV CALC-SCNC: -2.2 MMOL/L — LOW (ref -2–2)
BLD GP AB SCN SERPL QL: NEGATIVE — SIGNIFICANT CHANGE UP
BUN SERPL-MCNC: 26 MG/DL — HIGH (ref 7–23)
BUN SERPL-MCNC: 29 MG/DL — HIGH (ref 7–23)
CA-I SERPL-SCNC: 1.1 MMOL/L — LOW (ref 1.12–1.3)
CA-I SERPL-SCNC: 1.15 MMOL/L — SIGNIFICANT CHANGE UP (ref 1.12–1.3)
CALCIUM SERPL-MCNC: 7.9 MG/DL — LOW (ref 8.4–10.5)
CALCIUM SERPL-MCNC: 7.9 MG/DL — LOW (ref 8.4–10.5)
CHLORIDE BLDV-SCNC: 105 MMOL/L — SIGNIFICANT CHANGE UP (ref 96–108)
CHLORIDE BLDV-SCNC: 108 MMOL/L — SIGNIFICANT CHANGE UP (ref 96–108)
CHLORIDE SERPL-SCNC: 101 MMOL/L — SIGNIFICANT CHANGE UP (ref 96–108)
CHLORIDE SERPL-SCNC: 103 MMOL/L — SIGNIFICANT CHANGE UP (ref 96–108)
CK MB BLD-MCNC: 0.6 % — SIGNIFICANT CHANGE UP (ref 0–3.5)
CK MB CFR SERPL CALC: 13.7 NG/ML — HIGH (ref 0–6.7)
CK SERPL-CCNC: 2287 U/L — HIGH (ref 30–200)
CO2 BLDV-SCNC: 24 MMOL/L — SIGNIFICANT CHANGE UP (ref 22–30)
CO2 BLDV-SCNC: 26 MMOL/L — SIGNIFICANT CHANGE UP (ref 22–30)
CO2 SERPL-SCNC: 20 MMOL/L — LOW (ref 22–31)
CO2 SERPL-SCNC: 22 MMOL/L — SIGNIFICANT CHANGE UP (ref 22–31)
CREAT SERPL-MCNC: 1.32 MG/DL — HIGH (ref 0.5–1.3)
CREAT SERPL-MCNC: 1.88 MG/DL — HIGH (ref 0.5–1.3)
GAS PNL BLDV: 132 MMOL/L — LOW (ref 135–145)
GAS PNL BLDV: 134 MMOL/L — LOW (ref 135–145)
GAS PNL BLDV: SIGNIFICANT CHANGE UP
GLUCOSE BLDC GLUCOMTR-MCNC: 139 MG/DL — HIGH (ref 70–99)
GLUCOSE BLDC GLUCOMTR-MCNC: 203 MG/DL — HIGH (ref 70–99)
GLUCOSE BLDC GLUCOMTR-MCNC: 209 MG/DL — HIGH (ref 70–99)
GLUCOSE BLDC GLUCOMTR-MCNC: 263 MG/DL — HIGH (ref 70–99)
GLUCOSE BLDV-MCNC: 198 MG/DL — HIGH (ref 70–99)
GLUCOSE BLDV-MCNC: 205 MG/DL — HIGH (ref 70–99)
GLUCOSE SERPL-MCNC: 195 MG/DL — HIGH (ref 70–99)
GLUCOSE SERPL-MCNC: 204 MG/DL — HIGH (ref 70–99)
HCO3 BLDV-SCNC: 22 MMOL/L — SIGNIFICANT CHANGE UP (ref 21–29)
HCO3 BLDV-SCNC: 24 MMOL/L — SIGNIFICANT CHANGE UP (ref 21–29)
HCT VFR BLD CALC: 23.8 % — LOW (ref 39–50)
HCT VFR BLD CALC: 24.2 % — LOW (ref 39–50)
HCT VFR BLD CALC: 26.3 % — LOW (ref 39–50)
HCT VFR BLDA CALC: 25 % — LOW (ref 39–50)
HCT VFR BLDA CALC: 26 % — LOW (ref 39–50)
HGB BLD CALC-MCNC: 8.1 G/DL — LOW (ref 13–17)
HGB BLD CALC-MCNC: 8.3 G/DL — LOW (ref 13–17)
HGB BLD-MCNC: 7.4 G/DL — LOW (ref 13–17)
HGB BLD-MCNC: 7.4 G/DL — LOW (ref 13–17)
HGB BLD-MCNC: 8 G/DL — LOW (ref 13–17)
HOROWITZ INDEX BLDV+IHG-RTO: 21 — SIGNIFICANT CHANGE UP
HOROWITZ INDEX BLDV+IHG-RTO: 21 — SIGNIFICANT CHANGE UP
INR BLD: 1.23 RATIO — HIGH (ref 0.88–1.16)
LACTATE BLDV-MCNC: 2.2 MMOL/L — HIGH (ref 0.7–2)
LACTATE BLDV-MCNC: 2.2 MMOL/L — HIGH (ref 0.7–2)
MAGNESIUM SERPL-MCNC: 2.1 MG/DL — SIGNIFICANT CHANGE UP (ref 1.6–2.6)
MAGNESIUM SERPL-MCNC: 2.2 MG/DL — SIGNIFICANT CHANGE UP (ref 1.6–2.6)
MCHC RBC-ENTMCNC: 26.9 PG — LOW (ref 27–34)
MCHC RBC-ENTMCNC: 27 PG — SIGNIFICANT CHANGE UP (ref 27–34)
MCHC RBC-ENTMCNC: 27.2 PG — SIGNIFICANT CHANGE UP (ref 27–34)
MCHC RBC-ENTMCNC: 30.4 GM/DL — LOW (ref 32–36)
MCHC RBC-ENTMCNC: 30.6 GM/DL — LOW (ref 32–36)
MCHC RBC-ENTMCNC: 31.1 GM/DL — LOW (ref 32–36)
MCV RBC AUTO: 87.5 FL — SIGNIFICANT CHANGE UP (ref 80–100)
MCV RBC AUTO: 88 FL — SIGNIFICANT CHANGE UP (ref 80–100)
MCV RBC AUTO: 88.9 FL — SIGNIFICANT CHANGE UP (ref 80–100)
NRBC # BLD: 0 /100 WBCS — SIGNIFICANT CHANGE UP (ref 0–0)
OTHER CELLS CSF MANUAL: 5 ML/DL — LOW (ref 18–22)
OTHER CELLS CSF MANUAL: 9 ML/DL — LOW (ref 18–22)
PCO2 BLDV: 39 MMHG — SIGNIFICANT CHANGE UP (ref 35–50)
PCO2 BLDV: 49 MMHG — SIGNIFICANT CHANGE UP (ref 35–50)
PH BLDV: 7.32 — LOW (ref 7.35–7.45)
PH BLDV: 7.37 — SIGNIFICANT CHANGE UP (ref 7.35–7.45)
PHOSPHATE SERPL-MCNC: 2.4 MG/DL — LOW (ref 2.5–4.5)
PHOSPHATE SERPL-MCNC: 3.2 MG/DL — SIGNIFICANT CHANGE UP (ref 2.5–4.5)
PLATELET # BLD AUTO: 163 K/UL — SIGNIFICANT CHANGE UP (ref 150–400)
PLATELET # BLD AUTO: 173 K/UL — SIGNIFICANT CHANGE UP (ref 150–400)
PLATELET # BLD AUTO: 180 K/UL — SIGNIFICANT CHANGE UP (ref 150–400)
PO2 BLDV: 30 MMHG — SIGNIFICANT CHANGE UP (ref 25–45)
PO2 BLDV: 50 MMHG — HIGH (ref 25–45)
POTASSIUM BLDV-SCNC: 4.3 MMOL/L — SIGNIFICANT CHANGE UP (ref 3.5–5.3)
POTASSIUM BLDV-SCNC: 4.6 MMOL/L — SIGNIFICANT CHANGE UP (ref 3.5–5.3)
POTASSIUM SERPL-MCNC: 4.7 MMOL/L — SIGNIFICANT CHANGE UP (ref 3.5–5.3)
POTASSIUM SERPL-MCNC: 4.8 MMOL/L — SIGNIFICANT CHANGE UP (ref 3.5–5.3)
POTASSIUM SERPL-SCNC: 4.7 MMOL/L — SIGNIFICANT CHANGE UP (ref 3.5–5.3)
POTASSIUM SERPL-SCNC: 4.8 MMOL/L — SIGNIFICANT CHANGE UP (ref 3.5–5.3)
PROTHROM AB SERPL-ACNC: 14.1 SEC — HIGH (ref 10–12.9)
RBC # BLD: 2.72 M/UL — LOW (ref 4.2–5.8)
RBC # BLD: 2.75 M/UL — LOW (ref 4.2–5.8)
RBC # BLD: 2.96 M/UL — LOW (ref 4.2–5.8)
RBC # FLD: 14.9 % — HIGH (ref 10.3–14.5)
RBC # FLD: 15 % — HIGH (ref 10.3–14.5)
RBC # FLD: 15 % — HIGH (ref 10.3–14.5)
RH IG SCN BLD-IMP: POSITIVE — SIGNIFICANT CHANGE UP
SAO2 % BLDV: 46 % — LOW (ref 67–88)
SAO2 % BLDV: 83 % — SIGNIFICANT CHANGE UP (ref 67–88)
SODIUM SERPL-SCNC: 135 MMOL/L — SIGNIFICANT CHANGE UP (ref 135–145)
SODIUM SERPL-SCNC: 137 MMOL/L — SIGNIFICANT CHANGE UP (ref 135–145)
TROPONIN T, HIGH SENSITIVITY RESULT: 154 NG/L — HIGH (ref 0–51)
TROPONIN T, HIGH SENSITIVITY RESULT: 157 NG/L — HIGH (ref 0–51)
TROPONIN T, HIGH SENSITIVITY RESULT: 182 NG/L — HIGH (ref 0–51)
WBC # BLD: 18.9 K/UL — HIGH (ref 3.8–10.5)
WBC # BLD: 19.46 K/UL — HIGH (ref 3.8–10.5)
WBC # BLD: 20.58 K/UL — HIGH (ref 3.8–10.5)
WBC # FLD AUTO: 18.9 K/UL — HIGH (ref 3.8–10.5)
WBC # FLD AUTO: 19.46 K/UL — HIGH (ref 3.8–10.5)
WBC # FLD AUTO: 20.58 K/UL — HIGH (ref 3.8–10.5)

## 2020-01-12 PROCEDURE — 99233 SBSQ HOSP IP/OBS HIGH 50: CPT

## 2020-01-12 PROCEDURE — 99222 1ST HOSP IP/OBS MODERATE 55: CPT | Mod: GC

## 2020-01-12 PROCEDURE — 71045 X-RAY EXAM CHEST 1 VIEW: CPT | Mod: 26

## 2020-01-12 RX ORDER — SENNA PLUS 8.6 MG/1
2 TABLET ORAL AT BEDTIME
Refills: 0 | Status: DISCONTINUED | OUTPATIENT
Start: 2020-01-12 | End: 2020-01-22

## 2020-01-12 RX ORDER — SODIUM CHLORIDE 9 MG/ML
1000 INJECTION INTRAMUSCULAR; INTRAVENOUS; SUBCUTANEOUS
Refills: 0 | Status: DISCONTINUED | OUTPATIENT
Start: 2020-01-12 | End: 2020-01-13

## 2020-01-12 RX ORDER — POLYETHYLENE GLYCOL 3350 17 G/17G
17 POWDER, FOR SOLUTION ORAL DAILY
Refills: 0 | Status: DISCONTINUED | OUTPATIENT
Start: 2020-01-12 | End: 2020-01-22

## 2020-01-12 RX ADMIN — Medication 975 MILLIGRAM(S): at 17:34

## 2020-01-12 RX ADMIN — TRAMADOL HYDROCHLORIDE 50 MILLIGRAM(S): 50 TABLET ORAL at 10:43

## 2020-01-12 RX ADMIN — Medication 400 MILLIGRAM(S): at 00:34

## 2020-01-12 RX ADMIN — SENNA PLUS 2 TABLET(S): 8.6 TABLET ORAL at 21:24

## 2020-01-12 RX ADMIN — POLYETHYLENE GLYCOL 3350 17 GRAM(S): 17 POWDER, FOR SOLUTION ORAL at 21:24

## 2020-01-12 RX ADMIN — BUDESONIDE AND FORMOTEROL FUMARATE DIHYDRATE 2 PUFF(S): 160; 4.5 AEROSOL RESPIRATORY (INHALATION) at 06:52

## 2020-01-12 RX ADMIN — TRAMADOL HYDROCHLORIDE 50 MILLIGRAM(S): 50 TABLET ORAL at 11:13

## 2020-01-12 RX ADMIN — Medication 975 MILLIGRAM(S): at 12:50

## 2020-01-12 RX ADMIN — Medication 0.5 MILLIGRAM(S): at 05:43

## 2020-01-12 RX ADMIN — TRAMADOL HYDROCHLORIDE 25 MILLIGRAM(S): 50 TABLET ORAL at 02:12

## 2020-01-12 RX ADMIN — Medication 400 MILLIGRAM(S): at 05:44

## 2020-01-12 RX ADMIN — Medication 81 MILLIGRAM(S): at 12:21

## 2020-01-12 RX ADMIN — HYDROMORPHONE HYDROCHLORIDE 0.5 MILLIGRAM(S): 2 INJECTION INTRAMUSCULAR; INTRAVENOUS; SUBCUTANEOUS at 08:20

## 2020-01-12 RX ADMIN — Medication 0.5 MILLIGRAM(S): at 22:44

## 2020-01-12 RX ADMIN — TRAMADOL HYDROCHLORIDE 25 MILLIGRAM(S): 50 TABLET ORAL at 03:00

## 2020-01-12 RX ADMIN — BUDESONIDE AND FORMOTEROL FUMARATE DIHYDRATE 2 PUFF(S): 160; 4.5 AEROSOL RESPIRATORY (INHALATION) at 17:42

## 2020-01-12 RX ADMIN — Medication 975 MILLIGRAM(S): at 12:20

## 2020-01-12 RX ADMIN — FAMOTIDINE 20 MILLIGRAM(S): 10 INJECTION INTRAVENOUS at 12:21

## 2020-01-12 RX ADMIN — HYDROMORPHONE HYDROCHLORIDE 0.5 MILLIGRAM(S): 2 INJECTION INTRAMUSCULAR; INTRAVENOUS; SUBCUTANEOUS at 08:35

## 2020-01-12 RX ADMIN — TRAMADOL HYDROCHLORIDE 50 MILLIGRAM(S): 50 TABLET ORAL at 00:20

## 2020-01-12 RX ADMIN — ATORVASTATIN CALCIUM 40 MILLIGRAM(S): 80 TABLET, FILM COATED ORAL at 22:44

## 2020-01-12 RX ADMIN — Medication 6: at 17:35

## 2020-01-12 RX ADMIN — BUPROPION HYDROCHLORIDE 300 MILLIGRAM(S): 150 TABLET, EXTENDED RELEASE ORAL at 12:21

## 2020-01-12 RX ADMIN — CHLORHEXIDINE GLUCONATE 1 APPLICATION(S): 213 SOLUTION TOPICAL at 05:43

## 2020-01-12 RX ADMIN — Medication 4: at 13:08

## 2020-01-12 RX ADMIN — ZOLPIDEM TARTRATE 5 MILLIGRAM(S): 10 TABLET ORAL at 22:44

## 2020-01-12 RX ADMIN — Medication 4: at 22:44

## 2020-01-12 RX ADMIN — INSULIN GLARGINE 40 UNIT(S): 100 INJECTION, SOLUTION SUBCUTANEOUS at 08:10

## 2020-01-12 RX ADMIN — Medication 0.5 MILLIGRAM(S): at 13:27

## 2020-01-12 NOTE — CONSULT NOTE ADULT - SUBJECTIVE AND OBJECTIVE BOX
CARDIOLOGY CONSULT NOTE - DR. TMA    HPI:    Patient is a 70 year old man with history of FREEDOM, CAD, s/p CABG, s/p PPM, CVA 2013, Diabetic neuropathy, spinal stenosis of lumbar region, Anxiety, Depression, GERD, Gout, Hypertension, Adrenal Gland Insufficiency: left adrenalectomy admitted with lower back pain.       S/P T10-pelvis laminectomy, post op NIKUNJ, brief chest pain, elevated troponin    no active chest pain, dyspnea  Patient denies any palpitations, cough, syncope, edema, exertional symptoms, nausea, abdominal pain, fever, chills,  or rash.            PAST MEDICAL & SURGICAL HISTORY:  FREEDOM (obstructive sleep apnea)  Cardiac pacemaker  Hernia: umbilical, inguinal, abdominal wall  CVA (cerebrovascular accident): 2013- no residual effects  Diabetic neuropathy  Spinal stenosis of lumbar region  DM type 2 (diabetes mellitus, type 2)  CAD (coronary artery disease)  Anxiety  Depression  Acid Reflux  Gout  Benign Tumor of Adrenal Gland  HTN (Hypertension)  History of permanent cardiac pacemaker placement: placed Medtronic [place] PPM Model# [id number] , implanted 2019  History of lumbar laminectomy: L1-2 &amp; L4-5     S/P Coronary Angiogram: 2010 &amp; 2015 at [place], 2019 @[place]  S/P Hernia Repair: incisional hernia repair on 17  History of Appendectomy  S/P CABG X 4: ~25 years ago  Sudden Adrenal Gland Insufficiency: left adrenalectomy &gt;20 years ago        PREVIOUS DIAGNOSTIC TESTING:    [ ] Echocardiogram:  [ ]  Catheterization:  [ ] Stress Test:  	    MEDICATIONS:    Home Medications:  acetaminophen 325 mg oral tablet: 2 tab(s) orally every 6 hours (2020 17:50)  Ambien 10 mg oral tablet: 1 tab(s) orally once a day (at bedtime) (2020 17:50)  aspirin 81 mg oral tablet: 1 tab(s) orally once a day in morning (2020 17:50)  Crestor 10 mg oral tablet: 1 tab(s) orally 3 times a week, F-Sat-Sun (2020 17:50)  Deplin: 1 tab(s) orally once a  day in morning (2020 17:50)  famotidine 40 mg oral tablet: 1 tab(s) orally 3 times a day    Note: RX written for famotidine 40 mg BID (2020 17:50)  KlonoPIN 1 mg oral tablet: 1 tab(s) orally 3 times a day (2020 17:50)  Lasix 20 mg oral tablet: 1 tab(s) orally once a day on Monday-Thursday    Note: Pt takes Lasix 40 mg daily (Friday to ) takes in combination with Symbicort to help with SOB (2020 17:50)  Lasix 40 mg oral tablet: 1 tab(s) orally once a day (Friday to )    Note: Pt takes Lasix 20 mg daily on Monday to Thursday (2020 17:50)  magnesium: 500 milligram(s) orally once a day (2020 17:50)  metFORMIN 1000 mg oral tablet: 1 tab(s) orally 2 times a day (2020 17:50)  NovoLOG 100 units/mL subcutaneous solution: 6 unit(s) subcutaneous 3 times a day (before meals) (2020 17:50)  Plavix 75 mg oral tablet: 1 tab(s) orally once a day in morning (2020 17:50)  Soliqua 100/33 subcutaneous solution: 40 unit(s) subcutaneous once a day in am (2020 17:50)  Symbicort 160 mcg-4.5 mcg/inh inhalation aerosol: 2 puff(s) inhaled 2 times a day (2020 17:50)  traMADol 50 mg oral tablet: 2 tab(s) orally 3 times a day (2020 17:50)  Wellbutrin  mg/24 hours oral tablet, extended release: 1 tab(s) orally every 24 hours in morning (2020 17:50)      MEDICATIONS  (STANDING):  acetaminophen   Tablet .. 975 milliGRAM(s) Oral every 6 hours  aspirin enteric coated 81 milliGRAM(s) Oral daily  atorvastatin 40 milliGRAM(s) Oral at bedtime  budesonide 160 MICROgram(s)/formoterol 4.5 MICROgram(s) Inhaler 2 Puff(s) Inhalation two times a day  buPROPion XL . 300 milliGRAM(s) Oral daily  chlorhexidine 2% Cloths 1 Application(s) Topical daily  clonazePAM  Tablet 0.5 milliGRAM(s) Oral three times a day  famotidine Injectable 20 milliGRAM(s) IV Push daily  insulin glargine Injectable (LANTUS) 40 Unit(s) SubCutaneous every morning  insulin lispro (HumaLOG) corrective regimen sliding scale   SubCutaneous at bedtime  insulin lispro (HumaLOG) corrective regimen sliding scale   SubCutaneous three times a day before meals  sodium chloride 0.9%. 1000 milliLiter(s) (100 mL/Hr) IV Continuous <Continuous>      FAMILY HISTORY:  Family history of borderline diabetes mellitus  Family history of early CAD      SOCIAL HISTORY:    [x ] Non-smoker  [ ] Smoker  [ ] Alcohol    Allergies    No Known Allergies    Intolerances    	    REVIEW OF SYSTEMS:  CONSTITUTIONAL: No fever, weight loss, or fatigue  EYES: No eye pain, visual disturbances, or discharge  ENMT:  No difficulty hearing, tinnitus, vertigo; No sinus or throat pain  NECK: No pain or stiffness  RESPIRATORY: No cough, wheezing, chills or hemoptysis; No Shortness of Breath  CARDIOVASCULAR: as HPI  GASTROINTESTINAL: No abdominal or epigastric pain. No nausea, vomiting, or hematemesis; No diarrhea or constipation. No melena or hematochezia.  GENITOURINARY: No dysuria, frequency, hematuria, or incontinence  NEUROLOGICAL: No headaches, memory loss, loss of strength, numbness, or tremors  SKIN: No itching, burning, rashes, or lesions   	  [ ] All others negative	  [ ] Unable to obtain    PHYSICAL EXAM:    T(C): 37.2 (20 @ 11:00), Max: 38.4 (20 @ 22:00)  HR: 89 (20 @ 12:00) (89 - 95)  BP: 121/63 (20 @ 12:00) (116/62 - 150/81)  RR: 36 (20 @ 12:00) (19 - 40)  SpO2: 99% (20 @ 12:00) (90% - 99%)  Wt(kg): --  I&O's Summary    2020 07:  -  2020 07:00  --------------------------------------------------------  IN: 4590 mL / OUT: 1407 mL / NET: 3183 mL    2020 07:01  -  2020 12:44  --------------------------------------------------------  IN: 550 mL / OUT: 350 mL / NET: 200 mL      Daily     Daily Weight in k.7 (2020 00:13)    Appearance: Normal	  Psychiatry: A & O x 3, Mood & affect appropriate  HEENT:   Normal oral mucosa, PERRL, EOMI	  Lymphatic: No lymphadenopathy  Cardiovascular: Normal S1 S2,RRR, No JVD, No murmurs  Respiratory: Lungs clear to auscultation	  Gastrointestinal:  Soft, Non-tender, + BS	  Skin: No rashes, No ecchymoses, No cyanosis	  Neurologic: Non-focal  Extremities: Normal range of motion, No clubbing, cyanosis or edema  Vascular: Peripheral pulses palpable 2+ bilaterally    TELEMETRY: 	    ECG:  	sr, nonspec st abnl   RADIOLOGY:  OTHER: 	  	  LABS:	 	    CARDIAC MARKERS:        proBNP:     Lipid Profile:   HgA1c:   TSH:                           7.4    18.90 )-----------( 163      ( 2020 06:06 )             24.2     01-12    135  |  101  |  29<H>  ----------------------------<  195<H>  4.8   |  22  |  1.88<H>    Ca    7.9<L>      2020 01:08  Phos  3.2       Mg     2.1           PT/INR - ( 2020 01:08 )   PT: 14.1 sec;   INR: 1.23 ratio         PTT - ( 2020 01:08 )  PTT:25.1 sec    Creatinine, Serum: 1.88 mg/dL (20 @ 01:08)  Creatinine, Serum: 2.01 mg/dL (20 @ 18:43)  Creatinine, Serum: 1.71 mg/dL (20 @ 12:51)  Creatinine, Serum: 1.26 mg/dL (20 @ 03:30)  Creatinine, Serum: 1.13 mg/dL (20 @ 00:49)  Creatinine, Serum: 1.00 mg/dL (01-10-20 @ 21:10)  Creatinine, Serum: 0.87 mg/dL (20 @ 20:29)  Creatinine, Serum: 0.83 mg/dL (20 @ 18:20)        ASSESSMENT/PLAN:

## 2020-01-12 NOTE — CONSULT NOTE ADULT - ASSESSMENT
70y old M with pmhx of HFrEF, spinal stenosis s/p lumbar laminectomy (2015), CVA (2013, no residual deficits), CAD s/p CABG (4-vessel), DM2 (on insulin), benign adrenal tumor s/p L adrenalectomy s/p  T10 to pelvis laminectomy. Now with NIKUNJ, nephrology consulted for management     #NIKUNJ  - Patient with likely ATN in setting of hypotension in the OR on 1/10  - Scr peaked at 2.01mg/dL, now down to 1.88mg/dL  - Home lasix held, started on 125cc/hr of .9% NS per SICU team.  - Patient is non-oliguric, stable electrolytes  - Would hold further IVF as pt is net positive 5L for the hospitalization now  - Trend CK level, currently 2K  - UA bland, urine Na<35  - Would repeat urine studies now, repeat urine Na  - Monitor UOP and daily weights. Avoid volume depletion, NSAIDs, PPI's, ARB/ACE-I. Dose medications as per eGFR.

## 2020-01-12 NOTE — PHYSICAL THERAPY INITIAL EVALUATION ADULT - ADDITIONAL COMMENTS
Pt lives alone in a apt with +elevator. Pt was independent with all ADLs and ambulation PTA. Pt started using a RW for ambulation 3 wks ago but prior to that pt did not use a AD for ambulation. +eyeglasses. +drives. Pt is R hand dominant.

## 2020-01-12 NOTE — CONSULT NOTE ADULT - SUBJECTIVE AND OBJECTIVE BOX
[facility] DIVISION OF KIDNEY DISEASES AND HYPERTENSION -- INITIAL CONSULT NOTE  --------------------------------------------------------------------------------  HPI: Patient is a 70y old M with pmhx of HFrEF, spinal stenosis s/p lumbar laminectomy (2015), CVA (2013, no residual deficits), CAD s/p CABG (4-vessel), DM2 (on insulin), benign adrenal tumor s/p L adrenalectomy presented for intractable back pain on 1/9/20. Patient was taken to the OR for a T10 to pelvis laminectomy. EBL 1200mL, 3500mL of crystalloids given, and 2U pRBCs and 500cc from cell saver given, MAP's dropped to 60's, momentarily required levophed. He was transferred to SICU post-op where he was extubated. Baseline Scr noted to be .9mg/dL back in Dec 2019, admission Scr noted to be .87mg/dL, on 1/11 Scr went to 1.71 with decreased UOP and mild hyperkalemia to 5.4. Patient was placed on 125cc/hr of .9% NS. Nephrology consulted for NIKUNJ. Patient denies any NSAID use, no herbal supplementation, no ACE-I use. Currently endorsing back pain.       PAST HISTORY  --------------------------------------------------------------------------------  PAST MEDICAL & SURGICAL HISTORY:  FREEDOM (obstructive sleep apnea)  Cardiac pacemaker  Hernia: umbilical, inguinal, abdominal wall  CVA (cerebrovascular accident): 04/8/2013- no residual effects  Diabetic neuropathy  Spinal stenosis of lumbar region  DM type 2 (diabetes mellitus, type 2)  CAD (coronary artery disease)  Anxiety  Depression  Acid Reflux  Gout  Benign Tumor of Adrenal Gland  HTN (Hypertension)  History of permanent cardiac pacemaker placement: placed Medtronic [place] PPM Model# [id number] , implanted April 4, 2019  History of lumbar laminectomy: L1-2 &amp; L4-5   2015  S/P Coronary Angiogram: 12/26/2010 &amp; 2015 at [place], 4/2019 @[place]  S/P Hernia Repair: incisional hernia repair on 1/19/17  History of Appendectomy  S/P CABG X 4: ~25 years ago  Sudden Adrenal Gland Insufficiency: left adrenalectomy &gt;20 years ago    FAMILY HISTORY:  Family history of borderline diabetes mellitus  Family history of early CAD    PAST SOCIAL HISTORY:    ALLERGIES & MEDICATIONS  --------------------------------------------------------------------------------  Allergies    No Known Allergies    Intolerances      Standing Inpatient Medications  acetaminophen   Tablet .. 975 milliGRAM(s) Oral every 6 hours  aspirin enteric coated 81 milliGRAM(s) Oral daily  atorvastatin 40 milliGRAM(s) Oral at bedtime  budesonide 160 MICROgram(s)/formoterol 4.5 MICROgram(s) Inhaler 2 Puff(s) Inhalation two times a day  buPROPion XL . 300 milliGRAM(s) Oral daily  chlorhexidine 2% Cloths 1 Application(s) Topical daily  clonazePAM  Tablet 0.5 milliGRAM(s) Oral three times a day  famotidine Injectable 20 milliGRAM(s) IV Push daily  insulin glargine Injectable (LANTUS) 40 Unit(s) SubCutaneous every morning  insulin lispro (HumaLOG) corrective regimen sliding scale   SubCutaneous at bedtime  insulin lispro (HumaLOG) corrective regimen sliding scale   SubCutaneous three times a day before meals  sodium chloride 0.9%. 1000 milliLiter(s) IV Continuous <Continuous>    PRN Inpatient Medications  HYDROmorphone  Injectable 0.5 milliGRAM(s) IV Push every 3 hours PRN  traMADol 25 milliGRAM(s) Oral every 6 hours PRN  traMADol 50 milliGRAM(s) Oral every 6 hours PRN  zolpidem 5 milliGRAM(s) Oral at bedtime PRN      REVIEW OF SYSTEMS  --------------------------------------------------------------------------------  Gen: No lethargy  Respiratory: No dyspnea  CV: No chest pain  GI: No abdominal pain/ diarrhea   MSK: No edema, + back pain  Neuro: No dizziness  Heme: No bleeding    All other systems were reviewed and are negative, except as noted.      VITALS/PHYSICAL EXAM  --------------------------------------------------------------------------------  T(C): 37.7 (01-12-20 @ 07:00), Max: 38.4 (01-11-20 @ 22:00)  HR: 91 (01-12-20 @ 10:00) (89 - 95)  BP: 135/69 (01-12-20 @ 10:00) (110/62 - 150/81)  RR: 33 (01-12-20 @ 10:00) (19 - 36)  SpO2: 94% (01-12-20 @ 10:00) (90% - 99%)  Wt(kg): --  Height (cm): 180.34 (01-10-20 @ 10:52)  Weight (kg): 92.5 (01-10-20 @ 10:52)  BMI (kg/m2): 28.4 (01-10-20 @ 10:52)  BSA (m2): 2.13 (01-10-20 @ 10:52)      01-11-20 @ 07:01  -  01-12-20 @ 07:00  --------------------------------------------------------  IN: 4590 mL / OUT: 1407 mL / NET: 3183 mL    01-12-20 @ 07:01  -  01-12-20 @ 10:48  --------------------------------------------------------  IN: 450 mL / OUT: 215 mL / NET: 235 mL      Physical Exam:    	Gen: NAD, well-appearing  	HEENT: Anicteric   	Pulm: CTA B/L  	CV: RRR, S1S2, + murmur   	Abd: +BS, soft, nontender/nondistended       	: Kiran in place with clear urine   	MSK: Warm, no edema  	Neuro: No focal deficits, AOX3, no asterixis       	Vascular Access:      LABS/STUDIES  --------------------------------------------------------------------------------              7.4    18.90 >-----------<  163      [01-12-20 @ 06:06]              24.2     135  |  101  |  29  ----------------------------<  195      [01-12-20 @ 01:08]  4.8   |  22  |  1.88        Ca     7.9     [01-12-20 @ 01:08]      Mg     2.1     [01-12-20 @ 01:08]      Phos  3.2     [01-12-20 @ 01:08]      PT/INR: PT 14.1 , INR 1.23       [01-12-20 @ 01:08]  PTT: 25.1       [01-12-20 @ 01:08]    CK 2287      [01-12-20 @ 06:06]    Creatinine Trend:  SCr 1.88 [01-12 @ 01:08]  SCr 2.01 [01-11 @ 18:43]  SCr 1.71 [01-11 @ 12:51]  SCr 1.26 [01-11 @ 03:30]  SCr 1.13 [01-11 @ 00:49]    Urinalysis - [01-10-20 @ 06:45]      Color Yellow / Appearance Clear / SG 1.025 / pH 6.0      Gluc Trace / Ketone Negative  / Bili Negative / Urobili Negative       Blood Negative / Protein Negative / Leuk Est Negative / Nitrite Negative      RBC  / WBC  / Hyaline  / Gran  / Sq Epi  / Non Sq Epi  / Bacteria     Urine Creatinine 140      [01-11-20 @ 13:42]  Urine Sodium <35      [01-11-20 @ 13:42]    HbA1c 7.7      [01-02-20 @ 20:13]    HCV 0.06, Nonreact      [12-20-19 @ 08:38]

## 2020-01-12 NOTE — PROGRESS NOTE ADULT - ASSESSMENT
69 y/o with PMHX of CABG, PPM, DM, FREEDOM (not on CPAP) presenting with lower back pain, taken to OR for T10-pelvis laminectomy     Neuro:   - A/Ox4  - Standing Tylenol, tramadol 25/50 q6 prn, dilaudid 0.5 q3 breakthrough  - Klonopin 0.5 mg TID and Zolpidem 5 mg qhs (half of home dose)  - Bupropion 300 mg daily    Resp:   - Saturating well on room air  - c/w Symbicort     Cardiac:   - Monitor hemodynamics  - Lactate downtrending (2.2 from 4.4)  - Aspirin 81, atorva 40    GI:   - Regular Kosher diabetic diet  - c/w home pepcid     Renal  - NS@125  - monitor UOP   - Kiran in place  - Trend Cr (downtrending from 2)  - replete electrolytes as necessary     Heme:   - Trend H/H q6h  - holding chemical VTE ppx     ID:   - Trend WBC, fever curve  - No active issues    Endo:   - 40 units Lantus (home dose)  - ISS   - Monitor finger sticks

## 2020-01-12 NOTE — PROGRESS NOTE ADULT - ASSESSMENT
Pt is a 70y Male POD 1 from T10-Pelvis PSF w/ PRS closure  -Pain Control  -Resume A81 POD1  -Continue Post-Op Abx   -Encourage Incentive Spirometry  -WBAT RLE/LLE  -PT/OT  -Care per SICU  routine vitals  -floor when cleared by SICU Pt is a 70y Male POD 1 from T10-Pelvis PSF w/ PRS closure  -Pain Control  -Resume A81 POD1  -Continue Post-Op Abx   -Encourage Incentive Spirometry  -WBAT RLE/LLE  -PT/OT  -Care per SICU  routine vitals  -floor when cleared by SICU    Attending:  patient seen and examined yesterday and today    agree with housestaff assessment-- awaiting repeat h/h to determine transfuison requirement-- discussed with Obi Uriostegui and Ramona.     Plan as per above    Ramin WILLSON

## 2020-01-12 NOTE — PHYSICAL THERAPY INITIAL EVALUATION ADULT - GENERAL OBSERVATIONS, REHAB EVAL
Pt received semisupine in bed with +cardiac monitor, +noriega, +TAMIKA drain, +hemovac, +pulse ox and +BP cuff. NAD noted.

## 2020-01-12 NOTE — CONSULT NOTE ADULT - ASSESSMENT
70 year old man with history of FREEDOM, CAD, s/p CABG, s/p PPM, CVA 04/8/2013, Diabetic neuropathy, spinal stenosis of lumbar region, Anxiety, Depression, GERD, Gout, Hypertension, Adrenal Gland Insufficiency: left adrenalectomy admitted with lower back pain, S/P T10-pelvis laminectomy with post op NIKUNJ, brief chest pain, elevated troponin    1. Chest Pain, Elevated troponin  -brief, aytpical chest pain at PPM site  -symptoms resolved, no dynamic ischemic ECG abnl  -elevated HS trop with normal CKMB  -represent demand ischemia in setting of recent OR, hypotension, NIKUNJ, known CAD  -CK elevated with normal mb portion c/w mild rhabdo  -no further cardiac w/u indicated for now     2. CAD, s/p CABG  -cont asa    3. NIKUNJ  -likely HD mediated  -diuretics on hold       dvt ppx  d/w icu team     YFN TAM M.D.  CARDIOLOGY     office [phone]  cell [phone]

## 2020-01-12 NOTE — PHYSICAL THERAPY INITIAL EVALUATION ADULT - PERTINENT HX OF CURRENT PROBLEM, REHAB EVAL
Pt is 70M admitted for LBP. Pt has a history of chronic low back pain, has had previous laminectomy about 5 years ago with Dr. Faustin. Presents today because the pain has been getting worse, especially with ambulation. Pt s/p T10-Pelvis laminectomy, PSIF on 1/10/2020. (-) CXR 1/11/2020.

## 2020-01-12 NOTE — CONSULT NOTE ADULT - ATTENDING COMMENTS
acute respiratory insufficiency after surgery  on PS 5 / +5 / 40%  =>  7.38 / 41 / 184 / 24  RR = 16, TV = 450, MVe = 7.5 lpm  -respiratory mechanics predict high likelihood of successful liberation from mechanical ventilation, so he was extubated    acute blood loss anemia  lactic acidosis  -monitor hgb  -bolus PRN    hyperkalemia  hyperglycemia  -insulin  -repeat BMP      I have personally provided 70 minutes of critical care time concurrently with the resident, excluding time spent on separate procedures and time spent teaching.
#NIKUNJ- likely ATN in setting of hypotension+/- mild rhabdo  -cont ivf  -cr plateaued   -monitor trend  -renal sono  #Rhabdo-monitor cpk trend; cont ivf; pt is on a statin

## 2020-01-12 NOTE — PHYSICAL THERAPY INITIAL EVALUATION ADULT - IMPAIRMENTS CONTRIBUTING TO GAIT DEVIATIONS, PT EVAL
The patient has been examined and the H&P has been reviewed:        I concur with the findings and no changes have occurred since H&P was written.        Patient cleared for Anesthesia: MAC     Past Medical History:  Past Medical History:   Diagnosis Date    Anemia     Only during pregnancies.     Fibrocystic breast        Past Surgical History:   Procedure Laterality Date     SECTION  ; 2012    x2    CHOLECYSTECTOMY  2004    COSMETIC SURGERY      lipo in legs    LAPAROSCOPY-ALLY TUBAL COAG Bilateral 3/28/2014    Performed by [facility] at [facility] OR    TUBAL LIGATION      various vein      left leg    WISDOM TOOTH EXTRACTION         Social History     Tobacco Use    Smoking status: Former Smoker     Packs/day: 0.00     Years: 13.00     Pack years: 0.00     Types: Cigarettes     Last attempt to quit: 2007     Years since quittin.1    Smokeless tobacco: Never Used   Substance Use Topics    Alcohol use: No     Frequency: Never     Comment:      Drug use: No       Family History   Problem Relation Age of Onset    Hypertension Paternal Grandmother     Stroke Paternal Grandmother     Hypertension Maternal Grandmother     Stroke Maternal Grandmother     Cancer Mother 52        lung and bone    Hypertension Mother     Miscarriages / Stillbirths Mother     No Known Problems Father     Breast cancer Neg Hx     Colon cancer Neg Hx     Ovarian cancer Neg Hx        No outpatient medications have been marked as taking for the 19 encounter (Hospital Encounter).       Review of patient's allergies indicates:   Allergen Reactions    Zosyn [piperacillin-tazobactam] Itching     Redness to face                 Anesthesia/Surgery risks, benefits and alternative options discussed and understood by patient/family.      There are no hospital problems to display for this patient.     decreased flexibility/impaired balance/narrow base of support/pain/decreased strength

## 2020-01-13 ENCOUNTER — TRANSCRIPTION ENCOUNTER (OUTPATIENT)
Age: 70
End: 2020-01-13

## 2020-01-13 LAB
ANION GAP SERPL CALC-SCNC: 10 MMOL/L — SIGNIFICANT CHANGE UP (ref 5–17)
BUN SERPL-MCNC: 18 MG/DL — SIGNIFICANT CHANGE UP (ref 7–23)
CALCIUM SERPL-MCNC: 7.8 MG/DL — LOW (ref 8.4–10.5)
CHLORIDE SERPL-SCNC: 106 MMOL/L — SIGNIFICANT CHANGE UP (ref 96–108)
CO2 SERPL-SCNC: 21 MMOL/L — LOW (ref 22–31)
CREAT SERPL-MCNC: 0.93 MG/DL — SIGNIFICANT CHANGE UP (ref 0.5–1.3)
GLUCOSE BLDC GLUCOMTR-MCNC: 158 MG/DL — HIGH (ref 70–99)
GLUCOSE BLDC GLUCOMTR-MCNC: 171 MG/DL — HIGH (ref 70–99)
GLUCOSE BLDC GLUCOMTR-MCNC: 172 MG/DL — HIGH (ref 70–99)
GLUCOSE BLDC GLUCOMTR-MCNC: 268 MG/DL — HIGH (ref 70–99)
GLUCOSE SERPL-MCNC: 155 MG/DL — HIGH (ref 70–99)
HCT VFR BLD CALC: 23 % — LOW (ref 39–50)
HGB BLD-MCNC: 7.3 G/DL — LOW (ref 13–17)
MCHC RBC-ENTMCNC: 27.8 PG — SIGNIFICANT CHANGE UP (ref 27–34)
MCHC RBC-ENTMCNC: 31.7 GM/DL — LOW (ref 32–36)
MCV RBC AUTO: 87.5 FL — SIGNIFICANT CHANGE UP (ref 80–100)
PLATELET # BLD AUTO: 145 K/UL — LOW (ref 150–400)
POTASSIUM SERPL-MCNC: 3.9 MMOL/L — SIGNIFICANT CHANGE UP (ref 3.5–5.3)
POTASSIUM SERPL-SCNC: 3.9 MMOL/L — SIGNIFICANT CHANGE UP (ref 3.5–5.3)
RBC # BLD: 2.63 M/UL — LOW (ref 4.2–5.8)
RBC # FLD: 14.9 % — HIGH (ref 10.3–14.5)
SODIUM SERPL-SCNC: 137 MMOL/L — SIGNIFICANT CHANGE UP (ref 135–145)
WBC # BLD: 12.4 K/UL — HIGH (ref 3.8–10.5)
WBC # FLD AUTO: 12.4 K/UL — HIGH (ref 3.8–10.5)

## 2020-01-13 PROCEDURE — 76770 US EXAM ABDO BACK WALL COMP: CPT | Mod: 26

## 2020-01-13 PROCEDURE — 99232 SBSQ HOSP IP/OBS MODERATE 35: CPT | Mod: GC

## 2020-01-13 PROCEDURE — 71045 X-RAY EXAM CHEST 1 VIEW: CPT | Mod: 26

## 2020-01-13 RX ORDER — ACETAMINOPHEN 500 MG
1000 TABLET ORAL ONCE
Refills: 0 | Status: COMPLETED | OUTPATIENT
Start: 2020-01-13 | End: 2020-01-13

## 2020-01-13 RX ORDER — LACTULOSE 10 G/15ML
10 SOLUTION ORAL DAILY
Refills: 0 | Status: DISCONTINUED | OUTPATIENT
Start: 2020-01-13 | End: 2020-01-22

## 2020-01-13 RX ORDER — ZOLPIDEM TARTRATE 10 MG/1
5 TABLET ORAL AT BEDTIME
Refills: 0 | Status: DISCONTINUED | OUTPATIENT
Start: 2020-01-13 | End: 2020-01-20

## 2020-01-13 RX ORDER — LANOLIN ALCOHOL/MO/W.PET/CERES
5 CREAM (GRAM) TOPICAL AT BEDTIME
Refills: 0 | Status: DISCONTINUED | OUTPATIENT
Start: 2020-01-13 | End: 2020-01-13

## 2020-01-13 RX ORDER — SODIUM CHLORIDE 9 MG/ML
1000 INJECTION INTRAMUSCULAR; INTRAVENOUS; SUBCUTANEOUS
Refills: 0 | Status: DISCONTINUED | OUTPATIENT
Start: 2020-01-13 | End: 2020-01-13

## 2020-01-13 RX ORDER — DIPHENHYDRAMINE HCL 50 MG
25 CAPSULE ORAL EVERY 4 HOURS
Refills: 0 | Status: DISCONTINUED | OUTPATIENT
Start: 2020-01-13 | End: 2020-01-22

## 2020-01-13 RX ORDER — FAMOTIDINE 10 MG/ML
40 INJECTION INTRAVENOUS ONCE
Refills: 0 | Status: COMPLETED | OUTPATIENT
Start: 2020-01-13 | End: 2020-01-13

## 2020-01-13 RX ORDER — FAMOTIDINE 10 MG/ML
40 INJECTION INTRAVENOUS
Refills: 0 | Status: DISCONTINUED | OUTPATIENT
Start: 2020-01-14 | End: 2020-01-22

## 2020-01-13 RX ADMIN — TRAMADOL HYDROCHLORIDE 50 MILLIGRAM(S): 50 TABLET ORAL at 22:15

## 2020-01-13 RX ADMIN — TRAMADOL HYDROCHLORIDE 50 MILLIGRAM(S): 50 TABLET ORAL at 21:48

## 2020-01-13 RX ADMIN — Medication 975 MILLIGRAM(S): at 07:00

## 2020-01-13 RX ADMIN — LACTULOSE 10 GRAM(S): 10 SOLUTION ORAL at 21:45

## 2020-01-13 RX ADMIN — FAMOTIDINE 40 MILLIGRAM(S): 10 INJECTION INTRAVENOUS at 21:45

## 2020-01-13 RX ADMIN — ATORVASTATIN CALCIUM 40 MILLIGRAM(S): 80 TABLET, FILM COATED ORAL at 21:45

## 2020-01-13 RX ADMIN — Medication 6: at 21:48

## 2020-01-13 RX ADMIN — Medication 0.5 MILLIGRAM(S): at 06:38

## 2020-01-13 RX ADMIN — Medication 81 MILLIGRAM(S): at 13:28

## 2020-01-13 RX ADMIN — Medication 975 MILLIGRAM(S): at 06:37

## 2020-01-13 RX ADMIN — Medication 2: at 09:15

## 2020-01-13 RX ADMIN — POLYETHYLENE GLYCOL 3350 17 GRAM(S): 17 POWDER, FOR SOLUTION ORAL at 18:10

## 2020-01-13 RX ADMIN — SENNA PLUS 2 TABLET(S): 8.6 TABLET ORAL at 21:45

## 2020-01-13 RX ADMIN — FAMOTIDINE 20 MILLIGRAM(S): 10 INJECTION INTRAVENOUS at 13:31

## 2020-01-13 RX ADMIN — BUDESONIDE AND FORMOTEROL FUMARATE DIHYDRATE 2 PUFF(S): 160; 4.5 AEROSOL RESPIRATORY (INHALATION) at 06:38

## 2020-01-13 RX ADMIN — BUPROPION HYDROCHLORIDE 300 MILLIGRAM(S): 150 TABLET, EXTENDED RELEASE ORAL at 14:24

## 2020-01-13 RX ADMIN — INSULIN GLARGINE 40 UNIT(S): 100 INJECTION, SOLUTION SUBCUTANEOUS at 09:15

## 2020-01-13 RX ADMIN — Medication 1000 MILLIGRAM(S): at 15:15

## 2020-01-13 RX ADMIN — Medication 975 MILLIGRAM(S): at 00:29

## 2020-01-13 RX ADMIN — Medication 400 MILLIGRAM(S): at 14:24

## 2020-01-13 RX ADMIN — Medication 975 MILLIGRAM(S): at 01:00

## 2020-01-13 RX ADMIN — Medication 2: at 18:10

## 2020-01-13 RX ADMIN — BUDESONIDE AND FORMOTEROL FUMARATE DIHYDRATE 2 PUFF(S): 160; 4.5 AEROSOL RESPIRATORY (INHALATION) at 18:10

## 2020-01-13 RX ADMIN — ZOLPIDEM TARTRATE 5 MILLIGRAM(S): 10 TABLET ORAL at 22:28

## 2020-01-13 RX ADMIN — Medication 2: at 13:22

## 2020-01-13 RX ADMIN — Medication 0.5 MILLIGRAM(S): at 13:28

## 2020-01-13 RX ADMIN — SODIUM CHLORIDE 100 MILLILITER(S): 9 INJECTION INTRAMUSCULAR; INTRAVENOUS; SUBCUTANEOUS at 00:52

## 2020-01-13 NOTE — DISCHARGE NOTE PROVIDER - HOSPITAL COURSE
This is a 70 year old male with PMHx of Scoliosis, Kyphosis, Gout, T2DM, HTN, CABG x 4, CVA, Depression, FREEDOM, Lumbar lami by Dr. Faustin in 2015, Pacemaker by [employer] admitted to [facility] on 1/9/20.   On 1/10, patient underwent an uncomplicated T10-Pelvis Spinal fusion with plastics closure.  Postop, patient transferred to SICU intubated.  Patient eventually extubated.  Nephrology was consulted for NIKUNJ and recommendations followed.  Cardiology was consulted for chest pain and elevated troponins and recommendations were followed.  Evaluated and treated by PT, recommended for Subacute Rehab.  Remain of hospital stay unremarkable, and patient discharged to Subacute Rehab when bed available. This is a 70 year old male with PMHx of Scoliosis, Kyphosis, Gout, T2DM, HTN, CABG x 4, CVA, Depression, FREEDOM, Lumbar lami by Dr. Faustin in 2015, Pacemaker by [employer] admitted to [facility] on 1/9/20.   On 1/10, patient underwent an uncomplicated T10-Pelvis Spinal fusion with plastics closure.  Postop, patient transferred to SICU intubated with drains placed by Plastic Surgery and Prevena dressing. Patient eventually extubated.  Nephrology was consulted for NIKUNJ and recommendations followed.  Cardiology was consulted for chest pain and elevated troponins and recommendations were followed.    Wound Care consulted for diabetic foot ulcer who deferred management to Dr. Coppola. Pulmonary Team consulted for dyspnea and recommendations made and patient restarted on diuretics.  Evaluated and treated by Physical therapy whom recommended Subacute Rehab for discharge disposition.  Restarted on plavix on postoperative day five. Patient discharged to Subacute Rehab when bed available. This is a 70 year old male with PMHx of Scoliosis, Kyphosis, Gout, T2DM, HTN, CABG x 4, CVA, Depression, FREEDOM, Lumbar lami by Dr. Faustin in 2015, Pacemaker by [employer] admitted to [facility] on 1/9/20.   On 1/10, patient underwent an uncomplicated T10-Pelvis Spinal fusion with plastics closure.  Postop, patient transferred to SICU intubated with drains placed by Plastic Surgery and Prevena dressing. Patient eventually extubated. Patient was transfused PRBCs perioperatively for acute blood loss anemia.  Nephrology was consulted for NIKUNJ and recommendations followed.  Cardiology was consulted for chest pain and elevated troponins and recommendations were followed.    Wound Care consulted for diabetic foot ulcer who deferred management to Dr. Coppola. Pulmonary Team consulted for dyspnea and recommendations made and patient restarted on diuretics.  Evaluated and treated by Physical therapy whom recommended Subacute Rehab for discharge disposition.  Restarted on plavix on postoperative day five. Patient discharged to Subacute Rehab when bed available. This is a 70 year old male with PMHx of Scoliosis, Kyphosis, Gout, T2DM, HTN, CABG x 4, CVA, Depression, FREEDOM, Lumbar lami by Dr. Faustin in 2015, Pacemaker by [employer] admitted to [facility] on 1/9/20.   On 1/10, patient underwent an uncomplicated T10-Pelvis Spinal fusion with plastics closure.  Postop, patient transferred to SICU intubated with drains placed by Plastic Surgery and Prevena dressing. Patient eventually extubated. Patient was transfused PRBCs perioperatively for acute blood loss anemia.  Nephrology was consulted for NIKUNJ and recommendations followed.  ATN resolved with Nephrology management. Cardiology was consulted for chest pain and elevated troponin and recommendations were followed.    Wound Care consulted for diabetic foot ulcer who deferred management to Dr. Coppola. Pulmonary Team consulted for dyspnea and recommendations made and patient restarted on diuretics.  Evaluated and treated by Physical therapy whom recommended Subacute Rehab for discharge disposition.  Restarted on plavix on postoperative day five. Patient discharged to Subacute Rehab when bed available. This is a 70 year old male with PMHx of Scoliosis, Kyphosis, Gout, T2DM, HTN, CABG x 4, CVA, Depression, FREEDOM, Lumbar lami by Dr. Faustin in 2015, Pacemaker by [employer] admitted to [facility] on 1/9/20.   On 1/10, patient underwent an uncomplicated T10-Pelvis Spinal fusion with plastics closure.  Postop, patient transferred to SICU intubated with drains placed by Plastic Surgery and Prevena dressing. Patient eventually extubated. Patient was transfused PRBCs perioperatively for acute blood loss anemia.  Nephrology was consulted for NIKUNJ 2' ATN from low oxygen during surgery and recommendations followed.  ATN resolved with Nephrology management. Cardiology was consulted for chest pain and elevated troponin and recommendations were followed.    Wound Care consulted for diabetic foot ulcer who deferred management to Dr. Coppola. Pulmonary Team consulted for dyspnea and recommendations made and patient restarted on diuretics.  Evaluated and treated by Physical therapy whom recommended Subacute Rehab for discharge disposition.  Restarted on plavix on postoperative day five. Patient discharged to Subacute Rehab when bed available.

## 2020-01-13 NOTE — PROGRESS NOTE ADULT - ASSESSMENT
70y old M with pmhx of HFrEF, spinal stenosis s/p lumbar laminectomy (2015), CVA (2013, no residual deficits), CAD s/p CABG (4-vessel), DM2 (on insulin), benign adrenal tumor s/p L adrenalectomy s/p  T10 to pelvis laminectomy. Now with NIKUNJ, nephrology consulted for management     #NIKUNJ  - Patient with likely ATN in setting of hypotension in the OR on 1/10  - Scr peaked at 2.01mg/dL, now down to 0.93  - Hold fluids now, monitor scr, continue with PO intake, if scr stable by tomorrow resume home dose of lasix.   - Patient is non-oliguric, stable electrolytesnow  - UA bland, urine Na<35  - Monitor UOP and daily weights. Avoid volume depletion, NSAIDs, PPI's, ARB/ACE-I. Dose medications as per eGFR.    Will sign off re consult if needed. 70y old M with pmhx of HFrEF, spinal stenosis s/p lumbar laminectomy (2015), CVA (2013, no residual deficits), CAD s/p CABG (4-vessel), DM2 (on insulin), benign adrenal tumor s/p L adrenalectomy s/p  T10 to pelvis laminectomy. Now with NIKUNJ, nephrology consulted for management     #NIKUNJ  - Patient with likely ATN in setting of hypotension in the OR on 1/10  - Scr peaked at 2.01mg/dL, now down to 0.93  - Hold fluids now, monitor scr, continue with PO intake, appears euvolemic, can resume lasix as needed.  - Patient is non-oliguric, stable electrolytes now  - UA bland, urine Na<35  - Monitor UOP and daily weights. Avoid volume depletion, NSAIDs, PPI's, ARB/ACE-I. Dose medications as per eGFR.    Will sign off re consult if needed.

## 2020-01-13 NOTE — DISCHARGE NOTE PROVIDER - NSDCFUADDINST_GEN_ALL_CORE_FT
Please follow up with Dr. Roberts after your discharge from Subacute Rehab (2 weeks, call for appointment).  PT-weight bearing as tolerated with brace for support.  Keep dressing clean, dry and intact.  Have doctor remove any staples/sutures postop day 14 (if applicable), and apply steristrips as needed.  Please follow up with your PMD 1 month after your hospital discharge. Please follow up with Dr. Roberts after your discharge from Subacute Rehab on 1/22/20- please call office with questions.   PT-weight bearing as tolerated with brace for support.  Keep dressing clean, dry and intact.  Have doctor remove any staples/sutures postop day 14 (if applicable), and apply steristrips as needed.  Please follow up with your PMD 1 month after your hospital discharge. Please follow up with Dr. Roberts after your discharge from Subacute Rehab on 1/22/20- please call office with questions.   PT-weight bearing as tolerated with brace for support.  Keep dressing clean, dry and intact.  Have doctor remove any staples/sutures postop day 14 (if applicable), and apply steristrips as needed.  Please follow up with Plastics within 2 weeks (call for appointment).  Please follow up with your PMD 1 month after your hospital discharge.

## 2020-01-13 NOTE — DISCHARGE NOTE PROVIDER - NSDCFUSCHEDAPPT_GEN_ALL_CORE_FT
BHUPENDRA THOMPSON ; 01/22/2020 ; [facility] [address] [facility] BHUPENDRA THOMPSON ; 01/22/2020 ; [facility] [address] [facility] BHUPENDRA THOMPSON ; 01/22/2020 ; [facility] [address] [facility] BHUPENDRA THOMPSON ; 01/22/2020 ; [facility] [address] [facility] BHUPENDRA THOMPSON ; 01/22/2020 ; [facility] [address] [facility] BHUPENDRA THOMPSON ; 03/11/2020 ; [facility] [address] [facility] BHUPENDRA THOMPSON ; 03/11/2020 ; [facility]

## 2020-01-13 NOTE — PROGRESS NOTE ADULT - ASSESSMENT
70 year old man with history of FREEDOM, CAD, s/p CABG, s/p PPM, CVA 04/8/2013, Diabetic neuropathy, spinal stenosis of lumbar region, Anxiety, Depression, GERD, Gout, Hypertension, Adrenal Gland Insufficiency: left adrenalectomy admitted with lower back pain, S/P T10-pelvis laminectomy with post op NIKUNJ, brief chest pain, elevated troponin    1. Chest Pain, Elevated troponin  -brief, aytpical chest pain at PPM site  -symptoms resolved, no dynamic ischemic ECG abnl  -elevated HS trop with normal CKMB  -represent demand ischemia in setting of recent OR, hypotension, NIKUNJ, known CAD  -CK elevated with normal mb portion c/w mild rhabdo  -no further cardiac w/u indicated for now     2. CAD, s/p CABG  -cont asa    3. NIKUNJ  -likely HD mediated  -diuretics on hold, resume po dose tomorrow if cr stable       dvt ppx  d/w icu team     YFN TAM M.D.  CARDIOLOGY     office [phone]  cell [phone] 70 year old man with history of FREEDOM, CAD, s/p CABG, s/p PPM, CVA 04/8/2013, Diabetic neuropathy, spinal stenosis of lumbar region, Anxiety, Depression, GERD, Gout, Hypertension, Adrenal Gland Insufficiency: left adrenalectomy admitted with lower back pain, S/P T10-pelvis laminectomy with post op NIKUNJ, brief chest pain, elevated troponin    1. Chest Pain, Elevated troponin  -brief, aytpical chest pain at PPM site  -symptoms resolved, no dynamic ischemic ECG abnl  -elevated HS trop with normal CKMB  -represent demand ischemia in setting of recent OR, hypotension, NIKUNJ, known CAD  -CK elevated with normal mb portion c/w mild rhabdo  -no further cardiac w/u indicated for now     2. CAD, s/p CABG  -cont asa    3. NIKUNJ  -likely HD mediated  -diuretics on hold, resume po dose tomorrow if cr stable       dvt ppx

## 2020-01-13 NOTE — DISCHARGE NOTE PROVIDER - NSDCACTIVITY_GEN_ALL_CORE
Walking - Outdoors allowed/Do not drive or operate machinery/No heavy lifting/straining/Stairs allowed/Walking - Indoors allowed Do not drive or operate machinery/No heavy lifting/straining/Walking - Indoors allowed/Do not make important decisions/Stairs allowed/Walking - Outdoors allowed

## 2020-01-13 NOTE — OCCUPATIONAL THERAPY INITIAL EVALUATION ADULT - DIAGNOSIS, OT EVAL
Pt p/w impairments in balance, strength, AROM, endurance, coordination impacting independence with ADLs and functional mobility.

## 2020-01-13 NOTE — DISCHARGE NOTE PROVIDER - CARE PROVIDER_API CALL
Saulo oRberts)  Orthopaedic Surgery  [address] [facility], [address]  [place] [address]  Phone: [phone]  Fax: [phone]  Follow Up Time: Saulo Roberts)  Orthopaedic Surgery  [address] [facility], [address]  [place] [address]  Phone: [phone]  Fax: [phone]  Follow Up Time:     Matthias Coppola (DPM)  Podiatric Medicine and Surgery  [address] [place] [address]  Phone: [phone]  Fax: [phone]  Follow Up Time:     Saulo Montgomery)  Cardiovascular Disease; Interventional Cardiology; Nuclear Cardiology  [address] [facility], [address]  [place] [address]  Phone: [phone]  Fax: [phone]  Follow Up Time: Saulo Roberts)  Orthopaedic Surgery  [address] [facility], [address]  [place] [address]  Phone: [phone]  Fax: [phone]  Follow Up Time:     Matthias Coppola (DPM)  Podiatric Medicine and Surgery  [address] [place] [address]  Phone: [phone]  Fax: [phone]  Follow Up Time:     Saulo Montgomery)  Cardiovascular Disease; Interventional Cardiology; Nuclear Cardiology  [address] [facility], [address]  [place] [address]  Phone: [phone]  Fax: [phone]  Follow Up Time: Saulo Roberts)  Orthopaedic Surgery  [address] [facility], [address]  [place] [address]  Phone: [phone]  Fax: [phone]  Follow Up Time:     Matthias Coppola (DPM)  Podiatric Medicine and Surgery  [address] [facility] [place] [address]  Phone: [phone]  Fax: [phone]  Follow Up Time:     Saulo Montgomery)  Cardiovascular Disease; Interventional Cardiology; Nuclear Cardiology  [address] [facility], [address]  [place] [address]  Phone: [phone]  Fax: [phone]  Follow Up Time:     Saulo Jennings)  Internal Medicine; Pulmonary Disease  [address] [facility], [address] [place] [address]  Phone: [phone]  Fax: [phone]  Follow Up Time:     Curt Storey)  Plastic Surgery  [address] [place] [address]  Phone: [phone]  Fax: [phone]  Follow Up Time:

## 2020-01-13 NOTE — OCCUPATIONAL THERAPY INITIAL EVALUATION ADULT - PLANNED THERAPY INTERVENTIONS, OT EVAL
strengthening/transfer training/neuromuscular re-education/balance training/bed mobility training/ADL retraining

## 2020-01-13 NOTE — DISCHARGE NOTE PROVIDER - NSDCCPCAREPLAN_GEN_ALL_CORE_FT
PRINCIPAL DISCHARGE DIAGNOSIS  Diagnosis: Spinal stenosis, unspecified spinal region  Assessment and Plan of Treatment:
Clothing

## 2020-01-13 NOTE — DISCHARGE NOTE PROVIDER - CARE PROVIDERS DIRECT ADDRESSES
,[email or web address],[email or web address] [email or web address],DirectAddress_Unknown,DirectAddress_Unknown,DirectAddress_Unknown

## 2020-01-13 NOTE — DISCHARGE NOTE PROVIDER - NSDCMRMEDTOKEN_GEN_ALL_CORE_FT
acetaminophen 325 mg oral tablet: 2 tab(s) orally every 6 hours  Ambien 10 mg oral tablet: 1 tab(s) orally once a day (at bedtime)  aspirin 81 mg oral tablet: 1 tab(s) orally once a day in morning  Crestor 10 mg oral tablet: 1 tab(s) orally 3 times a week, F-Sat-Sun  Deplin: 1 tab(s) orally once a  day in morning  famotidine 40 mg oral tablet: 1 tab(s) orally 3 times a day    Note: RX written for famotidine 40 mg BID  KlonoPIN 1 mg oral tablet: 1 tab(s) orally 3 times a day  Lasix 20 mg oral tablet: 1 tab(s) orally once a day on Monday-Thursday    Note: Pt takes Lasix 40 mg daily (Friday to Sunday) takes in combination with Symbicort to help with SOB  Lasix 40 mg oral tablet: 1 tab(s) orally once a day (Friday to Sunday)    Note: Pt takes Lasix 20 mg daily on Monday to Thursday  magnesium: 500 milligram(s) orally once a day  metFORMIN 1000 mg oral tablet: 1 tab(s) orally 2 times a day  NovoLOG 100 units/mL subcutaneous solution: 6 unit(s) subcutaneous 3 times a day (before meals)  Plavix 75 mg oral tablet: 1 tab(s) orally once a day in morning  Soliqua 100/33 subcutaneous solution: 40 unit(s) subcutaneous once a day in am  Symbicort 160 mcg-4.5 mcg/inh inhalation aerosol: 2 puff(s) inhaled 2 times a day  traMADol 50 mg oral tablet: 2 tab(s) orally 3 times a day  Wellbutrin  mg/24 hours oral tablet, extended release: 1 tab(s) orally every 24 hours in morning acetaminophen 325 mg oral tablet: 3 tab(s) orally every 8 hours  Ambien 10 mg oral tablet: 1 tab(s) orally once a day (at bedtime)  aspirin 81 mg oral tablet: 1 tab(s) orally once a day in morning  bisacodyl 10 mg rectal suppository: 1 suppository(ies) rectal once a day, As needed, Constipation  budesonide: 160 milligram-4.5mcg inhaled aerosol 2 times a day  Crestor 10 mg oral tablet: 1 tab(s) orally 3 times a week, F-Sat-Sun  Deplin: 1 tab(s) orally once a  day in morning  famotidine 40 mg oral tablet: 1 tab(s) orally 3 times a day    Note: RX written for famotidine 40 mg BID  ipratropium-albuterol 0.5 mg-2.5 mg/3 mLinhalation solution: 3 milliliter(s) inhaled every 6 hours  KlonoPIN 1 mg oral tablet: 1 tab(s) orally 3 times a day  lactulose 10 g/15 mL oral syrup: 15 milliliter(s) orally once a day, As needed, Constipation  Lasix 20 mg oral tablet: 1 tab(s) orally once a day on Monday-Thursday    Note: Pt takes Lasix 40 mg daily (Friday to Sunday) takes in combination with Symbicort to help with SOB  Lasix 40 mg oral tablet: 1 tab(s) orally once a day (Friday to Sunday)    Note: Pt takes Lasix 20 mg daily on Monday to Thursday  magnesium: 500 milligram(s) orally once a day  metFORMIN 1000 mg oral tablet: 1 tab(s) orally 2 times a day  NovoLOG 100 units/mL subcutaneous solution: 6 unit(s) subcutaneous 3 times a day (before meals)  Plavix 75 mg oral tablet: 1 tab(s) orally once a day in morning  polyethylene glycol 3350 oral powder for reconstitution: 17 gram(s) orally once a day  senna oral tablet: 2 tab(s) orally once a day (at bedtime)  Soliqua 100/33 subcutaneous solution: 40 unit(s) subcutaneous once a day in am  Symbicort 160 mcg-4.5 mcg/inh inhalation aerosol: 2 puff(s) inhaled 2 times a day  traMADol 50 mg oral tablet: 1 tab(s) orally every 6 hours, As needed, Severe Pain (7 - 10)  traMADol 50 mg oral tablet: 0.5 tab(s) orally every 6 hours, As needed, Moderate Pain (4 - 6)  Wellbutrin  mg/24 hours oral tablet, extended release: 1 tab(s) orally every 24 hours in morning

## 2020-01-13 NOTE — PROGRESS NOTE ADULT - ASSESSMENT
70M s/p s/p T10-pelvis PSF POD 3    Analgesia  DVT ppx with venodynes  WBAT B/L LE  PT/OT  Incentive spirometry  Resume A81  Likely d/c noriega today  DC planning

## 2020-01-13 NOTE — DISCHARGE NOTE PROVIDER - NSDCFUADDAPPT_GEN_ALL_CORE_FT
CALL TO MAKE APPOINTMENTS TO SEE THE FOLLOWING PROVIDERS:  Follow up with Dr. Coppola for diabetic foot ulcer management upon discharge from Rehab.  Follow up with Dr. Singletary or outside cardiologist for general checkup/possible medication adjustment upon discharge from Rehab. CALL TO MAKE APPOINTMENTS TO SEE THE FOLLOWING PROVIDERS:  Follow up with Dr. Coppola for diabetic foot ulcer management upon discharge from Rehab.  Follow up with Dr. Singletary or outside cardiologist for general checkup/possible medication adjustment upon discharge from Rehab.  FOLLOW UP WITH YOUR PULMONOLOGIST- DR. SOW- FOR PULMONARY MANAGEMENT AND DISCUSS CARDIOPULMONARY REHAB AS RECOMMENDED DURING THIS HOSPITALIZATION BY PULMONARY DOCTOR WHO WAS CONSULTED.

## 2020-01-13 NOTE — OCCUPATIONAL THERAPY INITIAL EVALUATION ADULT - GENERAL OBSERVATIONS, REHAB EVAL
Pt presents semi-supine in NAD +IVL, +TAMIKA drain, +hemovac Pt presents semi-supine in NAD +IVL, +TAMIKA drain, +hemovac, +noriega.

## 2020-01-13 NOTE — PROGRESS NOTE ADULT - ASSESSMENT
A/P: S/P posterior spine fusion with muscle flap reconstruction.  - Diet  - Pain control  - Drain Monitoring  - IV Abx  - DVT PPx: SCD, chemoprophylaxis as per spine service  - Will Follow    Thank You  Curt Storey MD  Plastic Surgery  [phone]

## 2020-01-14 LAB
ANION GAP SERPL CALC-SCNC: 7 MMOL/L — SIGNIFICANT CHANGE UP (ref 5–17)
BUN SERPL-MCNC: 12 MG/DL — SIGNIFICANT CHANGE UP (ref 7–23)
CALCIUM SERPL-MCNC: 8 MG/DL — LOW (ref 8.4–10.5)
CHLORIDE SERPL-SCNC: 105 MMOL/L — SIGNIFICANT CHANGE UP (ref 96–108)
CO2 SERPL-SCNC: 24 MMOL/L — SIGNIFICANT CHANGE UP (ref 22–31)
CREAT SERPL-MCNC: 0.76 MG/DL — SIGNIFICANT CHANGE UP (ref 0.5–1.3)
GLUCOSE BLDC GLUCOMTR-MCNC: 132 MG/DL — HIGH (ref 70–99)
GLUCOSE BLDC GLUCOMTR-MCNC: 169 MG/DL — HIGH (ref 70–99)
GLUCOSE BLDC GLUCOMTR-MCNC: 183 MG/DL — HIGH (ref 70–99)
GLUCOSE BLDC GLUCOMTR-MCNC: 191 MG/DL — HIGH (ref 70–99)
GLUCOSE SERPL-MCNC: 138 MG/DL — HIGH (ref 70–99)
HCT VFR BLD CALC: 25.4 % — LOW (ref 39–50)
HGB BLD-MCNC: 8.2 G/DL — LOW (ref 13–17)
MCHC RBC-ENTMCNC: 28.1 PG — SIGNIFICANT CHANGE UP (ref 27–34)
MCHC RBC-ENTMCNC: 32.3 GM/DL — SIGNIFICANT CHANGE UP (ref 32–36)
MCV RBC AUTO: 87 FL — SIGNIFICANT CHANGE UP (ref 80–100)
NRBC # BLD: 0 /100 WBCS — SIGNIFICANT CHANGE UP (ref 0–0)
PLATELET # BLD AUTO: 148 K/UL — LOW (ref 150–400)
POTASSIUM SERPL-MCNC: 3.8 MMOL/L — SIGNIFICANT CHANGE UP (ref 3.5–5.3)
POTASSIUM SERPL-SCNC: 3.8 MMOL/L — SIGNIFICANT CHANGE UP (ref 3.5–5.3)
RBC # BLD: 2.92 M/UL — LOW (ref 4.2–5.8)
RBC # FLD: 14.7 % — HIGH (ref 10.3–14.5)
SODIUM SERPL-SCNC: 136 MMOL/L — SIGNIFICANT CHANGE UP (ref 135–145)
WBC # BLD: 11.37 K/UL — HIGH (ref 3.8–10.5)
WBC # FLD AUTO: 11.37 K/UL — HIGH (ref 3.8–10.5)

## 2020-01-14 PROCEDURE — 71045 X-RAY EXAM CHEST 1 VIEW: CPT | Mod: 26

## 2020-01-14 RX ORDER — FUROSEMIDE 40 MG
40 TABLET ORAL
Refills: 0 | Status: DISCONTINUED | OUTPATIENT
Start: 2020-01-14 | End: 2020-01-22

## 2020-01-14 RX ORDER — IPRATROPIUM/ALBUTEROL SULFATE 18-103MCG
3 AEROSOL WITH ADAPTER (GRAM) INHALATION EVERY 6 HOURS
Refills: 0 | Status: DISCONTINUED | OUTPATIENT
Start: 2020-01-14 | End: 2020-01-16

## 2020-01-14 RX ORDER — FUROSEMIDE 40 MG
20 TABLET ORAL
Refills: 0 | Status: DISCONTINUED | OUTPATIENT
Start: 2020-01-14 | End: 2020-01-22

## 2020-01-14 RX ORDER — BUDESONIDE, MICRONIZED 100 %
0.5 POWDER (GRAM) MISCELLANEOUS EVERY 12 HOURS
Refills: 0 | Status: DISCONTINUED | OUTPATIENT
Start: 2020-01-14 | End: 2020-01-16

## 2020-01-14 RX ADMIN — TRAMADOL HYDROCHLORIDE 50 MILLIGRAM(S): 50 TABLET ORAL at 04:31

## 2020-01-14 RX ADMIN — Medication 975 MILLIGRAM(S): at 07:15

## 2020-01-14 RX ADMIN — Medication 81 MILLIGRAM(S): at 12:23

## 2020-01-14 RX ADMIN — Medication 975 MILLIGRAM(S): at 06:51

## 2020-01-14 RX ADMIN — LACTULOSE 10 GRAM(S): 10 SOLUTION ORAL at 12:24

## 2020-01-14 RX ADMIN — Medication 2: at 22:33

## 2020-01-14 RX ADMIN — Medication 0.5 MILLIGRAM(S): at 00:58

## 2020-01-14 RX ADMIN — FAMOTIDINE 40 MILLIGRAM(S): 10 INJECTION INTRAVENOUS at 17:57

## 2020-01-14 RX ADMIN — TRAMADOL HYDROCHLORIDE 50 MILLIGRAM(S): 50 TABLET ORAL at 05:00

## 2020-01-14 RX ADMIN — ZOLPIDEM TARTRATE 5 MILLIGRAM(S): 10 TABLET ORAL at 00:58

## 2020-01-14 RX ADMIN — Medication 3 MILLILITER(S): at 17:59

## 2020-01-14 RX ADMIN — HYDROMORPHONE HYDROCHLORIDE 0.5 MILLIGRAM(S): 2 INJECTION INTRAMUSCULAR; INTRAVENOUS; SUBCUTANEOUS at 00:56

## 2020-01-14 RX ADMIN — TRAMADOL HYDROCHLORIDE 50 MILLIGRAM(S): 50 TABLET ORAL at 12:24

## 2020-01-14 RX ADMIN — Medication 2: at 18:27

## 2020-01-14 RX ADMIN — Medication 0.5 MILLIGRAM(S): at 13:15

## 2020-01-14 RX ADMIN — Medication 975 MILLIGRAM(S): at 21:10

## 2020-01-14 RX ADMIN — TRAMADOL HYDROCHLORIDE 50 MILLIGRAM(S): 50 TABLET ORAL at 17:57

## 2020-01-14 RX ADMIN — BUPROPION HYDROCHLORIDE 300 MILLIGRAM(S): 150 TABLET, EXTENDED RELEASE ORAL at 12:23

## 2020-01-14 RX ADMIN — ATORVASTATIN CALCIUM 40 MILLIGRAM(S): 80 TABLET, FILM COATED ORAL at 21:10

## 2020-01-14 RX ADMIN — TRAMADOL HYDROCHLORIDE 50 MILLIGRAM(S): 50 TABLET ORAL at 12:54

## 2020-01-14 RX ADMIN — Medication 975 MILLIGRAM(S): at 13:15

## 2020-01-14 RX ADMIN — Medication 0.5 MILLIGRAM(S): at 06:50

## 2020-01-14 RX ADMIN — HYDROMORPHONE HYDROCHLORIDE 0.5 MILLIGRAM(S): 2 INJECTION INTRAMUSCULAR; INTRAVENOUS; SUBCUTANEOUS at 01:10

## 2020-01-14 RX ADMIN — Medication 975 MILLIGRAM(S): at 13:45

## 2020-01-14 RX ADMIN — Medication 2: at 13:15

## 2020-01-14 RX ADMIN — Medication 0.5 MILLIGRAM(S): at 17:59

## 2020-01-14 RX ADMIN — FAMOTIDINE 40 MILLIGRAM(S): 10 INJECTION INTRAVENOUS at 06:50

## 2020-01-14 RX ADMIN — INSULIN GLARGINE 40 UNIT(S): 100 INJECTION, SOLUTION SUBCUTANEOUS at 09:38

## 2020-01-14 RX ADMIN — BUDESONIDE AND FORMOTEROL FUMARATE DIHYDRATE 2 PUFF(S): 160; 4.5 AEROSOL RESPIRATORY (INHALATION) at 06:50

## 2020-01-14 RX ADMIN — Medication 0.5 MILLIGRAM(S): at 21:10

## 2020-01-14 RX ADMIN — Medication 975 MILLIGRAM(S): at 22:10

## 2020-01-14 NOTE — PROVIDER CONTACT NOTE (OTHER) - ASSESSMENT
Pt febrile 38.4 rectally, pt also c/o dull chest pain over pace maker.
pt. denies SOB, denies chest pain. no redness or hives noted.

## 2020-01-14 NOTE — CONSULT NOTE ADULT - SUBJECTIVE AND OBJECTIVE BOX
[facility] - [facility] - CONSULT NOTE    HPI: 70 year old morbidly obese gentleman, former smoker, with chronic shortness of breath with exertion. He is followed by Dr. Brian of [facility] for pulmonary hypertension (now off sildenafil due to lack of efficacy and maintained on diuretics), obesity related obstructive sleep apnea and obstructive lung disease (maintained on Symbicort). The patient underwent a T10 - pelvis laminectomy on 1/10 for lumbar kyphosis with scoliosis causing intractable back pain. Pain is currently well controlled and the patient is ambulating around the mistry. The patient had post operative "atypical" chest pain around his pacemaker site associated with elevated troponin levels but normal CKMB levels. Felt by cardiology to have "demand" ischemia - no ischemic evaluation is being pursued. He now describes shortness of breath and inability to take a deep breath. He has mild chest congestion and wheeze. No cough or sputum production. No chest pain/pressure or palpitations. No fevers, chills or sweats. Asked to evaluate.     PMHX:  FREEDOM (obstructive sleep apnea)  Cardiac pacemaker  Hernia  CVA (cerebrovascular accident)  Diabetic neuropathy  Spinal stenosis of lumbar region  DM type 2 (diabetes mellitus, type 2)  CAD (coronary artery disease)  Anxiety  Depression  Acid Reflux  Gout  Adrenal Insufficiency  Benign Tumor of Adrenal Gland  HTN (Hypertension)  Diabetes      PSHX:  Permanent cardiac pacemaker  Lumbar laminectomy  Hernia Repair  Appendectomy  CABG X 4    FAMILY HISTORY:  No pertinent past medical history in first degree relative    SOCIAL HISTORY:  rabbi; former smoker 2ppd x 10 years discontinued in 1981;     Pulmonary Medications:   budesonide 160 MICROgram(s)/formoterol 4.5 MICROgram(s) Inhaler 2 Puff(s) Inhalation two times a day      Antimicrobials:      Cardiology:      Other:  acetaminophen   Tablet .. 975 milliGRAM(s) Oral every 8 hours  aspirin enteric coated 81 milliGRAM(s) Oral daily  atorvastatin 40 milliGRAM(s) Oral at bedtime  buPROPion XL . 300 milliGRAM(s) Oral daily  clonazePAM  Tablet 0.5 milliGRAM(s) Oral three times a day  famotidine    Tablet 40 milliGRAM(s) Oral two times a day  insulin glargine Injectable (LANTUS) 40 Unit(s) SubCutaneous every morning  insulin lispro (HumaLOG) corrective regimen sliding scale   SubCutaneous at bedtime  insulin lispro (HumaLOG) corrective regimen sliding scale   SubCutaneous three times a day before meals  polyethylene glycol 3350 17 Gram(s) Oral daily  senna 2 Tablet(s) Oral at bedtime      Prn:  MEDICATIONS  (PRN):  bisacodyl Suppository 10 milliGRAM(s) Rectal daily PRN Constipation  diphenhydrAMINE 25 milliGRAM(s) Oral every 4 hours PRN Rash and/or Itching  HYDROmorphone  Injectable 0.5 milliGRAM(s) IV Push every 3 hours PRN breakthrough pain  lactulose Syrup 10 Gram(s) Oral daily PRN Constipation  traMADol 25 milliGRAM(s) Oral every 6 hours PRN Moderate Pain (4 - 6)  traMADol 50 milliGRAM(s) Oral every 6 hours PRN Severe Pain (7 - 10)  zolpidem 5 milliGRAM(s) Oral at bedtime PRN Insomnia  zolpidem 5 milliGRAM(s) Oral at bedtime PRN Insomnia      Allergies    No Known Allergies    Intolerances        HOME MEDICATIONS: see  H & P    REVIEW OF SYSTEMS:  Constitutional: As per HPI  HEENT: Within normal limits  CV: As per HPI  Resp: As per HPI  GI: Within normal limits   : Within normal limits  Musculoskeletal: As per HPI  Skin: Within normal limits  Neurological: Within normal limits  Psychiatric: Within normal limits  Endocrine: Within normal limits  Hematologic/Lymphatic: Within normal limits  Allergic/Immunologic: Within normal limits    [x] All other systems negative    OBJECTIVE:    I&O's Detail    13 Jan 2020 07:01  -  14 Jan 2020 07:00  --------------------------------------------------------  IN:    IV PiggyBack: 100 mL    Oral Fluid: 1390 mL    Packed Red Blood Cells: 350 mL    sodium chloride 0.9%: 300 mL  Total IN: 2140 mL    OUT:    Accordian: 250 mL    Bulb: 130 mL    Indwelling Catheter - Urethral: 700 mL    Voided: 650 mL  Total OUT: 1730 mL    Total NET: 410 mL      14 Jan 2020 07:01  -  14 Jan 2020 15:40  --------------------------------------------------------  IN:    Oral Fluid: 780 mL  Total IN: 780 mL    OUT:    Accordian: 46 mL    Bulb: 40 mL  Total OUT: 86 mL    Total NET: 694 mL     POCT Blood Glucose.: 183 mg/dL (14 Jan 2020 13:07)  POCT Blood Glucose.: 132 mg/dL (14 Jan 2020 09:38)  POCT Blood Glucose.: 268 mg/dL (13 Jan 2020 21:42)  POCT Blood Glucose.: 172 mg/dL (13 Jan 2020 17:41)      PHYSICAL EXAM:  ICU Vital Signs Last 24 Hrs  T(C): 36.4 (14 Jan 2020 13:02), Max: 37.8 (13 Jan 2020 22:59)  T(F): 97.5 (14 Jan 2020 13:02), Max: 100.1 (13 Jan 2020 22:59)  HR: 90 (14 Jan 2020 13:02) (86 - 90)  BP: 160/91 (14 Jan 2020 13:02) (144/75 - 160/91)  BP(mean): --  ABP: --  ABP(mean): --  RR: 17 (14 Jan 2020 13:02) (17 - 18)  SpO2: 97% (14 Jan 2020 13:02) (94% - 98%) on room air    General: Awake. Alert. Cooperative. No distress. Appears stated age 	  HEENT: Atraumatic. Normocephalic. Anicteric. Normal oral mucosa. PERRL. EOMI. Mallampati class IV airway.  Neck: Supple. Trachea midline. Thyroid without enlargement/tenderness/nodules. No carotid bruit. No JVD. Short and wide	  Cardiovascular: Regular rate and rhythm. S1 S2 normal. No murmurs, rubs or gallops.  Respiratory: Respirations unlabored. Bibasilar rales. Mild wheeze. No curvature.  Abdomen: Soft. Non-tender. Non-distended. No organomegaly. No masses. Normal bowel sounds. Obese  Extremities: Warm to touch. No clubbing or cyanosis. No pedal edema.  Pulses: 2+ peripheral pulses all extremities.	  Skin: Mid - lower back bandaged. TAMIKA drains with serosanguinous draiage  Lymph Nodes: Cervical, supraclavicular and axillary nodes normal  Neurological: Motor and sensory examination equal and normal. A and O x 3  Psychiatry: Appropriate mood and affect.      LABS:                          8.2    11.37 )-----------( 148      ( 14 Jan 2020 07:19 )             25.4     CBC    WBC  11.37 <==, 12.40 <==, 20.58 <==, 18.90 <==, 19.46 <==, 19.82 <==, 22.36 <==    Hemoglobin  8.2 <<==, 7.3 <<==, 7.4 <<==, 7.4 <<==, 8.0 <<==, 8.5 <<==, 9.1 <<==    Hematocrit  25.4 <==, 23.0 <==, 23.8 <==, 24.2 <==, 26.3 <==, 28.5 <==, 30.1 <==    Platelets  148 <==, 145 <==, 173 <==, 163 <==, 180 <==, 196 <==, 197 <==      136  |  105  |  12  ----------------------------<  138<H>    01-14  3.8   |  24  |  0.76    LYTES    sodium  136 <==, 137 <==, 137 <==, 135 <==, 132 <==, 134 <==, 137 <==    potassium   3.8 <==, 3.9 <==, 4.7 <==, 4.8 <==, 5.0 <==, 5.4 <==, 5.2 <==    chloride  105 <==, 106 <==, 103 <==, 101 <==, 97 <==, 100 <==, 103 <==    carbon dioxide  24 <==, 21 <==, 20 <==, 22 <==, 20 <==, 21 <==, 21 <==    =============================================================================================  RENAL FUNCTION:    Creatinine:   0.76  <<==, 0.93  <<==, 1.32  <<==, 1.88  <<==, 2.01  <<==, 1.71  <<==, 1.26  <<==    BUN:   12 <==, 18 <==, 26 <==, 29 <==, 28 <==, 23 <==, 17 <==    ============================================================================================    calcium   8.0 <==, 7.8 <==, 7.9 <==, 7.9 <==, 8.4 <==, 8.4 <==, 8.6 <==    phos   2.4 <==, 3.2 <==, 3.6 <==, 2.7 <==, 3.1 <==, 2.3 <==    mag   2.2 <==, 2.1 <==, 2.1 <==, 1.8 <==, 1.9 <==, 2.0 <==    ============================================================================================  LFTs    AST:   52 <==     ALT:  20  <==     AP:  68  <=    Bili:  0.2  <=    PT/INR - ( 12 Jan 2020 01:08 )   PT: 14.1 sec;   INR: 1.23 ratio      CARDIAC MARKERS ( 12 Jan 2020 14:06 )  CPK x     /CKMB x     /CKMB Units x        troponin 154 ng/L    CARDIAC MARKERS ( 12 Jan 2020 06:06 )  CPK 2287 U/L /CKMB x     /CKMB Units 13.7 ng/mL    troponin 157 ng/L    CARDIAC MARKERS ( 12 Jan 2020 01:08 )  CPK x     /CKMB x     /CKMB Units x        troponin 182 ng/L    MICROBIOLOGY:           RADIOLOGY:  [x ] Chest radiographs reviewed and interpreted by me      EXAM:  XR CHEST PORTABLE ROUTINE 1V                          PROCEDURE DATE:  01/13/2020      INTERPRETATION:  EXAMINATION: XR CHEST    CLINICAL INDICATION:  Shortness of breath.     TECHNIQUE: Frontal radiograph of the chest was obtained on 1/13/2020     COMPARISON: Chest radiograph 1/12/2020.    FINDINGS:     Status post median sternotomy. Left chest wall pacemaker with leads in place. The cardiac silhouette is unchanged in size. No pleural effusion or pneumothorax. No focal consolidation. Partial visualized thoracic spine orthopedic hardware.    IMPRESSION:   Clear lungs.    BENJI SEO M.D., RADIOLOGY RESIDENT  This document has been electronically signed.  EVELIO NELSON M.D., ATTENDING RADIOLOGIST  This document has been electronically signed. Jan 13 2020 12:43PM  ---------------------------------------------------------------------------------------------------------------

## 2020-01-14 NOTE — CONSULT NOTE ADULT - ASSESSMENT
ASSESSMENT:    dyspnea - multifactorial    1) obesity related restrictive lung disease complicated by obstructive sleep apnea  2) bronchospasm  3) atelectasis  4) +/- mild pulmonary edema off diuretics due to NIKUNJ which has resolved    post-operative elevation in CPK likely related to surgical muscle injury - elevated troponin likely related to "demand" ischemia    PLAN/RECOMMENDATIONS:    stable oxygenation on room air  head of bed elevation more than 45 degrees at all times  albuterol/atrovent/pulmicort nebs  incentive spirometry hourly  restart diuretics at usual dose  no indication for antibiotics or systemic steroids  cardiac meds: ASA/lipitor  glucose control  bowel regimen  klonipin/wellbutrin XL  analgesics    Thank you for the courtesy of this referral. Plan of care discussed with the patient at bedside     Saulo Jennings MD, [facility]  [phone]  Pulmonary Medicine ASSESSMENT:    dyspnea - multifactorial    1) obesity related restrictive lung disease complicated by obstructive sleep apnea with pulmonary hypertension  2) bronchospasm  3) atelectasis  4) +/- mild pulmonary edema off diuretics due to NIKUNJ which has resolved    post-operative elevation in CPK likely related to surgical muscle injury - elevated troponin likely related to "demand" ischemia    PLAN/RECOMMENDATIONS:    stable oxygenation on room air  head of bed elevation more than 45 degrees at all times  albuterol/atrovent/pulmicort nebs  incentive spirometry hourly  restart diuretics at usual dose  no indication for antibiotics or systemic steroids  cardiac meds: ASA/lipitor  glucose control  bowel regimen  klonipin/wellbutrin XL  analgesics  would benefit from outpatient cardiopulmonary rehab    Thank you for the courtesy of this referral. Plan of care discussed with the patient at bedside     Saulo Jennings MD, [facility]  [phone]  Pulmonary Medicine

## 2020-01-14 NOTE — PROVIDER CONTACT NOTE (OTHER) - SITUATION
Patient blood sugar noted to be 337.
Pt post op day 1 s/p T10-S1 fusion.
pt. receiving PRBC. 15 minute vitals taken. pt. describes feeling itchy, and temperature is 100.1

## 2020-01-14 NOTE — PROGRESS NOTE ADULT - ASSESSMENT
70 year old man with history of FREEDOM, CAD, s/p CABG, s/p PPM, CVA 04/8/2013, Diabetic neuropathy, spinal stenosis of lumbar region, Anxiety, Depression, GERD, Gout, Hypertension, Adrenal Gland Insufficiency: left adrenalectomy admitted with lower back pain, S/P T10-pelvis laminectomy with post op NIKUNJ, brief chest pain, elevated troponin    1. Chest Pain, Elevated troponin  -brief, aytpical chest pain at PPM site  -symptoms resolved, no dynamic ischemic ECG abnl  -elevated HS trop with normal CKMB  -represent demand ischemia in setting of recent OR, hypotension, NIKUNJ, known CAD  -CK elevated with normal mb portion c/w mild rhabdo  -no further cardiac w/u indicated for now     2. CAD, s/p CABG  -cont asa    3. NIKUNJ  -likely HD mediated  -diuretics on hold, cr stable, resume po dose        dvt ppx

## 2020-01-14 NOTE — PROGRESS NOTE ADULT - ASSESSMENT
Impression: Stable       Plan:   Continue present treatment                 Out of bed, ambulate, in brace                 Physical therapy follow up                 Continue to monitor                    Mahin Pinto PA-C  Orthopaedic Surgery  Team pager [phone]  [phone] Impression: Stable       Plan:   Continue present treatment                 Out of bed, ambulate, in brace                 Physical therapy follow up                 Continue to monitor                    Mahin Pinto PA-C  Orthopaedic Surgery  Team pager [phone]  [phone]     Attending:  patient seen and examined this afternoon- in good spirits, pain well controlled  he is concerned about h/h and ?mild reaction (itching) to blood transfusion last eloy-- reassured.  Walked approx 35 ft with PT. Feels much more upright than pre-op  Doing well-- continue PT and discharge planning (sub-acute likely)   agree with housestaff assessment  Plan as per above    Ramin WILLSON

## 2020-01-14 NOTE — CHART NOTE - NSCHARTNOTEFT_GEN_A_CORE
Wound Care Team Note:    Request for wound care consult for diabetic foot ulcer received and referred to wound care team podiatrist, Dr. Coppola. Will defer to Dr. Coppola for management.    Nessa High NP-C, [facility] [id number]

## 2020-01-14 NOTE — PROVIDER CONTACT NOTE (OTHER) - RECOMMENDATIONS
EKG ordered, troponin/cardiac enzymes added to AM labs. Monitor temp as per RAFAEL Mccloud.
benadryl

## 2020-01-14 NOTE — PROVIDER CONTACT NOTE (OTHER) - ACTION/TREATMENT ORDERED:
As per Dr. Grace, cover patient with sliding scale, blood sugar to be discussed with Dr. Uriostegui. Will continue to monitor.
EKG performed, RAFAEL Mccloud & MD Vernon reviewed. Pt to receive 0000 dose of IV Tylenol, will continue to monitor temp. AM Labs with cardiac enzymes sent.
benadryl and tylenol ordered as per Adina Boo

## 2020-01-15 LAB
ANION GAP SERPL CALC-SCNC: 11 MMOL/L — SIGNIFICANT CHANGE UP (ref 5–17)
APTT BLD: 29.1 SEC — SIGNIFICANT CHANGE UP (ref 27.5–36.3)
BASE EXCESS BLDV CALC-SCNC: 2.1 MMOL/L — HIGH (ref -2–2)
BUN SERPL-MCNC: 13 MG/DL — SIGNIFICANT CHANGE UP (ref 7–23)
CA-I SERPL-SCNC: 1.14 MMOL/L — SIGNIFICANT CHANGE UP (ref 1.12–1.3)
CALCIUM SERPL-MCNC: 8.6 MG/DL — SIGNIFICANT CHANGE UP (ref 8.4–10.5)
CHLORIDE BLDV-SCNC: 110 MMOL/L — HIGH (ref 96–108)
CHLORIDE SERPL-SCNC: 104 MMOL/L — SIGNIFICANT CHANGE UP (ref 96–108)
CO2 BLDV-SCNC: 26 MMOL/L — SIGNIFICANT CHANGE UP (ref 22–30)
CO2 SERPL-SCNC: 25 MMOL/L — SIGNIFICANT CHANGE UP (ref 22–31)
CREAT SERPL-MCNC: 0.78 MG/DL — SIGNIFICANT CHANGE UP (ref 0.5–1.3)
GAS PNL BLDV: 135 MMOL/L — SIGNIFICANT CHANGE UP (ref 135–145)
GAS PNL BLDV: SIGNIFICANT CHANGE UP
GAS PNL BLDV: SIGNIFICANT CHANGE UP
GLUCOSE BLDC GLUCOMTR-MCNC: 155 MG/DL — HIGH (ref 70–99)
GLUCOSE BLDC GLUCOMTR-MCNC: 161 MG/DL — HIGH (ref 70–99)
GLUCOSE BLDC GLUCOMTR-MCNC: 189 MG/DL — HIGH (ref 70–99)
GLUCOSE BLDC GLUCOMTR-MCNC: 193 MG/DL — HIGH (ref 70–99)
GLUCOSE BLDV-MCNC: 95 MG/DL — SIGNIFICANT CHANGE UP (ref 70–99)
GLUCOSE SERPL-MCNC: 96 MG/DL — SIGNIFICANT CHANGE UP (ref 70–99)
HCO3 BLDV-SCNC: 25 MMOL/L — SIGNIFICANT CHANGE UP (ref 21–29)
HCT VFR BLD CALC: 28.6 % — LOW (ref 39–50)
HCT VFR BLDA CALC: 28 % — LOW (ref 39–50)
HGB BLD CALC-MCNC: 9.1 G/DL — LOW (ref 13–17)
HGB BLD-MCNC: 8.7 G/DL — LOW (ref 13–17)
INR BLD: 1.13 RATIO — SIGNIFICANT CHANGE UP (ref 0.88–1.16)
LACTATE BLDV-MCNC: 1 MMOL/L — SIGNIFICANT CHANGE UP (ref 0.7–2)
MAGNESIUM SERPL-MCNC: 1.9 MG/DL — SIGNIFICANT CHANGE UP (ref 1.6–2.6)
MCHC RBC-ENTMCNC: 27 PG — SIGNIFICANT CHANGE UP (ref 27–34)
MCHC RBC-ENTMCNC: 30.4 GM/DL — LOW (ref 32–36)
MCV RBC AUTO: 88.8 FL — SIGNIFICANT CHANGE UP (ref 80–100)
NRBC # BLD: 0 /100 WBCS — SIGNIFICANT CHANGE UP (ref 0–0)
OTHER CELLS CSF MANUAL: 11 ML/DL — LOW (ref 18–22)
PCO2 BLDV: 35 MMHG — SIGNIFICANT CHANGE UP (ref 35–50)
PH BLDV: 7.48 — HIGH (ref 7.35–7.45)
PHOSPHATE SERPL-MCNC: 3 MG/DL — SIGNIFICANT CHANGE UP (ref 2.5–4.5)
PLATELET # BLD AUTO: 185 K/UL — SIGNIFICANT CHANGE UP (ref 150–400)
PO2 BLDV: 55 MMHG — HIGH (ref 25–45)
POTASSIUM BLDV-SCNC: 3.7 MMOL/L — SIGNIFICANT CHANGE UP (ref 3.5–5.3)
POTASSIUM SERPL-MCNC: 3.9 MMOL/L — SIGNIFICANT CHANGE UP (ref 3.5–5.3)
POTASSIUM SERPL-SCNC: 3.9 MMOL/L — SIGNIFICANT CHANGE UP (ref 3.5–5.3)
PROTHROM AB SERPL-ACNC: 13.1 SEC — HIGH (ref 10–12.9)
RBC # BLD: 3.22 M/UL — LOW (ref 4.2–5.8)
RBC # FLD: 15.1 % — HIGH (ref 10.3–14.5)
SAO2 % BLDV: 89 % — HIGH (ref 67–88)
SODIUM SERPL-SCNC: 140 MMOL/L — SIGNIFICANT CHANGE UP (ref 135–145)
WBC # BLD: 9.69 K/UL — SIGNIFICANT CHANGE UP (ref 3.8–10.5)
WBC # FLD AUTO: 9.69 K/UL — SIGNIFICANT CHANGE UP (ref 3.8–10.5)

## 2020-01-15 RX ORDER — TRAMADOL HYDROCHLORIDE 50 MG/1
2 TABLET ORAL
Qty: 0 | Refills: 0 | DISCHARGE

## 2020-01-15 RX ORDER — ACETAMINOPHEN 500 MG
3 TABLET ORAL
Qty: 0 | Refills: 0 | DISCHARGE
Start: 2020-01-15

## 2020-01-15 RX ORDER — TRAMADOL HYDROCHLORIDE 50 MG/1
0.5 TABLET ORAL
Qty: 0 | Refills: 0 | DISCHARGE
Start: 2020-01-15

## 2020-01-15 RX ORDER — BUDESONIDE, MICRONIZED 100 %
160 POWDER (GRAM) MISCELLANEOUS
Qty: 0 | Refills: 0 | DISCHARGE
Start: 2020-01-15

## 2020-01-15 RX ORDER — LACTULOSE 10 G/15ML
15 SOLUTION ORAL
Qty: 0 | Refills: 0 | DISCHARGE
Start: 2020-01-15

## 2020-01-15 RX ORDER — CLOPIDOGREL BISULFATE 75 MG/1
75 TABLET, FILM COATED ORAL DAILY
Refills: 0 | Status: DISCONTINUED | OUTPATIENT
Start: 2020-01-15 | End: 2020-01-22

## 2020-01-15 RX ORDER — POLYETHYLENE GLYCOL 3350 17 G/17G
17 POWDER, FOR SOLUTION ORAL
Qty: 0 | Refills: 0 | DISCHARGE
Start: 2020-01-15

## 2020-01-15 RX ORDER — HYDROMORPHONE HYDROCHLORIDE 2 MG/ML
0.25 INJECTION INTRAMUSCULAR; INTRAVENOUS; SUBCUTANEOUS ONCE
Refills: 0 | Status: DISCONTINUED | OUTPATIENT
Start: 2020-01-15 | End: 2020-01-15

## 2020-01-15 RX ORDER — SENNA PLUS 8.6 MG/1
2 TABLET ORAL
Qty: 0 | Refills: 0 | DISCHARGE
Start: 2020-01-15

## 2020-01-15 RX ORDER — IPRATROPIUM/ALBUTEROL SULFATE 18-103MCG
3 AEROSOL WITH ADAPTER (GRAM) INHALATION
Qty: 0 | Refills: 0 | DISCHARGE
Start: 2020-01-15

## 2020-01-15 RX ORDER — TRAMADOL HYDROCHLORIDE 50 MG/1
1 TABLET ORAL
Qty: 0 | Refills: 0 | DISCHARGE
Start: 2020-01-15

## 2020-01-15 RX ADMIN — ZOLPIDEM TARTRATE 5 MILLIGRAM(S): 10 TABLET ORAL at 00:35

## 2020-01-15 RX ADMIN — FAMOTIDINE 40 MILLIGRAM(S): 10 INJECTION INTRAVENOUS at 05:39

## 2020-01-15 RX ADMIN — TRAMADOL HYDROCHLORIDE 50 MILLIGRAM(S): 50 TABLET ORAL at 17:58

## 2020-01-15 RX ADMIN — Medication 975 MILLIGRAM(S): at 21:42

## 2020-01-15 RX ADMIN — Medication 2: at 22:25

## 2020-01-15 RX ADMIN — Medication 2: at 18:04

## 2020-01-15 RX ADMIN — Medication 2: at 12:51

## 2020-01-15 RX ADMIN — ZOLPIDEM TARTRATE 5 MILLIGRAM(S): 10 TABLET ORAL at 01:34

## 2020-01-15 RX ADMIN — CLOPIDOGREL BISULFATE 75 MILLIGRAM(S): 75 TABLET, FILM COATED ORAL at 12:01

## 2020-01-15 RX ADMIN — Medication 975 MILLIGRAM(S): at 13:22

## 2020-01-15 RX ADMIN — Medication 975 MILLIGRAM(S): at 21:12

## 2020-01-15 RX ADMIN — Medication 3 MILLILITER(S): at 17:59

## 2020-01-15 RX ADMIN — Medication 975 MILLIGRAM(S): at 06:40

## 2020-01-15 RX ADMIN — FAMOTIDINE 40 MILLIGRAM(S): 10 INJECTION INTRAVENOUS at 17:57

## 2020-01-15 RX ADMIN — Medication 81 MILLIGRAM(S): at 12:01

## 2020-01-15 RX ADMIN — Medication 2: at 09:04

## 2020-01-15 RX ADMIN — INSULIN GLARGINE 40 UNIT(S): 100 INJECTION, SOLUTION SUBCUTANEOUS at 08:04

## 2020-01-15 RX ADMIN — ATORVASTATIN CALCIUM 40 MILLIGRAM(S): 80 TABLET, FILM COATED ORAL at 21:13

## 2020-01-15 RX ADMIN — Medication 3 MILLILITER(S): at 00:39

## 2020-01-15 RX ADMIN — TRAMADOL HYDROCHLORIDE 50 MILLIGRAM(S): 50 TABLET ORAL at 12:31

## 2020-01-15 RX ADMIN — TRAMADOL HYDROCHLORIDE 50 MILLIGRAM(S): 50 TABLET ORAL at 01:28

## 2020-01-15 RX ADMIN — ZOLPIDEM TARTRATE 5 MILLIGRAM(S): 10 TABLET ORAL at 23:34

## 2020-01-15 RX ADMIN — Medication 0.5 MILLIGRAM(S): at 12:51

## 2020-01-15 RX ADMIN — Medication 20 MILLIGRAM(S): at 08:04

## 2020-01-15 RX ADMIN — Medication 3 MILLILITER(S): at 05:39

## 2020-01-15 RX ADMIN — HYDROMORPHONE HYDROCHLORIDE 0.5 MILLIGRAM(S): 2 INJECTION INTRAMUSCULAR; INTRAVENOUS; SUBCUTANEOUS at 21:42

## 2020-01-15 RX ADMIN — Medication 3 MILLILITER(S): at 12:01

## 2020-01-15 RX ADMIN — Medication 0.5 MILLIGRAM(S): at 05:40

## 2020-01-15 RX ADMIN — Medication 30 MILLILITER(S): at 18:51

## 2020-01-15 RX ADMIN — Medication 0.5 MILLIGRAM(S): at 17:59

## 2020-01-15 RX ADMIN — TRAMADOL HYDROCHLORIDE 50 MILLIGRAM(S): 50 TABLET ORAL at 12:01

## 2020-01-15 RX ADMIN — Medication 975 MILLIGRAM(S): at 12:51

## 2020-01-15 RX ADMIN — Medication 975 MILLIGRAM(S): at 05:40

## 2020-01-15 RX ADMIN — Medication 0.5 MILLIGRAM(S): at 22:24

## 2020-01-15 RX ADMIN — HYDROMORPHONE HYDROCHLORIDE 0.5 MILLIGRAM(S): 2 INJECTION INTRAMUSCULAR; INTRAVENOUS; SUBCUTANEOUS at 21:13

## 2020-01-15 RX ADMIN — ZOLPIDEM TARTRATE 5 MILLIGRAM(S): 10 TABLET ORAL at 22:30

## 2020-01-15 RX ADMIN — Medication 0.5 MILLIGRAM(S): at 05:38

## 2020-01-15 RX ADMIN — BUPROPION HYDROCHLORIDE 300 MILLIGRAM(S): 150 TABLET, EXTENDED RELEASE ORAL at 12:01

## 2020-01-15 NOTE — PROGRESS NOTE ADULT - ASSESSMENT
A/P: S/P posterior spine fusion with muscle flap reconstruction.  - Diet  - Pain control  - Drain Monitoring  - IV abx  - DVT PPx: SCD, chemoprophylaxis as per spine service  - Will Follow    Thank You  Curt Storey MD  Plastic Surgery  [phone]

## 2020-01-15 NOTE — PROGRESS NOTE ADULT - ASSESSMENT
Assessment/Plan:    --superficial chronic dm right foot ulceration--stable non-infected    --patient to continue with mupirocin ointment and dsd daily  --patient to continue with dm shoe and inserts  --recommend continued f/u as outpatient with current dpm  --thank you for consult will follow prn

## 2020-01-15 NOTE — PROGRESS NOTE ADULT - ASSESSMENT
70 year old man with history of FREEDOM, CAD, s/p CABG, s/p PPM, CVA 04/8/2013, Diabetic neuropathy, spinal stenosis of lumbar region, Anxiety, Depression, GERD, Gout, Hypertension, Adrenal Gland Insufficiency: left adrenalectomy admitted with lower back pain, S/P T10-pelvis laminectomy with post op NIKUNJ, brief chest pain, elevated troponin    1. Chest Pain, Elevated troponin  -brief, aytpical chest pain at PPM site  -symptoms resolved, no dynamic ischemic ECG abnl  -elevated HS trop with normal CKMB  -represent demand ischemia in setting of recent OR, hypotension, NIKUNJ, known CAD  -CK elevated with normal mb portion c/w mild rhabdo  -no further cardiac w/u indicated for now     2. CAD, s/p CABG  -cont asa, cv stable no decomp chf on exam     3. NIKUNJ, resolved   -likely HD mediated  - lasix po resumed       dvt ppx

## 2020-01-15 NOTE — PROGRESS NOTE ADULT - ASSESSMENT
ASSESSMENT:    dyspnea - multifactorial    1) obesity related restrictive lung disease complicated by obstructive sleep apnea with pulmonary hypertension  2) bronchospasm  3) atelectasis  4) +/- mild pulmonary edema off diuretics due to NIKUNJ which has resolved    post-operative elevation in CPK likely related to surgical muscle injury - elevated troponin likely related to "demand" ischemia    PLAN/RECOMMENDATIONS:    stable oxygenation on room air  head of bed elevation more than 45 degrees at all times  albuterol/atrovent/pulmicort nebs  incentive spirometry hourly  restart diuretics at usual dose  no indication for antibiotics or systemic steroids  cardiac meds: ASA/lipitor  glucose control  bowel regimen  klonipin/wellbutrin XL  analgesics  would benefit from outpatient cardiopulmonary rehab    Thank you for the courtesy of this referral. Plan of care discussed with the patient at bedside     Pratima Mahajan MD  [phone]  Pulmonary

## 2020-01-15 NOTE — PROGRESS NOTE ADULT - ASSESSMENT
70M s/p T10-pelvis PSF POD 5  Analgesia  DVT ppx with venodynes  Will restart plavix today  WBAT, ambulate in brace  PT/OT  Incentive spirometry  FU am labs  DC planning

## 2020-01-16 LAB
GLUCOSE BLDC GLUCOMTR-MCNC: 185 MG/DL — HIGH (ref 70–99)
GLUCOSE BLDC GLUCOMTR-MCNC: 189 MG/DL — HIGH (ref 70–99)
GLUCOSE BLDC GLUCOMTR-MCNC: 189 MG/DL — HIGH (ref 70–99)
GLUCOSE BLDC GLUCOMTR-MCNC: 230 MG/DL — HIGH (ref 70–99)

## 2020-01-16 RX ORDER — BUDESONIDE AND FORMOTEROL FUMARATE DIHYDRATE 160; 4.5 UG/1; UG/1
2 AEROSOL RESPIRATORY (INHALATION)
Refills: 0 | Status: DISCONTINUED | OUTPATIENT
Start: 2020-01-16 | End: 2020-01-22

## 2020-01-16 RX ORDER — BUDESONIDE AND FORMOTEROL FUMARATE DIHYDRATE 160; 4.5 UG/1; UG/1
2 AEROSOL RESPIRATORY (INHALATION)
Refills: 0 | Status: DISCONTINUED | OUTPATIENT
Start: 2020-01-16 | End: 2020-01-16

## 2020-01-16 RX ADMIN — TRAMADOL HYDROCHLORIDE 50 MILLIGRAM(S): 50 TABLET ORAL at 17:21

## 2020-01-16 RX ADMIN — Medication 4: at 13:02

## 2020-01-16 RX ADMIN — CLOPIDOGREL BISULFATE 75 MILLIGRAM(S): 75 TABLET, FILM COATED ORAL at 12:25

## 2020-01-16 RX ADMIN — Medication 975 MILLIGRAM(S): at 12:41

## 2020-01-16 RX ADMIN — HYDROMORPHONE HYDROCHLORIDE 0.5 MILLIGRAM(S): 2 INJECTION INTRAMUSCULAR; INTRAVENOUS; SUBCUTANEOUS at 21:38

## 2020-01-16 RX ADMIN — TRAMADOL HYDROCHLORIDE 50 MILLIGRAM(S): 50 TABLET ORAL at 12:10

## 2020-01-16 RX ADMIN — Medication 81 MILLIGRAM(S): at 12:25

## 2020-01-16 RX ADMIN — Medication 2: at 08:37

## 2020-01-16 RX ADMIN — Medication 20 MILLIGRAM(S): at 08:10

## 2020-01-16 RX ADMIN — HYDROMORPHONE HYDROCHLORIDE 0.5 MILLIGRAM(S): 2 INJECTION INTRAMUSCULAR; INTRAVENOUS; SUBCUTANEOUS at 03:45

## 2020-01-16 RX ADMIN — Medication 975 MILLIGRAM(S): at 21:37

## 2020-01-16 RX ADMIN — TRAMADOL HYDROCHLORIDE 50 MILLIGRAM(S): 50 TABLET ORAL at 11:40

## 2020-01-16 RX ADMIN — Medication 975 MILLIGRAM(S): at 06:05

## 2020-01-16 RX ADMIN — Medication 2: at 17:24

## 2020-01-16 RX ADMIN — Medication 975 MILLIGRAM(S): at 06:35

## 2020-01-16 RX ADMIN — SENNA PLUS 2 TABLET(S): 8.6 TABLET ORAL at 21:39

## 2020-01-16 RX ADMIN — ZOLPIDEM TARTRATE 5 MILLIGRAM(S): 10 TABLET ORAL at 21:37

## 2020-01-16 RX ADMIN — HYDROMORPHONE HYDROCHLORIDE 0.5 MILLIGRAM(S): 2 INJECTION INTRAMUSCULAR; INTRAVENOUS; SUBCUTANEOUS at 22:08

## 2020-01-16 RX ADMIN — TRAMADOL HYDROCHLORIDE 50 MILLIGRAM(S): 50 TABLET ORAL at 01:42

## 2020-01-16 RX ADMIN — BUPROPION HYDROCHLORIDE 300 MILLIGRAM(S): 150 TABLET, EXTENDED RELEASE ORAL at 12:25

## 2020-01-16 RX ADMIN — Medication 0.5 MILLIGRAM(S): at 12:25

## 2020-01-16 RX ADMIN — ATORVASTATIN CALCIUM 40 MILLIGRAM(S): 80 TABLET, FILM COATED ORAL at 21:37

## 2020-01-16 RX ADMIN — Medication 0.5 MILLIGRAM(S): at 06:05

## 2020-01-16 RX ADMIN — FAMOTIDINE 40 MILLIGRAM(S): 10 INJECTION INTRAVENOUS at 06:05

## 2020-01-16 RX ADMIN — Medication 975 MILLIGRAM(S): at 22:07

## 2020-01-16 RX ADMIN — POLYETHYLENE GLYCOL 3350 17 GRAM(S): 17 POWDER, FOR SOLUTION ORAL at 11:41

## 2020-01-16 RX ADMIN — BUDESONIDE AND FORMOTEROL FUMARATE DIHYDRATE 2 PUFF(S): 160; 4.5 AEROSOL RESPIRATORY (INHALATION) at 17:22

## 2020-01-16 RX ADMIN — HYDROMORPHONE HYDROCHLORIDE 0.5 MILLIGRAM(S): 2 INJECTION INTRAMUSCULAR; INTRAVENOUS; SUBCUTANEOUS at 03:31

## 2020-01-16 RX ADMIN — TRAMADOL HYDROCHLORIDE 50 MILLIGRAM(S): 50 TABLET ORAL at 17:51

## 2020-01-16 RX ADMIN — INSULIN GLARGINE 40 UNIT(S): 100 INJECTION, SOLUTION SUBCUTANEOUS at 08:08

## 2020-01-16 RX ADMIN — Medication 975 MILLIGRAM(S): at 12:25

## 2020-01-16 RX ADMIN — FAMOTIDINE 40 MILLIGRAM(S): 10 INJECTION INTRAVENOUS at 17:21

## 2020-01-16 RX ADMIN — Medication 0.5 MILLIGRAM(S): at 21:41

## 2020-01-16 RX ADMIN — TRAMADOL HYDROCHLORIDE 50 MILLIGRAM(S): 50 TABLET ORAL at 01:12

## 2020-01-16 NOTE — PROGRESS NOTE ADULT - ASSESSMENT
70 year old man with history of FREEDOM, CAD, s/p CABG, s/p PPM, CVA 04/8/2013, Diabetic neuropathy, spinal stenosis of lumbar region, Anxiety, Depression, GERD, Gout, Hypertension, Adrenal Gland Insufficiency: left adrenalectomy admitted with lower back pain, S/P T10-pelvis laminectomy with post op NIKUNJ, brief chest pain, elevated troponin    1. Chest Pain, Elevated troponin  -brief, aytpical chest pain at PPM site  -symptoms resolved, no dynamic ischemic ECG abnl  -elevated HS trop with normal CKMB  -represent demand ischemia in setting of recent OR, hypotension, NIKUNJ, known CAD  -CK elevated with normal mb portion c/w mild rhabdo  -no further cardiac w/u indicated for now     2. CAD, s/p CABG  -cont asa, cv stable no decomp chf on exam     3. NIKUNJ, resolved   -likely HD mediated  - lasix po resumed       dvt ppx 70 year old man with history of FREEDOM, CAD, s/p CABG, s/p PPM, CVA 04/8/2013, Diabetic neuropathy, spinal stenosis of lumbar region, Anxiety, Depression, GERD, Gout, Hypertension, Adrenal Gland Insufficiency: left adrenalectomy admitted with lower back pain, S/P T10-pelvis laminectomy with post op NIKUNJ, brief chest pain, elevated troponin    1. Chest Pain, Elevated troponin   -brief, aytpical chest pain at PPM site  -symptoms resolved, no dynamic ischemic ECG abnl  -elevated HS trop with normal CKMB  -represent demand ischemia in setting of recent OR, hypotension, NIKUNJ, known CAD  -CK elevated with normal mb portion c/w mild rhabdo  -no further cardiac w/u indicated for now     2. CAD, s/p CABG  -cont asa, cv stable no decomp chf on exam     3. NIKUNJ, resolved   -likely HD mediated  - lasix po resumed       dvt ppx

## 2020-01-16 NOTE — PROGRESS NOTE ADULT - ASSESSMENT
ASSESSMENT:    dyspnea - multifactorial    1) obesity related restrictive lung disease complicated by obstructive sleep apnea with pulmonary hypertension  2) bronchospasm  3) atelectasis  4) +/- mild pulmonary edema off diuretics due to NIKUNJ which has resolved    post-operative elevation in CPK likely related to surgical muscle injury - elevated troponin likely related to "demand" ischemia    PLAN/RECOMMENDATIONS:    stable oxygenation on room air  head of bed elevation more than 45 degrees at all times  symbicort bid  dc nebs  incentive spirometry hourly  restart diuretics at usual dose  no indication for antibiotics or systemic steroids  cardiac meds: ASA/lipitor  glucose control  bowel regimen  klonipin/wellbutrin XL  analgesics  aggressive incentive spirometry reviewed  increasing ambulation    Thank you for the courtesy of this referral. Plan of care discussed with the patient at bedside     Pratima Mahajan MD  [phone]  Pulmonary

## 2020-01-16 NOTE — PROGRESS NOTE ADULT - ASSESSMENT
70M s/p T10-pelvis PSF POD 6  Analgesia  DVT ppx with venodynes  Will restart plavix today  WBAT, ambulate in brace  PT/OT  Incentive spirometry  FU am labs  DC planning

## 2020-01-17 LAB
ANION GAP SERPL CALC-SCNC: 12 MMOL/L — SIGNIFICANT CHANGE UP (ref 5–17)
APTT BLD: 28.4 SEC — SIGNIFICANT CHANGE UP (ref 27.5–36.3)
BASE EXCESS BLDV CALC-SCNC: 2.5 MMOL/L — HIGH (ref -2–2)
BUN SERPL-MCNC: 17 MG/DL — SIGNIFICANT CHANGE UP (ref 7–23)
CA-I SERPL-SCNC: 1.18 MMOL/L — SIGNIFICANT CHANGE UP (ref 1.12–1.3)
CALCIUM SERPL-MCNC: 8.9 MG/DL — SIGNIFICANT CHANGE UP (ref 8.4–10.5)
CHLORIDE BLDV-SCNC: 105 MMOL/L — SIGNIFICANT CHANGE UP (ref 96–108)
CHLORIDE SERPL-SCNC: 103 MMOL/L — SIGNIFICANT CHANGE UP (ref 96–108)
CO2 BLDV-SCNC: 29 MMOL/L — SIGNIFICANT CHANGE UP (ref 22–30)
CO2 SERPL-SCNC: 25 MMOL/L — SIGNIFICANT CHANGE UP (ref 22–31)
CREAT SERPL-MCNC: 0.86 MG/DL — SIGNIFICANT CHANGE UP (ref 0.5–1.3)
GAS PNL BLDV: 135 MMOL/L — SIGNIFICANT CHANGE UP (ref 135–145)
GAS PNL BLDV: SIGNIFICANT CHANGE UP
GAS PNL BLDV: SIGNIFICANT CHANGE UP
GLUCOSE BLDC GLUCOMTR-MCNC: 167 MG/DL — HIGH (ref 70–99)
GLUCOSE BLDC GLUCOMTR-MCNC: 272 MG/DL — HIGH (ref 70–99)
GLUCOSE BLDC GLUCOMTR-MCNC: 293 MG/DL — HIGH (ref 70–99)
GLUCOSE BLDC GLUCOMTR-MCNC: 336 MG/DL — HIGH (ref 70–99)
GLUCOSE BLDV-MCNC: 145 MG/DL — HIGH (ref 70–99)
GLUCOSE SERPL-MCNC: 142 MG/DL — HIGH (ref 70–99)
HCO3 BLDV-SCNC: 27 MMOL/L — SIGNIFICANT CHANGE UP (ref 21–29)
HCT VFR BLD CALC: 30.4 % — LOW (ref 39–50)
HCT VFR BLDA CALC: 30 % — LOW (ref 39–50)
HGB BLD CALC-MCNC: 9.6 G/DL — LOW (ref 13–17)
HGB BLD-MCNC: 9.5 G/DL — LOW (ref 13–17)
INR BLD: 1.17 RATIO — HIGH (ref 0.88–1.16)
LACTATE BLDV-MCNC: 1.5 MMOL/L — SIGNIFICANT CHANGE UP (ref 0.7–2)
MAGNESIUM SERPL-MCNC: 2 MG/DL — SIGNIFICANT CHANGE UP (ref 1.6–2.6)
MCHC RBC-ENTMCNC: 27.9 PG — SIGNIFICANT CHANGE UP (ref 27–34)
MCHC RBC-ENTMCNC: 31.3 GM/DL — LOW (ref 32–36)
MCV RBC AUTO: 89.1 FL — SIGNIFICANT CHANGE UP (ref 80–100)
NRBC # BLD: 0 /100 WBCS — SIGNIFICANT CHANGE UP (ref 0–0)
OTHER CELLS CSF MANUAL: 7 ML/DL — LOW (ref 18–22)
PCO2 BLDV: 47 MMHG — SIGNIFICANT CHANGE UP (ref 35–50)
PH BLDV: 7.38 — SIGNIFICANT CHANGE UP (ref 7.35–7.45)
PHOSPHATE SERPL-MCNC: 3.3 MG/DL — SIGNIFICANT CHANGE UP (ref 2.5–4.5)
PLATELET # BLD AUTO: 268 K/UL — SIGNIFICANT CHANGE UP (ref 150–400)
PO2 BLDV: 34 MMHG — SIGNIFICANT CHANGE UP (ref 25–45)
POTASSIUM BLDV-SCNC: 3.6 MMOL/L — SIGNIFICANT CHANGE UP (ref 3.5–5.3)
POTASSIUM SERPL-MCNC: 3.8 MMOL/L — SIGNIFICANT CHANGE UP (ref 3.5–5.3)
POTASSIUM SERPL-SCNC: 3.8 MMOL/L — SIGNIFICANT CHANGE UP (ref 3.5–5.3)
PROTHROM AB SERPL-ACNC: 13.5 SEC — HIGH (ref 10–12.9)
RBC # BLD: 3.41 M/UL — LOW (ref 4.2–5.8)
RBC # FLD: 15.9 % — HIGH (ref 10.3–14.5)
SAO2 % BLDV: 55 % — LOW (ref 67–88)
SODIUM SERPL-SCNC: 140 MMOL/L — SIGNIFICANT CHANGE UP (ref 135–145)
WBC # BLD: 11.53 K/UL — HIGH (ref 3.8–10.5)
WBC # FLD AUTO: 11.53 K/UL — HIGH (ref 3.8–10.5)

## 2020-01-17 RX ADMIN — TRAMADOL HYDROCHLORIDE 50 MILLIGRAM(S): 50 TABLET ORAL at 05:29

## 2020-01-17 RX ADMIN — FAMOTIDINE 40 MILLIGRAM(S): 10 INJECTION INTRAVENOUS at 05:29

## 2020-01-17 RX ADMIN — ZOLPIDEM TARTRATE 5 MILLIGRAM(S): 10 TABLET ORAL at 23:48

## 2020-01-17 RX ADMIN — FAMOTIDINE 40 MILLIGRAM(S): 10 INJECTION INTRAVENOUS at 17:22

## 2020-01-17 RX ADMIN — Medication 0.5 MILLIGRAM(S): at 13:44

## 2020-01-17 RX ADMIN — SENNA PLUS 2 TABLET(S): 8.6 TABLET ORAL at 21:43

## 2020-01-17 RX ADMIN — Medication 2: at 08:54

## 2020-01-17 RX ADMIN — Medication 0.5 MILLIGRAM(S): at 05:30

## 2020-01-17 RX ADMIN — HYDROMORPHONE HYDROCHLORIDE 0.5 MILLIGRAM(S): 2 INJECTION INTRAMUSCULAR; INTRAVENOUS; SUBCUTANEOUS at 21:15

## 2020-01-17 RX ADMIN — ZOLPIDEM TARTRATE 5 MILLIGRAM(S): 10 TABLET ORAL at 00:46

## 2020-01-17 RX ADMIN — Medication 0.5 MILLIGRAM(S): at 21:43

## 2020-01-17 RX ADMIN — TRAMADOL HYDROCHLORIDE 50 MILLIGRAM(S): 50 TABLET ORAL at 06:00

## 2020-01-17 RX ADMIN — Medication 6: at 12:38

## 2020-01-17 RX ADMIN — BUDESONIDE AND FORMOTEROL FUMARATE DIHYDRATE 2 PUFF(S): 160; 4.5 AEROSOL RESPIRATORY (INHALATION) at 17:22

## 2020-01-17 RX ADMIN — Medication 6: at 18:16

## 2020-01-17 RX ADMIN — HYDROMORPHONE HYDROCHLORIDE 0.5 MILLIGRAM(S): 2 INJECTION INTRAMUSCULAR; INTRAVENOUS; SUBCUTANEOUS at 22:00

## 2020-01-17 RX ADMIN — BUPROPION HYDROCHLORIDE 300 MILLIGRAM(S): 150 TABLET, EXTENDED RELEASE ORAL at 11:18

## 2020-01-17 RX ADMIN — Medication 40 MILLIGRAM(S): at 08:54

## 2020-01-17 RX ADMIN — Medication 975 MILLIGRAM(S): at 21:44

## 2020-01-17 RX ADMIN — Medication 975 MILLIGRAM(S): at 22:15

## 2020-01-17 RX ADMIN — POLYETHYLENE GLYCOL 3350 17 GRAM(S): 17 POWDER, FOR SOLUTION ORAL at 11:18

## 2020-01-17 RX ADMIN — TRAMADOL HYDROCHLORIDE 50 MILLIGRAM(S): 50 TABLET ORAL at 17:22

## 2020-01-17 RX ADMIN — Medication 8: at 21:45

## 2020-01-17 RX ADMIN — Medication 81 MILLIGRAM(S): at 11:18

## 2020-01-17 RX ADMIN — INSULIN GLARGINE 40 UNIT(S): 100 INJECTION, SOLUTION SUBCUTANEOUS at 08:55

## 2020-01-17 RX ADMIN — Medication 975 MILLIGRAM(S): at 14:14

## 2020-01-17 RX ADMIN — TRAMADOL HYDROCHLORIDE 50 MILLIGRAM(S): 50 TABLET ORAL at 23:46

## 2020-01-17 RX ADMIN — Medication 975 MILLIGRAM(S): at 13:44

## 2020-01-17 RX ADMIN — BUDESONIDE AND FORMOTEROL FUMARATE DIHYDRATE 2 PUFF(S): 160; 4.5 AEROSOL RESPIRATORY (INHALATION) at 05:28

## 2020-01-17 RX ADMIN — ATORVASTATIN CALCIUM 40 MILLIGRAM(S): 80 TABLET, FILM COATED ORAL at 21:43

## 2020-01-17 RX ADMIN — TRAMADOL HYDROCHLORIDE 50 MILLIGRAM(S): 50 TABLET ORAL at 17:52

## 2020-01-17 RX ADMIN — CLOPIDOGREL BISULFATE 75 MILLIGRAM(S): 75 TABLET, FILM COATED ORAL at 11:18

## 2020-01-17 NOTE — DIETITIAN INITIAL EVALUATION ADULT. - ADD RECOMMEND
1. Continue current Consistent Carbohydrate (with evening snacks) and Kosher diet as indicated. Will hold off adding DASH/TLC restrictions in-patient to allow pt more meal choices at this time. 2. Provide/review nutrition education as indicated 3. Will continue to monitor nutrient intake, wt, labs, f/u per protocol

## 2020-01-17 NOTE — PROGRESS NOTE ADULT - ASSESSMENT
70M s/p T10-pelvis PSF POD 7  Analgesia  DVT ppx with venodynes  Cont plavix  WBAT, ambulate in brace  PT/OT  Incentive spirometry  FU am labs  Drains per PRSx  DC planning

## 2020-01-17 NOTE — DIETITIAN INITIAL EVALUATION ADULT. - OTHER INFO
Pt is 70yoM with PMH significant for "FREEDOM, CAD, s/p CABG, s/p PPM, CVA 04/8/2013, Diabetic neuropathy, spinal stenosis of lumbar region, Anxiety, Depression, GERD, Gout, Hypertension, Adrenal Gland Insufficiency: left adrenalectomy admitted with lower back pain, S/P T10-pelvis laminectomy with post op NIKUNJ, brief chest pain, elevated troponin."    Therapeutic Diet PTA: pt follows a heart healthy, low sodium, Consistent Carbohydrate diet at home. He does the cooking for himself at home and follows Kosher dietary restrictions. Pt reports good compliance to nutrition related recommendations. Avoids take out foods, concentrated sweets, sugar sweetened beverages, high saturated fat intake.   Pt reports good po intake and appetite at baseline continues in-patient.    Pt denies chewing/swallowing difficulties.  Pt has no c/o nausea, vomiting, diarrhea, or constipation.     Pt UBW: ~204 lbs, as reflected by dosing wt of 203.9 lbs. Pt reports he used to weight 275 lbs several years ago and that he lost weight to his current UBW and has been stable recently.  Weight history per chart: 215.8 lbs (1/21/17), 204.5 lbs (12/22/19). Weight from current admission: 211.6 lbs (1/11), 222 lbs (1/12).     Self monitoring PTA: Pt endorses SMBG 3-4x/daily with a usual BG range ~150-180 mg/dL. Endorses taking novolog and metformin PTA. Reports his BG has been running higher than usual over the last few weeks secondary to pain and stress of upcoming operation. Pt reports his HgbA1c used to be 7.1%, and now is elevated from that to 7.7% (1/2). Overall, adequate BG control noted. Pt does report elevated BG values >250 mg/dL at times over the weekends and holidays secondary to eating more CHO at those times. No hypoglycemia events PTA reported. Pt does follow up with an endocrinologist.    Nutrition Supplements PTA: Mg  NKFA reported.    Nutrition education provided: reinforced high sodium foods to avoid and general heart healthy recommendations for lean proteins and adequate fruits/vegetables/whole grains as well as indication to consume consistent CHO intake, avoid concentrated sweets, and reviewed hypoglycemia protocol. Pt declines written materials, able to teach back points.

## 2020-01-17 NOTE — DIETITIAN INITIAL EVALUATION ADULT. - REASON INDICATOR FOR ASSESSMENT
Pt seen for routine length of stay on 7TOW.  Information obtained from: pt, electronic medical record

## 2020-01-17 NOTE — PROGRESS NOTE ADULT - ASSESSMENT
ASSESSMENT:    dyspnea - multifactorial    1) obesity related restrictive lung disease complicated by obstructive sleep apnea with pulmonary hypertension  2) bronchospasm  3) atelectasis  4) +/- mild pulmonary edema off diuretics due to NIKUNJ which has resolved    post-operative elevation in CPK likely related to surgical muscle injury - elevated troponin likely related to "demand" ischemia    PLAN/RECOMMENDATIONS:    stable oxygenation on room air  head of bed elevation more than 45 degrees at all times  symbicort bid- 160 mcg  dc nebs  incentive spirometry hourly- emphasized to pt  glucose control  bowel regimen  klonipin/wellbutrin XL  analgesics  dvt proph  increasing ambulation  stable from a pulmonary standpoint- please recall if we can be of further assistance     Plan of care discussed with the patient at bedside     Pratima Mahajan MD  [phone]  Pulmonary

## 2020-01-17 NOTE — PROGRESS NOTE ADULT - ASSESSMENT
70 year old man with history of FREEDOM, CAD, s/p CABG, s/p PPM, CVA 04/8/2013, Diabetic neuropathy, spinal stenosis of lumbar region, Anxiety, Depression, GERD, Gout, Hypertension, Adrenal Gland Insufficiency: left adrenalectomy admitted with lower back pain, S/P T10-pelvis laminectomy with post op NIKUNJ, brief chest pain, elevated troponin    1. Chest Pain, Elevated troponin   -brief, aytpical chest pain at PPM site  -symptoms resolved, no dynamic ischemic ECG abnl  -elevated HS trop with normal CKMB  -represent demand ischemia in setting of recent OR, hypotension, NIKUNJ, known CAD  -CK elevated with normal mb portion c/w mild rhabdo  -no further cardiac w/u indicated for now     2. CAD, s/p CABG  -cont asa, cv stable no decomp chf on exam     3. NIKUNJ, resolved   -likely HD mediated  -lasix po resumed       dvt ppx

## 2020-01-18 LAB
GLUCOSE BLDC GLUCOMTR-MCNC: 185 MG/DL — HIGH (ref 70–99)
GLUCOSE BLDC GLUCOMTR-MCNC: 185 MG/DL — HIGH (ref 70–99)
GLUCOSE BLDC GLUCOMTR-MCNC: 259 MG/DL — HIGH (ref 70–99)
GLUCOSE BLDC GLUCOMTR-MCNC: 260 MG/DL — HIGH (ref 70–99)
GLUCOSE BLDC GLUCOMTR-MCNC: 382 MG/DL — HIGH (ref 70–99)
GLUCOSE BLDC GLUCOMTR-MCNC: 413 MG/DL — HIGH (ref 70–99)

## 2020-01-18 RX ORDER — CLONAZEPAM 1 MG
0.5 TABLET ORAL THREE TIMES A DAY
Refills: 0 | Status: DISCONTINUED | OUTPATIENT
Start: 2020-01-18 | End: 2020-01-22

## 2020-01-18 RX ORDER — TRAMADOL HYDROCHLORIDE 50 MG/1
25 TABLET ORAL EVERY 6 HOURS
Refills: 0 | Status: DISCONTINUED | OUTPATIENT
Start: 2020-01-18 | End: 2020-01-20

## 2020-01-18 RX ORDER — TRAMADOL HYDROCHLORIDE 50 MG/1
50 TABLET ORAL EVERY 6 HOURS
Refills: 0 | Status: DISCONTINUED | OUTPATIENT
Start: 2020-01-18 | End: 2020-01-20

## 2020-01-18 RX ADMIN — TRAMADOL HYDROCHLORIDE 50 MILLIGRAM(S): 50 TABLET ORAL at 21:03

## 2020-01-18 RX ADMIN — FAMOTIDINE 40 MILLIGRAM(S): 10 INJECTION INTRAVENOUS at 17:41

## 2020-01-18 RX ADMIN — HYDROMORPHONE HYDROCHLORIDE 0.5 MILLIGRAM(S): 2 INJECTION INTRAMUSCULAR; INTRAVENOUS; SUBCUTANEOUS at 02:48

## 2020-01-18 RX ADMIN — POLYETHYLENE GLYCOL 3350 17 GRAM(S): 17 POWDER, FOR SOLUTION ORAL at 12:10

## 2020-01-18 RX ADMIN — Medication 81 MILLIGRAM(S): at 12:10

## 2020-01-18 RX ADMIN — Medication 6: at 22:00

## 2020-01-18 RX ADMIN — Medication 40 MILLIGRAM(S): at 09:31

## 2020-01-18 RX ADMIN — ATORVASTATIN CALCIUM 40 MILLIGRAM(S): 80 TABLET, FILM COATED ORAL at 21:57

## 2020-01-18 RX ADMIN — INSULIN GLARGINE 40 UNIT(S): 100 INJECTION, SOLUTION SUBCUTANEOUS at 09:30

## 2020-01-18 RX ADMIN — HYDROMORPHONE HYDROCHLORIDE 0.5 MILLIGRAM(S): 2 INJECTION INTRAMUSCULAR; INTRAVENOUS; SUBCUTANEOUS at 03:10

## 2020-01-18 RX ADMIN — FAMOTIDINE 40 MILLIGRAM(S): 10 INJECTION INTRAVENOUS at 06:17

## 2020-01-18 RX ADMIN — BUDESONIDE AND FORMOTEROL FUMARATE DIHYDRATE 2 PUFF(S): 160; 4.5 AEROSOL RESPIRATORY (INHALATION) at 06:17

## 2020-01-18 RX ADMIN — Medication 975 MILLIGRAM(S): at 22:02

## 2020-01-18 RX ADMIN — Medication 975 MILLIGRAM(S): at 14:06

## 2020-01-18 RX ADMIN — Medication 0.5 MILLIGRAM(S): at 14:06

## 2020-01-18 RX ADMIN — TRAMADOL HYDROCHLORIDE 50 MILLIGRAM(S): 50 TABLET ORAL at 06:17

## 2020-01-18 RX ADMIN — BUDESONIDE AND FORMOTEROL FUMARATE DIHYDRATE 2 PUFF(S): 160; 4.5 AEROSOL RESPIRATORY (INHALATION) at 17:41

## 2020-01-18 RX ADMIN — TRAMADOL HYDROCHLORIDE 50 MILLIGRAM(S): 50 TABLET ORAL at 00:15

## 2020-01-18 RX ADMIN — Medication 0.5 MILLIGRAM(S): at 06:17

## 2020-01-18 RX ADMIN — Medication 6: at 12:10

## 2020-01-18 RX ADMIN — Medication 2: at 09:30

## 2020-01-18 RX ADMIN — HYDROMORPHONE HYDROCHLORIDE 0.5 MILLIGRAM(S): 2 INJECTION INTRAMUSCULAR; INTRAVENOUS; SUBCUTANEOUS at 21:57

## 2020-01-18 RX ADMIN — TRAMADOL HYDROCHLORIDE 50 MILLIGRAM(S): 50 TABLET ORAL at 14:32

## 2020-01-18 RX ADMIN — Medication 975 MILLIGRAM(S): at 06:17

## 2020-01-18 RX ADMIN — CLOPIDOGREL BISULFATE 75 MILLIGRAM(S): 75 TABLET, FILM COATED ORAL at 12:10

## 2020-01-18 RX ADMIN — Medication 975 MILLIGRAM(S): at 21:57

## 2020-01-18 RX ADMIN — Medication 975 MILLIGRAM(S): at 06:47

## 2020-01-18 RX ADMIN — TRAMADOL HYDROCHLORIDE 50 MILLIGRAM(S): 50 TABLET ORAL at 06:47

## 2020-01-18 RX ADMIN — TRAMADOL HYDROCHLORIDE 50 MILLIGRAM(S): 50 TABLET ORAL at 15:00

## 2020-01-18 RX ADMIN — ZOLPIDEM TARTRATE 5 MILLIGRAM(S): 10 TABLET ORAL at 23:05

## 2020-01-18 RX ADMIN — LACTULOSE 10 GRAM(S): 10 SOLUTION ORAL at 20:43

## 2020-01-18 RX ADMIN — TRAMADOL HYDROCHLORIDE 50 MILLIGRAM(S): 50 TABLET ORAL at 20:33

## 2020-01-18 RX ADMIN — Medication 10: at 14:25

## 2020-01-18 RX ADMIN — Medication 2: at 18:32

## 2020-01-18 RX ADMIN — BUPROPION HYDROCHLORIDE 300 MILLIGRAM(S): 150 TABLET, EXTENDED RELEASE ORAL at 12:10

## 2020-01-18 RX ADMIN — Medication 0.5 MILLIGRAM(S): at 21:57

## 2020-01-18 RX ADMIN — HYDROMORPHONE HYDROCHLORIDE 0.5 MILLIGRAM(S): 2 INJECTION INTRAMUSCULAR; INTRAVENOUS; SUBCUTANEOUS at 22:27

## 2020-01-18 NOTE — PROGRESS NOTE ADULT - ASSESSMENT
69 y/o M s/p T10- pelvis PSF POD#8, N[facility]DA, f/u drains  Ketty Wolfe PA-C  Orthopaedic Surgery  Team pager [phone]  [facility] [phone]  [phone]

## 2020-01-19 LAB
GLUCOSE BLDC GLUCOMTR-MCNC: 175 MG/DL — HIGH (ref 70–99)
GLUCOSE BLDC GLUCOMTR-MCNC: 180 MG/DL — HIGH (ref 70–99)
GLUCOSE BLDC GLUCOMTR-MCNC: 228 MG/DL — HIGH (ref 70–99)
GLUCOSE BLDC GLUCOMTR-MCNC: 260 MG/DL — HIGH (ref 70–99)
GLUCOSE BLDC GLUCOMTR-MCNC: 328 MG/DL — HIGH (ref 70–99)

## 2020-01-19 RX ORDER — HYDROMORPHONE HYDROCHLORIDE 2 MG/ML
0.5 INJECTION INTRAMUSCULAR; INTRAVENOUS; SUBCUTANEOUS
Refills: 0 | Status: DISCONTINUED | OUTPATIENT
Start: 2020-01-19 | End: 2020-01-19

## 2020-01-19 RX ADMIN — TRAMADOL HYDROCHLORIDE 50 MILLIGRAM(S): 50 TABLET ORAL at 11:30

## 2020-01-19 RX ADMIN — INSULIN GLARGINE 40 UNIT(S): 100 INJECTION, SOLUTION SUBCUTANEOUS at 09:49

## 2020-01-19 RX ADMIN — TRAMADOL HYDROCHLORIDE 50 MILLIGRAM(S): 50 TABLET ORAL at 05:50

## 2020-01-19 RX ADMIN — POLYETHYLENE GLYCOL 3350 17 GRAM(S): 17 POWDER, FOR SOLUTION ORAL at 12:14

## 2020-01-19 RX ADMIN — Medication 975 MILLIGRAM(S): at 05:50

## 2020-01-19 RX ADMIN — Medication 0.5 MILLIGRAM(S): at 21:18

## 2020-01-19 RX ADMIN — Medication 2: at 09:02

## 2020-01-19 RX ADMIN — ZOLPIDEM TARTRATE 5 MILLIGRAM(S): 10 TABLET ORAL at 21:20

## 2020-01-19 RX ADMIN — BUPROPION HYDROCHLORIDE 300 MILLIGRAM(S): 150 TABLET, EXTENDED RELEASE ORAL at 12:14

## 2020-01-19 RX ADMIN — Medication 0.5 MILLIGRAM(S): at 13:47

## 2020-01-19 RX ADMIN — BUDESONIDE AND FORMOTEROL FUMARATE DIHYDRATE 2 PUFF(S): 160; 4.5 AEROSOL RESPIRATORY (INHALATION) at 05:19

## 2020-01-19 RX ADMIN — Medication 975 MILLIGRAM(S): at 13:47

## 2020-01-19 RX ADMIN — TRAMADOL HYDROCHLORIDE 50 MILLIGRAM(S): 50 TABLET ORAL at 05:21

## 2020-01-19 RX ADMIN — TRAMADOL HYDROCHLORIDE 50 MILLIGRAM(S): 50 TABLET ORAL at 17:57

## 2020-01-19 RX ADMIN — Medication 8: at 13:46

## 2020-01-19 RX ADMIN — TRAMADOL HYDROCHLORIDE 50 MILLIGRAM(S): 50 TABLET ORAL at 23:58

## 2020-01-19 RX ADMIN — Medication 975 MILLIGRAM(S): at 21:18

## 2020-01-19 RX ADMIN — Medication 4: at 17:58

## 2020-01-19 RX ADMIN — Medication 2: at 21:16

## 2020-01-19 RX ADMIN — TRAMADOL HYDROCHLORIDE 50 MILLIGRAM(S): 50 TABLET ORAL at 11:59

## 2020-01-19 RX ADMIN — Medication 975 MILLIGRAM(S): at 05:20

## 2020-01-19 RX ADMIN — Medication 975 MILLIGRAM(S): at 21:48

## 2020-01-19 RX ADMIN — ZOLPIDEM TARTRATE 5 MILLIGRAM(S): 10 TABLET ORAL at 22:13

## 2020-01-19 RX ADMIN — FAMOTIDINE 40 MILLIGRAM(S): 10 INJECTION INTRAVENOUS at 05:21

## 2020-01-19 RX ADMIN — Medication 40 MILLIGRAM(S): at 09:49

## 2020-01-19 RX ADMIN — SENNA PLUS 2 TABLET(S): 8.6 TABLET ORAL at 21:20

## 2020-01-19 RX ADMIN — ATORVASTATIN CALCIUM 40 MILLIGRAM(S): 80 TABLET, FILM COATED ORAL at 21:18

## 2020-01-19 RX ADMIN — FAMOTIDINE 40 MILLIGRAM(S): 10 INJECTION INTRAVENOUS at 17:57

## 2020-01-19 RX ADMIN — TRAMADOL HYDROCHLORIDE 50 MILLIGRAM(S): 50 TABLET ORAL at 18:28

## 2020-01-19 RX ADMIN — Medication 81 MILLIGRAM(S): at 12:14

## 2020-01-19 RX ADMIN — CLOPIDOGREL BISULFATE 75 MILLIGRAM(S): 75 TABLET, FILM COATED ORAL at 12:14

## 2020-01-19 RX ADMIN — BUDESONIDE AND FORMOTEROL FUMARATE DIHYDRATE 2 PUFF(S): 160; 4.5 AEROSOL RESPIRATORY (INHALATION) at 17:58

## 2020-01-19 RX ADMIN — Medication 0.5 MILLIGRAM(S): at 05:21

## 2020-01-19 NOTE — PROGRESS NOTE ADULT - ASSESSMENT
A/p: 71 y/o M s/p T10- pelvis PSF POD#9,     PT/OT-WBAT  Drains: Plastic Surgery to determine dispo  IS  DVT PPx: Aspirin 81mg PO Daily, Plavix 75mg PO Daily and SCDs  Pain Control  Dispo planning to Sub Acute Rehab    James Hawkins PA-C  Orthopedic Surgery Team  Team Pager: #[phone]

## 2020-01-20 DIAGNOSIS — M48.061 SPINAL STENOSIS, LUMBAR REGION WITHOUT NEUROGENIC CLAUDICATION: ICD-10-CM

## 2020-01-20 LAB
GLUCOSE BLDC GLUCOMTR-MCNC: 156 MG/DL — HIGH (ref 70–99)
GLUCOSE BLDC GLUCOMTR-MCNC: 201 MG/DL — HIGH (ref 70–99)
GLUCOSE BLDC GLUCOMTR-MCNC: 218 MG/DL — HIGH (ref 70–99)
GLUCOSE BLDC GLUCOMTR-MCNC: 235 MG/DL — HIGH (ref 70–99)

## 2020-01-20 RX ORDER — ZOLPIDEM TARTRATE 10 MG/1
5 TABLET ORAL AT BEDTIME
Refills: 0 | Status: DISCONTINUED | OUTPATIENT
Start: 2020-01-20 | End: 2020-01-22

## 2020-01-20 RX ORDER — TRAMADOL HYDROCHLORIDE 50 MG/1
50 TABLET ORAL EVERY 4 HOURS
Refills: 0 | Status: DISCONTINUED | OUTPATIENT
Start: 2020-01-20 | End: 2020-01-22

## 2020-01-20 RX ADMIN — Medication 2: at 09:17

## 2020-01-20 RX ADMIN — TRAMADOL HYDROCHLORIDE 50 MILLIGRAM(S): 50 TABLET ORAL at 14:34

## 2020-01-20 RX ADMIN — Medication 0.5 MILLIGRAM(S): at 14:04

## 2020-01-20 RX ADMIN — TRAMADOL HYDROCHLORIDE 50 MILLIGRAM(S): 50 TABLET ORAL at 04:57

## 2020-01-20 RX ADMIN — ATORVASTATIN CALCIUM 40 MILLIGRAM(S): 80 TABLET, FILM COATED ORAL at 21:47

## 2020-01-20 RX ADMIN — Medication 4: at 21:48

## 2020-01-20 RX ADMIN — Medication 4: at 12:51

## 2020-01-20 RX ADMIN — Medication 975 MILLIGRAM(S): at 14:04

## 2020-01-20 RX ADMIN — ZOLPIDEM TARTRATE 5 MILLIGRAM(S): 10 TABLET ORAL at 21:47

## 2020-01-20 RX ADMIN — Medication 4: at 17:00

## 2020-01-20 RX ADMIN — Medication 975 MILLIGRAM(S): at 14:34

## 2020-01-20 RX ADMIN — Medication 975 MILLIGRAM(S): at 21:48

## 2020-01-20 RX ADMIN — Medication 975 MILLIGRAM(S): at 06:14

## 2020-01-20 RX ADMIN — TRAMADOL HYDROCHLORIDE 50 MILLIGRAM(S): 50 TABLET ORAL at 10:04

## 2020-01-20 RX ADMIN — Medication 975 MILLIGRAM(S): at 22:20

## 2020-01-20 RX ADMIN — FAMOTIDINE 40 MILLIGRAM(S): 10 INJECTION INTRAVENOUS at 17:04

## 2020-01-20 RX ADMIN — FAMOTIDINE 40 MILLIGRAM(S): 10 INJECTION INTRAVENOUS at 05:44

## 2020-01-20 RX ADMIN — TRAMADOL HYDROCHLORIDE 50 MILLIGRAM(S): 50 TABLET ORAL at 00:29

## 2020-01-20 RX ADMIN — Medication 0.5 MILLIGRAM(S): at 05:44

## 2020-01-20 RX ADMIN — TRAMADOL HYDROCHLORIDE 50 MILLIGRAM(S): 50 TABLET ORAL at 18:10

## 2020-01-20 RX ADMIN — Medication 0.5 MILLIGRAM(S): at 21:47

## 2020-01-20 RX ADMIN — TRAMADOL HYDROCHLORIDE 50 MILLIGRAM(S): 50 TABLET ORAL at 14:04

## 2020-01-20 RX ADMIN — Medication 975 MILLIGRAM(S): at 05:44

## 2020-01-20 RX ADMIN — CLOPIDOGREL BISULFATE 75 MILLIGRAM(S): 75 TABLET, FILM COATED ORAL at 12:26

## 2020-01-20 RX ADMIN — Medication 20 MILLIGRAM(S): at 10:04

## 2020-01-20 RX ADMIN — ZOLPIDEM TARTRATE 5 MILLIGRAM(S): 10 TABLET ORAL at 23:24

## 2020-01-20 RX ADMIN — INSULIN GLARGINE 40 UNIT(S): 100 INJECTION, SOLUTION SUBCUTANEOUS at 09:16

## 2020-01-20 RX ADMIN — TRAMADOL HYDROCHLORIDE 50 MILLIGRAM(S): 50 TABLET ORAL at 10:34

## 2020-01-20 RX ADMIN — TRAMADOL HYDROCHLORIDE 50 MILLIGRAM(S): 50 TABLET ORAL at 04:27

## 2020-01-20 RX ADMIN — BUDESONIDE AND FORMOTEROL FUMARATE DIHYDRATE 2 PUFF(S): 160; 4.5 AEROSOL RESPIRATORY (INHALATION) at 17:04

## 2020-01-20 RX ADMIN — Medication 81 MILLIGRAM(S): at 12:26

## 2020-01-20 RX ADMIN — POLYETHYLENE GLYCOL 3350 17 GRAM(S): 17 POWDER, FOR SOLUTION ORAL at 12:26

## 2020-01-20 RX ADMIN — TRAMADOL HYDROCHLORIDE 50 MILLIGRAM(S): 50 TABLET ORAL at 23:25

## 2020-01-20 RX ADMIN — TRAMADOL HYDROCHLORIDE 50 MILLIGRAM(S): 50 TABLET ORAL at 18:40

## 2020-01-20 RX ADMIN — BUDESONIDE AND FORMOTEROL FUMARATE DIHYDRATE 2 PUFF(S): 160; 4.5 AEROSOL RESPIRATORY (INHALATION) at 05:43

## 2020-01-20 RX ADMIN — BUPROPION HYDROCHLORIDE 300 MILLIGRAM(S): 150 TABLET, EXTENDED RELEASE ORAL at 12:26

## 2020-01-20 NOTE — PROGRESS NOTE ADULT - ATTENDING COMMENTS
Agree with above NP note.  cv stable  cont current tx
Agree with above NP note.  cv stable  cont current tx  no further chest pain  cont po lasix
Agree with above NP note.  cv stable  cont current tx  no further chest pain  diuretics on hold, resume po if cleared  ghazala resolved
Agree with above NP note.  cv stable  cont current tx per surgery
Awake and alert non focal, pain controlled  Monitor slow drop in HCT, on ASA, and mechanical DVT prophylaxis  CP resolved, EKG no acute finding, mild increase in Ti decreasing, no intervention as per cardiology  NIKUNJ improving, mild rhabdomyolysis, making good urine, decrease IVF to 100 cc/hr, Lasix on hold, not in fluid overload  COPD meds  Adjustment of Lantus/ISS  PT  Discussed with Ortho
Agree with above NP note.  cv stable  cont current tx
Agree with above NP note.  cv stable  cont current tx  no further chest pain  diuretics on hold  ghazala resolved

## 2020-01-20 NOTE — PROGRESS NOTE ADULT - PROBLEM SELECTOR PLAN 1
PT/OT-WBAT brace for support  IS  DVT PPx  Pain Control  Continue Current Tx.  Dispo plan for [facility] tomorrow  Drain out when <40cc per PRS    Adam Frank PA-C  Team Pager: #[phone]

## 2020-01-21 LAB
GLUCOSE BLDC GLUCOMTR-MCNC: 175 MG/DL — HIGH (ref 70–99)
GLUCOSE BLDC GLUCOMTR-MCNC: 176 MG/DL — HIGH (ref 70–99)
GLUCOSE BLDC GLUCOMTR-MCNC: 206 MG/DL — HIGH (ref 70–99)
GLUCOSE BLDC GLUCOMTR-MCNC: 207 MG/DL — HIGH (ref 70–99)
GLUCOSE BLDC GLUCOMTR-MCNC: 224 MG/DL — HIGH (ref 70–99)

## 2020-01-21 RX ADMIN — Medication 81 MILLIGRAM(S): at 11:25

## 2020-01-21 RX ADMIN — Medication 975 MILLIGRAM(S): at 05:17

## 2020-01-21 RX ADMIN — TRAMADOL HYDROCHLORIDE 50 MILLIGRAM(S): 50 TABLET ORAL at 16:10

## 2020-01-21 RX ADMIN — BUPROPION HYDROCHLORIDE 300 MILLIGRAM(S): 150 TABLET, EXTENDED RELEASE ORAL at 11:25

## 2020-01-21 RX ADMIN — CLOPIDOGREL BISULFATE 75 MILLIGRAM(S): 75 TABLET, FILM COATED ORAL at 11:25

## 2020-01-21 RX ADMIN — TRAMADOL HYDROCHLORIDE 50 MILLIGRAM(S): 50 TABLET ORAL at 00:00

## 2020-01-21 RX ADMIN — TRAMADOL HYDROCHLORIDE 50 MILLIGRAM(S): 50 TABLET ORAL at 03:52

## 2020-01-21 RX ADMIN — TRAMADOL HYDROCHLORIDE 50 MILLIGRAM(S): 50 TABLET ORAL at 09:05

## 2020-01-21 RX ADMIN — Medication 975 MILLIGRAM(S): at 05:47

## 2020-01-21 RX ADMIN — FAMOTIDINE 40 MILLIGRAM(S): 10 INJECTION INTRAVENOUS at 05:17

## 2020-01-21 RX ADMIN — Medication 0.5 MILLIGRAM(S): at 21:12

## 2020-01-21 RX ADMIN — TRAMADOL HYDROCHLORIDE 50 MILLIGRAM(S): 50 TABLET ORAL at 08:35

## 2020-01-21 RX ADMIN — TRAMADOL HYDROCHLORIDE 50 MILLIGRAM(S): 50 TABLET ORAL at 15:38

## 2020-01-21 RX ADMIN — Medication 20 MILLIGRAM(S): at 08:32

## 2020-01-21 RX ADMIN — TRAMADOL HYDROCHLORIDE 50 MILLIGRAM(S): 50 TABLET ORAL at 04:30

## 2020-01-21 RX ADMIN — Medication 975 MILLIGRAM(S): at 13:34

## 2020-01-21 RX ADMIN — Medication 0.5 MILLIGRAM(S): at 13:33

## 2020-01-21 RX ADMIN — ATORVASTATIN CALCIUM 40 MILLIGRAM(S): 80 TABLET, FILM COATED ORAL at 21:12

## 2020-01-21 RX ADMIN — Medication 975 MILLIGRAM(S): at 21:13

## 2020-01-21 RX ADMIN — Medication 0.5 MILLIGRAM(S): at 05:17

## 2020-01-21 RX ADMIN — Medication 4: at 12:51

## 2020-01-21 RX ADMIN — TRAMADOL HYDROCHLORIDE 50 MILLIGRAM(S): 50 TABLET ORAL at 21:14

## 2020-01-21 RX ADMIN — Medication 2: at 22:17

## 2020-01-21 RX ADMIN — Medication 975 MILLIGRAM(S): at 21:43

## 2020-01-21 RX ADMIN — FAMOTIDINE 40 MILLIGRAM(S): 10 INJECTION INTRAVENOUS at 17:40

## 2020-01-21 RX ADMIN — BUDESONIDE AND FORMOTEROL FUMARATE DIHYDRATE 2 PUFF(S): 160; 4.5 AEROSOL RESPIRATORY (INHALATION) at 05:18

## 2020-01-21 RX ADMIN — Medication 4: at 17:40

## 2020-01-21 RX ADMIN — TRAMADOL HYDROCHLORIDE 50 MILLIGRAM(S): 50 TABLET ORAL at 21:43

## 2020-01-21 RX ADMIN — ZOLPIDEM TARTRATE 5 MILLIGRAM(S): 10 TABLET ORAL at 23:08

## 2020-01-21 RX ADMIN — INSULIN GLARGINE 40 UNIT(S): 100 INJECTION, SOLUTION SUBCUTANEOUS at 09:13

## 2020-01-21 RX ADMIN — Medication 2: at 09:13

## 2020-01-21 RX ADMIN — ZOLPIDEM TARTRATE 5 MILLIGRAM(S): 10 TABLET ORAL at 22:17

## 2020-01-21 NOTE — PROGRESS NOTE ADULT - ASSESSMENT
A/p: 70yMale s/p T10-Pelvis PSIF.  VSS. NAD.  -Drains possible DC'd today  -Drains per PRS  -WBAT  -c/w ASA and Plavix

## 2020-01-21 NOTE — PROGRESS NOTE ADULT - ASSESSMENT
70 year old man with history of FREEDOM, CAD, s/p CABG, s/p PPM, CVA 04/8/2013, Diabetic neuropathy, spinal stenosis of lumbar region, Anxiety, Depression, GERD, Gout, Hypertension, Adrenal Gland Insufficiency: left adrenalectomy admitted with lower back pain, S/P T10-pelvis laminectomy with post op NIKUNJ, brief chest pain, elevated troponin    1. Chest Pain, Elevated troponin   -brief, aytpical chest pain at PPM site  -symptoms resolved, no dynamic ischemic ECG abnl  -elevated HS trop with normal CKMB  -represent demand ischemia in setting of recent OR, hypotension, NIKUNJ, known CAD  -CK elevated with normal mb portion c/w mild rhabdo  -no further cardiac w/u indicated for now     2. CAD, s/p CABG  -cont asa, cv stable no decomp chf on exam     3. NIKUNJ, resolved   -likely HD mediated  -cont po lasix       dvt ppx

## 2020-01-21 NOTE — CHART NOTE - NSCHARTNOTEFT_GEN_A_CORE
Wound Care Team Note:    Request for wound care team consult for foot wound received. Patient seen by Wound care Team Podiatrist, Dr. Coppola. Spoke with RN who was advised to refer any questions to Dr. Coppola.     Nessa High, NP-C, [facility] [id number]

## 2020-01-22 ENCOUNTER — APPOINTMENT (OUTPATIENT)
Dept: ORTHOPEDIC SURGERY | Facility: CLINIC | Age: 70
End: 2020-01-22

## 2020-01-22 ENCOUNTER — TRANSCRIPTION ENCOUNTER (OUTPATIENT)
Age: 70
End: 2020-01-22

## 2020-01-22 VITALS
SYSTOLIC BLOOD PRESSURE: 138 MMHG | OXYGEN SATURATION: 96 % | RESPIRATION RATE: 16 BRPM | DIASTOLIC BLOOD PRESSURE: 82 MMHG | HEART RATE: 87 BPM | TEMPERATURE: 99 F

## 2020-01-22 LAB
GLUCOSE BLDC GLUCOMTR-MCNC: 174 MG/DL — HIGH (ref 70–99)
GLUCOSE BLDC GLUCOMTR-MCNC: 250 MG/DL — HIGH (ref 70–99)

## 2020-01-22 PROCEDURE — C1769: CPT

## 2020-01-22 PROCEDURE — 82553 CREATINE MB FRACTION: CPT

## 2020-01-22 PROCEDURE — 84132 ASSAY OF SERUM POTASSIUM: CPT

## 2020-01-22 PROCEDURE — 97535 SELF CARE MNGMENT TRAINING: CPT

## 2020-01-22 PROCEDURE — 93005 ELECTROCARDIOGRAM TRACING: CPT

## 2020-01-22 PROCEDURE — 36430 TRANSFUSION BLD/BLD COMPNT: CPT

## 2020-01-22 PROCEDURE — 71045 X-RAY EXAM CHEST 1 VIEW: CPT

## 2020-01-22 PROCEDURE — 97116 GAIT TRAINING THERAPY: CPT

## 2020-01-22 PROCEDURE — 82435 ASSAY OF BLOOD CHLORIDE: CPT

## 2020-01-22 PROCEDURE — 84300 ASSAY OF URINE SODIUM: CPT

## 2020-01-22 PROCEDURE — 85027 COMPLETE CBC AUTOMATED: CPT

## 2020-01-22 PROCEDURE — 97162 PT EVAL MOD COMPLEX 30 MIN: CPT

## 2020-01-22 PROCEDURE — 84100 ASSAY OF PHOSPHORUS: CPT

## 2020-01-22 PROCEDURE — 80053 COMPREHEN METABOLIC PANEL: CPT

## 2020-01-22 PROCEDURE — 85730 THROMBOPLASTIN TIME PARTIAL: CPT

## 2020-01-22 PROCEDURE — 84484 ASSAY OF TROPONIN QUANT: CPT

## 2020-01-22 PROCEDURE — 82565 ASSAY OF CREATININE: CPT

## 2020-01-22 PROCEDURE — 83605 ASSAY OF LACTIC ACID: CPT

## 2020-01-22 PROCEDURE — 81003 URINALYSIS AUTO W/O SCOPE: CPT

## 2020-01-22 PROCEDURE — 94664 DEMO&/EVAL PT USE INHALER: CPT

## 2020-01-22 PROCEDURE — 94002 VENT MGMT INPAT INIT DAY: CPT

## 2020-01-22 PROCEDURE — 86923 COMPATIBILITY TEST ELECTRIC: CPT

## 2020-01-22 PROCEDURE — 82330 ASSAY OF CALCIUM: CPT

## 2020-01-22 PROCEDURE — P9016: CPT

## 2020-01-22 PROCEDURE — 86901 BLOOD TYPING SEROLOGIC RH(D): CPT

## 2020-01-22 PROCEDURE — 82570 ASSAY OF URINE CREATININE: CPT

## 2020-01-22 PROCEDURE — 85610 PROTHROMBIN TIME: CPT

## 2020-01-22 PROCEDURE — 94640 AIRWAY INHALATION TREATMENT: CPT

## 2020-01-22 PROCEDURE — 86900 BLOOD TYPING SEROLOGIC ABO: CPT

## 2020-01-22 PROCEDURE — C1713: CPT

## 2020-01-22 PROCEDURE — 85014 HEMATOCRIT: CPT

## 2020-01-22 PROCEDURE — C1889: CPT

## 2020-01-22 PROCEDURE — 97165 OT EVAL LOW COMPLEX 30 MIN: CPT

## 2020-01-22 PROCEDURE — 84295 ASSAY OF SERUM SODIUM: CPT

## 2020-01-22 PROCEDURE — 86850 RBC ANTIBODY SCREEN: CPT

## 2020-01-22 PROCEDURE — 82550 ASSAY OF CK (CPK): CPT

## 2020-01-22 PROCEDURE — 99285 EMERGENCY DEPT VISIT HI MDM: CPT

## 2020-01-22 PROCEDURE — 86891 AUTOLOGOUS BLOOD OP SALVAGE: CPT

## 2020-01-22 PROCEDURE — 76000 FLUOROSCOPY <1 HR PHYS/QHP: CPT

## 2020-01-22 PROCEDURE — 82962 GLUCOSE BLOOD TEST: CPT

## 2020-01-22 PROCEDURE — 76770 US EXAM ABDO BACK WALL COMP: CPT

## 2020-01-22 PROCEDURE — 97110 THERAPEUTIC EXERCISES: CPT

## 2020-01-22 PROCEDURE — 82947 ASSAY GLUCOSE BLOOD QUANT: CPT

## 2020-01-22 PROCEDURE — 97530 THERAPEUTIC ACTIVITIES: CPT

## 2020-01-22 PROCEDURE — 83735 ASSAY OF MAGNESIUM: CPT

## 2020-01-22 PROCEDURE — 82803 BLOOD GASES ANY COMBINATION: CPT

## 2020-01-22 PROCEDURE — 80048 BASIC METABOLIC PNL TOTAL CA: CPT

## 2020-01-22 RX ADMIN — TRAMADOL HYDROCHLORIDE 50 MILLIGRAM(S): 50 TABLET ORAL at 06:08

## 2020-01-22 RX ADMIN — Medication 0.5 MILLIGRAM(S): at 05:38

## 2020-01-22 RX ADMIN — BUDESONIDE AND FORMOTEROL FUMARATE DIHYDRATE 2 PUFF(S): 160; 4.5 AEROSOL RESPIRATORY (INHALATION) at 05:42

## 2020-01-22 RX ADMIN — Medication 0.5 MILLIGRAM(S): at 13:16

## 2020-01-22 RX ADMIN — CLOPIDOGREL BISULFATE 75 MILLIGRAM(S): 75 TABLET, FILM COATED ORAL at 11:26

## 2020-01-22 RX ADMIN — Medication 975 MILLIGRAM(S): at 05:38

## 2020-01-22 RX ADMIN — Medication 2: at 09:14

## 2020-01-22 RX ADMIN — Medication 81 MILLIGRAM(S): at 11:26

## 2020-01-22 RX ADMIN — Medication 975 MILLIGRAM(S): at 13:46

## 2020-01-22 RX ADMIN — FAMOTIDINE 40 MILLIGRAM(S): 10 INJECTION INTRAVENOUS at 05:38

## 2020-01-22 RX ADMIN — Medication 4: at 12:18

## 2020-01-22 RX ADMIN — INSULIN GLARGINE 40 UNIT(S): 100 INJECTION, SOLUTION SUBCUTANEOUS at 09:13

## 2020-01-22 RX ADMIN — TRAMADOL HYDROCHLORIDE 50 MILLIGRAM(S): 50 TABLET ORAL at 05:38

## 2020-01-22 RX ADMIN — Medication 975 MILLIGRAM(S): at 06:08

## 2020-01-22 RX ADMIN — BUPROPION HYDROCHLORIDE 300 MILLIGRAM(S): 150 TABLET, EXTENDED RELEASE ORAL at 11:26

## 2020-01-22 RX ADMIN — Medication 20 MILLIGRAM(S): at 09:13

## 2020-01-22 RX ADMIN — Medication 975 MILLIGRAM(S): at 13:16

## 2020-01-22 NOTE — PROGRESS NOTE ADULT - SUBJECTIVE AND OBJECTIVE BOX
[facility]   [id number]    Patient stable, tolerating diet, pain controlled on regimen.      T(C): 36.8 (01-15-20 @ 16:22), Max: 37.4 (01-15-20 @ 04:55)  HR: 89 (01-15-20 @ 16:22) (85 - 92)  BP: 149/81 (01-15-20 @ 16:22) (139/80 - 163/87)  RR: 18 (01-15-20 @ 16:22) (18 - 18)  SpO2: 97% (01-15-20 @ 16:22) (95% - 99%)  Wt(kg): --  NAD  Back: Dressing clean/dry/adherent.  Soft.  No collection.  Drains in situ.  BLE: No calf tenderness.      01-14 @ 07:01  -  01-15 @ 07:00  --------------------------------------------------------  IN: 1020 mL / OUT: 596 mL / NET: 424 mL    01-15 @ 07:01  -  01-15 @ 19:34  --------------------------------------------------------  IN: 740 mL / OUT: 80 mL / NET: 660 mL      Hemovac: 76cc  TAMIKA: 70cc  acetaminophen   Tablet .. 975 milliGRAM(s) Oral every 8 hours  albuterol/ipratropium for Nebulization 3 milliLiter(s) Nebulizer every 6 hours  aluminum hydroxide/magnesium hydroxide/simethicone Suspension 30 milliLiter(s) Oral every 4 hours PRN  aspirin enteric coated 81 milliGRAM(s) Oral daily  atorvastatin 40 milliGRAM(s) Oral at bedtime  bisacodyl Suppository 10 milliGRAM(s) Rectal daily PRN  buDESOnide    Inhalation Suspension 0.5 milliGRAM(s) Inhalation every 12 hours  buPROPion XL . 300 milliGRAM(s) Oral daily  clonazePAM  Tablet 0.5 milliGRAM(s) Oral three times a day  clopidogrel Tablet 75 milliGRAM(s) Oral daily  diphenhydrAMINE 25 milliGRAM(s) Oral every 4 hours PRN  famotidine    Tablet 40 milliGRAM(s) Oral two times a day  furosemide    Tablet 20 milliGRAM(s) Oral <User Schedule>  furosemide    Tablet 40 milliGRAM(s) Oral <User Schedule>  HYDROmorphone  Injectable 0.5 milliGRAM(s) IV Push every 3 hours PRN  insulin glargine Injectable (LANTUS) 40 Unit(s) SubCutaneous every morning  insulin lispro (HumaLOG) corrective regimen sliding scale   SubCutaneous at bedtime  insulin lispro (HumaLOG) corrective regimen sliding scale   SubCutaneous three times a day before meals  lactulose Syrup 10 Gram(s) Oral daily PRN  polyethylene glycol 3350 17 Gram(s) Oral daily  senna 2 Tablet(s) Oral at bedtime  traMADol 25 milliGRAM(s) Oral every 6 hours PRN  traMADol 50 milliGRAM(s) Oral every 6 hours PRN  zolpidem 5 milliGRAM(s) Oral at bedtime PRN  zolpidem 5 milliGRAM(s) Oral at bedtime PRN                            8.7    9.69  )-----------( 185      ( 15 Satya 2020 07:10 )             28.6     01-15    140  |  104  |  13  ----------------------------<  96  3.9   |  25  |  0.78    Ca    8.6      15 Satya 2020 07:08  Phos  3.0     01-15  Mg     1.9     01-15
CARDIOLOGY FOLLOW UP - Dr. Montgomery    CC no cp or sob        PHYSICAL EXAM:  T(C): 36.5 (01-15-20 @ 13:06), Max: 37.4 (01-15-20 @ 04:55)  HR: 90 (01-15-20 @ 13:06) (85 - 92)  BP: 163/87 (01-15-20 @ 13:06) (139/80 - 163/87)  RR: 18 (01-15-20 @ 13:06) (18 - 18)  SpO2: 98% (01-15-20 @ 13:06) (95% - 99%)  Wt(kg): --  I&O's Summary    14 Jan 2020 07:01  -  15 Satya 2020 07:00  --------------------------------------------------------  IN: 1020 mL / OUT: 596 mL / NET: 424 mL    15 Satya 2020 07:01  -  15 Satya 2020 15:45  --------------------------------------------------------  IN: 740 mL / OUT: 80 mL / NET: 660 mL        Appearance: Normal	  Cardiovascular: Normal S1 S2,RRR, No JVD, No murmurs  Respiratory: diminished   Gastrointestinal:  Soft, Non-tender, + BS	  Extremities: Normal range of motion, No clubbing, cyanosis or edema        MEDICATIONS  (STANDING):  acetaminophen   Tablet .. 975 milliGRAM(s) Oral every 8 hours  albuterol/ipratropium for Nebulization 3 milliLiter(s) Nebulizer every 6 hours  aspirin enteric coated 81 milliGRAM(s) Oral daily  atorvastatin 40 milliGRAM(s) Oral at bedtime  buDESOnide    Inhalation Suspension 0.5 milliGRAM(s) Inhalation every 12 hours  buPROPion XL . 300 milliGRAM(s) Oral daily  clonazePAM  Tablet 0.5 milliGRAM(s) Oral three times a day  clopidogrel Tablet 75 milliGRAM(s) Oral daily  famotidine    Tablet 40 milliGRAM(s) Oral two times a day  furosemide    Tablet 20 milliGRAM(s) Oral <User Schedule>  furosemide    Tablet 40 milliGRAM(s) Oral <User Schedule>  insulin glargine Injectable (LANTUS) 40 Unit(s) SubCutaneous every morning  insulin lispro (HumaLOG) corrective regimen sliding scale   SubCutaneous at bedtime  insulin lispro (HumaLOG) corrective regimen sliding scale   SubCutaneous three times a day before meals  polyethylene glycol 3350 17 Gram(s) Oral daily  senna 2 Tablet(s) Oral at bedtime      TELEMETRY: 	    ECG:  	  RADIOLOGY:   DIAGNOSTIC TESTING:  [ ] Echocardiogram:  [ ]  Catheterization:  [ ] Stress Test:    OTHER: 	  < from: Xray Chest 1 View- PORTABLE-Urgent (01.14.20 @ 15:43) >  FINDINGS:     Status post median sternotomy. Left chest wall dual-lead pacemaker with leads in the right atrium and right ventricle. The cardiac silhouette is normal in size. There is no pleural effusion. There is no pneumothorax. No focal consolidation identified. Partial visualized thoracic spinal hardware.    IMPRESSION:   Clear lungs.              < end of copied text >    LABS:	 	    Troponin T, High Sensitivity Result: 154 ng/L [0 - 51] (01-12 @ 14:06)  Troponin T, High Sensitivity Result: 157 ng/L [0 - 51] (01-12 @ 06:06)  Creatine Kinase, Serum: 2287 U/L [30 - 200] (01-12 @ 06:06)  CKMB Units: 13.7 ng/mL [0.0 - 6.7] (01-12 @ 06:06)  Troponin T, High Sensitivity Result: 182 ng/L [0 - 51] (01-12 @ 01:08)                          8.7    9.69  )-----------( 185      ( 15 Satya 2020 07:10 )             28.6     01-15    140  |  104  |  13  ----------------------------<  96  3.9   |  25  |  0.78    Ca    8.6      15 Satya 2020 07:08  Phos  3.0     01-15  Mg     1.9     01-15      PT/INR - ( 15 Satya 2020 07:10 )   PT: 13.1 sec;   INR: 1.13 ratio         PTT - ( 15 Satya 2020 07:10 )  PTT:29.1 sec
CARDIOLOGY FOLLOW UP - Dr. Montgomery    CC no cp or sob       PHYSICAL EXAM:  T(C): 36.7 (01-17-20 @ 09:34), Max: 37.3 (01-16-20 @ 16:56)  HR: 90 (01-17-20 @ 09:34) (89 - 90)  BP: 144/87 (01-17-20 @ 09:34) (144/87 - 159/90)  RR: 18 (01-17-20 @ 09:34) (18 - 18)  SpO2: 99% (01-17-20 @ 09:34) (95% - 99%)  Wt(kg): --  I&O's Summary    16 Jan 2020 07:01  -  17 Jan 2020 07:00  --------------------------------------------------------  IN: 800 mL / OUT: 1403 mL / NET: -603 mL    17 Jan 2020 07:01  -  17 Jan 2020 13:11  --------------------------------------------------------  IN: 440 mL / OUT: 390 mL / NET: 50 mL        Appearance: Normal	  Cardiovascular: Normal S1 S2,RRR,  Respiratory: diminished at base   Gastrointestinal:  Soft, Non-tender, + BS	  Extremities: Normal range of motion, No clubbing, cyanosis or edema        MEDICATIONS  (STANDING):  acetaminophen   Tablet .. 975 milliGRAM(s) Oral every 8 hours  aspirin enteric coated 81 milliGRAM(s) Oral daily  atorvastatin 40 milliGRAM(s) Oral at bedtime  budesonide 160 MICROgram(s)/formoterol 4.5 MICROgram(s) Inhaler 2 Puff(s) Inhalation two times a day  buPROPion XL . 300 milliGRAM(s) Oral daily  clonazePAM  Tablet 0.5 milliGRAM(s) Oral three times a day  clopidogrel Tablet 75 milliGRAM(s) Oral daily  famotidine    Tablet 40 milliGRAM(s) Oral two times a day  furosemide    Tablet 20 milliGRAM(s) Oral <User Schedule>  furosemide    Tablet 40 milliGRAM(s) Oral <User Schedule>  insulin glargine Injectable (LANTUS) 40 Unit(s) SubCutaneous every morning  insulin lispro (HumaLOG) corrective regimen sliding scale   SubCutaneous at bedtime  insulin lispro (HumaLOG) corrective regimen sliding scale   SubCutaneous three times a day before meals  polyethylene glycol 3350 17 Gram(s) Oral daily  senna 2 Tablet(s) Oral at bedtime      TELEMETRY: 	    ECG:  	  RADIOLOGY:   DIAGNOSTIC TESTING:  [ ] Echocardiogram:  [ ]  Catheterization:  [ ] Stress Test:    OTHER: 	    LABS:	 	    Troponin T, High Sensitivity Result: 154 ng/L [0 - 51] (01-12 @ 14:06)  Troponin T, High Sensitivity Result: 157 ng/L [0 - 51] (01-12 @ 06:06)  Creatine Kinase, Serum: 2287 U/L [30 - 200] (01-12 @ 06:06)  CKMB Units: 13.7 ng/mL [0.0 - 6.7] (01-12 @ 06:06)  Troponin T, High Sensitivity Result: 182 ng/L [0 - 51] (01-12 @ 01:08)                          9.5    11.53 )-----------( 268      ( 17 Jan 2020 05:05 )             30.4     01-17    140  |  103  |  17  ----------------------------<  142<H>  3.8   |  25  |  0.86    Ca    8.9      17 Jan 2020 05:05  Phos  3.3     01-17  Mg     2.0     01-17      PT/INR - ( 17 Jan 2020 05:05 )   PT: 13.5 sec;   INR: 1.17 ratio         PTT - ( 17 Jan 2020 05:05 )  PTT:28.4 sec
CARDIOLOGY FOLLOW UP - Dr. Montgomery    CC no cp or sob       PHYSICAL EXAM:  T(C): 36.7 (01-20-20 @ 16:25), Max: 37.5 (01-20-20 @ 14:47)  HR: 89 (01-20-20 @ 16:25) (65 - 91)  BP: 150/83 (01-20-20 @ 16:25) (106/60 - 155/85)  RR: 18 (01-20-20 @ 16:25) (18 - 18)  SpO2: 97% (01-20-20 @ 16:25) (95% - 99%)  Wt(kg): --  I&O's Summary    19 Jan 2020 07:01  -  20 Jan 2020 07:00  --------------------------------------------------------  IN: 900 mL / OUT: 530 mL / NET: 370 mL    20 Jan 2020 07:01  -  20 Jan 2020 17:17  --------------------------------------------------------  IN: 560 mL / OUT: 25 mL / NET: 535 mL        Appearance: Normal	  Cardiovascular: Normal S1 S2,RRR, No JVD, No murmurs  Respiratory: Lungs clear to auscultation	  Gastrointestinal:  Soft, Non-tender, + BS	  Extremities: Normal range of motion, No clubbing, cyanosis or edema        MEDICATIONS  (STANDING):  acetaminophen   Tablet .. 975 milliGRAM(s) Oral every 8 hours  aspirin enteric coated 81 milliGRAM(s) Oral daily  atorvastatin 40 milliGRAM(s) Oral at bedtime  budesonide 160 MICROgram(s)/formoterol 4.5 MICROgram(s) Inhaler 2 Puff(s) Inhalation two times a day  buPROPion XL . 300 milliGRAM(s) Oral daily  clonazePAM  Tablet 0.5 milliGRAM(s) Oral three times a day  clopidogrel Tablet 75 milliGRAM(s) Oral daily  famotidine    Tablet 40 milliGRAM(s) Oral two times a day  furosemide    Tablet 20 milliGRAM(s) Oral <User Schedule>  furosemide    Tablet 40 milliGRAM(s) Oral <User Schedule>  insulin glargine Injectable (LANTUS) 40 Unit(s) SubCutaneous every morning  insulin lispro (HumaLOG) corrective regimen sliding scale   SubCutaneous at bedtime  insulin lispro (HumaLOG) corrective regimen sliding scale   SubCutaneous three times a day before meals  polyethylene glycol 3350 17 Gram(s) Oral daily  senna 2 Tablet(s) Oral at bedtime      TELEMETRY: 	    ECG:  	  RADIOLOGY:   DIAGNOSTIC TESTING:  [ ] Echocardiogram:  [ ]  Catheterization:  [ ] Stress Test:    OTHER: 	    LABS:
CARDIOLOGY FOLLOW UP - Dr. Montgomery    CC no cp/sob       PHYSICAL EXAM:  T(C): 36.7 (01-16-20 @ 09:20), Max: 37.2 (01-16-20 @ 05:06)  HR: 89 (01-16-20 @ 09:20) (89 - 90)  BP: 146/85 (01-16-20 @ 09:20) (129/79 - 149/81)  RR: 18 (01-16-20 @ 09:20) (18 - 18)  SpO2: 99% (01-16-20 @ 09:20) (94% - 99%)  Wt(kg): --  I&O's Summary    15 Satya 2020 07:01  -  16 Jan 2020 07:00  --------------------------------------------------------  IN: 1320 mL / OUT: 630 mL / NET: 690 mL    16 Jan 2020 07:01  -  16 Jan 2020 13:31  --------------------------------------------------------  IN: 480 mL / OUT: 405 mL / NET: 75 mL    Appearance: Normal	  Cardiovascular: Normal S1 S2,RRR, No JVD, No murmurs  Respiratory: Lungs clear to auscultation	  Gastrointestinal:  Soft, Non-tender, + BS	  Extremities: Normal range of motion, No clubbing, cyanosis or edema    MEDICATIONS  (STANDING):  acetaminophen   Tablet .. 975 milliGRAM(s) Oral every 8 hours  aspirin enteric coated 81 milliGRAM(s) Oral daily  atorvastatin 40 milliGRAM(s) Oral at bedtime  budesonide 160 MICROgram(s)/formoterol 4.5 MICROgram(s) Inhaler 2 Puff(s) Inhalation two times a day  buPROPion XL . 300 milliGRAM(s) Oral daily  clonazePAM  Tablet 0.5 milliGRAM(s) Oral three times a day  clopidogrel Tablet 75 milliGRAM(s) Oral daily  famotidine    Tablet 40 milliGRAM(s) Oral two times a day  furosemide    Tablet 20 milliGRAM(s) Oral <User Schedule>  furosemide    Tablet 40 milliGRAM(s) Oral <User Schedule>  insulin glargine Injectable (LANTUS) 40 Unit(s) SubCutaneous every morning  insulin lispro (HumaLOG) corrective regimen sliding scale   SubCutaneous at bedtime  insulin lispro (HumaLOG) corrective regimen sliding scale   SubCutaneous three times a day before meals  polyethylene glycol 3350 17 Gram(s) Oral daily  senna 2 Tablet(s) Oral at bedtime      TELEMETRY: 	    ECG:  	  RADIOLOGY:   DIAGNOSTIC TESTING:  [ ] Echocardiogram:  [ ]  Catheterization:  [ ] Stress Test:    OTHER: 	    LABS:	 	                                8.7    9.69  )-----------( 185      ( 15 Satya 2020 07:10 )             28.6     01-15    140  |  104  |  13  ----------------------------<  96  3.9   |  25  |  0.78    Ca    8.6      15 Satya 2020 07:08  Phos  3.0     01-15  Mg     1.9     01-15      PT/INR - ( 15 Satya 2020 07:10 )   PT: 13.1 sec;   INR: 1.13 ratio         PTT - ( 15 Satya 2020 07:10 )  PTT:29.1 sec
CARDIOLOGY FOLLOW UP - Dr. Montgomery    CC no cp/sob       PHYSICAL EXAM:  T(C): 36.9 (01-13-20 @ 08:22), Max: 37.4 (01-12-20 @ 20:00)  HR: 84 (01-13-20 @ 12:14) (84 - 93)  BP: 135/75 (01-13-20 @ 12:14) (114/59 - 144/78)  RR: 18 (01-13-20 @ 08:22) (18 - 53)  SpO2: 97% (01-13-20 @ 12:14) (91% - 99%)  Wt(kg): --  I&O's Summary    12 Jan 2020 07:01  -  13 Jan 2020 07:00  --------------------------------------------------------  IN: 2150 mL / OUT: 2245 mL / NET: -95 mL    13 Jan 2020 07:01  -  13 Jan 2020 12:26  --------------------------------------------------------  IN: 200 mL / OUT: 95 mL / NET: 105 mL        Appearance: Normal	  Cardiovascular: Normal S1 S2,RRR, No JVD, No murmurs  Respiratory: Lungs clear to auscultation	  Gastrointestinal:  Soft, Non-tender, + BS	  Extremities: Normal range of motion, No clubbing, cyanosis or edema        MEDICATIONS  (STANDING):  acetaminophen   Tablet .. 975 milliGRAM(s) Oral every 6 hours  aspirin enteric coated 81 milliGRAM(s) Oral daily  atorvastatin 40 milliGRAM(s) Oral at bedtime  budesonide 160 MICROgram(s)/formoterol 4.5 MICROgram(s) Inhaler 2 Puff(s) Inhalation two times a day  buPROPion XL . 300 milliGRAM(s) Oral daily  chlorhexidine 2% Cloths 1 Application(s) Topical daily  clonazePAM  Tablet 0.5 milliGRAM(s) Oral three times a day  famotidine Injectable 20 milliGRAM(s) IV Push daily  insulin glargine Injectable (LANTUS) 40 Unit(s) SubCutaneous every morning  insulin lispro (HumaLOG) corrective regimen sliding scale   SubCutaneous at bedtime  insulin lispro (HumaLOG) corrective regimen sliding scale   SubCutaneous three times a day before meals  melatonin 5 milliGRAM(s) Oral at bedtime  polyethylene glycol 3350 17 Gram(s) Oral daily  senna 2 Tablet(s) Oral at bedtime  sodium chloride 0.9%. 1000 milliLiter(s) (50 mL/Hr) IV Continuous <Continuous>      TELEMETRY: 	    ECG:  	  RADIOLOGY:   DIAGNOSTIC TESTING:  [ ] Echocardiogram:  [ ]  Catheterization:  [ ] Stress Test:    OTHER: 	    LABS:	 	                                7.3    12.40 )-----------( 145      ( 13 Jan 2020 08:35 )             23.0     01-13    137  |  106  |  18  ----------------------------<  155<H>  3.9   |  21<L>  |  0.93    Ca    7.8<L>      13 Jan 2020 07:08  Phos  2.4     01-12  Mg     2.2     01-12      PT/INR - ( 12 Jan 2020 01:08 )   PT: 14.1 sec;   INR: 1.23 ratio         PTT - ( 12 Jan 2020 01:08 )  PTT:25.1 sec
CARDIOLOGY FOLLOW UP - Dr. Montgomery    CC no cp/sob       PHYSICAL EXAM:  T(C): 37.1 (01-19-20 @ 04:56), Max: 37.3 (01-18-20 @ 23:49)  HR: 90 (01-19-20 @ 04:56) (87 - 90)  BP: 158/87 (01-19-20 @ 04:56) (134/79 - 159/81)  RR: 18 (01-19-20 @ 04:56) (18 - 18)  SpO2: 95% (01-19-20 @ 04:56) (95% - 98%)  Wt(kg): --  I&O's Summary    18 Jan 2020 07:01  -  19 Jan 2020 07:00  --------------------------------------------------------  IN: 1320 mL / OUT: 1210 mL / NET: 110 mL        Appearance: Normal	  Cardiovascular: Normal S1 S2,RRR, No JVD, No murmurs  Respiratory: Lungs clear to auscultation	  Gastrointestinal:  Soft, Non-tender, + BS	  Extremities: Normal range of motion, No clubbing, cyanosis or edema        MEDICATIONS  (STANDING):  acetaminophen   Tablet .. 975 milliGRAM(s) Oral every 8 hours  aspirin enteric coated 81 milliGRAM(s) Oral daily  atorvastatin 40 milliGRAM(s) Oral at bedtime  budesonide 160 MICROgram(s)/formoterol 4.5 MICROgram(s) Inhaler 2 Puff(s) Inhalation two times a day  buPROPion XL . 300 milliGRAM(s) Oral daily  clonazePAM  Tablet 0.5 milliGRAM(s) Oral three times a day  clopidogrel Tablet 75 milliGRAM(s) Oral daily  famotidine    Tablet 40 milliGRAM(s) Oral two times a day  furosemide    Tablet 20 milliGRAM(s) Oral <User Schedule>  furosemide    Tablet 40 milliGRAM(s) Oral <User Schedule>  insulin glargine Injectable (LANTUS) 40 Unit(s) SubCutaneous every morning  insulin lispro (HumaLOG) corrective regimen sliding scale   SubCutaneous at bedtime  insulin lispro (HumaLOG) corrective regimen sliding scale   SubCutaneous three times a day before meals  polyethylene glycol 3350 17 Gram(s) Oral daily  senna 2 Tablet(s) Oral at bedtime      TELEMETRY: 	    ECG:  	  RADIOLOGY:   DIAGNOSTIC TESTING:  [ ] Echocardiogram:  [ ]  Catheterization:  [ ] Stress Test:    OTHER: 	    LABS:
CARDIOLOGY FOLLOW UP - Dr. Montgomery    CC no cp/sob   c/o back pain with deep breathing - incentive spirometer encouraged     PHYSICAL EXAM:  T(C): 36.9 (01-14-20 @ 08:58), Max: 37.8 (01-13-20 @ 22:59)  HR: 90 (01-14-20 @ 08:58) (84 - 91)  BP: 156/93 (01-14-20 @ 08:58) (135/75 - 158/84)  RR: 17 (01-14-20 @ 08:58) (17 - 18)  SpO2: 98% (01-14-20 @ 08:58) (94% - 98%)  Wt(kg): --  I&O's Summary    13 Jan 2020 07:01  -  14 Jan 2020 07:00  --------------------------------------------------------  IN: 2140 mL / OUT: 1730 mL / NET: 410 mL        Appearance: Normal	  Cardiovascular: Normal S1 S2,RRR, No JVD, No murmurs  Respiratory: Lungs clear to auscultation	  Gastrointestinal:  Soft, Non-tender, + BS	  Extremities: Normal range of motion, No clubbing, cyanosis or edema        MEDICATIONS  (STANDING):  acetaminophen   Tablet .. 975 milliGRAM(s) Oral every 8 hours  aspirin enteric coated 81 milliGRAM(s) Oral daily  atorvastatin 40 milliGRAM(s) Oral at bedtime  budesonide 160 MICROgram(s)/formoterol 4.5 MICROgram(s) Inhaler 2 Puff(s) Inhalation two times a day  buPROPion XL . 300 milliGRAM(s) Oral daily  clonazePAM  Tablet 0.5 milliGRAM(s) Oral three times a day  famotidine    Tablet 40 milliGRAM(s) Oral two times a day  insulin glargine Injectable (LANTUS) 40 Unit(s) SubCutaneous every morning  insulin lispro (HumaLOG) corrective regimen sliding scale   SubCutaneous at bedtime  insulin lispro (HumaLOG) corrective regimen sliding scale   SubCutaneous three times a day before meals  polyethylene glycol 3350 17 Gram(s) Oral daily  senna 2 Tablet(s) Oral at bedtime      TELEMETRY: 	    ECG:  	  RADIOLOGY:   DIAGNOSTIC TESTING:  [ ] Echocardiogram:  [ ]  Catheterization:  [ ] Stress Test:    OTHER: 	    LABS:	 	                                8.2    11.37 )-----------( 148      ( 14 Jan 2020 07:19 )             25.4     01-14    136  |  105  |  12  ----------------------------<  138<H>  3.8   |  24  |  0.76    Ca    8.0<L>      14 Jan 2020 07:19  Phos  2.4     01-12  Mg     2.2     01-12
CARDIOLOGY FOLLOW UP - Dr. Montgomery    CC no cp/sob  oob in BR with no complaints of dizziness or pain       PHYSICAL EXAM:  T(C): 37.6 (01-18-20 @ 06:11), Max: 37.6 (01-18-20 @ 06:11)  HR: 89 (01-18-20 @ 06:11) (88 - 91)  BP: 146/81 (01-18-20 @ 06:11) (143/75 - 154/80)  RR: 17 (01-18-20 @ 06:11) (17 - 18)  SpO2: 94% (01-18-20 @ 06:11) (94% - 99%)  Wt(kg): --  I&O's Summary    17 Jan 2020 07:01  -  18 Jan 2020 07:00  --------------------------------------------------------  IN: 1320 mL / OUT: 1201 mL / NET: 119 mL        Appearance: Normal	  Cardiovascular: Normal S1 S2,RRR,   Respiratory: diminished   Gastrointestinal:  Soft, Non-tender, + BS	  Extremities: Normal range of motion, No clubbing, cyanosis or edema        MEDICATIONS  (STANDING):  acetaminophen   Tablet .. 975 milliGRAM(s) Oral every 8 hours  aspirin enteric coated 81 milliGRAM(s) Oral daily  atorvastatin 40 milliGRAM(s) Oral at bedtime  budesonide 160 MICROgram(s)/formoterol 4.5 MICROgram(s) Inhaler 2 Puff(s) Inhalation two times a day  buPROPion XL . 300 milliGRAM(s) Oral daily  clonazePAM  Tablet 0.5 milliGRAM(s) Oral three times a day  clopidogrel Tablet 75 milliGRAM(s) Oral daily  famotidine    Tablet 40 milliGRAM(s) Oral two times a day  furosemide    Tablet 20 milliGRAM(s) Oral <User Schedule>  furosemide    Tablet 40 milliGRAM(s) Oral <User Schedule>  insulin glargine Injectable (LANTUS) 40 Unit(s) SubCutaneous every morning  insulin lispro (HumaLOG) corrective regimen sliding scale   SubCutaneous at bedtime  insulin lispro (HumaLOG) corrective regimen sliding scale   SubCutaneous three times a day before meals  polyethylene glycol 3350 17 Gram(s) Oral daily  senna 2 Tablet(s) Oral at bedtime      TELEMETRY: 	    ECG:  	  RADIOLOGY:   DIAGNOSTIC TESTING:  [ ] Echocardiogram:  [ ]  Catheterization:  [ ] Stress Test:    OTHER: 	    LABS:	 	                                9.5    11.53 )-----------( 268      ( 17 Jan 2020 05:05 )             30.4     01-17    140  |  103  |  17  ----------------------------<  142<H>  3.8   |  25  |  0.86    Ca    8.9      17 Jan 2020 05:05  Phos  3.3     01-17  Mg     2.0     01-17      PT/INR - ( 17 Jan 2020 05:05 )   PT: 13.5 sec;   INR: 1.17 ratio         PTT - ( 17 Jan 2020 05:05 )  PTT:28.4 sec
CC: no events, no cp/sob    TELEMETRY:     PHYSICAL EXAM:    T(C): 36.7 (01-21-20 @ 09:20), Max: 37.5 (01-20-20 @ 14:47)  HR: 92 (01-21-20 @ 09:20) (88 - 92)  BP: 138/85 (01-21-20 @ 09:20) (136/82 - 154/84)  RR: 18 (01-21-20 @ 09:20) (18 - 18)  SpO2: 97% (01-21-20 @ 09:20) (96% - 98%)  Wt(kg): --  I&O's Summary    20 Jan 2020 07:01  -  21 Jan 2020 07:00  --------------------------------------------------------  IN: 1000 mL / OUT: 705 mL / NET: 295 mL        Appearance: Normal	  Cardiovascular: Normal S1 S2,RRR, No JVD, No murmurs  Respiratory: Lungs clear to auscultation	  Gastrointestinal:  Soft, Non-tender, + BS	  Extremities: Normal range of motion, No clubbing, cyanosis or edema  Vascular: Peripheral pulses palpable 2+ bilaterally     LABS:	 	                  CARDIAC MARKERS:        MEDICATIONS  (STANDING):  acetaminophen   Tablet .. 975 milliGRAM(s) Oral every 8 hours  aspirin enteric coated 81 milliGRAM(s) Oral daily  atorvastatin 40 milliGRAM(s) Oral at bedtime  budesonide 160 MICROgram(s)/formoterol 4.5 MICROgram(s) Inhaler 2 Puff(s) Inhalation two times a day  buPROPion XL . 300 milliGRAM(s) Oral daily  clonazePAM  Tablet 0.5 milliGRAM(s) Oral three times a day  clopidogrel Tablet 75 milliGRAM(s) Oral daily  famotidine    Tablet 40 milliGRAM(s) Oral two times a day  furosemide    Tablet 20 milliGRAM(s) Oral <User Schedule>  furosemide    Tablet 40 milliGRAM(s) Oral <User Schedule>  insulin glargine Injectable (LANTUS) 40 Unit(s) SubCutaneous every morning  insulin lispro (HumaLOG) corrective regimen sliding scale   SubCutaneous at bedtime  insulin lispro (HumaLOG) corrective regimen sliding scale   SubCutaneous three times a day before meals  polyethylene glycol 3350 17 Gram(s) Oral daily  senna 2 Tablet(s) Oral at bedtime
Follow-up Pulm Progress Note    No new respiratory events overnight.  Denies increased SOB, chest pain, cough or mucus.    Medications:  MEDICATIONS  (STANDING):  acetaminophen   Tablet .. 975 milliGRAM(s) Oral every 8 hours  albuterol/ipratropium for Nebulization 3 milliLiter(s) Nebulizer every 6 hours  aspirin enteric coated 81 milliGRAM(s) Oral daily  atorvastatin 40 milliGRAM(s) Oral at bedtime  buDESOnide    Inhalation Suspension 0.5 milliGRAM(s) Inhalation every 12 hours  buPROPion XL . 300 milliGRAM(s) Oral daily  clonazePAM  Tablet 0.5 milliGRAM(s) Oral three times a day  clopidogrel Tablet 75 milliGRAM(s) Oral daily  famotidine    Tablet 40 milliGRAM(s) Oral two times a day  furosemide    Tablet 20 milliGRAM(s) Oral <User Schedule>  furosemide    Tablet 40 milliGRAM(s) Oral <User Schedule>  insulin glargine Injectable (LANTUS) 40 Unit(s) SubCutaneous every morning  insulin lispro (HumaLOG) corrective regimen sliding scale   SubCutaneous at bedtime  insulin lispro (HumaLOG) corrective regimen sliding scale   SubCutaneous three times a day before meals  polyethylene glycol 3350 17 Gram(s) Oral daily  senna 2 Tablet(s) Oral at bedtime    MEDICATIONS  (PRN):  aluminum hydroxide/magnesium hydroxide/simethicone Suspension 30 milliLiter(s) Oral every 4 hours PRN Dyspepsia  bisacodyl Suppository 10 milliGRAM(s) Rectal daily PRN Constipation  diphenhydrAMINE 25 milliGRAM(s) Oral every 4 hours PRN Rash and/or Itching  HYDROmorphone  Injectable 0.5 milliGRAM(s) IV Push every 3 hours PRN breakthrough pain  lactulose Syrup 10 Gram(s) Oral daily PRN Constipation  traMADol 25 milliGRAM(s) Oral every 6 hours PRN Moderate Pain (4 - 6)  traMADol 50 milliGRAM(s) Oral every 6 hours PRN Severe Pain (7 - 10)  zolpidem 5 milliGRAM(s) Oral at bedtime PRN Insomnia  zolpidem 5 milliGRAM(s) Oral at bedtime PRN Insomnia      Vent settings (if applicable)      Vital Signs Last 24 Hrs  T(C): 37.2 (16 Jan 2020 05:06), Max: 37.2 (16 Jan 2020 05:06)  T(F): 99 (16 Jan 2020 05:06), Max: 99 (16 Jan 2020 05:06)  HR: 90 (16 Jan 2020 05:06) (89 - 90)  BP: 148/87 (16 Jan 2020 05:06) (129/79 - 163/87)  BP(mean): --  RR: 18 (16 Jan 2020 05:06) (18 - 18)  SpO2: 97% (16 Jan 2020 05:06) (94% - 98%)          01-15 @ 07:01  -  01-16 @ 07:00  --------------------------------------------------------  IN: 1320 mL / OUT: 630 mL / NET: 690 mL          LABS:                        8.7    9.69  )-----------( 185      ( 15 Satya 2020 07:10 )             28.6     01-15    140  |  104  |  13  ----------------------------<  96  3.9   |  25  |  0.78    Ca    8.6      15 Satya 2020 07:08  Phos  3.0     01-15  Mg     1.9     01-15            CAPILLARY BLOOD GLUCOSE      POCT Blood Glucose.: 189 mg/dL (16 Jan 2020 08:31)    PT/INR - ( 15 Satya 2020 07:10 )   PT: 13.1 sec;   INR: 1.13 ratio         PTT - ( 15 Satya 2020 07:10 )  PTT:29.1 sec          CULTURES:        Physical Examination:  Awake and alert, generally comfortable  HEENT: unremarkable  PULM: Clear to auscultation bilaterally, no significant sputum production  CVS: Regular rate and rhythm, no murmurs, rubs, or gallops  Abd:  soft, non tender  Extrem: No CCE    RADIOLOGY REVIEWED  CXR:    CT chest:
Follow-up Pulm Progress Note    No new respiratory events overnight.  Denies increased SOB, chest pain, cough or mucus.  breathes better after nebs, but notes chest discomfort after nebs while lungs "open"    Medications:  MEDICATIONS  (STANDING):  acetaminophen   Tablet .. 975 milliGRAM(s) Oral every 8 hours  albuterol/ipratropium for Nebulization 3 milliLiter(s) Nebulizer every 6 hours  aspirin enteric coated 81 milliGRAM(s) Oral daily  atorvastatin 40 milliGRAM(s) Oral at bedtime  buDESOnide    Inhalation Suspension 0.5 milliGRAM(s) Inhalation every 12 hours  buPROPion XL . 300 milliGRAM(s) Oral daily  clonazePAM  Tablet 0.5 milliGRAM(s) Oral three times a day  clopidogrel Tablet 75 milliGRAM(s) Oral daily  famotidine    Tablet 40 milliGRAM(s) Oral two times a day  furosemide    Tablet 20 milliGRAM(s) Oral <User Schedule>  furosemide    Tablet 40 milliGRAM(s) Oral <User Schedule>  insulin glargine Injectable (LANTUS) 40 Unit(s) SubCutaneous every morning  insulin lispro (HumaLOG) corrective regimen sliding scale   SubCutaneous at bedtime  insulin lispro (HumaLOG) corrective regimen sliding scale   SubCutaneous three times a day before meals  polyethylene glycol 3350 17 Gram(s) Oral daily  senna 2 Tablet(s) Oral at bedtime    MEDICATIONS  (PRN):  bisacodyl Suppository 10 milliGRAM(s) Rectal daily PRN Constipation  diphenhydrAMINE 25 milliGRAM(s) Oral every 4 hours PRN Rash and/or Itching  HYDROmorphone  Injectable 0.5 milliGRAM(s) IV Push every 3 hours PRN breakthrough pain  lactulose Syrup 10 Gram(s) Oral daily PRN Constipation  traMADol 25 milliGRAM(s) Oral every 6 hours PRN Moderate Pain (4 - 6)  traMADol 50 milliGRAM(s) Oral every 6 hours PRN Severe Pain (7 - 10)  zolpidem 5 milliGRAM(s) Oral at bedtime PRN Insomnia  zolpidem 5 milliGRAM(s) Oral at bedtime PRN Insomnia      Vent settings (if applicable)      Vital Signs Last 24 Hrs  T(C): 36.5 (15 Satya 2020 09:16), Max: 37.4 (15 Satya 2020 04:55)  T(F): 97.7 (15 Satya 2020 09:16), Max: 99.4 (15 Satya 2020 04:55)  HR: 90 (15 Satya 2020 09:16) (85 - 92)  BP: 153/95 (15 Satya 2020 09:16) (139/80 - 160/91)  BP(mean): --  RR: 18 (15 Satya 2020 09:16) (17 - 18)  SpO2: 99% (15 Satya 2020 09:16) (95% - 99%)          01-14 @ 07:01  -  01-15 @ 07:00  --------------------------------------------------------  IN: 1020 mL / OUT: 596 mL / NET: 424 mL          LABS:                        8.7    9.69  )-----------( 185      ( 15 Satya 2020 07:10 )             28.6     01-15    140  |  104  |  13  ----------------------------<  96  3.9   |  25  |  0.78    Ca    8.6      15 Satya 2020 07:08  Phos  3.0     01-15  Mg     1.9     01-15            CAPILLARY BLOOD GLUCOSE      POCT Blood Glucose.: 155 mg/dL (15 Satya 2020 08:55)    PT/INR - ( 15 Satya 2020 07:10 )   PT: 13.1 sec;   INR: 1.13 ratio         PTT - ( 15 Satya 2020 07:10 )  PTT:29.1 sec          CULTURES:        Physical Examination:  Awake and alert, generally comfortable  HEENT: unremarkable  PULM: Clear to auscultation bilaterally, no significant sputum production  CVS: Regular rate and rhythm, no murmurs, rubs, or gallops  Abd:  soft, non tender  Extrem: No CCE    RADIOLOGY REVIEWED  CXR:    CT chest:
Follow-up Pulm Progress Note    No new respiratory events overnight.  Denies increased SOB, chest pain, cough or mucus.  feels better off of nebs.  still with drainage from hip    Medications:  MEDICATIONS  (STANDING):  acetaminophen   Tablet .. 975 milliGRAM(s) Oral every 8 hours  aspirin enteric coated 81 milliGRAM(s) Oral daily  atorvastatin 40 milliGRAM(s) Oral at bedtime  budesonide 160 MICROgram(s)/formoterol 4.5 MICROgram(s) Inhaler 2 Puff(s) Inhalation two times a day  buPROPion XL . 300 milliGRAM(s) Oral daily  clonazePAM  Tablet 0.5 milliGRAM(s) Oral three times a day  clopidogrel Tablet 75 milliGRAM(s) Oral daily  famotidine    Tablet 40 milliGRAM(s) Oral two times a day  furosemide    Tablet 20 milliGRAM(s) Oral <User Schedule>  furosemide    Tablet 40 milliGRAM(s) Oral <User Schedule>  insulin glargine Injectable (LANTUS) 40 Unit(s) SubCutaneous every morning  insulin lispro (HumaLOG) corrective regimen sliding scale   SubCutaneous at bedtime  insulin lispro (HumaLOG) corrective regimen sliding scale   SubCutaneous three times a day before meals  polyethylene glycol 3350 17 Gram(s) Oral daily  senna 2 Tablet(s) Oral at bedtime    MEDICATIONS  (PRN):  aluminum hydroxide/magnesium hydroxide/simethicone Suspension 30 milliLiter(s) Oral every 4 hours PRN Dyspepsia  bisacodyl Suppository 10 milliGRAM(s) Rectal daily PRN Constipation  diphenhydrAMINE 25 milliGRAM(s) Oral every 4 hours PRN Rash and/or Itching  HYDROmorphone  Injectable 0.5 milliGRAM(s) IV Push every 3 hours PRN breakthrough pain  lactulose Syrup 10 Gram(s) Oral daily PRN Constipation  traMADol 25 milliGRAM(s) Oral every 6 hours PRN Moderate Pain (4 - 6)  traMADol 50 milliGRAM(s) Oral every 6 hours PRN Severe Pain (7 - 10)  zolpidem 5 milliGRAM(s) Oral at bedtime PRN Insomnia  zolpidem 5 milliGRAM(s) Oral at bedtime PRN Insomnia      Vent settings (if applicable)      Vital Signs Last 24 Hrs  T(C): 37.1 (17 Jan 2020 04:44), Max: 37.3 (16 Jan 2020 16:56)  T(F): 98.8 (17 Jan 2020 04:44), Max: 99.1 (16 Jan 2020 16:56)  HR: 90 (17 Jan 2020 04:44) (89 - 90)  BP: 159/90 (17 Jan 2020 04:44) (145/81 - 159/90)  BP(mean): --  RR: 18 (17 Jan 2020 04:44) (18 - 18)  SpO2: 95% (17 Jan 2020 04:44) (95% - 99%)          01-16 @ 07:01  -  01-17 @ 07:00  --------------------------------------------------------  IN: 800 mL / OUT: 1403 mL / NET: -603 mL          LABS:                        9.5    11.53 )-----------( 268      ( 17 Jan 2020 05:05 )             30.4     01-17    140  |  103  |  17  ----------------------------<  142<H>  3.8   |  25  |  0.86    Ca    8.9      17 Jan 2020 05:05  Phos  3.3     01-17  Mg     2.0     01-17            CAPILLARY BLOOD GLUCOSE      POCT Blood Glucose.: 167 mg/dL (17 Jan 2020 08:44)    PT/INR - ( 17 Jan 2020 05:05 )   PT: 13.5 sec;   INR: 1.17 ratio         PTT - ( 17 Jan 2020 05:05 )  PTT:28.4 sec          CULTURES:        Physical Examination:  Awake and alert, generally comfortable  HEENT: unremarkable  PULM: Clear to auscultation bilaterally, no significant sputum production  CVS: Regular rate and rhythm, no murmurs, rubs, or gallops  Abd:  soft, non tender  Extrem: No CCE, drain right hip    RADIOLOGY REVIEWED  CXR:    CT chest:
[facility]   [id number]    Patient stable, tolerating diet, pain controlled on regimen.      T(C): 37.5 (01-11-20 @ 15:00), Max: 37.5 (01-11-20 @ 15:00)  HR: 95 (01-11-20 @ 16:00) (88 - 95)  BP: 120/69 (01-11-20 @ 16:00) (86/59 - 139/70)  RR: 21 (01-11-20 @ 16:00) (14 - 33)  SpO2: 98% (01-11-20 @ 16:00) (93% - 100%)  Wt(kg): --  NAD  Back: Dressing clean/dry/adherent.  Soft.  No collection.  Drains in situ.  BLE: No calf tenderness.      01-10 @ 07:01  -  01-11 @ 07:00  --------------------------------------------------------  IN: 2861 mL / OUT: 985 mL / NET: 1876 mL    01-11 @ 07:01 - 01-11 @ 16:56  --------------------------------------------------------  IN: 1640 mL / OUT: 470 mL / NET: 1170 mL      Hemovac:  TAMIKA:   acetaminophen  IVPB .. 1000 milliGRAM(s) IV Intermittent every 6 hours  aspirin enteric coated 81 milliGRAM(s) Oral daily  atorvastatin 40 milliGRAM(s) Oral at bedtime  budesonide 160 MICROgram(s)/formoterol 4.5 MICROgram(s) Inhaler 2 Puff(s) Inhalation two times a day  buPROPion XL . 300 milliGRAM(s) Oral daily  chlorhexidine 2% Cloths 1 Application(s) Topical daily  clonazePAM  Tablet 1 milliGRAM(s) Oral three times a day  famotidine Injectable 20 milliGRAM(s) IV Push daily  HYDROmorphone  Injectable 0.5 milliGRAM(s) IV Push every 3 hours PRN  insulin glargine Injectable (LANTUS) 32 Unit(s) SubCutaneous every morning  insulin lispro (HumaLOG) corrective regimen sliding scale   SubCutaneous three times a day before meals  insulin lispro (HumaLOG) corrective regimen sliding scale   SubCutaneous at bedtime  sodium chloride 0.9%. 1000 milliLiter(s) IV Continuous <Continuous>  traMADol 25 milliGRAM(s) Oral every 6 hours PRN  traMADol 50 milliGRAM(s) Oral every 6 hours PRN                            9.1    22.36 )-----------( 197      ( 11 Jan 2020 12:51 )             30.1     01-11    134<L>  |  100  |  23  ----------------------------<  260<H>  5.4<H>   |  21<L>  |  1.71<H>    Ca    8.4      11 Jan 2020 12:51  Phos  3.6     01-11  Mg     2.1     01-11    TPro  7.7  /  Alb  4.3  /  TBili  0.2  /  DBili  x   /  AST  52<H>  /  ALT  20  /  AlkPhos  68  01-09
[facility]   [id number]    Patient stable, tolerating diet, pain controlled on regimen.      T(C): 36.9 (01-13-20 @ 08:22), Max: 37.4 (01-12-20 @ 20:00)  HR: 90 (01-13-20 @ 08:22) (88 - 93)  BP: 132/67 (01-13-20 @ 08:22) (114/59 - 144/78)  RR: 18 (01-13-20 @ 08:22) (18 - 53)  SpO2: 93% (01-13-20 @ 08:22) (91% - 99%)  Wt(kg): --  NAD  Back: Dressing clean/dry/adherent.  Soft.  No collection.  Drains in situ.  BLE: No calf tenderness.      01-12 @ 07:01  -  01-13 @ 07:00  --------------------------------------------------------  IN: 2150 mL / OUT: 2245 mL / NET: -95 mL      Hemovac: 370cc  TAMIKA: 180cc  acetaminophen   Tablet .. 975 milliGRAM(s) Oral every 6 hours  aspirin enteric coated 81 milliGRAM(s) Oral daily  atorvastatin 40 milliGRAM(s) Oral at bedtime  budesonide 160 MICROgram(s)/formoterol 4.5 MICROgram(s) Inhaler 2 Puff(s) Inhalation two times a day  buPROPion XL . 300 milliGRAM(s) Oral daily  chlorhexidine 2% Cloths 1 Application(s) Topical daily  clonazePAM  Tablet 0.5 milliGRAM(s) Oral three times a day  famotidine Injectable 20 milliGRAM(s) IV Push daily  HYDROmorphone  Injectable 0.5 milliGRAM(s) IV Push every 3 hours PRN  insulin glargine Injectable (LANTUS) 40 Unit(s) SubCutaneous every morning  insulin lispro (HumaLOG) corrective regimen sliding scale   SubCutaneous at bedtime  insulin lispro (HumaLOG) corrective regimen sliding scale   SubCutaneous three times a day before meals  melatonin 5 milliGRAM(s) Oral at bedtime  polyethylene glycol 3350 17 Gram(s) Oral daily  senna 2 Tablet(s) Oral at bedtime  sodium chloride 0.9%. 1000 milliLiter(s) IV Continuous <Continuous>  traMADol 25 milliGRAM(s) Oral every 6 hours PRN  traMADol 50 milliGRAM(s) Oral every 6 hours PRN  zolpidem 5 milliGRAM(s) Oral at bedtime PRN                            7.4    20.58 )-----------( 173      ( 12 Jan 2020 14:06 )             23.8     01-13    137  |  106  |  18  ----------------------------<  155<H>  3.9   |  21<L>  |  0.93    Ca    7.8<L>      13 Jan 2020 07:08  Phos  2.4     01-12  Mg     2.2     01-12
ORTHO  Patient is a 70y old  Male who presents with a chief complaint of Intractable back pain (13 Jan 2020 12:26)    Pt. resting without complaint    VS-  T(C): 37.1 (01-14-20 @ 04:43), Max: 37.8 (01-13-20 @ 22:59)  HR: 89 (01-14-20 @ 04:43) (84 - 91)  BP: 158/84 (01-14-20 @ 04:43) (132/67 - 158/84)  RR: 18 (01-14-20 @ 04:43) (18 - 18)  SpO2: 96% (01-14-20 @ 04:43) (93% - 98%)  Wt(kg): --    M.S. A&O  TLS dressing C/D/I, Hemovac-160 cc/shift  TAMIKA- 85 cc/shift  Neuro-              Motor- (+) ANKLE df/pf 5+/5              Sensation- grossly intact to light touch              Calves- soft, nontender- PAS                               7.3    12.40 )-----------( 145      ( 13 Jan 2020 08:35 )             23.0     01-13    137  |  106  |  18  ----------------------------<  155<H>  3.9   |  21<L>  |  0.93    Ca    7.8<L>      13 Jan 2020 07:08  Phos  2.4     01-12  Mg     2.2     01-12
ORTHO  Patient is a 70y old  Male who presents with a chief complaint of Intractable back pain (21 Jan 2020 10:58)    Pt. resting without complaint    VS-  T(C): 36.9 (01-22-20 @ 05:10), Max: 37.2 (01-21-20 @ 21:28)  HR: 89 (01-22-20 @ 05:10) (89 - 92)  BP: 138/91 (01-22-20 @ 05:10) (136/80 - 151/88)  RR: 18 (01-22-20 @ 05:10) (18 - 18)  SpO2: 96% (01-22-20 @ 05:10) (95% - 97%)  Wt(kg): --    Alert and Oriented, No Acute Distress  Back dsg- c/d/i  Calves Soft, Non-tender bilaterally  +PF/DF/EHL/FHL 5/5 bilat  SILT bilat
Ortho Progress Note    S: Patient seen and examined. No acute events overnight. Pain well controlled with current regimen. Denies lightheadedness/dizziness, CP/SOB.       O:  Physical Exam:  Gen: Laying in bed, NAD, alert and oriented.   Resp: Unlabored breathing  Spine PE:  Motor:                 L2             L3             L4               L5            S1  R         5/5           5/5          5/5             5/5           5/5  L          5/5          5/5           5/5             5/5           5/5    Sensory: **diffuse decreased sensation in feet 2/2 diabetes**               L2          L3         L4      L5       S1         (0=absent, 1=impaired, 2=normal, NT=not testable)  R         2            2            2        2        2  L          2            2           2        2         2    Vital Signs Last 24 Hrs  T(C): 36.9 (10 Satya 2020 04:45), Max: 36.9 (09 Jan 2020 19:14)  T(F): 98.4 (10 Satya 2020 04:45), Max: 98.5 (09 Jan 2020 19:14)  HR: 89 (10 Satya 2020 04:45) (88 - 89)  BP: 137/80 (10 Satya 2020 04:45) (137/80 - 178/98)  BP(mean): --  RR: 18 (10 Satya 2020 04:45) (16 - 18)  SpO2: 98% (10 Satya 2020 04:45) (97% - 99%)                          12.0   10.97 )-----------( 302      ( 09 Jan 2020 18:20 )             40.6       01-09    138  |  97  |  14  ----------------------------<  84  4.2   |  28  |  0.87        PT/INR - ( 09 Jan 2020 20:29 )   PT: 11.5 sec;   INR: 1.01 ratio         PTT - ( 09 Jan 2020 20:29 )  PTT:28.5 sec
Ortho Progress Note    S: Patient seen and examined. No acute events overnight. Pain well controlled with current regimen. Denies lightheadedness/dizziness, CP/SOB. Tolerating diet. was able to sit in the chair for 3 hrs yesterday      O:  Physical Exam:  Gen: Laying in bed, NAD, alert and oriented.   Resp: Unlabored breathing  BLE: EHL/FHL/TA/Sol intact          + SILT DP/SP//Sa/Tib          +DP, extremity WWP    Vital Signs Last 24 Hrs  T(C): 37.1 (12 Jan 2020 03:00), Max: 38.4 (11 Jan 2020 22:00)  T(F): 98.8 (12 Jan 2020 03:00), Max: 101.1 (11 Jan 2020 22:00)  HR: 89 (12 Jan 2020 05:00) (89 - 95)  BP: 122/60 (12 Jan 2020 05:00) (106/63 - 150/81)  BP(mean): 86 (12 Jan 2020 05:00) (78 - 106)  RR: 30 (12 Jan 2020 05:00) (14 - 36)  SpO2: 96% (12 Jan 2020 05:00) (93% - 100%)                          8.0    19.46 )-----------( 180      ( 12 Jan 2020 01:08 )             26.3                         8.5    19.82 )-----------( 196      ( 11 Jan 2020 20:48 )             28.5       01-12    135  |  101  |  29<H>  ----------------------------<  195<H>  4.8   |  22  |  1.88<H>        PT/INR - ( 12 Jan 2020 01:08 )   PT: 14.1 sec;   INR: 1.23 ratio         PTT - ( 12 Jan 2020 01:08 )  PTT:25.1 sec
Orthopaedic Surgery Post-Operative Progress Note    Pt reports pain is well controlled. Tolerated procedure well. Denies fevers, dizziness, CP, SOB, N/V, numbness/tingling, weakness, calf pain. Pt was extubated shortly after arrival to SICU.    Labs:                        11.4   22.50 )-----------( 231      ( 2020 00:49 )             36.9     01-10    138  |  104  |  11  ----------------------------<  218<H>  5.1   |  21<L>  |  1.00    Ca    7.8<L>      10 Satya 2020 21:10  Phos  2.3     01-10  Mg     2.0     01-10    TPro  7.7  /  Alb  4.3  /  TBili  0.2  /  DBili  x   /  AST  52<H>  /  ALT  20  /  AlkPhos  68  09    PT/INR - ( 10 Satya 2020 21:10 )   PT: 12.7 sec;   INR: 1.10 ratio         PTT - ( 10 Satya 2020 21:10 )  PTT:26.7 sec  Urinalysis Basic - ( 10 Satya 2020 06:45 )    Color: Yellow / Appearance: Clear / S.025 / pH: x  Gluc: x / Ketone: Negative  / Bili: Negative / Urobili: Negative   Blood: x / Protein: Negative / Nitrite: Negative   Leuk Esterase: Negative / RBC: x / WBC x   Sq Epi: x / Non Sq Epi: x / Bacteria: x    Vitals:  T(C): 35.8 (01-10-20 @ 23:00), Max: 36.9 (01-10-20 @ 04:45)  HR: 89 (20 @ 00:00) (88 - 93)  BP: 99/61 (20 @ 00:00) (86/59 - 159/81)  RR: 23 (20 @ 00:00) (16 - 23)  SpO2: 99% (20 @ 00:00) (98% - 100%)    Physical Exam:  Gen: NAD, resting comfortably    Spine Exam:  Dressing clean, dry, intact  Negative clonus  Negative babinski  Negative vazquez  Negative ulnar drift  No calf TTP    Motor:               C5           C6            C7               C8           T1   R         5/5          5/5            5/5             5/5          5/5  L          5/5          5/5            5/5             5/5          5/5                L2             L3             L4               L5            S1  R         5/5           5/5          5/5             5/5           5/5  L          5/5          5/5           5/5             5/5           5/5    Sensory:            C5         C6         C7      C8       T1        (0=absent, 1=impaired, 2=normal, NT=not testable)  R         2            2           2        2         2  L          2            2           2        2         2               L2          L3         L4      L5       S1         (0=absent, 1=impaired, 2=normal, NT=not testable)  R         2            2            2        2        2  L          2            2           2        2         2
Orthopaedic Surgery Progress Note    Pt reports pain is well controlled. No acute events. Denies fevers, dizziness, CP, SOB, N/V, numbness/tingling, weakness, calf pain.     Labs:                             11.4   22.50 )-----------( 231      ( 11 Jan 2020 00:49 )             36.9   01-11    137  |  103  |  17  ----------------------------<  294<H>  5.2   |  21<L>  |  1.26    Ca    8.6      11 Jan 2020 03:30  Phos  2.7     01-11  Mg     1.8     01-11    TPro  7.7  /  Alb  4.3  /  TBili  0.2  /  DBili  x   /  AST  52<H>  /  ALT  20  /  AlkPhos  68  01-09  TPro  7.7  /  Alb  4.3  /  TBili  0.2  /  DBili  x   /  AST  52<H>  /  ALT  20  /  AlkPhos  68  01-09    PT/INR - ( 11 Jan 2020 00:49 )   PT: 12.5 sec;   INR: 1.08 ratio         PTT - ( 11 Jan 2020 00:49 )  PTT:23.9 sec    Vitals:  Vital Signs Last 24 Hrs  T(C): 36.8 (11 Jan 2020 03:00), Max: 36.8 (11 Jan 2020 03:00)  T(F): 98.2 (11 Jan 2020 03:00), Max: 98.2 (11 Jan 2020 03:00)  HR: 89 (11 Jan 2020 05:00) (88 - 93)  BP: 105/75 (11 Jan 2020 05:00) (86/59 - 159/81)  BP(mean): 85 (11 Jan 2020 05:00) (66 - 85)  RR: 22 (11 Jan 2020 05:00) (16 - 25)  SpO2: 96% (11 Jan 2020 05:00) (94% - 100%)    Physical Exam:  Gen: NAD, resting comfortably    Spine Exam:  Dressing clean, dry, intact  Negative clonus  Negative babinski  Negative vazquez  Negative ulnar drift  No calf TTP    Motor:               C5           C6            C7               C8           T1   R         5/5          5/5            5/5             5/5          5/5  L          5/5          5/5            5/5             5/5          5/5                L2             L3             L4               L5            S1  R         5/5           5/5          5/5             5/5           5/5  L          5/5          5/5           5/5             5/5           5/5    Sensory:            C5         C6         C7      C8       T1        (0=absent, 1=impaired, 2=normal, NT=not testable)  R         2            2           2        2         2  L          2            2           2        2         2               L2          L3         L4      L5       S1         (0=absent, 1=impaired, 2=normal, NT=not testable)  R         2            2            2        2        2  L          2            2           2        2         2
Patient is a 70y old  Male who presents with a chief complaint of Intractable back pain   Patient s/p T10- pelvis PSF  POST OPERATIVE DAY #:  [8 ]   Patient comfortable  No complaints    T(C): 36.8 (01-18-20 @ 08:00), Max: 37.6 (01-18-20 @ 06:11)  HR: 86 (01-18-20 @ 08:00) (86 - 91)  BP: 140/77 (01-18-20 @ 08:00) (140/77 - 154/80)  RR: 18 (01-18-20 @ 08:00) (17 - 18)  SpO2: 95% (01-18-20 @ 08:00) (94% - 98%)  Wt(kg): --    PHYSICAL EXAM:  NAD, Alert  Back: Dressing C/D/I; sensation grossly intact to light touch; (+) Distal Pulses; No Calf tenderness B/L, PAS              [ ] Lower extremeity                  PF          DF         EHL       FHL                                                                                            R        5/5        5/5        5/5       5/5                                                        L         5/5        5/5        5/5       5/5      LABS:                      9.5    11.53 )-----------( 268      ( 17 Jan 2020 05:05 )             30.4   01-17  140  |  103  |  17  ----------------------------<  142<H>  3.8   |  25  |  0.86  Ca    8.9      17 Jan 2020 05:05  Phos  3.3     01-17  Mg     2.0     01-17  PT/INR - ( 17 Jan 2020 05:05 )   PT: 13.5 sec;   INR: 1.17 ratio         PTT - ( 17 Jan 2020 05:05 )  PTT:28.4 sec    RADIOLOGY & ADDITIONAL TESTS:
Patient resting without complaints.  No chest pain, SOB, N/V.  No acute events overnight.    Vitals 24hrs  Vital Signs Last 24 Hrs  T(C): 36.6 (21 Jan 2020 05:05), Max: 37.5 (20 Jan 2020 14:47)  T(F): 97.9 (21 Jan 2020 05:05), Max: 99.5 (20 Jan 2020 14:47)  HR: 89 (21 Jan 2020 05:05) (65 - 90)  BP: 147/87 (21 Jan 2020 05:05) (106/60 - 154/84)  BP(mean): --  RR: 18 (21 Jan 2020 05:05) (18 - 18)  SpO2: 96% (21 Jan 2020 05:05) (96% - 99%)      01-19-20 @ 07:01  -  01-20-20 @ 07:00  --------------------------------------------------------  IN: 900 mL / OUT: 530 mL / NET: 370 mL    01-20-20 @ 07:01  -  01-21-20 @ 06:09  --------------------------------------------------------  IN: 1000 mL / OUT: 705 mL / NET: 295 mL        Lab Results 24hrs:      Exam:  Alert and Oriented, No Acute Distress  Back dsg c/d/i  HV drain in place  Calves Soft, Non-tender bilaterally  +PF/DF/EHL/FHL 5/5 bilat  SILT bilat
Podiatry pager #: [phone]/ [phone]    Patient is a 70y old  Male who presents with a chief complaint of Intractable back pain (16 Jan 2020 09:20) Podiatry seen today for evaluation of right dm foot ulceration. Patient current DPM is at [facility] and sees regularly for wound care management      HPI:  70M admitted for LBP. Pt has a history of chronic low back pain, has had previous laminectomy about 5 years ago with Dr. Faustin. Presents today because the pain has been getting worse, especially with ambulation. Denies bowel/bladder habit changes. Denies numbness/tingling down the legs. Denies weakness to LE. No other complaints. (09 Jan 2020 19:14)      PAST MEDICAL & SURGICAL HISTORY:  FREEDOM (obstructive sleep apnea)  Cardiac pacemaker  Hernia: umbilical, inguinal, abdominal wall  CVA (cerebrovascular accident): 04/8/2013- no residual effects  Diabetic neuropathy  Spinal stenosis of lumbar region  DM type 2 (diabetes mellitus, type 2)  CAD (coronary artery disease)  Anxiety  Depression  Acid Reflux  Gout  Benign Tumor of Adrenal Gland  HTN (Hypertension)  History of permanent cardiac pacemaker placement: placed Medtronic [place] PPM Model# [id number] , implanted April 4, 2019  History of lumbar laminectomy: L1-2 &amp; L4-5   2015  S/P Coronary Angiogram: 12/26/2010 &amp; 2015 at [facility], 4/2019 @[facility]  S/P Hernia Repair: incisional hernia repair on 1/19/17  History of Appendectomy  S/P CABG X 4: ~25 years ago  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 975 milliGRAM(s) Oral every 8 hours  aspirin enteric coated 81 milliGRAM(s) Oral daily  atorvastatin 40 milliGRAM(s) Oral at bedtime  budesonide  80 MICROgram(s)/formoterol 4.5 MICROgram(s) Inhaler 2 Puff(s) Inhalation two times a day  buPROPion XL . 300 milliGRAM(s) Oral daily  clonazePAM  Tablet 0.5 milliGRAM(s) Oral three times a day  clopidogrel Tablet 75 milliGRAM(s) Oral daily  famotidine    Tablet 40 milliGRAM(s) Oral two times a day  furosemide    Tablet 20 milliGRAM(s) Oral <User Schedule>  furosemide    Tablet 40 milliGRAM(s) Oral <User Schedule>  insulin glargine Injectable (LANTUS) 40 Unit(s) SubCutaneous every morning  insulin lispro (HumaLOG) corrective regimen sliding scale   SubCutaneous at bedtime  insulin lispro (HumaLOG) corrective regimen sliding scale   SubCutaneous three times a day before meals  polyethylene glycol 3350 17 Gram(s) Oral daily  senna 2 Tablet(s) Oral at bedtime    MEDICATIONS  (PRN):  aluminum hydroxide/magnesium hydroxide/simethicone Suspension 30 milliLiter(s) Oral every 4 hours PRN Dyspepsia  bisacodyl Suppository 10 milliGRAM(s) Rectal daily PRN Constipation  diphenhydrAMINE 25 milliGRAM(s) Oral every 4 hours PRN Rash and/or Itching  HYDROmorphone  Injectable 0.5 milliGRAM(s) IV Push every 3 hours PRN breakthrough pain  lactulose Syrup 10 Gram(s) Oral daily PRN Constipation  traMADol 25 milliGRAM(s) Oral every 6 hours PRN Moderate Pain (4 - 6)  traMADol 50 milliGRAM(s) Oral every 6 hours PRN Severe Pain (7 - 10)  zolpidem 5 milliGRAM(s) Oral at bedtime PRN Insomnia  zolpidem 5 milliGRAM(s) Oral at bedtime PRN Insomnia      Allergies    No Known Allergies    Intolerances        VITALS:    Vital Signs Last 24 Hrs  T(C): 36.7 (16 Jan 2020 09:20), Max: 37.2 (16 Jan 2020 05:06)  T(F): 98 (16 Jan 2020 09:20), Max: 99 (16 Jan 2020 05:06)  HR: 89 (16 Jan 2020 09:20) (89 - 90)  BP: 146/85 (16 Jan 2020 09:20) (129/79 - 163/87)  BP(mean): --  RR: 18 (16 Jan 2020 09:20) (18 - 18)  SpO2: 99% (16 Jan 2020 09:20) (94% - 99%)    LABS:                          8.7    9.69  )-----------( 185      ( 15 Satya 2020 07:10 )             28.6       01-15    140  |  104  |  13  ----------------------------<  96  3.9   |  25  |  0.78    Ca    8.6      15 Satya 2020 07:08  Phos  3.0     01-15  Mg     1.9     01-15        CAPILLARY BLOOD GLUCOSE      POCT Blood Glucose.: 189 mg/dL (16 Jan 2020 08:31)  POCT Blood Glucose.: 193 mg/dL (15 Satya 2020 22:02)  POCT Blood Glucose.: 161 mg/dL (15 Satya 2020 18:03)  POCT Blood Glucose.: 189 mg/dL (15 Satya 2020 12:46)      PT/INR - ( 15 Satya 2020 07:10 )   PT: 13.1 sec;   INR: 1.13 ratio         PTT - ( 15 Satya 2020 07:10 )  PTT:29.1 sec    LOWER EXTREMITY PHYSICAL EXAM:    Vasular: DP/PT 1_/4, B/L, CFT <_2 seconds B/L, Temperature gradient wnl_, B/L.   Neuro: Epicritic sensation diminished to the level of toes, B/L.  Musculoskeletal/Ortho: pes cavus foot type  Skin:  Wound #1: right foot  Location: sub met 2-3  Size:1.2cm diameter  Depth: superficial  Wound bed: hyperkeratotic tissue overlying mild granular tissue  Drainage: None  Odor: none  Periwound: no clinical signs of infection  Etiology: dm
Post op Day [10 ]    Patient resting without complaints.  No chest pain, SOB, N/V.    T(C): 36.7 (01-20-20 @ 08:22), Max: 37.4 (01-19-20 @ 21:05)  HR: 65 (01-20-20 @ 08:22) (65 - 91)  BP: 106/60 (01-20-20 @ 08:22) (106/60 - 155/85)  RR: 18 (01-20-20 @ 08:22) (18 - 18)  SpO2: 98% (01-20-20 @ 08:22) (95% - 98%)  Wt(kg): --    Exam:  Alert and Oriented, No Acute Distress  Back dsg c/d/i, proximal portion of incision DIOGENES, Drain sites c/d/i  Calves Soft, Non-tender bilaterally  +PF/DF/EHL/FHL 5/5 bilat  SILT bilat
Post op Day [9]    Patient resting without complaints.  No chest pain, SOB, N/V.    T(C): 37.1 (01-19-20 @ 04:56), Max: 37.3 (01-18-20 @ 23:49)  HR: 90 (01-19-20 @ 04:56) (87 - 90)  BP: 158/87 (01-19-20 @ 04:56) (134/79 - 159/81)  RR: 18 (01-19-20 @ 04:56) (18 - 18)  SpO2: 95% (01-19-20 @ 04:56) (95% - 98%)      PHYSICAL EXAM:  NAD, Alert  Drains: 2 Hemovacs (Sewn) on negative suction drained 5/5 and 80/105 (Managed by plastic Sx team)  Back: Dressing C/D/I; sensation grossly intact to light touch; (+) Distal Pulses; No Calf tenderness B/L, PAS              [x] Lower extremity                  PF          DF         EHL       FHL                                                                                      R        5/5        5/5        5/5       5/5                                                  L         5/5        5/5        5/5       5/5
Pt S/E at bedside, no acute events overnight, pain controlled. No complaints this morning, Pt transferred out of SICU last night. Denies numbness/tingling. Has not had a bowel movement.    Vital Signs Last 24 Hrs  T(C): 37 (13 Jan 2020 05:26), Max: 37.7 (12 Jan 2020 07:00)  T(F): 98.6 (13 Jan 2020 05:26), Max: 99.9 (12 Jan 2020 07:00)  HR: 89 (13 Jan 2020 05:26) (88 - 93)  BP: 138/70 (13 Jan 2020 05:26) (114/59 - 144/78)  BP(mean): 92 (12 Jan 2020 19:00) (83 - 107)  RR: 20 (13 Jan 2020 05:26) (20 - 53)  SpO2: 94% (13 Jan 2020 05:26) (90% - 99%)    Gen: NAD, AAOx3    Spine:  Dressing clean dry intact  +EHL/FHL/TA/GS 5/5 bilaterally  SILT L3-S1 bilaterally  +DP/PT Pulses bilaterally  Compartments soft  No calf TTP B/L
Pt S/E at bedside, no acute events overnight, pain controlled. No complaints this morning.    Vital Signs Last 24 Hrs  T(C): 37.1 (17 Jan 2020 04:44), Max: 37.3 (16 Jan 2020 16:56)  T(F): 98.8 (17 Jan 2020 04:44), Max: 99.1 (16 Jan 2020 16:56)  HR: 90 (17 Jan 2020 04:44) (89 - 90)  BP: 159/90 (17 Jan 2020 04:44) (145/81 - 159/90)  BP(mean): --  RR: 18 (17 Jan 2020 04:44) (18 - 18)  SpO2: 95% (17 Jan 2020 04:44) (95% - 99%)    Gen: NAD, AAOx3    Spine:  Dressing clean dry intact  +HMV in place  +EHL/FHL/TA/GS 5/5 bilaterally  SILT L3-S1 bilaterally  +DP/PT Pulses  Compartments soft  No calf TTP B/L
Pt S/E at bedside, no acute events overnight, pain controlled. Pt doing well.      Vital Signs Last 24 Hrs  T(C): 37.2 (16 Jan 2020 05:06), Max: 37.2 (16 Jan 2020 05:06)  T(F): 99 (16 Jan 2020 05:06), Max: 99 (16 Jan 2020 05:06)  HR: 90 (16 Jan 2020 05:06) (89 - 90)  BP: 148/87 (16 Jan 2020 05:06) (129/79 - 163/87)  BP(mean): --  RR: 18 (16 Jan 2020 05:06) (18 - 18)  SpO2: 97% (16 Jan 2020 05:06) (94% - 99%)    Gen: NAD, AAOx3    Spine:  Dressing clean dry intact  +HMV in place  +EHL/FHL/TA/GS 5/5 bilaterally  SILT L3-S1 bilaterally  +DP/PT Pulses  Compartments soft  No calf TTP B/L
Pt S/E at bedside, no acute events overnight, pain controlled. Pt had a bowel movement yesterday. No complaints this morning.    Vital Signs Last 24 Hrs  T(C): 37.4 (15 Satya 2020 04:55), Max: 37.4 (15 Satya 2020 04:55)  T(F): 99.4 (15 Satya 2020 04:55), Max: 99.4 (15 Satya 2020 04:55)  HR: 92 (15 Satya 2020 04:55) (85 - 92)  BP: 152/82 (15 Satya 2020 04:55) (139/80 - 160/91)  BP(mean): --  RR: 18 (15 Satya 2020 04:55) (17 - 18)  SpO2: 95% (15 Satya 2020 04:55) (95% - 98%)    Gen: NAD, AAOx3    Spine:  Dressing clean dry intact  +HMV in place  +EHL/FHL/TA/GS 5/5 bilaterally  SILT L3-S1 bilaterally  +DP/PT Pulses  Compartments soft  No calf TTP B/L
[facility] DIVISION OF KIDNEY DISEASES AND HYPERTENSION -- FOLLOW UP NOTE  --------------------------------------------------------------------------------  Chief Complaint:  presented for intractable back pain on 1/9/20    24 hour events/subjective:  pt examined at bed side feeling better, having some pain, non oliguric, no events. Received 1 unit of PRBC and on fluids.       PAST HISTORY  --------------------------------------------------------------------------------  No significant changes to PMH, PSH, FHx, SHx, unless otherwise noted    ALLERGIES & MEDICATIONS  --------------------------------------------------------------------------------  Allergies    No Known Allergies    Intolerances      Standing Inpatient Medications  acetaminophen   Tablet .. 975 milliGRAM(s) Oral every 6 hours  aspirin enteric coated 81 milliGRAM(s) Oral daily  atorvastatin 40 milliGRAM(s) Oral at bedtime  budesonide 160 MICROgram(s)/formoterol 4.5 MICROgram(s) Inhaler 2 Puff(s) Inhalation two times a day  buPROPion XL . 300 milliGRAM(s) Oral daily  chlorhexidine 2% Cloths 1 Application(s) Topical daily  clonazePAM  Tablet 0.5 milliGRAM(s) Oral three times a day  famotidine Injectable 20 milliGRAM(s) IV Push daily  insulin glargine Injectable (LANTUS) 40 Unit(s) SubCutaneous every morning  insulin lispro (HumaLOG) corrective regimen sliding scale   SubCutaneous at bedtime  insulin lispro (HumaLOG) corrective regimen sliding scale   SubCutaneous three times a day before meals  melatonin 5 milliGRAM(s) Oral at bedtime  polyethylene glycol 3350 17 Gram(s) Oral daily  senna 2 Tablet(s) Oral at bedtime  sodium chloride 0.9%. 1000 milliLiter(s) IV Continuous <Continuous>    PRN Inpatient Medications  HYDROmorphone  Injectable 0.5 milliGRAM(s) IV Push every 3 hours PRN  traMADol 25 milliGRAM(s) Oral every 6 hours PRN  traMADol 50 milliGRAM(s) Oral every 6 hours PRN  zolpidem 5 milliGRAM(s) Oral at bedtime PRN      REVIEW OF SYSTEMS  --------------------------------------------------------------------------------  Gen: No lethargy  Respiratory: No dyspnea  CV: No chest pain  GI: No abdominal pain/ diarrhea   MSK: No edema, + back pain  Neuro: No dizziness  Heme: No bleeding    All other systems were reviewed and are negative, except as noted.      VITALS/PHYSICAL EXAM  --------------------------------------------------------------------------------  T(C): 36.9 (01-13-20 @ 08:22), Max: 37.4 (01-12-20 @ 20:00)  HR: 90 (01-13-20 @ 08:22) (88 - 93)  BP: 132/67 (01-13-20 @ 08:22) (114/59 - 144/78)  RR: 18 (01-13-20 @ 08:22) (18 - 53)  SpO2: 93% (01-13-20 @ 08:22) (91% - 99%)  Wt(kg): --        01-12-20 @ 07:01  -  01-13-20 @ 07:00  --------------------------------------------------------  IN: 2150 mL / OUT: 2245 mL / NET: -95 mL      Physical Exam:    	Gen: NAD,  	HEENT: Anicteric   	Pulm: CTA B/L  	CV: RRR, S1S2, + murmur   	Abd: +BS, soft, nontender/nondistended       	: Kiran in place with clear urine   	MSK: Warm, no edema  	Neuro: No focal deficits, AOX3, no asterixis         LABS/STUDIES  --------------------------------------------------------------------------------              7.3    12.40 >-----------<  145      [01-13-20 @ 08:35]              23.0     137  |  106  |  18  ----------------------------<  155      [01-13-20 @ 07:08]  3.9   |  21  |  0.93        Ca     7.8     [01-13-20 @ 07:08]      Mg     2.2     [01-12-20 @ 14:06]      Phos  2.4     [01-12-20 @ 14:06]      PT/INR: PT 14.1 , INR 1.23       [01-12-20 @ 01:08]  PTT: 25.1       [01-12-20 @ 01:08]    CK 2287      [01-12-20 @ 06:06]    Creatinine Trend:  SCr 0.93 [01-13 @ 07:08]  SCr 1.32 [01-12 @ 14:06]  SCr 1.88 [01-12 @ 01:08]  SCr 2.01 [01-11 @ 18:43]  SCr 1.71 [01-11 @ 12:51]    Urinalysis - [01-10-20 @ 06:45]      Color Yellow / Appearance Clear / SG 1.025 / pH 6.0      Gluc Trace / Ketone Negative  / Bili Negative / Urobili Negative       Blood Negative / Protein Negative / Leuk Est Negative / Nitrite Negative      RBC  / WBC  / Hyaline  / Gran  / Sq Epi  / Non Sq Epi  / Bacteria     Urine Creatinine 140      [01-11-20 @ 13:42]  Urine Sodium <35      [01-11-20 @ 13:42]    HbA1c 7.7      [01-02-20 @ 20:13]    HCV 0.06, Nonreact      [12-20-19 @ 08:38]
SICU DAILY PROGRESS NOTE    70M with PMhx of CABG, PPM (Medtronic), DM, and FREEDOM admitted for lower back pain. Pt has a history of chronic low back pain, has had previous laminectomy about 5 years ago with Dr. Faustin. Presents today because the pain has been getting worse, especially with ambulation. Denies bowel/bladder habit changes. Denies numbness/tingling down the legs. Denies weakness to LE. No other complaints. Patient was taken to the OR for a T10 to pelvis laminectomy. EBL 1200mL, 3500mL of crystalloids given, and 2U pRBCs and 500cc from cell saver given. Patient transferred to SICU post-operatively intubated and sedated. Patient on pressors during surgery but weaned off on arrival to SICU. Patient was given SBT in the SICU and extubated.     24 HOUR EVENTS:  - Advanced to regular diet  - Transitioned to PO regimen including half his home benzodiazepines  - Bedside ultrasound suggestive of hypovolemia, given 1.5L bolus of crystalloid total  - Pt complaining of dull chest pain above his pacemaker, EKG wnl and troponin sent      SUBJECTIVE/ROS:  [ ] A ten-point review of systems was otherwise negative except as noted.  [ ] Due to altered mental status/intubation, subjective information were not able to be obtained from the patient. History was obtained, to the extent possible, from review of the chart and collateral sources of information.      NEURO  RASS:     GCS:     CAM ICU:  Exam: awake, alert, oriented  Meds: acetaminophen   Tablet .. 975 milliGRAM(s) Oral every 6 hours  acetaminophen  IVPB .. 1000 milliGRAM(s) IV Intermittent every 6 hours  buPROPion XL . 300 milliGRAM(s) Oral daily  clonazePAM  Tablet 0.5 milliGRAM(s) Oral three times a day  HYDROmorphone  Injectable 0.5 milliGRAM(s) IV Push every 3 hours PRN breakthrough pain  traMADol 25 milliGRAM(s) Oral every 6 hours PRN Moderate Pain (4 - 6)  traMADol 50 milliGRAM(s) Oral every 6 hours PRN Severe Pain (7 - 10)  zolpidem 5 milliGRAM(s) Oral at bedtime PRN Insomnia    [x] Adequacy of sedation and pain control has been assessed and adjusted      RESPIRATORY  RR: 29 (01-12-20 @ 02:00) (14 - 36)  SpO2: 97% (01-12-20 @ 02:00) (93% - 100%)  Wt(kg): --  Exam: unlabored, equal chest rise  Mechanical Ventilation:   ABG - ( 11 Jan 2020 00:45 )  pH: 7.36  /  pCO2: 39    /  pO2: 135   / HCO3: 21    / Base Excess: -3.1  /  SaO2: 99      Lactate: x        [N/A] Extubation Readiness Assessed  Meds: budesonide 160 MICROgram(s)/formoterol 4.5 MICROgram(s) Inhaler 2 Puff(s) Inhalation two times a day        CARDIOVASCULAR  HR: 89 (01-12-20 @ 02:00) (89 - 95)  BP: 127/67 (01-12-20 @ 02:00) (105/75 - 150/81)  BP(mean): 92 (01-12-20 @ 02:00) (78 - 106)  ABP: --  ABP(mean): --  Wt(kg): --  CVP(cm H2O): --  VBG - ( 12 Jan 2020 00:54 )  pH: 7.32  /  pCO2: 49    /  pO2: 30    / HCO3: 24    / Base Excess: -1.1  /  SaO2: 46     Lactate: 2.2        Exam: regular rate  Cardiac Rhythm: sinus, ventricularly paced  Perfusion     [x]Adequate   [ ]Inadequate  Mentation   [x]Normal       [ ]Reduced  Extremities  [x]Warm         [ ]Cool  Volume Status [ ]Hypervolemic [x]Euvolemic [ ]Hypovolemic  Meds:       GI/NUTRITION  Exam: soft, nontender, nondistended  Diet: regular Kosher diabetic  Meds: famotidine Injectable 20 milliGRAM(s) IV Push daily      GENITOURINARY  I&O's Detail    01-10 @ 07:01  -  01-11 @ 07:00  --------------------------------------------------------  IN:    dexmedetomidine Infusion: 11 mL    IV PiggyBack: 1850 mL    lactated ringers.: 1000 mL  Total IN: 2861 mL    OUT:    Accordian: 700 mL    Bulb: 55 mL    Indwelling Catheter - Urethral: 230 mL  Total OUT: 985 mL    Total NET: 1876 mL      01-11 @ 07:01  -  01-12 @ 04:05  --------------------------------------------------------  IN:    IV PiggyBack: 250 mL    lactated ringers.: 200 mL    Oral Fluid: 240 mL    sodium chloride 0.9%: 200 mL    Sodium Chloride 0.9% IV Bolus: 1500 mL    sodium chloride 0.9%.: 1475 mL  Total IN: 3865 mL    OUT:    Accordian: 470 mL    Bulb: 80 mL    Indwelling Catheter - Urethral: 547 mL  Total OUT: 1097 mL    Total NET: 2768 mL          01-12    135  |  101  |  29<H>  ----------------------------<  195<H>  4.8   |  22  |  1.88<H>    Ca    7.9<L>      12 Jan 2020 01:08  Phos  3.2     01-12  Mg     2.1     01-12      [ ] Kiran catheter, indication: N/A  Meds: sodium chloride 0.9%. 1000 milliLiter(s) IV Continuous <Continuous>        HEMATOLOGIC  Meds: aspirin enteric coated 81 milliGRAM(s) Oral daily    [x] VTE Prophylaxis                        8.0    19.46 )-----------( 180      ( 12 Jan 2020 01:08 )             26.3     PT/INR - ( 12 Jan 2020 01:08 )   PT: 14.1 sec;   INR: 1.23 ratio         PTT - ( 12 Jan 2020 01:08 )  PTT:25.1 sec  Transfusion     [ ] PRBC   [ ] Platelets   [ ] FFP   [ ] Cryoprecipitate      INFECTIOUS DISEASES  WBC Count: 19.46 K/uL (01-12 @ 01:08)  WBC Count: 19.82 K/uL (01-11 @ 20:48)  WBC Count: 22.36 K/uL (01-11 @ 12:51)  WBC Count: 19.26 K/uL (01-11 @ 07:56)    RECENT CULTURES:    Meds:       ENDOCRINE  CAPILLARY BLOOD GLUCOSE      POCT Blood Glucose.: 244 mg/dL (11 Jan 2020 21:30)  POCT Blood Glucose.: 337 mg/dL (11 Jan 2020 17:47)  POCT Blood Glucose.: 249 mg/dL (11 Jan 2020 12:49)  POCT Blood Glucose.: 185 mg/dL (11 Jan 2020 09:04)  POCT Blood Glucose.: 219 mg/dL (11 Jan 2020 06:26)  POCT Blood Glucose.: 238 mg/dL (11 Jan 2020 05:19)    Meds: atorvastatin 40 milliGRAM(s) Oral at bedtime  insulin glargine Injectable (LANTUS) 40 Unit(s) SubCutaneous every morning  insulin lispro (HumaLOG) corrective regimen sliding scale   SubCutaneous at bedtime  insulin lispro (HumaLOG) corrective regimen sliding scale   SubCutaneous three times a day before meals        ACCESS DEVICES:  [ ] Peripheral IV  [ ] Central Venous Line	[ ] R	[ ] L	[ ] IJ	[ ] Fem	[ ] SC	Placed:   [ ] Arterial Line		[ ] R	[ ] L	[ ] Fem	[ ] Rad	[ ] Ax	Placed:   [ ] PICC:					[ ] Mediport  [ ] Urinary Catheter, Date Placed:   [x] Necessity of urinary, arterial, and venous catheters discussed    OTHER MEDICATIONS:  chlorhexidine 2% Cloths 1 Application(s) Topical daily      CODE STATUS:      IMAGING:

## 2020-01-22 NOTE — DISCHARGE NOTE NURSING/CASE MANAGEMENT/SOCIAL WORK - NSDCFUADDAPPT_GEN_ALL_CORE_FT
CALL TO MAKE APPOINTMENTS TO SEE THE FOLLOWING PROVIDERS:  Follow up with Dr. Coppola for diabetic foot ulcer management upon discharge from Rehab.  Follow up with Dr. Singletary or outside cardiologist for general checkup/possible medication adjustment upon discharge from Rehab.  FOLLOW UP WITH YOUR PULMONOLOGIST- DR. SOW- FOR PULMONARY MANAGEMENT AND DISCUSS CARDIOPULMONARY REHAB AS RECOMMENDED DURING THIS HOSPITALIZATION BY PULMONARY DOCTOR WHO WAS CONSULTED.

## 2020-01-22 NOTE — PROGRESS NOTE ADULT - ASSESSMENT
Impression: Stable       Plan:   Continue present treatment                 Out of bed, ambulate, with brace                  Physical therapy follow up                  Continue to monitor      Mahin Pinto PA-C  Orthopaedic Surgery  Team pager [phone]  [phone]

## 2020-01-22 NOTE — PROGRESS NOTE ADULT - REASON FOR ADMISSION
Intractable back pain

## 2020-01-22 NOTE — PROGRESS NOTE ADULT - PROVIDER SPECIALTY LIST ADULT
Cardiology
Nephrology
Orthopedics
Plastic Surgery
Plastic Surgery
Podiatry
Pulmonology
SICU
Plastic Surgery

## 2020-02-02 NOTE — DISCHARGE NOTE NURSING/CASE MANAGEMENT/SOCIAL WORK - PATIENT PORTAL LINK FT
no You can access the FollowMyHealth Patient Portal offered by [facility] by registering at the following website: [email or web address]. By joining [employer]’s FollowMyHealth portal, you will also be able to view your health information using other applications (apps) compatible with our system.

## 2020-02-12 ENCOUNTER — APPOINTMENT (OUTPATIENT)
Dept: ORTHOPEDIC SURGERY | Facility: CLINIC | Age: 70
End: 2020-02-12
Payer: MEDICARE

## 2020-02-12 DIAGNOSIS — M48.061 SPINAL STENOSIS, LUMBAR REGION WITHOUT NEUROGENIC CLAUDICATION: ICD-10-CM

## 2020-02-12 PROCEDURE — 72082 X-RAY EXAM ENTIRE SPI 2/3 VW: CPT

## 2020-02-12 PROCEDURE — 99024 POSTOP FOLLOW-UP VISIT: CPT

## 2020-02-12 NOTE — DISCUSSION/SUMMARY
[de-identified] : He is recovering all prescriptions were reviewed. He will follow with me in one month's time

## 2020-02-12 NOTE — PHYSICAL EXAM
[Walker] : ambulates with walker [de-identified] : His incision is well healed. Stable neurologic exam. [de-identified] : AP lateral scoliosis x-rays reveals improved sagittal coronal balance at T10 to pelvis fusion

## 2020-02-12 NOTE — HISTORY OF PRESENT ILLNESS
[de-identified] : Mr. Leon presents to the office s/p T1 - pelvis fusion on 1/10/20. Overall he is doing well. Minimal back pain. No leg pain. He states he is almost off the walker altogether. He will use some tramadol at times and Tylenol at others.

## 2020-03-11 ENCOUNTER — APPOINTMENT (OUTPATIENT)
Dept: ORTHOPEDIC SURGERY | Facility: CLINIC | Age: 70
End: 2020-03-11
Payer: MEDICARE

## 2020-03-11 PROCEDURE — 99024 POSTOP FOLLOW-UP VISIT: CPT

## 2020-03-11 PROCEDURE — 72082 X-RAY EXAM ENTIRE SPI 2/3 VW: CPT

## 2020-03-11 NOTE — PHYSICAL EXAM
[Cane] : ambulates with cane [de-identified] : His incision is well healed. Stable neurologic exam. [de-identified] : AP lateral scoliosis x-rays reveals improved sagittal coronal balance at T10 to pelvis fusion

## 2020-03-11 NOTE — HISTORY OF PRESENT ILLNESS
[de-identified] : Mr. Leon presents to the office s/p T1 - pelvis fusion on 1/10/20. Overall he is doing well. Minimal back pain. He has back soreness.  He feels his balance is off.

## 2020-03-11 NOTE — DISCUSSION/SUMMARY
[de-identified] : Overall he is doing well. He states he has zero pain at this point. He has not used tramadol at all recently. He uses some CBD well for back stiffness which he feels helps him a great deal. He does feel significantly emotionally wiped out as he did after his prior CABG years ago. He will continue with his activities. Followup in 2 months

## 2020-03-16 NOTE — H&P PST ADULT - FALL HARM RISK TYPE OF ASSESSMENT
Continue Regimen: Betamethasone cream twice daily as needed.  Zyrtec daily Detail Level: Zone Admission

## 2020-04-13 ENCOUNTER — APPOINTMENT (OUTPATIENT)
Dept: ORTHOPEDIC SURGERY | Facility: CLINIC | Age: 70
End: 2020-04-13
Payer: MEDICARE

## 2020-04-13 PROCEDURE — 99213 OFFICE O/P EST LOW 20 MIN: CPT | Mod: 95

## 2020-04-13 NOTE — PHYSICAL EXAM
[Cane] : ambulates with cane [de-identified] : His incision is well healed. Stable neurologic exam. [de-identified] : AP lateral scoliosis x-rays reveals improved sagittal coronal balance at T10 to pelvis fusion

## 2020-04-13 NOTE — DISCUSSION/SUMMARY
[de-identified] : Discuss further treatment options. She will continue with his activity. He will wean from his brace. Followup in one month for repeat x-ray

## 2020-04-13 NOTE — HISTORY OF PRESENT ILLNESS
[Home] : at home, [unfilled] , at the time of the visit. [Medical Office: ([facility])___] : at ~his/her~ medical office located in [place] [Patient] : the patient [de-identified] : Mr. Leon presents to the office s/p T1 - pelvis fusion on 1/10/20. Overall he is doing well. He has not been able to do much physical therapy due to the virus outbreak. He will get some back stiffness at times.

## 2020-04-29 RX ORDER — CYCLOBENZAPRINE HYDROCHLORIDE 10 MG/1
10 TABLET, FILM COATED ORAL 3 TIMES DAILY
Qty: 30 | Refills: 0 | Status: ACTIVE | COMMUNITY
Start: 2020-04-29 | End: 1900-01-01

## 2020-05-22 NOTE — H&P PST ADULT - SOURCE OF INFORMATION, PROFILE
Prior Authorization Approval    Authorization Effective Date: 5/21/2020  Authorization Expiration Date: 5/21/2021  Medication: hydroxychloroquine (PLAQUENIL) 200 MG tablet -APPROVED   Reference #: [id number]   Insurance Company: [employer] ([facility]) - Phone [phone] Fax [phone]  Which Pharmacy is filling the prescription (Not needed for infusion/clinic administered): [facility] - [address]  Pharmacy Notified: Yes  Patient Notified: Yes      Diana BLACK Team           chart(s)/patient

## 2020-05-27 RX ORDER — CYCLOBENZAPRINE HYDROCHLORIDE 10 MG/1
10 TABLET, FILM COATED ORAL 3 TIMES DAILY
Qty: 30 | Refills: 0 | Status: ACTIVE | COMMUNITY
Start: 2020-05-27 | End: 1900-01-01

## 2020-06-24 ENCOUNTER — APPOINTMENT (OUTPATIENT)
Dept: ORTHOPEDIC SURGERY | Facility: CLINIC | Age: 70
End: 2020-06-24
Payer: MEDICARE

## 2020-06-24 VITALS — TEMPERATURE: 97 F

## 2020-06-24 PROCEDURE — 72082 X-RAY EXAM ENTIRE SPI 2/3 VW: CPT

## 2020-06-24 PROCEDURE — 99214 OFFICE O/P EST MOD 30 MIN: CPT

## 2020-08-24 RX ORDER — METAXALONE 800 MG/1
800 TABLET ORAL 3 TIMES DAILY
Qty: 30 | Refills: 0 | Status: ACTIVE | COMMUNITY
Start: 2020-08-24 | End: 1900-01-01

## 2020-08-24 RX ORDER — TIZANIDINE 4 MG/1
4 TABLET ORAL
Qty: 30 | Refills: 0 | Status: ACTIVE | COMMUNITY
Start: 2020-08-24 | End: 1900-01-01

## 2020-08-28 RX ORDER — CYCLOBENZAPRINE HYDROCHLORIDE 10 MG/1
10 TABLET, FILM COATED ORAL 3 TIMES DAILY
Qty: 30 | Refills: 0 | Status: ACTIVE | COMMUNITY
Start: 2020-08-28 | End: 1900-01-01

## 2020-09-02 NOTE — DISCHARGE NOTE PROVIDER - NSDCFUADDAPPT_GEN_ALL_CORE_FT
Stable    s/p cerebral angiogram  s/p trach   s/p PEG. Please follow-up with your primary care physician with one to two weeks.

## 2020-09-28 NOTE — PATIENT PROFILE ADULT. - VISION (WITH CORRECTIVE LENSES IF THE PATIENT USUALLY WEARS THEM):
Partially impaired: cannot see medication labels or newsprint, but can see obstacles in path, and the surrounding layout; can count fingers at arm's length no chest pain and no edema.

## 2020-10-14 ENCOUNTER — APPOINTMENT (OUTPATIENT)
Dept: ORTHOPEDIC SURGERY | Facility: CLINIC | Age: 70
End: 2020-10-14
Payer: MEDICARE

## 2020-10-14 PROCEDURE — 99214 OFFICE O/P EST MOD 30 MIN: CPT

## 2020-10-14 PROCEDURE — 72082 X-RAY EXAM ENTIRE SPI 2/3 VW: CPT

## 2020-10-14 NOTE — DISCUSSION/SUMMARY
[de-identified] : We discussed further treatment options.  We reviewed his imaging with new hardware failure.  However, his sagittal balance is still grossly preserved.  He is interested in pursuing revision surgery.  I have recommended that he finish up his treatment from his recent bacteremia.  He will follow-up with me in 3 weeks time or sooner with any changes or worsening of his symptoms.

## 2020-10-14 NOTE — PHYSICAL EXAM
[Stooped] : stooped [Walker] : ambulates with walker [de-identified] : His incision is well healed. Stable neurologic exam.   [de-identified] : AP lateral scoliosis x-rays taken today demonstrates fracture of 1 of his S2 AI screws.  He also appears to have a brandt fracture on the contralateral side.

## 2020-10-14 NOTE — HISTORY OF PRESENT ILLNESS
[de-identified] : Mr. Leon presents to the office s/p T1 - pelvis fusion on 1/10/20.  Overall he is feeling wiped out since getting diagnosed with a staph infection in his blood.  He is on IV antibiotics.  He has bilateral lumbar and buttock pain.  \par

## 2020-11-04 ENCOUNTER — APPOINTMENT (OUTPATIENT)
Dept: ORTHOPEDIC SURGERY | Facility: CLINIC | Age: 70
End: 2020-11-04
Payer: MEDICARE

## 2020-11-04 VITALS — TEMPERATURE: 97.1 F

## 2020-11-04 PROCEDURE — 99072 ADDL SUPL MATRL&STAF TM PHE: CPT

## 2020-11-04 PROCEDURE — 99214 OFFICE O/P EST MOD 30 MIN: CPT

## 2020-11-04 NOTE — HISTORY OF PRESENT ILLNESS
[de-identified] : Mr. Leon presents to the office s/p T1 - pelvis fusion on 1/10/20. He continues to have low back and buttock pain.  He is here to discuss revision surgery.

## 2020-11-04 NOTE — PHYSICAL EXAM
[Stooped] : stooped [Walker] : ambulates with walker [de-identified] : His incision is well healed. Stable neurologic exam.   [de-identified] : AP lateral scoliosis x-rays taken today demonstrates fracture of 1 of his S2 AI screws.  He also appears to have a brandt fracture on the contralateral side.

## 2020-11-04 NOTE — DISCUSSION/SUMMARY
[de-identified] : We discussed further treatment options.  At this point he feels his pain is increasing.  He would like to undergo surgery.  Risks and benefits were discussed at length.  We will obtain a CT scan for planning.  Follow-up for a preoperative appointment.

## 2020-11-05 ENCOUNTER — NON-APPOINTMENT (OUTPATIENT)
Age: 70
End: 2020-11-05

## 2020-11-14 DIAGNOSIS — Z01.818 ENCOUNTER FOR OTHER PREPROCEDURAL EXAMINATION: ICD-10-CM

## 2020-11-16 ENCOUNTER — APPOINTMENT (OUTPATIENT)
Dept: DISASTER EMERGENCY | Facility: CLINIC | Age: 70
End: 2020-11-16

## 2020-11-16 ENCOUNTER — LABORATORY RESULT (OUTPATIENT)
Age: 70
End: 2020-11-16

## 2020-11-18 ENCOUNTER — TRANSCRIPTION ENCOUNTER (OUTPATIENT)
Age: 70
End: 2020-11-18

## 2020-11-18 ENCOUNTER — OUTPATIENT (OUTPATIENT)
Dept: OUTPATIENT SERVICES | Facility: HOSPITAL | Age: 70
LOS: 1 days | End: 2020-11-18
Payer: MEDICARE

## 2020-11-18 VITALS
RESPIRATION RATE: 16 BRPM | WEIGHT: 210.1 LBS | TEMPERATURE: 98 F | HEIGHT: 69 IN | SYSTOLIC BLOOD PRESSURE: 120 MMHG | OXYGEN SATURATION: 98 % | HEART RATE: 85 BPM | DIASTOLIC BLOOD PRESSURE: 70 MMHG

## 2020-11-18 DIAGNOSIS — M41.9 SCOLIOSIS, UNSPECIFIED: ICD-10-CM

## 2020-11-18 DIAGNOSIS — E11.9 TYPE 2 DIABETES MELLITUS WITHOUT COMPLICATIONS: ICD-10-CM

## 2020-11-18 DIAGNOSIS — G47.33 OBSTRUCTIVE SLEEP APNEA (ADULT) (PEDIATRIC): ICD-10-CM

## 2020-11-18 DIAGNOSIS — Z98.890 OTHER SPECIFIED POSTPROCEDURAL STATES: Chronic | ICD-10-CM

## 2020-11-18 DIAGNOSIS — F32.9 MAJOR DEPRESSIVE DISORDER, SINGLE EPISODE, UNSPECIFIED: ICD-10-CM

## 2020-11-18 DIAGNOSIS — Z87.39 PERSONAL HISTORY OF OTHER DISEASES OF THE MUSCULOSKELETAL SYSTEM AND CONNECTIVE TISSUE: ICD-10-CM

## 2020-11-18 DIAGNOSIS — Z95.0 PRESENCE OF CARDIAC PACEMAKER: ICD-10-CM

## 2020-11-18 DIAGNOSIS — Z87.09 PERSONAL HISTORY OF OTHER DISEASES OF THE RESPIRATORY SYSTEM: ICD-10-CM

## 2020-11-18 DIAGNOSIS — Z95.0 PRESENCE OF CARDIAC PACEMAKER: Chronic | ICD-10-CM

## 2020-11-18 LAB
ANION GAP SERPL CALC-SCNC: 12 MMO/L — SIGNIFICANT CHANGE UP (ref 7–14)
BLD GP AB SCN SERPL QL: NEGATIVE — SIGNIFICANT CHANGE UP
BUN SERPL-MCNC: 25 MG/DL — HIGH (ref 7–23)
CALCIUM SERPL-MCNC: 10.3 MG/DL — SIGNIFICANT CHANGE UP (ref 8.4–10.5)
CHLORIDE SERPL-SCNC: 101 MMOL/L — SIGNIFICANT CHANGE UP (ref 98–107)
CO2 SERPL-SCNC: 26 MMOL/L — SIGNIFICANT CHANGE UP (ref 22–31)
CREAT SERPL-MCNC: 0.97 MG/DL — SIGNIFICANT CHANGE UP (ref 0.5–1.3)
GLUCOSE SERPL-MCNC: 185 MG/DL — HIGH (ref 70–99)
HBA1C BLD-MCNC: 7.6 % — HIGH (ref 4–5.6)
HCT VFR BLD CALC: 40.9 % — SIGNIFICANT CHANGE UP (ref 39–50)
HGB BLD-MCNC: 13.2 G/DL — SIGNIFICANT CHANGE UP (ref 13–17)
MCHC RBC-ENTMCNC: 31.7 PG — SIGNIFICANT CHANGE UP (ref 27–34)
MCHC RBC-ENTMCNC: 32.3 % — SIGNIFICANT CHANGE UP (ref 32–36)
MCV RBC AUTO: 98.1 FL — SIGNIFICANT CHANGE UP (ref 80–100)
NRBC # FLD: 0 K/UL — SIGNIFICANT CHANGE UP (ref 0–0)
PLATELET # BLD AUTO: 289 K/UL — SIGNIFICANT CHANGE UP (ref 150–400)
PMV BLD: 10.7 FL — SIGNIFICANT CHANGE UP (ref 7–13)
POTASSIUM SERPL-MCNC: 4.6 MMOL/L — SIGNIFICANT CHANGE UP (ref 3.5–5.3)
POTASSIUM SERPL-SCNC: 4.6 MMOL/L — SIGNIFICANT CHANGE UP (ref 3.5–5.3)
RBC # BLD: 4.17 M/UL — LOW (ref 4.2–5.8)
RBC # FLD: 13.8 % — SIGNIFICANT CHANGE UP (ref 10.3–14.5)
RH IG SCN BLD-IMP: POSITIVE — SIGNIFICANT CHANGE UP
SODIUM SERPL-SCNC: 139 MMOL/L — SIGNIFICANT CHANGE UP (ref 135–145)
WBC # BLD: 9.69 K/UL — SIGNIFICANT CHANGE UP (ref 3.8–10.5)
WBC # FLD AUTO: 9.69 K/UL — SIGNIFICANT CHANGE UP (ref 3.8–10.5)

## 2020-11-18 PROCEDURE — 93010 ELECTROCARDIOGRAM REPORT: CPT

## 2020-11-18 RX ORDER — ASPIRIN/CALCIUM CARB/MAGNESIUM 324 MG
1 TABLET ORAL
Qty: 0 | Refills: 0 | DISCHARGE

## 2020-11-18 RX ORDER — ROSUVASTATIN CALCIUM 5 MG/1
1 TABLET ORAL
Qty: 0 | Refills: 0 | DISCHARGE

## 2020-11-18 RX ORDER — METOPROLOL TARTRATE 50 MG
1 TABLET ORAL
Qty: 0 | Refills: 0 | DISCHARGE

## 2020-11-18 RX ORDER — INSULIN DETEMIR 100/ML (3)
70 INSULIN PEN (ML) SUBCUTANEOUS
Qty: 0 | Refills: 0 | DISCHARGE

## 2020-11-18 RX ORDER — LOSARTAN/HYDROCHLOROTHIAZIDE 100MG-25MG
1 TABLET ORAL
Qty: 0 | Refills: 0 | DISCHARGE

## 2020-11-18 RX ORDER — INSULIN GLARGINE AND LIXISENATIDE 100; 33 U/ML; UG/ML
40 INJECTION, SOLUTION SUBCUTANEOUS
Qty: 0 | Refills: 0 | DISCHARGE

## 2020-11-18 RX ORDER — EXENATIDE 250 UG/ML
10 INJECTION SUBCUTANEOUS
Qty: 0 | Refills: 0 | DISCHARGE

## 2020-11-18 RX ORDER — FUROSEMIDE 40 MG
1 TABLET ORAL
Qty: 0 | Refills: 0 | DISCHARGE

## 2020-11-18 RX ORDER — INSULIN ASPART 100 [IU]/ML
6 INJECTION, SOLUTION SUBCUTANEOUS
Qty: 0 | Refills: 0 | DISCHARGE

## 2020-11-18 RX ORDER — FAMOTIDINE 10 MG/ML
1 INJECTION INTRAVENOUS
Qty: 0 | Refills: 0 | DISCHARGE

## 2020-11-18 RX ORDER — FOLIC ACID 7.5 MG
1 TABLET ORAL
Qty: 0 | Refills: 0 | DISCHARGE

## 2020-11-18 RX ORDER — CLOPIDOGREL BISULFATE 75 MG/1
0 TABLET, FILM COATED ORAL
Qty: 0 | Refills: 0 | DISCHARGE

## 2020-11-18 RX ORDER — CLONAZEPAM 1 MG
1 TABLET ORAL
Qty: 0 | Refills: 0 | DISCHARGE

## 2020-11-18 RX ORDER — BUDESONIDE AND FORMOTEROL FUMARATE DIHYDRATE 160; 4.5 UG/1; UG/1
2 AEROSOL RESPIRATORY (INHALATION)
Qty: 0 | Refills: 0 | DISCHARGE

## 2020-11-18 RX ORDER — FEBUXOSTAT 40 MG/1
1 TABLET ORAL
Qty: 0 | Refills: 0 | DISCHARGE

## 2020-11-18 RX ORDER — BUPROPION HYDROCHLORIDE 150 MG/1
1 TABLET, EXTENDED RELEASE ORAL
Qty: 0 | Refills: 0 | DISCHARGE

## 2020-11-18 RX ORDER — SODIUM CHLORIDE 9 MG/ML
1000 INJECTION, SOLUTION INTRAVENOUS
Refills: 0 | Status: DISCONTINUED | OUTPATIENT
Start: 2020-11-19 | End: 2020-11-22

## 2020-11-18 RX ORDER — CLOPIDOGREL BISULFATE 75 MG/1
1 TABLET, FILM COATED ORAL
Qty: 0 | Refills: 0 | DISCHARGE

## 2020-11-18 RX ORDER — METFORMIN HYDROCHLORIDE 850 MG/1
1 TABLET ORAL
Qty: 0 | Refills: 0 | DISCHARGE

## 2020-11-18 RX ORDER — PANTOPRAZOLE SODIUM 20 MG/1
1 TABLET, DELAYED RELEASE ORAL
Qty: 0 | Refills: 0 | DISCHARGE

## 2020-11-18 RX ORDER — ESZOPICLONE 2 MG/1
1 TABLET, COATED ORAL
Qty: 0 | Refills: 0 | DISCHARGE

## 2020-11-18 RX ORDER — ZOLPIDEM TARTRATE 10 MG/1
1 TABLET ORAL
Qty: 0 | Refills: 0 | DISCHARGE

## 2020-11-18 NOTE — H&P PST ADULT - OTHER CARE PROVIDERS
Mathieu Sutherland (cardiology) [phone] // Kuldip Cochran (podiatry) [phone] // Wilver Selby (EP) [phone]

## 2020-11-18 NOTE — H&P PST ADULT - NEGATIVE GASTROINTESTINAL SYMPTOMS
no vomiting/no diarrhea/no nausea/no constipation no nausea/no constipation/no diarrhea/no vomiting/no abdominal pain

## 2020-11-18 NOTE — H&P PST ADULT - NSICDXPASTSURGICALHX_GEN_ALL_CORE_FT
PAST SURGICAL HISTORY:  History of Appendectomy     History of lumbar laminectomy L1-2 & L4-5   2015    History of permanent cardiac pacemaker placement placed Medtronic 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

## 2020-11-18 NOTE — H&P PST ADULT - ASSESSMENT
71 y/o male, with PMHx of T2DM, HTN, CABG x 4 (>25 years ago), HLD, CVA (no residual -2013), depression, FREEDOM (mouthpiece), S/P lumbar laminectomy (2015) & T1 spinal fusion in January 2020   c/o  lower back & buttock pain, progressively worsening since the surgery. Patient reports pain worsens while ambulating and has difficulty with balance while using his walker. Patient reports he recently recovered from sepsis in August. He is scheduled for exploration of T10 pelvis fusion with Dr. Roberts, Dr. Storey : muscle flap reconstruction.

## 2020-11-18 NOTE — H&P PST ADULT - NEGATIVE ENMT SYMPTOMS
no nose bleeds/no vertigo/no gum bleeding/no throat pain/no dysphagia/no sinus symptoms/no nasal congestion/no post-nasal discharge/no tinnitus no tinnitus/no nose bleeds/no gum bleeding/no throat pain/no nasal congestion/no vertigo/no sinus symptoms/no dysphagia

## 2020-11-18 NOTE — H&P PST ADULT - ENDOCRINE COMMENTS
T2DM, denies thyroid disorder T2DM, Last A1c 6.9 , average fating blood glucose 130.  denies thyroid disorder

## 2020-11-18 NOTE — H&P PST ADULT - NS MD HP SKIN WOUND STATUS
left foot 3rd toe ?infection, seen by podiatrist today, betadine and band-aid in place, pt on antibiotics le

## 2020-11-18 NOTE — H&P PST ADULT - HISTORY OF PRESENT ILLNESS
69 y/o male, with PMHx of T2DM, HTN, CABG x 4 (>25 years ago), HLD, CVA (no residual -2013), depression, FREEDOM (mouthpiece), S/P lumbar laminectomy (2015) & T1 spinal fusion in January 2020   c/o  lower back & buttock pain, progressively worsening since the surgery. Patient reports pain worsens while ambulating and has difficulty with balance while using his walker. Patient reports he recently recovered from sepsis in August. He is scheduled for exploration of T10 pelvis fusion with Dr. Roberts, Dr. Storey : muscle flap reconstruction.

## 2020-11-18 NOTE — H&P PST ADULT - NEGATIVE OPHTHALMOLOGIC SYMPTOMS
no lacrimation R/no blurred vision L/no pain R/no irritation R/no loss of vision L/no scleral injection L/no discharge R/no irritation L/no loss of vision R/no scleral injection R/no photophobia/no lacrimation L/no diplopia/no discharge L/no pain L

## 2020-11-18 NOTE — H&P PST ADULT - NEGATIVE CARDIOVASCULAR SYMPTOMS
no chest pain/no palpitations no dyspnea on exertion/no chest pain/no palpitations/no peripheral edema

## 2020-11-18 NOTE — H&P PST ADULT - NSICDXPROBLEM_GEN_ALL_CORE_FT
PROBLEM DIAGNOSES  Problem: H/O scoliosis  Assessment and Plan:  Scheduled for exploration of T10 pelvis fusion with Dr. Roberts on 11/19/2020.  Verbal and written pre-op instructions provided to patient. Patient verbalized understanding and will call surgeons office for revised instructions if surgery is rescheduled.   Pt. will take own Famotidine AM of surgery.  patient given verbal and written instruction with teach back on chlorhexidine shampoo, and the patient verbalized understanding with return demonstration.  Pt. had Covid test done yesterday.      Problem: Cardiac pacemaker  Assessment and Plan: Requested cardiac eval from cardiologist.   OR booking notified.   Last Plavix 11/14.  Continued ASA-advised to hold tomorrow morning-message left informing Anushka at Dr. Alvares office.     Problem: H/O respiratory system disease  Assessment and Plan: Pt. instructed to continue medications as prescribed.     Problem: Depression  Assessment and Plan: Pt. instructed to continue medications as prescribed.     Problem: FREEDOM (obstructive sleep apnea)  Assessment and Plan: FREEDOM-No CPAP- OR booking notified.    Problem: Diabetes mellitus  Assessment and Plan: Pt. instructed to hold Metformin the night before and morning of procedure. Hold Novolong Am of procedure.  Accucheck DOS. OR booking notified of DM2   Reduce Soliqua to 44 units night before procedure.

## 2020-11-18 NOTE — ASU PATIENT PROFILE, ADULT - PSH
History of Appendectomy    History of lumbar laminectomy  L1-2 & L4-5   2015  History of permanent cardiac pacemaker placement  placed Medtronic [place] PPM Model# [id number] , implanted April 4, 2019  S/P CABG X 4  ~25 years ago  S/P Coronary Angiogram  12/26/2010 & 2015 at [place], 4/2019 @[place]  S/P Hernia Repair  incisional hernia repair on 1/19/17  Sudden Adrenal Gland Insufficiency  left adrenalectomy >20 years ago

## 2020-11-19 ENCOUNTER — APPOINTMENT (OUTPATIENT)
Dept: ORTHOPEDIC SURGERY | Facility: HOSPITAL | Age: 70
End: 2020-11-19

## 2020-11-19 ENCOUNTER — INPATIENT (INPATIENT)
Facility: HOSPITAL | Age: 70
LOS: 12 days | Discharge: NURSING, REHAB OR LONG-TERM CARE | End: 2020-12-02
Attending: STUDENT IN AN ORGANIZED HEALTH CARE EDUCATION/TRAINING PROGRAM | Admitting: STUDENT IN AN ORGANIZED HEALTH CARE EDUCATION/TRAINING PROGRAM
Payer: MEDICARE

## 2020-11-19 ENCOUNTER — RESULT REVIEW (OUTPATIENT)
Age: 70
End: 2020-11-19

## 2020-11-19 VITALS
HEIGHT: 69 IN | DIASTOLIC BLOOD PRESSURE: 76 MMHG | WEIGHT: 210.1 LBS | SYSTOLIC BLOOD PRESSURE: 127 MMHG | TEMPERATURE: 98 F | OXYGEN SATURATION: 98 % | HEART RATE: 80 BPM | RESPIRATION RATE: 16 BRPM

## 2020-11-19 DIAGNOSIS — M41.9 SCOLIOSIS, UNSPECIFIED: ICD-10-CM

## 2020-11-19 DIAGNOSIS — Z98.890 OTHER SPECIFIED POSTPROCEDURAL STATES: Chronic | ICD-10-CM

## 2020-11-19 DIAGNOSIS — Z95.0 PRESENCE OF CARDIAC PACEMAKER: Chronic | ICD-10-CM

## 2020-11-19 LAB
ANION GAP SERPL CALC-SCNC: 11 MMO/L — SIGNIFICANT CHANGE UP (ref 7–14)
BASE EXCESS BLDA CALC-SCNC: 1.2 MMOL/L — SIGNIFICANT CHANGE UP
BASE EXCESS BLDA CALC-SCNC: 1.3 MMOL/L — SIGNIFICANT CHANGE UP
BASE EXCESS BLDA CALC-SCNC: 2.3 MMOL/L — SIGNIFICANT CHANGE UP
BASOPHILS # BLD AUTO: 0.03 K/UL — SIGNIFICANT CHANGE UP (ref 0–0.2)
BASOPHILS NFR BLD AUTO: 0.2 % — SIGNIFICANT CHANGE UP (ref 0–2)
BUN SERPL-MCNC: 21 MG/DL — SIGNIFICANT CHANGE UP (ref 7–23)
CA-I BLDA-SCNC: 1.16 MMOL/L — SIGNIFICANT CHANGE UP (ref 1.15–1.29)
CA-I BLDA-SCNC: 1.2 MMOL/L — SIGNIFICANT CHANGE UP (ref 1.15–1.29)
CA-I BLDA-SCNC: 1.21 MMOL/L — SIGNIFICANT CHANGE UP (ref 1.15–1.29)
CALCIUM SERPL-MCNC: 8.5 MG/DL — SIGNIFICANT CHANGE UP (ref 8.4–10.5)
CHLORIDE SERPL-SCNC: 99 MMOL/L — SIGNIFICANT CHANGE UP (ref 98–107)
CO2 SERPL-SCNC: 22 MMOL/L — SIGNIFICANT CHANGE UP (ref 22–31)
CREAT SERPL-MCNC: 0.91 MG/DL — SIGNIFICANT CHANGE UP (ref 0.5–1.3)
EOSINOPHIL # BLD AUTO: 0.01 K/UL — SIGNIFICANT CHANGE UP (ref 0–0.5)
EOSINOPHIL NFR BLD AUTO: 0.1 % — SIGNIFICANT CHANGE UP (ref 0–6)
GLUCOSE BLDA-MCNC: 123 MG/DL — HIGH (ref 70–99)
GLUCOSE BLDA-MCNC: 160 MG/DL — HIGH (ref 70–99)
GLUCOSE BLDA-MCNC: 166 MG/DL — HIGH (ref 70–99)
GLUCOSE BLDC GLUCOMTR-MCNC: 165 MG/DL — HIGH (ref 70–99)
GLUCOSE SERPL-MCNC: 216 MG/DL — HIGH (ref 70–99)
HCO3 BLDA-SCNC: 25 MMOL/L — SIGNIFICANT CHANGE UP (ref 22–26)
HCO3 BLDA-SCNC: 25 MMOL/L — SIGNIFICANT CHANGE UP (ref 22–26)
HCO3 BLDA-SCNC: 26 MMOL/L — SIGNIFICANT CHANGE UP (ref 22–26)
HCT VFR BLD CALC: 33.5 % — LOW (ref 39–50)
HCT VFR BLDA CALC: 33.1 % — LOW (ref 39–51)
HCT VFR BLDA CALC: 36.1 % — LOW (ref 39–51)
HCT VFR BLDA CALC: 36.8 % — LOW (ref 39–51)
HGB BLD-MCNC: 10.7 G/DL — LOW (ref 13–17)
HGB BLDA-MCNC: 10.7 G/DL — LOW (ref 13–17)
HGB BLDA-MCNC: 11.7 G/DL — LOW (ref 13–17)
HGB BLDA-MCNC: 11.9 G/DL — LOW (ref 13–17)
IMM GRANULOCYTES NFR BLD AUTO: 1.5 % — SIGNIFICANT CHANGE UP (ref 0–1.5)
LYMPHOCYTES # BLD AUTO: 0.61 K/UL — LOW (ref 1–3.3)
LYMPHOCYTES # BLD AUTO: 4.7 % — LOW (ref 13–44)
MCHC RBC-ENTMCNC: 31.7 PG — SIGNIFICANT CHANGE UP (ref 27–34)
MCHC RBC-ENTMCNC: 31.9 % — LOW (ref 32–36)
MCV RBC AUTO: 99.1 FL — SIGNIFICANT CHANGE UP (ref 80–100)
MONOCYTES # BLD AUTO: 0.27 K/UL — SIGNIFICANT CHANGE UP (ref 0–0.9)
MONOCYTES NFR BLD AUTO: 2.1 % — SIGNIFICANT CHANGE UP (ref 2–14)
NEUTROPHILS # BLD AUTO: 11.86 K/UL — HIGH (ref 1.8–7.4)
NEUTROPHILS NFR BLD AUTO: 91.4 % — HIGH (ref 43–77)
NRBC # FLD: 0 K/UL — SIGNIFICANT CHANGE UP (ref 0–0)
PCO2 BLDA: 41 MMHG — SIGNIFICANT CHANGE UP (ref 35–48)
PCO2 BLDA: 42 MMHG — SIGNIFICANT CHANGE UP (ref 35–48)
PCO2 BLDA: 44 MMHG — SIGNIFICANT CHANGE UP (ref 35–48)
PH BLDA: 7.39 PH — SIGNIFICANT CHANGE UP (ref 7.35–7.45)
PH BLDA: 7.41 PH — SIGNIFICANT CHANGE UP (ref 7.35–7.45)
PH BLDA: 7.42 PH — SIGNIFICANT CHANGE UP (ref 7.35–7.45)
PLATELET # BLD AUTO: 222 K/UL — SIGNIFICANT CHANGE UP (ref 150–400)
PMV BLD: 10.9 FL — SIGNIFICANT CHANGE UP (ref 7–13)
PO2 BLDA: 226 MMHG — HIGH (ref 83–108)
PO2 BLDA: 270 MMHG — HIGH (ref 83–108)
PO2 BLDA: 300 MMHG — HIGH (ref 83–108)
POTASSIUM BLDA-SCNC: 4.5 MMOL/L — SIGNIFICANT CHANGE UP (ref 3.4–4.5)
POTASSIUM BLDA-SCNC: 4.6 MMOL/L — HIGH (ref 3.4–4.5)
POTASSIUM BLDA-SCNC: 4.6 MMOL/L — HIGH (ref 3.4–4.5)
POTASSIUM SERPL-MCNC: 5.4 MMOL/L — HIGH (ref 3.5–5.3)
POTASSIUM SERPL-SCNC: 5.4 MMOL/L — HIGH (ref 3.5–5.3)
RBC # BLD: 3.38 M/UL — LOW (ref 4.2–5.8)
RBC # FLD: 13.9 % — SIGNIFICANT CHANGE UP (ref 10.3–14.5)
RH IG SCN BLD-IMP: POSITIVE — SIGNIFICANT CHANGE UP
SAO2 % BLDA: 99.4 % — HIGH (ref 95–99)
SAO2 % BLDA: 99.7 % — HIGH (ref 95–99)
SAO2 % BLDA: 99.7 % — HIGH (ref 95–99)
SODIUM BLDA-SCNC: 133 MMOL/L — LOW (ref 136–146)
SODIUM BLDA-SCNC: 137 MMOL/L — SIGNIFICANT CHANGE UP (ref 136–146)
SODIUM BLDA-SCNC: 138 MMOL/L — SIGNIFICANT CHANGE UP (ref 136–146)
SODIUM SERPL-SCNC: 132 MMOL/L — LOW (ref 135–145)
WBC # BLD: 12.98 K/UL — HIGH (ref 3.8–10.5)
WBC # FLD AUTO: 12.98 K/UL — HIGH (ref 3.8–10.5)

## 2020-11-19 PROCEDURE — 72020 X-RAY EXAM OF SPINE 1 VIEW: CPT | Mod: 26

## 2020-11-19 PROCEDURE — 22848 INSERT PELV FIXATION DEVICE: CPT

## 2020-11-19 PROCEDURE — 22802 ARTHRD PST DFRM 7-12 VRT SGM: CPT

## 2020-11-19 PROCEDURE — 88300 SURGICAL PATH GROSS: CPT | Mod: 26

## 2020-11-19 PROCEDURE — 22840 INSERT SPINE FIXATION DEVICE: CPT

## 2020-11-19 RX ORDER — SACUBITRIL AND VALSARTAN 24; 26 MG/1; MG/1
1 TABLET, FILM COATED ORAL
Refills: 0 | Status: DISCONTINUED | OUTPATIENT
Start: 2020-11-19 | End: 2020-11-19

## 2020-11-19 RX ORDER — CLONAZEPAM 1 MG
1 TABLET ORAL THREE TIMES A DAY
Refills: 0 | Status: DISCONTINUED | OUTPATIENT
Start: 2020-11-19 | End: 2020-11-23

## 2020-11-19 RX ORDER — INSULIN LISPRO 100/ML
VIAL (ML) SUBCUTANEOUS AT BEDTIME
Refills: 0 | Status: DISCONTINUED | OUTPATIENT
Start: 2020-11-19 | End: 2020-12-02

## 2020-11-19 RX ORDER — FUROSEMIDE 40 MG
60 TABLET ORAL DAILY
Refills: 0 | Status: DISCONTINUED | OUTPATIENT
Start: 2020-11-19 | End: 2020-11-20

## 2020-11-19 RX ORDER — ALBUTEROL 90 UG/1
2 AEROSOL, METERED ORAL EVERY 6 HOURS
Refills: 0 | Status: DISCONTINUED | OUTPATIENT
Start: 2020-11-19 | End: 2020-12-02

## 2020-11-19 RX ORDER — ACETAMINOPHEN 500 MG
975 TABLET ORAL EVERY 8 HOURS
Refills: 0 | Status: DISCONTINUED | OUTPATIENT
Start: 2020-11-19 | End: 2020-11-23

## 2020-11-19 RX ORDER — SODIUM CHLORIDE 9 MG/ML
1000 INJECTION, SOLUTION INTRAVENOUS
Refills: 0 | Status: DISCONTINUED | OUTPATIENT
Start: 2020-11-19 | End: 2020-12-02

## 2020-11-19 RX ORDER — CEFAZOLIN SODIUM 1 G
2000 VIAL (EA) INJECTION EVERY 8 HOURS
Refills: 0 | Status: COMPLETED | OUTPATIENT
Start: 2020-11-19 | End: 2020-11-20

## 2020-11-19 RX ORDER — INSULIN LISPRO 100/ML
VIAL (ML) SUBCUTANEOUS
Refills: 0 | Status: DISCONTINUED | OUTPATIENT
Start: 2020-11-19 | End: 2020-11-20

## 2020-11-19 RX ORDER — METOPROLOL TARTRATE 50 MG
12.5 TABLET ORAL DAILY
Refills: 0 | Status: DISCONTINUED | OUTPATIENT
Start: 2020-11-19 | End: 2020-12-02

## 2020-11-19 RX ORDER — ZOLPIDEM TARTRATE 10 MG/1
5 TABLET ORAL AT BEDTIME
Refills: 0 | Status: DISCONTINUED | OUTPATIENT
Start: 2020-11-19 | End: 2020-11-24

## 2020-11-19 RX ORDER — SODIUM CHLORIDE 9 MG/ML
1000 INJECTION, SOLUTION INTRAVENOUS
Refills: 0 | Status: DISCONTINUED | OUTPATIENT
Start: 2020-11-19 | End: 2020-11-22

## 2020-11-19 RX ORDER — MAGNESIUM HYDROXIDE 400 MG/1
30 TABLET, CHEWABLE ORAL EVERY 12 HOURS
Refills: 0 | Status: DISCONTINUED | OUTPATIENT
Start: 2020-11-19 | End: 2020-12-02

## 2020-11-19 RX ORDER — TRAMADOL HYDROCHLORIDE 50 MG/1
50 TABLET ORAL EVERY 8 HOURS
Refills: 0 | Status: DISCONTINUED | OUTPATIENT
Start: 2020-11-19 | End: 2020-11-24

## 2020-11-19 RX ORDER — DEXTROSE 50 % IN WATER 50 %
25 SYRINGE (ML) INTRAVENOUS ONCE
Refills: 0 | Status: DISCONTINUED | OUTPATIENT
Start: 2020-11-19 | End: 2020-12-02

## 2020-11-19 RX ORDER — OXYCODONE HYDROCHLORIDE 5 MG/1
10 TABLET ORAL EVERY 4 HOURS
Refills: 0 | Status: DISCONTINUED | OUTPATIENT
Start: 2020-11-19 | End: 2020-11-20

## 2020-11-19 RX ORDER — BUDESONIDE AND FORMOTEROL FUMARATE DIHYDRATE 160; 4.5 UG/1; UG/1
2 AEROSOL RESPIRATORY (INHALATION)
Refills: 0 | Status: DISCONTINUED | OUTPATIENT
Start: 2020-11-19 | End: 2020-12-02

## 2020-11-19 RX ORDER — BUPROPION HYDROCHLORIDE 150 MG/1
300 TABLET, EXTENDED RELEASE ORAL DAILY
Refills: 0 | Status: DISCONTINUED | OUTPATIENT
Start: 2020-11-19 | End: 2020-12-02

## 2020-11-19 RX ORDER — DEXTROSE 50 % IN WATER 50 %
12.5 SYRINGE (ML) INTRAVENOUS ONCE
Refills: 0 | Status: DISCONTINUED | OUTPATIENT
Start: 2020-11-19 | End: 2020-12-02

## 2020-11-19 RX ORDER — TIOTROPIUM BROMIDE 18 UG/1
1 CAPSULE ORAL; RESPIRATORY (INHALATION) DAILY
Refills: 0 | Status: DISCONTINUED | OUTPATIENT
Start: 2020-11-19 | End: 2020-12-02

## 2020-11-19 RX ORDER — METOPROLOL TARTRATE 50 MG
12.5 TABLET ORAL DAILY
Refills: 0 | Status: DISCONTINUED | OUTPATIENT
Start: 2020-11-19 | End: 2020-11-19

## 2020-11-19 RX ORDER — SACUBITRIL AND VALSARTAN 24; 26 MG/1; MG/1
1 TABLET, FILM COATED ORAL
Refills: 0 | Status: DISCONTINUED | OUTPATIENT
Start: 2020-11-19 | End: 2020-11-20

## 2020-11-19 RX ORDER — GLUCAGON INJECTION, SOLUTION 0.5 MG/.1ML
1 INJECTION, SOLUTION SUBCUTANEOUS ONCE
Refills: 0 | Status: DISCONTINUED | OUTPATIENT
Start: 2020-11-19 | End: 2020-12-02

## 2020-11-19 RX ORDER — GABAPENTIN 400 MG/1
100 CAPSULE ORAL THREE TIMES A DAY
Refills: 0 | Status: DISCONTINUED | OUTPATIENT
Start: 2020-11-20 | End: 2020-12-02

## 2020-11-19 RX ORDER — INSULIN GLARGINE 100 [IU]/ML
55 INJECTION, SOLUTION SUBCUTANEOUS AT BEDTIME
Refills: 0 | Status: DISCONTINUED | OUTPATIENT
Start: 2020-11-19 | End: 2020-11-23

## 2020-11-19 RX ORDER — OXYCODONE HYDROCHLORIDE 5 MG/1
5 TABLET ORAL EVERY 4 HOURS
Refills: 0 | Status: DISCONTINUED | OUTPATIENT
Start: 2020-11-19 | End: 2020-11-20

## 2020-11-19 RX ORDER — SPIRONOLACTONE 25 MG/1
25 TABLET, FILM COATED ORAL DAILY
Refills: 0 | Status: DISCONTINUED | OUTPATIENT
Start: 2020-11-19 | End: 2020-11-20

## 2020-11-19 RX ORDER — FAMOTIDINE 10 MG/ML
40 INJECTION INTRAVENOUS
Refills: 0 | Status: DISCONTINUED | OUTPATIENT
Start: 2020-11-19 | End: 2020-12-02

## 2020-11-19 RX ORDER — ATORVASTATIN CALCIUM 80 MG/1
20 TABLET, FILM COATED ORAL AT BEDTIME
Refills: 0 | Status: DISCONTINUED | OUTPATIENT
Start: 2020-11-19 | End: 2020-12-02

## 2020-11-19 RX ORDER — DEXTROSE 50 % IN WATER 50 %
15 SYRINGE (ML) INTRAVENOUS ONCE
Refills: 0 | Status: DISCONTINUED | OUTPATIENT
Start: 2020-11-19 | End: 2020-12-02

## 2020-11-19 RX ORDER — SENNA PLUS 8.6 MG/1
2 TABLET ORAL AT BEDTIME
Refills: 0 | Status: DISCONTINUED | OUTPATIENT
Start: 2020-11-19 | End: 2020-12-02

## 2020-11-19 RX ADMIN — SODIUM CHLORIDE 75 MILLILITER(S): 9 INJECTION, SOLUTION INTRAVENOUS at 22:30

## 2020-11-19 NOTE — BRIEF OPERATIVE NOTE - NSICDXBRIEFPROCEDURE_GEN_ALL_CORE_FT
PROCEDURES:  Removal, [place] brandt, spine 19-Nov-2020 20:39:53  Laci Jennings  Removal, Bill[place]ley brandt, spine 19-Nov-2020 20:39:46  Laci Jennings

## 2020-11-19 NOTE — BRIEF OPERATIVE NOTE - NSICDXBRIEFPOSTOP_GEN_ALL_CORE_FT
POST-OP DIAGNOSIS:  [facility] compl of internal fixation device of oth bones, init 19-Nov-2020 20:39:16  Laci Jennings

## 2020-11-19 NOTE — BRIEF OPERATIVE NOTE - OPERATION/FINDINGS
Findings: Broken brandt right, broken sacral alar screw left  Procedure: removal of bilateral rods, insertion of iliac screw on right, removal of left sacral alar screw, insertion of left iliac screw, insertion of bilateral rods

## 2020-11-19 NOTE — BRIEF OPERATIVE NOTE - NSICDXBRIEFPREOP_GEN_ALL_CORE_FT
PRE-OP DIAGNOSIS:  [facility] compl of internal fixation device of oth bones, init 19-Nov-2020 20:39:11  Laci Jennings

## 2020-11-19 NOTE — ASU PREOP CHECKLIST - COMMENTS
Took Pepcid  Entresto, deplin, Wellbutrin, Klonopin tramadol, Tylenol, Metroprolol 12.5mg with sip of water Trellegy inhaler Took Pepcid  Entresto, deplin,  Crestor, Wellbutrin, Klonopin tramadol, Tylenol, Metroprolol 12.5mg with sip of water Trellegy inhaler

## 2020-11-20 ENCOUNTER — TRANSCRIPTION ENCOUNTER (OUTPATIENT)
Age: 70
End: 2020-11-20

## 2020-11-20 DIAGNOSIS — I25.10 ATHEROSCLEROTIC HEART DISEASE OF NATIVE CORONARY ARTERY WITHOUT ANGINA PECTORIS: ICD-10-CM

## 2020-11-20 DIAGNOSIS — I10 ESSENTIAL (PRIMARY) HYPERTENSION: ICD-10-CM

## 2020-11-20 DIAGNOSIS — Z29.9 ENCOUNTER FOR PROPHYLACTIC MEASURES, UNSPECIFIED: ICD-10-CM

## 2020-11-20 DIAGNOSIS — E87.5 HYPERKALEMIA: ICD-10-CM

## 2020-11-20 DIAGNOSIS — E11.9 TYPE 2 DIABETES MELLITUS WITHOUT COMPLICATIONS: ICD-10-CM

## 2020-11-20 DIAGNOSIS — Z98.890 OTHER SPECIFIED POSTPROCEDURAL STATES: ICD-10-CM

## 2020-11-20 LAB
ANION GAP SERPL CALC-SCNC: 13 MMO/L — SIGNIFICANT CHANGE UP (ref 7–14)
ANION GAP SERPL CALC-SCNC: 8 MMO/L — SIGNIFICANT CHANGE UP (ref 7–14)
BASOPHILS # BLD AUTO: 0.01 K/UL — SIGNIFICANT CHANGE UP (ref 0–0.2)
BASOPHILS NFR BLD AUTO: 0.1 % — SIGNIFICANT CHANGE UP (ref 0–2)
BUN SERPL-MCNC: 25 MG/DL — HIGH (ref 7–23)
BUN SERPL-MCNC: 25 MG/DL — HIGH (ref 7–23)
CALCIUM SERPL-MCNC: 8.6 MG/DL — SIGNIFICANT CHANGE UP (ref 8.4–10.5)
CALCIUM SERPL-MCNC: 8.7 MG/DL — SIGNIFICANT CHANGE UP (ref 8.4–10.5)
CHLORIDE SERPL-SCNC: 101 MMOL/L — SIGNIFICANT CHANGE UP (ref 98–107)
CHLORIDE SERPL-SCNC: 102 MMOL/L — SIGNIFICANT CHANGE UP (ref 98–107)
CO2 SERPL-SCNC: 23 MMOL/L — SIGNIFICANT CHANGE UP (ref 22–31)
CO2 SERPL-SCNC: 27 MMOL/L — SIGNIFICANT CHANGE UP (ref 22–31)
CREAT SERPL-MCNC: 1.08 MG/DL — SIGNIFICANT CHANGE UP (ref 0.5–1.3)
CREAT SERPL-MCNC: 1.09 MG/DL — SIGNIFICANT CHANGE UP (ref 0.5–1.3)
EOSINOPHIL # BLD AUTO: 0 K/UL — SIGNIFICANT CHANGE UP (ref 0–0.5)
EOSINOPHIL NFR BLD AUTO: 0 % — SIGNIFICANT CHANGE UP (ref 0–6)
GLUCOSE BLDC GLUCOMTR-MCNC: 138 MG/DL — HIGH (ref 70–99)
GLUCOSE BLDC GLUCOMTR-MCNC: 175 MG/DL — HIGH (ref 70–99)
GLUCOSE BLDC GLUCOMTR-MCNC: 184 MG/DL — HIGH (ref 70–99)
GLUCOSE BLDC GLUCOMTR-MCNC: 210 MG/DL — HIGH (ref 70–99)
GLUCOSE BLDC GLUCOMTR-MCNC: 237 MG/DL — HIGH (ref 70–99)
GLUCOSE BLDC GLUCOMTR-MCNC: 251 MG/DL — HIGH (ref 70–99)
GLUCOSE BLDC GLUCOMTR-MCNC: 350 MG/DL — HIGH (ref 70–99)
GLUCOSE SERPL-MCNC: 159 MG/DL — HIGH (ref 70–99)
GLUCOSE SERPL-MCNC: 173 MG/DL — HIGH (ref 70–99)
HCT VFR BLD CALC: 30.5 % — LOW (ref 39–50)
HGB BLD-MCNC: 9.5 G/DL — LOW (ref 13–17)
IMM GRANULOCYTES NFR BLD AUTO: 0.4 % — SIGNIFICANT CHANGE UP (ref 0–1.5)
LYMPHOCYTES # BLD AUTO: 0.61 K/UL — LOW (ref 1–3.3)
LYMPHOCYTES # BLD AUTO: 4.4 % — LOW (ref 13–44)
MCHC RBC-ENTMCNC: 31.1 % — LOW (ref 32–36)
MCHC RBC-ENTMCNC: 31.3 PG — SIGNIFICANT CHANGE UP (ref 27–34)
MCV RBC AUTO: 100.3 FL — HIGH (ref 80–100)
MONOCYTES # BLD AUTO: 1.01 K/UL — HIGH (ref 0–0.9)
MONOCYTES NFR BLD AUTO: 7.4 % — SIGNIFICANT CHANGE UP (ref 2–14)
NEUTROPHILS # BLD AUTO: 12.04 K/UL — HIGH (ref 1.8–7.4)
NEUTROPHILS NFR BLD AUTO: 87.7 % — HIGH (ref 43–77)
NRBC # FLD: 0 K/UL — SIGNIFICANT CHANGE UP (ref 0–0)
PLATELET # BLD AUTO: 198 K/UL — SIGNIFICANT CHANGE UP (ref 150–400)
PMV BLD: 10.7 FL — SIGNIFICANT CHANGE UP (ref 7–13)
POTASSIUM SERPL-MCNC: 5.4 MMOL/L — HIGH (ref 3.5–5.3)
POTASSIUM SERPL-MCNC: 5.7 MMOL/L — HIGH (ref 3.5–5.3)
POTASSIUM SERPL-SCNC: 5.4 MMOL/L — HIGH (ref 3.5–5.3)
POTASSIUM SERPL-SCNC: 5.7 MMOL/L — HIGH (ref 3.5–5.3)
RBC # BLD: 3.04 M/UL — LOW (ref 4.2–5.8)
RBC # FLD: 13.9 % — SIGNIFICANT CHANGE UP (ref 10.3–14.5)
SODIUM SERPL-SCNC: 136 MMOL/L — SIGNIFICANT CHANGE UP (ref 135–145)
SODIUM SERPL-SCNC: 138 MMOL/L — SIGNIFICANT CHANGE UP (ref 135–145)
WBC # BLD: 13.73 K/UL — HIGH (ref 3.8–10.5)
WBC # FLD AUTO: 13.73 K/UL — HIGH (ref 3.8–10.5)

## 2020-11-20 PROCEDURE — 99223 1ST HOSP IP/OBS HIGH 75: CPT

## 2020-11-20 RX ORDER — SODIUM CHLORIDE 9 MG/ML
1000 INJECTION, SOLUTION INTRAVENOUS ONCE
Refills: 0 | Status: COMPLETED | OUTPATIENT
Start: 2020-11-20 | End: 2020-11-20

## 2020-11-20 RX ORDER — INSULIN LISPRO 100/ML
VIAL (ML) SUBCUTANEOUS
Refills: 0 | Status: DISCONTINUED | OUTPATIENT
Start: 2020-11-20 | End: 2020-12-02

## 2020-11-20 RX ORDER — HYDROMORPHONE HYDROCHLORIDE 2 MG/ML
0.25 INJECTION INTRAMUSCULAR; INTRAVENOUS; SUBCUTANEOUS ONCE
Refills: 0 | Status: DISCONTINUED | OUTPATIENT
Start: 2020-11-20 | End: 2020-11-20

## 2020-11-20 RX ORDER — INSULIN GLARGINE 100 [IU]/ML
20 INJECTION, SOLUTION SUBCUTANEOUS ONCE
Refills: 0 | Status: COMPLETED | OUTPATIENT
Start: 2020-11-20 | End: 2020-11-20

## 2020-11-20 RX ORDER — INFLUENZA VIRUS VACCINE 15; 15; 15; 15 UG/.5ML; UG/.5ML; UG/.5ML; UG/.5ML
0.5 SUSPENSION INTRAMUSCULAR ONCE
Refills: 0 | Status: DISCONTINUED | OUTPATIENT
Start: 2020-11-20 | End: 2020-11-20

## 2020-11-20 RX ORDER — INFLUENZA VIRUS VACCINE 15; 15; 15; 15 UG/.5ML; UG/.5ML; UG/.5ML; UG/.5ML
0.7 SUSPENSION INTRAMUSCULAR ONCE
Refills: 0 | Status: COMPLETED | OUTPATIENT
Start: 2020-11-20 | End: 2020-11-27

## 2020-11-20 RX ORDER — HYDROMORPHONE HYDROCHLORIDE 2 MG/ML
4 INJECTION INTRAMUSCULAR; INTRAVENOUS; SUBCUTANEOUS EVERY 4 HOURS
Refills: 0 | Status: DISCONTINUED | OUTPATIENT
Start: 2020-11-20 | End: 2020-11-27

## 2020-11-20 RX ORDER — INSULIN LISPRO 100/ML
3 VIAL (ML) SUBCUTANEOUS ONCE
Refills: 0 | Status: COMPLETED | OUTPATIENT
Start: 2020-11-20 | End: 2020-11-20

## 2020-11-20 RX ORDER — INSULIN LISPRO 100/ML
6 VIAL (ML) SUBCUTANEOUS
Refills: 0 | Status: DISCONTINUED | OUTPATIENT
Start: 2020-11-20 | End: 2020-11-22

## 2020-11-20 RX ORDER — HYDROMORPHONE HYDROCHLORIDE 2 MG/ML
2 INJECTION INTRAMUSCULAR; INTRAVENOUS; SUBCUTANEOUS EVERY 4 HOURS
Refills: 0 | Status: DISCONTINUED | OUTPATIENT
Start: 2020-11-20 | End: 2020-11-27

## 2020-11-20 RX ORDER — INSULIN LISPRO 100/ML
VIAL (ML) SUBCUTANEOUS
Refills: 0 | Status: DISCONTINUED | OUTPATIENT
Start: 2020-11-20 | End: 2020-11-20

## 2020-11-20 RX ADMIN — FAMOTIDINE 40 MILLIGRAM(S): 10 INJECTION INTRAVENOUS at 18:01

## 2020-11-20 RX ADMIN — HYDROMORPHONE HYDROCHLORIDE 0.25 MILLIGRAM(S): 2 INJECTION INTRAMUSCULAR; INTRAVENOUS; SUBCUTANEOUS at 03:29

## 2020-11-20 RX ADMIN — TRAMADOL HYDROCHLORIDE 50 MILLIGRAM(S): 50 TABLET ORAL at 14:55

## 2020-11-20 RX ADMIN — Medication 100 MILLIGRAM(S): at 15:26

## 2020-11-20 RX ADMIN — SPIRONOLACTONE 25 MILLIGRAM(S): 25 TABLET, FILM COATED ORAL at 06:17

## 2020-11-20 RX ADMIN — Medication 100 MILLIGRAM(S): at 06:15

## 2020-11-20 RX ADMIN — Medication 2: at 22:27

## 2020-11-20 RX ADMIN — TRAMADOL HYDROCHLORIDE 50 MILLIGRAM(S): 50 TABLET ORAL at 22:26

## 2020-11-20 RX ADMIN — GABAPENTIN 100 MILLIGRAM(S): 400 CAPSULE ORAL at 22:26

## 2020-11-20 RX ADMIN — TIOTROPIUM BROMIDE 1 CAPSULE(S): 18 CAPSULE ORAL; RESPIRATORY (INHALATION) at 11:17

## 2020-11-20 RX ADMIN — BUDESONIDE AND FORMOTEROL FUMARATE DIHYDRATE 2 PUFF(S): 160; 4.5 AEROSOL RESPIRATORY (INHALATION) at 11:17

## 2020-11-20 RX ADMIN — Medication 5: at 18:24

## 2020-11-20 RX ADMIN — TRAMADOL HYDROCHLORIDE 50 MILLIGRAM(S): 50 TABLET ORAL at 06:17

## 2020-11-20 RX ADMIN — Medication 975 MILLIGRAM(S): at 06:20

## 2020-11-20 RX ADMIN — Medication 3 UNIT(S): at 18:24

## 2020-11-20 RX ADMIN — Medication 975 MILLIGRAM(S): at 13:47

## 2020-11-20 RX ADMIN — ZOLPIDEM TARTRATE 5 MILLIGRAM(S): 10 TABLET ORAL at 22:26

## 2020-11-20 RX ADMIN — Medication 1 MILLIGRAM(S): at 22:26

## 2020-11-20 RX ADMIN — HYDROMORPHONE HYDROCHLORIDE 4 MILLIGRAM(S): 2 INJECTION INTRAMUSCULAR; INTRAVENOUS; SUBCUTANEOUS at 14:47

## 2020-11-20 RX ADMIN — HYDROMORPHONE HYDROCHLORIDE 4 MILLIGRAM(S): 2 INJECTION INTRAMUSCULAR; INTRAVENOUS; SUBCUTANEOUS at 13:47

## 2020-11-20 RX ADMIN — SODIUM CHLORIDE 1000 MILLILITER(S): 9 INJECTION, SOLUTION INTRAVENOUS at 18:01

## 2020-11-20 RX ADMIN — GABAPENTIN 100 MILLIGRAM(S): 400 CAPSULE ORAL at 06:17

## 2020-11-20 RX ADMIN — INSULIN GLARGINE 20 UNIT(S): 100 INJECTION, SOLUTION SUBCUTANEOUS at 12:00

## 2020-11-20 RX ADMIN — INSULIN GLARGINE 55 UNIT(S): 100 INJECTION, SOLUTION SUBCUTANEOUS at 22:26

## 2020-11-20 RX ADMIN — ATORVASTATIN CALCIUM 20 MILLIGRAM(S): 80 TABLET, FILM COATED ORAL at 22:26

## 2020-11-20 RX ADMIN — FAMOTIDINE 40 MILLIGRAM(S): 10 INJECTION INTRAVENOUS at 06:16

## 2020-11-20 RX ADMIN — SENNA PLUS 2 TABLET(S): 8.6 TABLET ORAL at 22:26

## 2020-11-20 RX ADMIN — Medication 60 MILLIGRAM(S): at 06:19

## 2020-11-20 RX ADMIN — BUDESONIDE AND FORMOTEROL FUMARATE DIHYDRATE 2 PUFF(S): 160; 4.5 AEROSOL RESPIRATORY (INHALATION) at 22:28

## 2020-11-20 RX ADMIN — Medication 4: at 12:01

## 2020-11-20 RX ADMIN — Medication 975 MILLIGRAM(S): at 22:25

## 2020-11-20 RX ADMIN — HYDROMORPHONE HYDROCHLORIDE 0.25 MILLIGRAM(S): 2 INJECTION INTRAMUSCULAR; INTRAVENOUS; SUBCUTANEOUS at 03:05

## 2020-11-20 NOTE — PHYSICAL THERAPY INITIAL EVALUATION ADULT - DIAGNOSIS, PT EVAL
S/p Removal of bilateral spinal padilla rods, insertion of iliac screw on right, removal of left sacral alar screw, insertion of left iliac screw, Insertion of bilateral rods. Pt presents with weakness and poor balance

## 2020-11-20 NOTE — CONSULT NOTE ADULT - PROBLEM SELECTOR RECOMMENDATION 9
Pain well controlled.  Pt does not want oxycodone. Only agreeable w/ tramadol and dilaudid for severe pain Discussed w/ primary team.  Voiding trial and remainder of plan as per primary team. Pain well controlled.  Pt does not want oxycodone. Only agreeable w/ tramadol and dilaudid for severe pain. Discussed w/ orthopedics ACP.  Voiding trial and remainder of plan as per orthopedics.

## 2020-11-20 NOTE — PROGRESS NOTE ADULT - SUBJECTIVE AND OBJECTIVE BOX
Orthopaedic Surgery Progress Note    Subjective:   Patient seen and examined  No acute events overnight  Pain well controlled    Objective:  T(C): 36.9 (11-20-20 @ 06:01), Max: 36.9 (11-20-20 @ 06:01)  HR: 69 (11-20-20 @ 06:01) (69 - 83)  BP: 100/53 (11-20-20 @ 06:01) (84/61 - 127/76)  RR: 19 (11-20-20 @ 06:01) (13 - 20)  SpO2: 97% (11-20-20 @ 06:01) (95% - 100%)  Wt(kg): --    11-19 @ 07:01  -  11-20 @ 06:10  --------------------------------------------------------  IN: 430 mL / OUT: 1560 mL / NET: -1130 mL        PE    NAD  Back:   dressing C/D/I, mild appropriate rina-incisional ttp  HMV and TAMIKA in place w/ serosanguinous output  Neuro:  RLE IP 5/5 HS 5/5 Q 5/5 GS 5/5 TA 5/5 EHL 5/5   SILT L2-S1, dec at baseline from neuropathy  LLE IP 5/5 HS 5/5 Q 5/5 GS 5/5 TA 5/5 EHL 5/5  SILT L2-S1, dec at baseline from neuropathy  WWP BLE                          10.7   12.98 )-----------( 222      ( 19 Nov 2020 21:55 )             33.5     11-19    132<L>  |  99  |  21  ----------------------------<  216<H>  5.4<H>   |  22  |  0.91    Ca    8.5      19 Nov 2020 21:55            70y Male s/p T10-pelvis PSF with PRS closure, POD1  - Pain control  - FU labs  - WBAT  - PT/OT/OOB  - I/S  - Monitor HMV output  - SCDs  - Dispo planning

## 2020-11-20 NOTE — PHYSICAL THERAPY INITIAL EVALUATION ADULT - PATIENT PROFILE REVIEW, REHAB EVAL
yes/Activity order: OOB; Ambulate with walker and assistance. Checked in with ASHLEY Alfaro prior --> Pt OK for PT consult.

## 2020-11-20 NOTE — OCCUPATIONAL THERAPY INITIAL EVALUATION ADULT - PERTINENT HX OF CURRENT PROBLEM, REHAB EVAL
70 year old male with history of T2DM, HTN, CABG x 4, HLD, CVA, depression, S/P lumbar laminectomy (2015) & T1 spinal fusion in January 2020  with c/o lower back & buttock pain, progressively worsening since the surgery. Patient now s/p exploration of T10-pelvis fusion, replacement of hardware, with muscle flap reconstruction on 11/19/2020.

## 2020-11-20 NOTE — CONSULT NOTE ADULT - SUBJECTIVE AND OBJECTIVE BOX
70M with PMH of HTN, T2DM, CAD s/p CABG, CVA no residual deficits, depression, s/p lumbar laminectomy 2015, T1 spinal fusion in jan 2020 who presents w/ progressive lower back and buttock pain. Pt was admitted for exploration T10 pelvis fusion. Pt went to OR on 11/19. OR course uneventful. Broken right brandt, broken sacral alar screw on the left. Revision and removal of faulty hardware was done. Pt reports fair pain control. Denies any fevers, chills, SOB, abd pain. Tolerating noriega catheter. He has not had a BM yet. He does report passing flatus. Denies any numbness/tingling. States his chronic condition are stable. SBP stable in the 120s and well controlled at home. DM also well controlled on home regimen of PO and insulin regimen. No active coronary issues, he is maintained on his home cardiac meds.     ROS: All other systems negative, except as noted in HPI.     Past Medical History:  Acid Reflux    Anxiety    Benign Tumor of Adrenal Gland    CAD (coronary artery disease)    Cardiac pacemaker    CVA (cerebrovascular accident)  04/8/2013- no residual effects  Depression    Diabetic neuropathy    DM type 2 (diabetes mellitus, type 2)    Gout    Hernia  umbilical, inguinal, abdominal wall  HTN (Hypertension)    FREEDOM (obstructive sleep apnea)    Spinal stenosis of lumbar region.     Past Surgical History:  History of Appendectomy    History of lumbar laminectomy  L1-2 & L4-5   2015  History of permanent cardiac pacemaker placement  placed Medtronic [place] PPM Model# [id number] , implanted April 4, 2019  S/P CABG X 4  ~25 years ago  S/P Coronary Angiogram  12/26/2010 & 2015 at [place], 4/2019 @[place]  S/P Hernia Repair  incisional hernia repair on 1/19/17  Sudden Adrenal Gland Insufficiency  left adrenalectomy >20 years ago.    Allergies: NKDA    Home meds:   Home Medications:  Aldactone 25 mg oral tablet: 1 tab(s) orally  daily (19 Nov 2020 13:55)  Ambien 10 mg oral tablet: 1 tab(s) orally once a day (at bedtime) (19 Nov 2020 13:55)  Aspir 81 oral delayed release tablet: 1 tab(s) orally once a day (19 Nov 2020 13:55)  Crestor 5 mg oral tablet: 1 tab(s) orally once a day (19 Nov 2020 13:55)  Deplin: one tab orally daily (19 Nov 2020 13:55)  Entresto 24 mg-26 mg oral tablet: 1 tab(s) orally 2 times a day (19 Nov 2020 13:55)  famotidine 40 mg oral tablet: one tab orally three times a day (19 Nov 2020 13:55)  ipratropium-albuterol 0.5 mg-2.5 mg/3 mLinhalation solution: 3 milliliter(s) inhaled every 4 hours as needed (19 Nov 2020 13:55)  KlonoPIN 1 mg oral tablet: 1 tab(s) orally 3 times a day (19 Nov 2020 13:55)  Lasix 20 mg oral tablet:  three tabs ( 60 mg) orally daily  (19 Nov 2020 13:55)  magnesium gluconate 500 mg oral tablet: one tab orally  daily (19 Nov 2020 13:55)  metFORMIN 1000 mg oral tablet: 1 tab(s) orally 2 times a day (19 Nov 2020 13:55)  metoprolol tartrate 25 mg oral tablet: 1/2 tab ( 12.5 mg ) orally daily (19 Nov 2020 13:55)  NovoLOG FlexPen 100 units/mL injectable solution: 6  to 8 units  subcutaneous premeal (19 Nov 2020 13:55)  Plavix 75 mg oral tablet: one tab orally   daily  (19 Nov 2020 13:55)  Soliqua 100/33 subcutaneous solution: 55 units subcutaneous at night (19 Nov 2020 13:55)  Trelegy Ellipta inhalation powder: 1 puff(s) inhaled once a day (19 Nov 2020 13:55)  Vitamin C 100 mg oral tablet: one tab orally daily (19 Nov 2020 13:55)  Vitamin D3: one tab orally daily (19 Nov 2020 13:55)  Wellbutrin: 300 mg  one tab orally daily (19 Nov 2020 13:55)      Social History:  No tobacco, ETOH, drug use.     Family History:  CAD, DM      Physical Exam:  Vital Signs Last 24 Hrs  T(C): 36.8 (20 Nov 2020 09:21), Max: 36.9 (20 Nov 2020 06:01)  T(F): 98.3 (20 Nov 2020 09:21), Max: 98.4 (20 Nov 2020 06:01)  HR: 69 (20 Nov 2020 09:21) (69 - 83)  BP: 105/51 (20 Nov 2020 09:21) (84/61 - 127/76)  BP(mean): 58 (19 Nov 2020 23:30) (58 - 74)  RR: 19 (20 Nov 2020 09:21) (13 - 20)  SpO2: 97% (20 Nov 2020 09:21) (95% - 100%)    Gen- In bed, comfortable, NAD  Eyes- EOMI, PERRLA, nonicteric.  EMNT- Fair dentition. MMM. No tonsilar exudates. No posterior pharynx erythema.  Neck- Supple. No masses. No tracheal deviation.  Resp- CTAB, good effort. No r/r/w. No accessory muscle use.  CVS- RRR, S1S2, no g/r/m. No LE edema.  GI- Soft abd, NT, ND, +BSx4. No HSM.  MSK- No C/C. ROM intact. No crepitus.  Neuro- CN II-XII intact. Speech fluent/face symmetric. Sensation intact.  - Noriega in place. Draining pamela clear urine.   Skin- No rashes/ulcers. Warm/moist.  Psych- AAOx3. Appropriate mood/affect.

## 2020-11-20 NOTE — OCCUPATIONAL THERAPY INITIAL EVALUATION ADULT - DIAGNOSIS, OT EVAL
s/p lower back pain, s/p exploration of T10-pelvis fusion; decreased functional mobility, decreased ADL performance

## 2020-11-20 NOTE — PHYSICAL THERAPY INITIAL EVALUATION ADULT - DID THE PATIENT HAVE SURGERY?
yes/Removal of bilateral spinal padilla rods, insertion of iliac screw on right, removal of left sacral alar screw, insertion of left iliac screw, Insertion of bilateral rods

## 2020-11-20 NOTE — PHYSICAL THERAPY INITIAL EVALUATION ADULT - PHYSICAL ASSIST/NONPHYSICAL ASSIST: SUPINE/SIT, REHAB EVAL
1 person assist/verbal cues verbal cues/2 person assist Logrolling to maintain spinal precautions/verbal cues/2 person assist

## 2020-11-20 NOTE — CONSULT NOTE ADULT - PROBLEM SELECTOR RECOMMENDATION 4
-FS fairly well controlled.  -Extensive conversation held w/ pt re: insulin dosing. For now, will elect to start low and titrate up based on next 24h needs.  -On Soliqua 55 units @ home --> will give 20 units NOW and resume usual 55 units tonight.   -Sliding scale insulin intensity has been increased as per discussion w/ patient. Will reassess needs over next 24h and titrate accordingly. He reports premeal boluses of 6-8 units at home.   -Has underlying DM neuropathy - cont gabapentin  -A1c 7.6

## 2020-11-20 NOTE — OCCUPATIONAL THERAPY INITIAL EVALUATION ADULT - GENERAL OBSERVATIONS, REHAB EVAL
Patient received semisupine in bed in NAD. +IV. +hemovac. +Kiran. +TAMIKA drain. Per RN Neva, patient okay to participate in OT evaluation.

## 2020-11-20 NOTE — PHYSICAL THERAPY INITIAL EVALUATION ADULT - ADDITIONAL COMMENTS
Pt states living in an apartment with elevator available for use to enter apartment and no stairs to negotiate. Pt reports getting  and currently living alone in the apartment. Pt states his kids and relatives live ~15 minutes walking distance from him and one of his sons is an EMT and is available to help him if needed. Pt states being ambulatory with rolling walker due to poor balance 2/2 previous CVA 3 months after divorce. Pt reports having a seat and grab bars in the shower for self assistance. Pt reports previously having an aide and a nurse come in to provide assistance at home.    Pt left comfortable in chair with +NAD, all precautions maintained, all lines intact, call peterson in reach and RN Neva Alfaro made aware. Pt states living in an apartment with elevator available for use to enter apartment and no stairs to negotiate. Pt reports getting  and currently living alone in the apartment. Pt states his kids and relatives live ~15 minutes walking distance from him and one of his sons is an EMT and is available to help him if needed. Pt reports having a fall in early october with no injuries sustained. Pt states being ambulatory with rolling walker due to poor balance 2/2 previous CVA 3 months after divorce. Pt reports having a seat and grab bars in the shower for self assistance. Pt reports previously having an aide and a nurse come in to provide assistance at home.    Pt left comfortable in chair with +NAD, all precautions maintained, all lines intact, call peterson in reach and RN Neva Alfaro made aware.

## 2020-11-20 NOTE — CONSULT NOTE ADULT - PROBLEM SELECTOR RECOMMENDATION 2
-5.7-> 5.4. Could be 2' reduction in PO intake.  -Will cont to hold Entresto, aldactone.  -Labs in AM.  -Encourage PO intake.

## 2020-11-20 NOTE — PROGRESS NOTE ADULT - SUBJECTIVE AND OBJECTIVE BOX
Spine Postop Check     S: Patient resting comfortably in PACU. Pain well-controlled currently. No new complaints. Stable      MEDICATIONS  (STANDING):  acetaminophen   Tablet .. 975 milliGRAM(s) Oral every 8 hours  atorvastatin 20 milliGRAM(s) Oral at bedtime  budesonide  80 MICROgram(s)/formoterol 4.5 MICROgram(s) Inhaler 2 Puff(s) Inhalation two times a day  buPROPion XL . 300 milliGRAM(s) Oral daily  ceFAZolin   IVPB 2000 milliGRAM(s) IV Intermittent every 8 hours  dextrose 40% Gel 15 Gram(s) Oral once  dextrose 5%. 1000 milliLiter(s) (50 mL/Hr) IV Continuous <Continuous>  dextrose 5%. 1000 milliLiter(s) (100 mL/Hr) IV Continuous <Continuous>  dextrose 50% Injectable 25 Gram(s) IV Push once  dextrose 50% Injectable 12.5 Gram(s) IV Push once  dextrose 50% Injectable 25 Gram(s) IV Push once  famotidine    Tablet 40 milliGRAM(s) Oral two times a day  furosemide    Tablet 60 milliGRAM(s) Oral daily  gabapentin 100 milliGRAM(s) Oral three times a day  glucagon  Injectable 1 milliGRAM(s) IntraMuscular once  insulin glargine Injectable (LANTUS) 55 Unit(s) SubCutaneous at bedtime  insulin lispro (ADMELOG) corrective regimen sliding scale   SubCutaneous three times a day before meals  insulin lispro (ADMELOG) corrective regimen sliding scale   SubCutaneous at bedtime  lactated ringers. 1000 milliLiter(s) (30 mL/Hr) IV Continuous <Continuous>  lactated ringers. 1000 milliLiter(s) (75 mL/Hr) IV Continuous <Continuous>  metoprolol tartrate 12.5 milliGRAM(s) Oral daily  sacubitril 24 mG/valsartan 26 mG 1 Tablet(s) Oral two times a day  senna 2 Tablet(s) Oral at bedtime  spironolactone 25 milliGRAM(s) Oral daily  tiotropium 18 MICROgram(s) Capsule 1 Capsule(s) Inhalation daily  traMADol 50 milliGRAM(s) Oral every 8 hours    MEDICATIONS  (PRN):  ALBUTerol    90 MICROgram(s) HFA Inhaler 2 Puff(s) Inhalation every 6 hours PRN Bronchospasm  clonazePAM  Tablet 1 milliGRAM(s) Oral three times a day PRN Anxiety  magnesium hydroxide Suspension 30 milliLiter(s) Oral every 12 hours PRN Constipation  oxyCODONE    IR 5 milliGRAM(s) Oral every 4 hours PRN mild to moderate (1-6)  oxyCODONE    IR 10 milliGRAM(s) Oral every 4 hours PRN Severe Pain (7 - 10)  zolpidem 5 milliGRAM(s) Oral at bedtime PRN Insomnia  zolpidem 5 milliGRAM(s) Oral at bedtime PRN Insomnia      Physical Exam:        11-19-20 @ 07:01  -  11-20-20 @ 00:33  --------------------------------------------------------  IN: 430 mL / OUT: 700 mL / NET: -270 mL        General: NAD  Resp: Nonlabored  Spine  HV SS  TAMIKA SS    b/l UE:  Skin Intact  C5-T1 5/5 motor  C5-T1 2/2 SILT  WWP  Cap refill <2 s    b/l LE:   Skin Intact  L2-S1 motor 5/5  L2-S1 SILT 2/2 (mild numbness across entire feet bilaterally per Pt's baseline DM (peripheral neuropathy))  WW       LABS:                        10.7   12.98 )-----------( 222      ( 19 Nov 2020 21:55 )             33.5     11-19    132<L>  |  99  |  21  ----------------------------<  216<H>  5.4<H>   |  22  |  0.91    Ca    8.5      19 Nov 2020 21:55        A/P: 70M s/p T10-Pelvis Exploration of rev PSF w/flap    Neuro: Pain control  Resp: IS  GI: Regular diet, bowel reg  MSK: WBAT, PT/OT  Heme: DVT PPX: Venodynes  Qiana  HV  TAMIKA  Prevena

## 2020-11-20 NOTE — PHYSICAL THERAPY INITIAL EVALUATION ADULT - PERTINENT HX OF CURRENT PROBLEM, REHAB EVAL
71 y/o male, with PMHx of T2DM, HTN, CABG x 4 (>25 years ago), HLD, CVA (no residual -2013), depression, FREEDOM (mouthpiece), S/P lumbar laminectomy (2015) & T1 spinal fusion in January 2020   c/o  lower back & buttock pain, progressively worsening since the surgery. Patient reports pain worsens while ambulating and has difficulty with balance while using his walker. Patient reports he recently recovered from sepsis in August.

## 2020-11-20 NOTE — DISCHARGE NOTE PROVIDER - HOSPITAL COURSE
69 yo s/p T10 - pelvis lami/PSF with PRS flap 11/19/20 with Dr Roberts.   Patient tolerated the procedure well without any complications.  Patient tolerated physical therapy well and pain is controlled.   A medical co-management attending has followed patient  for continuity of care and management and cleared for safe discharge.  Please follow up with Dr Roberts  in 1-2 weeks.  Call office to make an appointment [phone].  Please follow up with your primary care physician as medications may have changed.  Please avoid any NSAIDS, aspirin or anti-inflammatory medications unless otherwise specified by your surgeon.  Avoid any heavy lifting, bending, squatting, twisting motion,  Keep dressing and/or incision clean and dry.  Patient may shower, please avoid aiming shower stream directly onto incision.  Sutures/staples to be removed at office postop visit POD 14.  Patient is weight bear as tolerated.  Please notify Ortho with any questions.     ISTOP 71yo male is admitted for elective Thoracic to pelvis revision posterior spinal fusion 11/19/2020 without any intraoperative complications.  Patient is stable for discharge as per surgeon.  Patient is tolerating Physical Therapy: weight bearing as tolerated, out of bed for gait training, and no heavy lifting or bending.  If applicable, Staples/sutures to be removed on post op day #14 in surgeon's office.  NO ADVIL, MOTRIN, IBUPROFEN until instructed otherwise by surgeon - Dr. Roberts is ok with continuing patient's home med Aspirin 81mg (MUST TAKE WITH FOOD) and Plavix.  Follow up with Dr. Roberts's office in 1week - call the office to make appointment [phone].    Patient has history of diabetes for which house Endocrinologist evaluated and helped managed patient's medication regimen for postop glycemic control.  Patient is being discharged to rehab to resume all home regimens.  However, if home regimen is not feasible at rehab facility, below is the regimen that was followed during hospitalization:     Insulin lispro (ADMELOG) corrective regimen sliding scale-----      0 Unit(s) if Glucose 0 - 250      1 Unit(s) if Glucose 251 - 300      2 Unit(s) if Glucose 301 - 350      3 Unit(s) if Glucose 351 - 400      4 Unit(s) if Glucose Greater Than 400 + Contact Provider  SubCutaneous, at bedtime 71yo male is admitted for elective Thoracic to pelvis revision posterior spinal fusion 11/19/2020 without any intraoperative complications.  Patient is stable for discharge as per surgeon.  Patient is tolerating Physical Therapy: weight bearing as tolerated, out of bed for gait training, and no heavy lifting or bending.  If applicable, Staples/sutures to be removed on post op day #14 in surgeon's office.  NO ADVIL, MOTRIN, IBUPROFEN until instructed otherwise by surgeon - Dr. Roberts is ok with continuing patient's home med Aspirin 81mg (MUST TAKE WITH FOOD) and Plavix.  Follow up with Dr. Roberts's office in 1week - call the office to make appointment [phone].    Patient has history of diabetes for which house Endocrinologist evaluated and helped managed patient's medication regimen for postop glycemic control.  Patient is being discharged to rehab to resume all home regimens.   Endocrinology was consulted who made the follow recommendations for DM2 management:     Recommendation: While inpatient consistent carbohydrate diet and FS AC and HS  Goal Glucose 100-180  Recommend continuation of Lantus 60 Units HS and Admelog 11 Units TIDAC plus moderate scale before meals and low scale at bedtime  Patient can resume home regimen on discharge  He will f/u with his endocrinologist Dr Lal upon discharge from rehab.   However, if home regimen is not feasible at rehab facility, below is the regimen that was followed during hospitalization:     Insulin lispro (ADMELOG) corrective regimen sliding scale-----      0 Unit(s) if Glucose 0 - 250      1 Unit(s) if Glucose 251 - 300      2 Unit(s) if Glucose 301 - 350      3 Unit(s) if Glucose 351 - 400      4 Unit(s) if Glucose Greater Than 400 + Contact Provider  SubCutaneous, at bedtime     71yo male is admitted for elective Thoracic to pelvis revision posterior spinal fusion 11/19/2020 without any intraoperative complications.  Patient is stable for discharge as per surgeon.  Patient is tolerating Physical Therapy: weight bearing as tolerated, out of bed for gait training, and no heavy lifting or bending.  If applicable, Staples/sutures to be removed on post op day #14 in surgeon's office.  NO ADVIL, MOTRIN, IBUPROFEN until instructed otherwise by surgeon - Dr. Roberts is ok with continuing patient's home med Aspirin 81mg (MUST TAKE WITH FOOD) and Plavix.  Follow up with Dr. Roberts's office in 1week - call the office to make appointment [phone].    Patient has history of diabetes for which house Endocrinologist evaluated and helped managed patient's medication regimen for postop glycemic control.  Patient is being discharged to rehab to resume all home regimens.  Patient should follow up with private Endocrinologist on discharge from rehab for continuity of care.  Endocrinology was consulted who made the follow recommendations for DM2 management:     Recommendation: While inpatient consistent carbohydrate diet and FS AC and HS  Goal Glucose 100-180  Recommend continuation of Lantus 60 Units HS and Admelog 11 Units TIDAC plus moderate scale before meals and low scale at bedtime  Patient can resume home regimen on discharge  He will f/u with his endocrinologist Dr Lal upon discharge from rehab.   However, if home regimen is not feasible at rehab facility, below is the regimen that was followed during hospitalization:     Insulin lispro (ADMELOG) corrective regimen sliding scale-----      0 Unit(s) if Glucose 0 - 250      1 Unit(s) if Glucose 251 - 300      2 Unit(s) if Glucose 301 - 350      3 Unit(s) if Glucose 351 - 400      4 Unit(s) if Glucose Greater Than 400 + Contact Provider  SubCutaneous, at bedtime

## 2020-11-20 NOTE — PROVIDER CONTACT NOTE (OTHER) - ASSESSMENT
Pt states, "I need way more insulin to control my blood sugar. This is not the right dose. I want to see the doctor."

## 2020-11-20 NOTE — PHYSICAL THERAPY INITIAL EVALUATION ADULT - GENERAL OBSERVATIONS, REHAB EVAL
Pt seen for PT in semisupine position in bed, no distress, +IV, +TAMIKA drain, +Kiran x1, and surgical dressing dry and intact. Pt seen for PT in semisupine position in bed, no distress, +IV, +Hemovac, +Woundvac, +TAMIKA drain, +Kiran x1, and surgical dressing dry and intact.

## 2020-11-20 NOTE — DISCHARGE NOTE PROVIDER - NSDCCPCAREPLAN_GEN_ALL_CORE_FT
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  Patient is stable for discharge as per surgeon.  Patient is tolerating Physical Therapy: weight bearing as tolerated, out of bed for gait training, and no heavy lifting or bending.  If applicable, Staples/sutures to be removed on post dictated operative note day #14 in surgeon's office.  NO ADVIL, MOTRIN, IBUPROFEN until instructed otherwise by surgeon - Dr. Roberts is ok with continuing patient's home med Aspirin 81mg (MUST TAKE WITH FOOD) and Plavix.  Follow up with Dr. Roberts's office in 1week - call the office to make appointment [phone].  Patient has history of diabetes for which house Endocrinologist evaluated and helped managed patient's medication regimen for postop glycemic control.  Patient is being discharged to rehab to resume all home regimens.  However, if home regimen is not feasible at rehab facility, below is the regimen that was followed during hospitalization:   Insulin lispro (ADMELOG) corrective regimen sliding scale-----      0 Unit(s) if Glucose 0 - 250      1 Unit(s) if Glucose 251 - 300      2 Unit(s) if Glucose 301 - 350      3 Unit(s) if Glucose 351 - 400      4 Unit(s) if Glucose Greater Than 400 + Contact Provider  SubCutaneous, at bedtime

## 2020-11-20 NOTE — CONSULT NOTE ADULT - ASSESSMENT
70M with PMH of HTN, T2DM, CAD s/p CABG, CVA no residual deficits, depression, s/p lumbar laminectomy 2015, T1 spinal fusion in Jan 2020 s/p T10-pelvis PSF with PRS closure, POD1

## 2020-11-20 NOTE — PHYSICAL THERAPY INITIAL EVALUATION ADULT - GAIT DEVIATIONS NOTED, PT EVAL
decreased step length/decreased weight-shifting ability/increased time in double stance/decreased brittney

## 2020-11-20 NOTE — DISCHARGE NOTE PROVIDER - NSDCACTIVITY_GEN_ALL_CORE
Do not make important decisions/Stairs allowed/No heavy lifting/straining/Driving allowed/Showering allowed/Do not drive or operate machinery/Walking - Outdoors allowed/Walking - Indoors allowed

## 2020-11-20 NOTE — PROVIDER CONTACT NOTE (OTHER) - RECOMMENDATIONS
Pt refusing to stand up to attempt another void. Requesting straight cath to relieve bladder pressure

## 2020-11-20 NOTE — DISCHARGE NOTE PROVIDER - NSDCMRMEDTOKEN_GEN_ALL_CORE_FT
Aldactone 25 mg oral tablet: 1 tab(s) orally  daily  Ambien 10 mg oral tablet: 1 tab(s) orally once a day (at bedtime)  Aspir 81 oral delayed release tablet: 1 tab(s) orally once a day  Crestor 5 mg oral tablet: 1 tab(s) orally once a day  Deplin: one tab orally daily  Entresto 24 mg-26 mg oral tablet: 1 tab(s) orally 2 times a day  famotidine 40 mg oral tablet: one tab orally three times a day  ipratropium-albuterol 0.5 mg-2.5 mg/3 mLinhalation solution: 3 milliliter(s) inhaled every 4 hours as needed  KlonoPIN 1 mg oral tablet: 1 tab(s) orally 3 times a day  Lasix 20 mg oral tablet:  three tabs ( 60 mg) orally daily   magnesium gluconate 500 mg oral tablet: one tab orally  daily  metFORMIN 1000 mg oral tablet: 1 tab(s) orally 2 times a day  metoprolol tartrate 25 mg oral tablet: 1/2 tab ( 12.5 mg ) orally daily  NovoLOG FlexPen 100 units/mL injectable solution: 6  to 8 units  subcutaneous premeal  Plavix 75 mg oral tablet: one tab orally   daily   Soliqua 100/33 subcutaneous solution: 55 units subcutaneous at night  Trelegy Ellipta inhalation powder: 1 puff(s) inhaled once a day  Vitamin C 100 mg oral tablet: one tab orally daily  Vitamin D3: one tab orally daily  Wellbutrin: 300 mg  one tab orally daily   albuterol 90 mcg/inh inhalation aerosol: 2 puff(s) inhaled every 6 hours, As needed, Bronchospasm  Aldactone 25 mg oral tablet: 1 tab(s) orally  daily  Aspir 81 oral delayed release tablet: 1 tab(s) orally once a day  clonazePAM 1 mg oral tablet: 1 tab(s) orally every 8 hours, As needed, Anxiety  Crestor 5 mg oral tablet: 1 tab(s) orally once a day  Entresto 24 mg-26 mg oral tablet: 1 tab(s) orally 2 times a day  famotidine 40 mg oral tablet: one tab orally three times a day  gabapentin 100 mg oral capsule: 1 cap(s) orally 3 times a day  HYDROmorphone 2 mg oral tablet: 1 tab(s) orally every 4 hours, As needed, Moderate Pain (4 - 6)  HYDROmorphone 4 mg oral tablet: 1 tab(s) orally every 4 hours, As needed, Severe Pain (7 - 10)  ipratropium-albuterol 0.5 mg-2.5 mg/3 mLinhalation solution: 3 milliliter(s) inhaled every 4 hours as needed  lactulose 10 g/15 mL oral syrup: 15 milliliter(s) orally 3 times a day  Lasix 20 mg oral tablet:  three tabs ( 60 mg) orally daily   metFORMIN 1000 mg oral tablet: 1 tab(s) orally 2 times a day  metoprolol tartrate 25 mg oral tablet: 1/2 tab ( half tab...12.5 mg ) orally daily  NovoLOG FlexPen 100 units/mL injectable solution: 6  to 8 units  subcutaneous premeal  Plavix 75 mg oral tablet: one tab orally   daily   polyethylene glycol 3350 oral powder for reconstitution: 17 gram(s) orally once a day  senna oral tablet: 2 tab(s) orally once a day (at bedtime)  simethicone 80 mg oral tablet, chewable: 1 tab(s) orally once a day  Soliqua 100/33 subcutaneous solution: 55 units subcutaneous at night  tamsulosin 0.4 mg oral capsule: 1 cap(s) orally once a day (at bedtime)  traMADol 50 mg oral tablet: 1 tab(s) orally every 8 hours as needed for Mild pain  Trelegy Ellipta inhalation powder: 1 puff(s) inhaled once a day  Wellbutrin: 300 mg  one tab orally daily  zolpidem 5 mg oral tablet: 1 tab(s) orally once a day (at bedtime), As needed, Insomnia   albuterol 90 mcg/inh inhalation aerosol: 2 puff(s) inhaled every 6 hours, As needed, Bronchospasm  Aldactone 25 mg oral tablet: 1 tab(s) orally  daily  Aspir 81 oral delayed release tablet: 1 tab(s) orally once a day  clonazePAM 1 mg oral tablet: 1 tab(s) orally every 8 hours, As needed, Anxiety  Crestor 5 mg oral tablet: 1 tab(s) orally once a day  Entresto 24 mg-26 mg oral tablet: 1 tab(s) orally 2 times a day  famotidine 40 mg oral tablet: one tab orally three times a day  gabapentin 100 mg oral capsule: 1 cap(s) orally 3 times a day  HYDROmorphone 2 mg oral tablet: 1 tab(s) orally every 4 hours, As needed, Moderate Pain (4 - 6)  HYDROmorphone 4 mg oral tablet: 1 tab(s) orally every 4 hours, As needed, Severe Pain (7 - 10)  ipratropium-albuterol 0.5 mg-2.5 mg/3 mLinhalation solution: 3 milliliter(s) inhaled every 4 hours as needed  lactulose 10 g/15 mL oral syrup: 15 milliliter(s) orally 3 times a day  Lasix 20 mg oral tablet:  three tabs ( 60 mg) orally daily   metFORMIN 1000 mg oral tablet: 1 tab(s) orally 2 times a day  metoprolol tartrate 25 mg oral tablet: 1/2 tab ( half tab...12.5 mg ) orally daily  NovoLOG FlexPen 100 units/mL injectable solution: 6  to 8 units  subcutaneous premeal  Plavix 75 mg oral tablet: one tab orally   daily   polyethylene glycol 3350 oral powder for reconstitution: 17 gram(s) orally once a day  senna oral tablet: 2 tab(s) orally once a day (at bedtime)  simethicone 80 mg oral tablet, chewable: 1 tab(s) orally once a day  Soliqua 100/33 subcutaneous solution: 55 units subcutaneous at night  tamsulosin 0.4 mg oral capsule: 1 cap(s) orally once a day (at bedtime)  Trelegy Ellipta inhalation powder: 1 puff(s) inhaled once a day  Wellbutrin: 300 mg  one tab orally daily  zolpidem 5 mg oral tablet: 1 tab(s) orally once a day (at bedtime), As needed, Insomnia

## 2020-11-20 NOTE — CONSULT NOTE ADULT - SUBJECTIVE AND OBJECTIVE BOX
BHUPENDRA THOMPSON  [id number]    70y y.o M s/p posterior spine fusion with hardware malfunction presents to the Orthopaedic spine service with back pain.  Patient diagnosed with hardware malfunction requiring operative intervention with revision posterior spine fusion.  Plastic surgery consulted to evaluate patient for muscle flap reconstruction of posterior spine wound given severe spine disease requiring wide exposure of spine structures, need for bilateral multilevel hardware placement, use of autologous and cadaveric bone graft in setting of extensive scar tissue whereas I had performed surgery in the past requiring healthy vascularized muscle flap coverage of exposed vital stuctures and extensive foreign body.    Scoliosis    Family history of borderline diabetes mellitus    Family history of early CAD    Handoff    MEWS Score    FREEDOM (obstructive sleep apnea)    Cardiac pacemaker    Hernia    CVA (cerebrovascular accident)    Diabetic neuropathy    Spinal stenosis of lumbar region    DM type 2 (diabetes mellitus, type 2)    CAD (coronary artery disease)    Anxiety    Depression    Acid Reflux    Gout    Adrenal Insufficiency    Benign Tumor of Adrenal Gland    HTN (Hypertension)    Diabetes    Mech compl of internal fixation device of oth bones, init    Mech compl of internal fixation device of oth bones, init    Revision, fusion, spine, thoracic    Hardware failure of anterior column of spine    Need for prophylactic measure    CAD (coronary artery disease)    DM type 2 (diabetes mellitus, type 2)    HTN (Hypertension)    Hyperkalemia    Postoperative state    H/O respiratory system disease    Depression    H/O scoliosis    FREEDOM (obstructive sleep apnea)    Cardiac pacemaker    Diabetes mellitus    Removal, Billingsley brandt, spine    Removal, Billingsley brandt, spine    History of permanent cardiac pacemaker placement    Presence of permanent cardiac pacemaker    History of lumbar laminectomy    S/P Coronary Angiogram    S/P Hernia Repair    History of Appendectomy    S/P CABG X 4    Sudden Adrenal Gland Insufficiency    [id number]      No Known Allergies      T(C): 37.2 (11-20-20 @ 13:45), Max: 37.5 (11-20-20 @ 13:06)  HR: 85 (11-20-20 @ 13:45) (69 - 85)  BP: 119/61 (11-20-20 @ 13:45) (84/61 - 119/61)  RR: 16 (11-20-20 @ 13:45) (13 - 20)  SpO2: 98% (11-20-20 @ 13:45) (95% - 100%)  Intubated, prone position  37 cm spine wound with exposure of spine, intact bilateral hardware and bone graft with denuded adjacent muscles and soft tissue.        Imaging: Reviewed.     A/P:  70yy.o with severe scoliosis s/p revision posterior spine decompression/fusion with muscle flap reconstruction.  - Diet  - Pain control  - IV abx  - Drain monitoring  - Ambulation as per Ortho   - DVT PPx: SCD, chemoprophylaxis as per spine service  - Will Follow    Thank You  Curt Storey MD  Plastic Surgery

## 2020-11-20 NOTE — OCCUPATIONAL THERAPY INITIAL EVALUATION ADULT - LIVES WITH, PROFILE
Patient lives alone in an apartment with elevator access. Patient reports owning a shower chair, grab bars, and raised toilet seat prior to admission.

## 2020-11-20 NOTE — PROVIDER CONTACT NOTE (OTHER) - SITUATION
Pt with noriega removed earlier today. Due to void by 23:00. Unable to do so on multiple attempts while in bed. Refusing to stand up to attempt. Scan shows 696ml in bladder

## 2020-11-20 NOTE — DISCHARGE NOTE PROVIDER - CARE PROVIDER_API CALL
Saulo Roberts  ORTHOPAEDIC SURGERY  [address] [facility], [address]  [place] [address]  Phone: [phone]  Fax: [phone]  Established Patient  Follow Up Time:     Curt Storey)  Plastic Surgery  [address] [facility], [address] [place] [id number]  Phone: [phone]  Fax: [phone]  Established Patient  Follow Up Time:

## 2020-11-20 NOTE — DISCHARGE NOTE PROVIDER - NSDCCPTREATMENT_GEN_ALL_CORE_FT
PRINCIPAL PROCEDURE  Procedure: Revision, fusion, spine, thoracic  Findings and Treatment:        PRINCIPAL PROCEDURE  Procedure: Revision, fusion, spine, thoracic  Findings and Treatment: dictated operative note  weight bearing as tolerated to lower extremities  out of bed for gait training  no heavy lifting and bending  call Surgeon's office to make appointment for 1-2 weeks from date of surgery Dr. Roberts [phone]

## 2020-11-20 NOTE — CONSULT NOTE ADULT - PROBLEM SELECTOR RECOMMENDATION 3
-BP on soft side. Could be 2' post-op status (recent sedatives, reduced PO intake).   -Will hold aldactone, entresto, and lasix (received today). Resume as appropriate.   -Will cont w/ metoprolol.   -Based on regimen, likely has chronic sCHF. Pt is currently euvolemic.

## 2020-11-20 NOTE — DISCHARGE NOTE PROVIDER - REASON FOR ADMISSION
T10 - pelvis lami/PSF with PRS flap 11/19/20 elective Thoracic to pelvis revision posterior spinal fusion 11/19/2020

## 2020-11-21 DIAGNOSIS — R33.9 RETENTION OF URINE, UNSPECIFIED: ICD-10-CM

## 2020-11-21 DIAGNOSIS — N17.9 ACUTE KIDNEY FAILURE, UNSPECIFIED: ICD-10-CM

## 2020-11-21 LAB
ANION GAP SERPL CALC-SCNC: 13 MMO/L — SIGNIFICANT CHANGE UP (ref 7–14)
BASOPHILS # BLD AUTO: 0.02 K/UL — SIGNIFICANT CHANGE UP (ref 0–0.2)
BASOPHILS NFR BLD AUTO: 0.2 % — SIGNIFICANT CHANGE UP (ref 0–2)
BUN SERPL-MCNC: 30 MG/DL — HIGH (ref 7–23)
CALCIUM SERPL-MCNC: 8.6 MG/DL — SIGNIFICANT CHANGE UP (ref 8.4–10.5)
CHLORIDE SERPL-SCNC: 101 MMOL/L — SIGNIFICANT CHANGE UP (ref 98–107)
CO2 SERPL-SCNC: 23 MMOL/L — SIGNIFICANT CHANGE UP (ref 22–31)
CREAT SERPL-MCNC: 1.35 MG/DL — HIGH (ref 0.5–1.3)
EOSINOPHIL # BLD AUTO: 0.03 K/UL — SIGNIFICANT CHANGE UP (ref 0–0.5)
EOSINOPHIL NFR BLD AUTO: 0.2 % — SIGNIFICANT CHANGE UP (ref 0–6)
GLUCOSE BLDC GLUCOMTR-MCNC: 230 MG/DL — HIGH (ref 70–99)
GLUCOSE BLDC GLUCOMTR-MCNC: 234 MG/DL — HIGH (ref 70–99)
GLUCOSE BLDC GLUCOMTR-MCNC: 284 MG/DL — HIGH (ref 70–99)
GLUCOSE BLDC GLUCOMTR-MCNC: 322 MG/DL — HIGH (ref 70–99)
GLUCOSE SERPL-MCNC: 223 MG/DL — HIGH (ref 70–99)
HCT VFR BLD CALC: 26.7 % — LOW (ref 39–50)
HGB BLD-MCNC: 9 G/DL — LOW (ref 13–17)
IMM GRANULOCYTES NFR BLD AUTO: 0.8 % — SIGNIFICANT CHANGE UP (ref 0–1.5)
LYMPHOCYTES # BLD AUTO: 1.26 K/UL — SIGNIFICANT CHANGE UP (ref 1–3.3)
LYMPHOCYTES # BLD AUTO: 9.8 % — LOW (ref 13–44)
MCHC RBC-ENTMCNC: 33 PG — SIGNIFICANT CHANGE UP (ref 27–34)
MCHC RBC-ENTMCNC: 33.7 % — SIGNIFICANT CHANGE UP (ref 32–36)
MCV RBC AUTO: 97.8 FL — SIGNIFICANT CHANGE UP (ref 80–100)
MONOCYTES # BLD AUTO: 1.47 K/UL — HIGH (ref 0–0.9)
MONOCYTES NFR BLD AUTO: 11.4 % — SIGNIFICANT CHANGE UP (ref 2–14)
NEUTROPHILS # BLD AUTO: 9.96 K/UL — HIGH (ref 1.8–7.4)
NEUTROPHILS NFR BLD AUTO: 77.6 % — HIGH (ref 43–77)
NRBC # FLD: 0 K/UL — SIGNIFICANT CHANGE UP (ref 0–0)
PLATELET # BLD AUTO: 192 K/UL — SIGNIFICANT CHANGE UP (ref 150–400)
PMV BLD: 10.9 FL — SIGNIFICANT CHANGE UP (ref 7–13)
POTASSIUM SERPL-MCNC: 4.3 MMOL/L — SIGNIFICANT CHANGE UP (ref 3.5–5.3)
POTASSIUM SERPL-SCNC: 4.3 MMOL/L — SIGNIFICANT CHANGE UP (ref 3.5–5.3)
RBC # BLD: 2.73 M/UL — LOW (ref 4.2–5.8)
RBC # FLD: 14.2 % — SIGNIFICANT CHANGE UP (ref 10.3–14.5)
SODIUM SERPL-SCNC: 137 MMOL/L — SIGNIFICANT CHANGE UP (ref 135–145)
WBC # BLD: 12.84 K/UL — HIGH (ref 3.8–10.5)
WBC # FLD AUTO: 12.84 K/UL — HIGH (ref 3.8–10.5)

## 2020-11-21 PROCEDURE — 99233 SBSQ HOSP IP/OBS HIGH 50: CPT

## 2020-11-21 RX ORDER — TAMSULOSIN HYDROCHLORIDE 0.4 MG/1
0.4 CAPSULE ORAL ONCE
Refills: 0 | Status: COMPLETED | OUTPATIENT
Start: 2020-11-21 | End: 2020-11-21

## 2020-11-21 RX ORDER — POLYETHYLENE GLYCOL 3350 17 G/17G
17 POWDER, FOR SOLUTION ORAL DAILY
Refills: 0 | Status: DISCONTINUED | OUTPATIENT
Start: 2020-11-21 | End: 2020-12-02

## 2020-11-21 RX ADMIN — TRAMADOL HYDROCHLORIDE 50 MILLIGRAM(S): 50 TABLET ORAL at 21:35

## 2020-11-21 RX ADMIN — Medication 6 UNIT(S): at 18:16

## 2020-11-21 RX ADMIN — BUDESONIDE AND FORMOTEROL FUMARATE DIHYDRATE 2 PUFF(S): 160; 4.5 AEROSOL RESPIRATORY (INHALATION) at 10:09

## 2020-11-21 RX ADMIN — BUDESONIDE AND FORMOTEROL FUMARATE DIHYDRATE 2 PUFF(S): 160; 4.5 AEROSOL RESPIRATORY (INHALATION) at 21:35

## 2020-11-21 RX ADMIN — Medication 4: at 08:28

## 2020-11-21 RX ADMIN — Medication 2: at 21:59

## 2020-11-21 RX ADMIN — ZOLPIDEM TARTRATE 5 MILLIGRAM(S): 10 TABLET ORAL at 23:19

## 2020-11-21 RX ADMIN — Medication 1 MILLIGRAM(S): at 21:51

## 2020-11-21 RX ADMIN — SENNA PLUS 2 TABLET(S): 8.6 TABLET ORAL at 21:34

## 2020-11-21 RX ADMIN — TIOTROPIUM BROMIDE 1 CAPSULE(S): 18 CAPSULE ORAL; RESPIRATORY (INHALATION) at 10:08

## 2020-11-21 RX ADMIN — POLYETHYLENE GLYCOL 3350 17 GRAM(S): 17 POWDER, FOR SOLUTION ORAL at 12:36

## 2020-11-21 RX ADMIN — FAMOTIDINE 40 MILLIGRAM(S): 10 INJECTION INTRAVENOUS at 05:49

## 2020-11-21 RX ADMIN — Medication 975 MILLIGRAM(S): at 14:06

## 2020-11-21 RX ADMIN — HYDROMORPHONE HYDROCHLORIDE 4 MILLIGRAM(S): 2 INJECTION INTRAMUSCULAR; INTRAVENOUS; SUBCUTANEOUS at 10:08

## 2020-11-21 RX ADMIN — BUPROPION HYDROCHLORIDE 300 MILLIGRAM(S): 150 TABLET, EXTENDED RELEASE ORAL at 10:08

## 2020-11-21 RX ADMIN — HYDROMORPHONE HYDROCHLORIDE 4 MILLIGRAM(S): 2 INJECTION INTRAMUSCULAR; INTRAVENOUS; SUBCUTANEOUS at 11:08

## 2020-11-21 RX ADMIN — Medication 975 MILLIGRAM(S): at 21:34

## 2020-11-21 RX ADMIN — Medication 6 UNIT(S): at 08:28

## 2020-11-21 RX ADMIN — FAMOTIDINE 40 MILLIGRAM(S): 10 INJECTION INTRAVENOUS at 17:52

## 2020-11-21 RX ADMIN — MAGNESIUM HYDROXIDE 30 MILLILITER(S): 400 TABLET, CHEWABLE ORAL at 19:44

## 2020-11-21 RX ADMIN — TRAMADOL HYDROCHLORIDE 50 MILLIGRAM(S): 50 TABLET ORAL at 14:07

## 2020-11-21 RX ADMIN — Medication 6 UNIT(S): at 12:36

## 2020-11-21 RX ADMIN — INSULIN GLARGINE 55 UNIT(S): 100 INJECTION, SOLUTION SUBCUTANEOUS at 22:00

## 2020-11-21 RX ADMIN — Medication 975 MILLIGRAM(S): at 05:49

## 2020-11-21 RX ADMIN — Medication 4: at 12:36

## 2020-11-21 RX ADMIN — Medication 6: at 18:16

## 2020-11-21 RX ADMIN — GABAPENTIN 100 MILLIGRAM(S): 400 CAPSULE ORAL at 05:48

## 2020-11-21 RX ADMIN — ATORVASTATIN CALCIUM 20 MILLIGRAM(S): 80 TABLET, FILM COATED ORAL at 21:34

## 2020-11-21 NOTE — PROGRESS NOTE ADULT - PROBLEM SELECTOR PLAN 2
-Cr bumped to 1.35. Could be 2' postobstructive cause vs. hypotension vs. prerenal azotemia.  -Encourage PO hydration.  -Avoid nephrotoxins. Trend labs.

## 2020-11-21 NOTE — PROGRESS NOTE ADULT - PROBLEM SELECTOR PLAN 1
-Pain well controlled.  -Daniel fever this AM. Monitor at this time.   -Further management as per ortho.

## 2020-11-21 NOTE — PROGRESS NOTE ADULT - PROBLEM SELECTOR PLAN 3
-Bladder scan q8h. Straight cath as appropriate. If he retains again and BP allows - would start flomax.   -Straight cathed over night. He voided spontaneously this afternoon.  -Would address constipation - miralax started. Titrate PRN.

## 2020-11-21 NOTE — PROGRESS NOTE ADULT - PROBLEM SELECTOR PLAN 4
-Lantus 55 units.  -Start premeal lispro insulin 6 units TIDAC w/ moderate correctional scale.   -Carb controlled diet.  -Pt admitted to dietary indiscretion yesterday. -Lantus 55 units.  -Start premeal lispro insulin 6 units TIDAC w/ moderate correctional scale.   -Carb controlled diet.  -Pt admitted to dietary indiscretion yesterday. Encourage adhernce to carb controlled diet.  -Would c/s endocrine if FS remains difficult to manage.

## 2020-11-21 NOTE — PROGRESS NOTE ADULT - PROBLEM SELECTOR PLAN 7
-His cardiac regimen suggest chronic sCHF. He is not overloaded at this time.   -Cont BB, statin.  -Reassess ASA/Plavix.  -Reassess remainder of regimen

## 2020-11-21 NOTE — PROGRESS NOTE ADULT - SUBJECTIVE AND OBJECTIVE BOX
[facility] Division of Hospital Medicine  Vaibhav Solis) MD Saroj  Pager [phone]    SUBJECTIVE:  Follow up for DM management    Pt seen and evaluated at bedside this AM. Overnight events are noted. Had doubt of urinary retention alleviated w/ straight cath. Since last night, he has voided 100cc into the urinal. Still reported no urge despite having staff stand him up. Upon return to bladder scan, pt spontaneously voided 400cc. Bladder scan showed residual of 200. Distension much alleviated.  Pt also had febrile episode this. Otherwise, denied any SOB, sore throat, cough, SOB, abd pain, diarrhea, dysuria. He does report constipation. +Flatus.    ROS: All systems negative except as noted.    Vital Signs Last 24 Hrs  T(C): 36.8 (21 Nov 2020 10:03), Max: 38.7 (21 Nov 2020 05:45)  T(F): 98.2 (21 Nov 2020 10:03), Max: 101.6 (21 Nov 2020 05:45)  HR: 75 (21 Nov 2020 10:03) (75 - 93)  BP: 116/63 (21 Nov 2020 10:03) (98/46 - 121/52)  BP(mean): --  RR: 18 (21 Nov 2020 10:03) (16 - 18)  SpO2: 97% (21 Nov 2020 10:03) (94% - 98%)      PHYSICAL EXAM:  Gen- In bed, comfortable, NAD  Eyes- EOMI, PERRLA, nonicteric.  EMNT- Fair dentition. MMM. No tonsilar exudates. No posterior pharynx erythema.  Neck- Supple. No masses. No tracheal deviation.  Resp- CTAB, good effort. No r/r/w. No accessory muscle use.  CVS- RRR, S1S2, no g/r/m. No LE edema.  GI- Soft abd, NT, ND, +BSx4. No HSM.  MSK- No C/C. ROM intact. No crepitus.  Back- HMV/TAMIKA in place.  Neuro- CN II-XII intact. Speech fluent/face symmetric. Sensation intact.  Skin- No rashes/ulcers. Warm/moist.  Psych- AAOx3. Appropriate mood/affect.      MEDICATION:  MEDICATIONS  (STANDING):  acetaminophen   Tablet .. 975 milliGRAM(s) Oral every 8 hours  atorvastatin 20 milliGRAM(s) Oral at bedtime  budesonide  80 MICROgram(s)/formoterol 4.5 MICROgram(s) Inhaler 2 Puff(s) Inhalation two times a day  buPROPion XL . 300 milliGRAM(s) Oral daily  dextrose 40% Gel 15 Gram(s) Oral once  dextrose 5%. 1000 milliLiter(s) (50 mL/Hr) IV Continuous <Continuous>  dextrose 5%. 1000 milliLiter(s) (100 mL/Hr) IV Continuous <Continuous>  dextrose 50% Injectable 25 Gram(s) IV Push once  dextrose 50% Injectable 12.5 Gram(s) IV Push once  dextrose 50% Injectable 25 Gram(s) IV Push once  famotidine    Tablet 40 milliGRAM(s) Oral two times a day  gabapentin 100 milliGRAM(s) Oral three times a day  glucagon  Injectable 1 milliGRAM(s) IntraMuscular once  influenza  Vaccine (HIGH DOSE) 0.7 milliLiter(s) IntraMuscular once  insulin glargine Injectable (LANTUS) 55 Unit(s) SubCutaneous at bedtime  insulin lispro (ADMELOG) corrective regimen sliding scale   SubCutaneous at bedtime  insulin lispro (ADMELOG) corrective regimen sliding scale   SubCutaneous three times a day before meals  insulin lispro Injectable (ADMELOG) 6 Unit(s) SubCutaneous three times a day before meals  lactated ringers. 1000 milliLiter(s) (75 mL/Hr) IV Continuous <Continuous>  lactated ringers. 1000 milliLiter(s) (30 mL/Hr) IV Continuous <Continuous>  metoprolol tartrate 12.5 milliGRAM(s) Oral daily  polyethylene glycol 3350 17 Gram(s) Oral daily  senna 2 Tablet(s) Oral at bedtime  tiotropium 18 MICROgram(s) Capsule 1 Capsule(s) Inhalation daily  traMADol 50 milliGRAM(s) Oral every 8 hours    MEDICATIONS  (PRN):  ALBUTerol    90 MICROgram(s) HFA Inhaler 2 Puff(s) Inhalation every 6 hours PRN Bronchospasm  clonazePAM  Tablet 1 milliGRAM(s) Oral three times a day PRN Anxiety  HYDROmorphone   Tablet 2 milliGRAM(s) Oral every 4 hours PRN Moderate Pain (4 - 6)  HYDROmorphone   Tablet 4 milliGRAM(s) Oral every 4 hours PRN Severe Pain (7 - 10)  magnesium hydroxide Suspension 30 milliLiter(s) Oral every 12 hours PRN Constipation  zolpidem 5 milliGRAM(s) Oral at bedtime PRN Insomnia  zolpidem 5 milliGRAM(s) Oral at bedtime PRN Insomnia      LABORATORY:                          9.0    12.84 )-----------( 192      ( 21 Nov 2020 06:15 )             26.7     11-21    137  |  101  |  30<H>  ----------------------------<  223<H>  4.3   |  23  |  1.35<H>    Ca    8.6      21 Nov 2020 06:15

## 2020-11-21 NOTE — PROVIDER CONTACT NOTE (OTHER) - ASSESSMENT
Placed O2 2L NC d/t some trouble breathing s/p ambulation with PT. Pt states at home at night he is on O2 2L NC. VS: temp 99.8 PO, HR 80, /80, RR 20, O2 97% on O2 2L NC. After sitting in the chair for five minutes, pt states he "felt the same as before just not chattering now". Agrees to sit in chair.

## 2020-11-21 NOTE — PROGRESS NOTE ADULT - SUBJECTIVE AND OBJECTIVE BOX
Orthopaedic Surgery Progress Note    Subjective:   Patient seen and examined. Febrile to 101.6, patient complaining of pain and anxiety. Straight cath'd once    Objective:  T(C): 38.7 (11-21-20 @ 05:45), Max: 38.7 (11-21-20 @ 05:45)  HR: 88 (11-21-20 @ 05:45) (69 - 93)  BP: 101/54 (11-21-20 @ 05:45) (98/46 - 121/52)  RR: 18 (11-21-20 @ 05:45) (16 - 19)  SpO2: 96% (11-21-20 @ 05:45) (94% - 98%)  Wt(kg): --    11-20 @ 07:01  -  11-21 @ 07:00  --------------------------------------------------------  IN: 0 mL / OUT: 2187.5 mL / NET: -2187.5 mL      PE  Gen: Appears anxious   Back:   dressing C/D/I, mild appropriate rina-incisional ttp  HMV in place w/ serosanguinous output  Neuro:  RLE IP 5/5 HS 5/5 Q 5/5 GS 5/5 TA 5/5 EHL 5/5   SILT L2-S1  LLE IP 5/5 HS 5/5 Q 5/5 GS 5/5 TA 5/5 EHL 5/5  SILT L2-S1  WWP BLE                          9.0    12.84 )-----------( 192      ( 21 Nov 2020 06:15 )             26.7     11-20    136  |  101  |  25<H>  ----------------------------<  159<H>  5.4<H>   |  27  |  1.08    Ca    8.7      20 Nov 2020 07:40      A/P: 70M s/p T10-Pelvis Exploration of rev PSF w/flap  Reevaluate temperature, will consider fever workup if persists another day  Neuro: Pain control  Resp: IS  GI: Regular diet, bowel reg  MSK: WBAT, PT/OT  Heme: DVT PPX: Adi HI  TAMIKA  Prevena

## 2020-11-21 NOTE — PROVIDER CONTACT NOTE (OTHER) - ACTION/TREATMENT ORDERED:
MD notified. States no need for cont pulse ox at this time. States to recheck vital signs. Will continue to monitor.

## 2020-11-22 DIAGNOSIS — I50.22 CHRONIC SYSTOLIC (CONGESTIVE) HEART FAILURE: ICD-10-CM

## 2020-11-22 LAB
ANION GAP SERPL CALC-SCNC: 10 MMO/L — SIGNIFICANT CHANGE UP (ref 7–14)
BASOPHILS # BLD AUTO: 0.04 K/UL — SIGNIFICANT CHANGE UP (ref 0–0.2)
BASOPHILS NFR BLD AUTO: 0.3 % — SIGNIFICANT CHANGE UP (ref 0–2)
BUN SERPL-MCNC: 22 MG/DL — SIGNIFICANT CHANGE UP (ref 7–23)
CALCIUM SERPL-MCNC: 8.8 MG/DL — SIGNIFICANT CHANGE UP (ref 8.4–10.5)
CHLORIDE SERPL-SCNC: 102 MMOL/L — SIGNIFICANT CHANGE UP (ref 98–107)
CO2 SERPL-SCNC: 24 MMOL/L — SIGNIFICANT CHANGE UP (ref 22–31)
CREAT SERPL-MCNC: 0.96 MG/DL — SIGNIFICANT CHANGE UP (ref 0.5–1.3)
EOSINOPHIL # BLD AUTO: 0.13 K/UL — SIGNIFICANT CHANGE UP (ref 0–0.5)
EOSINOPHIL NFR BLD AUTO: 1 % — SIGNIFICANT CHANGE UP (ref 0–6)
GLUCOSE BLDC GLUCOMTR-MCNC: 216 MG/DL — HIGH (ref 70–99)
GLUCOSE BLDC GLUCOMTR-MCNC: 279 MG/DL — HIGH (ref 70–99)
GLUCOSE BLDC GLUCOMTR-MCNC: 283 MG/DL — HIGH (ref 70–99)
GLUCOSE BLDC GLUCOMTR-MCNC: 293 MG/DL — HIGH (ref 70–99)
GLUCOSE SERPL-MCNC: 300 MG/DL — HIGH (ref 70–99)
HCT VFR BLD CALC: 28.3 % — LOW (ref 39–50)
HGB BLD-MCNC: 9 G/DL — LOW (ref 13–17)
IMM GRANULOCYTES NFR BLD AUTO: 0.5 % — SIGNIFICANT CHANGE UP (ref 0–1.5)
LYMPHOCYTES # BLD AUTO: 1.82 K/UL — SIGNIFICANT CHANGE UP (ref 1–3.3)
LYMPHOCYTES # BLD AUTO: 14.4 % — SIGNIFICANT CHANGE UP (ref 13–44)
MCHC RBC-ENTMCNC: 31.8 % — LOW (ref 32–36)
MCHC RBC-ENTMCNC: 32.1 PG — SIGNIFICANT CHANGE UP (ref 27–34)
MCV RBC AUTO: 101.1 FL — HIGH (ref 80–100)
MONOCYTES # BLD AUTO: 1.15 K/UL — HIGH (ref 0–0.9)
MONOCYTES NFR BLD AUTO: 9.1 % — SIGNIFICANT CHANGE UP (ref 2–14)
NEUTROPHILS # BLD AUTO: 9.41 K/UL — HIGH (ref 1.8–7.4)
NEUTROPHILS NFR BLD AUTO: 74.7 % — SIGNIFICANT CHANGE UP (ref 43–77)
NRBC # FLD: 0 K/UL — SIGNIFICANT CHANGE UP (ref 0–0)
PLATELET # BLD AUTO: 175 K/UL — SIGNIFICANT CHANGE UP (ref 150–400)
PMV BLD: 10.8 FL — SIGNIFICANT CHANGE UP (ref 7–13)
POTASSIUM SERPL-MCNC: 4.4 MMOL/L — SIGNIFICANT CHANGE UP (ref 3.5–5.3)
POTASSIUM SERPL-SCNC: 4.4 MMOL/L — SIGNIFICANT CHANGE UP (ref 3.5–5.3)
RBC # BLD: 2.8 M/UL — LOW (ref 4.2–5.8)
RBC # FLD: 14.3 % — SIGNIFICANT CHANGE UP (ref 10.3–14.5)
SODIUM SERPL-SCNC: 136 MMOL/L — SIGNIFICANT CHANGE UP (ref 135–145)
WBC # BLD: 12.61 K/UL — HIGH (ref 3.8–10.5)
WBC # FLD AUTO: 12.61 K/UL — HIGH (ref 3.8–10.5)

## 2020-11-22 PROCEDURE — 99233 SBSQ HOSP IP/OBS HIGH 50: CPT

## 2020-11-22 RX ORDER — INSULIN LISPRO 100/ML
8 VIAL (ML) SUBCUTANEOUS
Refills: 0 | Status: DISCONTINUED | OUTPATIENT
Start: 2020-11-22 | End: 2020-11-23

## 2020-11-22 RX ORDER — SACUBITRIL AND VALSARTAN 24; 26 MG/1; MG/1
1 TABLET, FILM COATED ORAL
Refills: 0 | Status: DISCONTINUED | OUTPATIENT
Start: 2020-11-22 | End: 2020-12-02

## 2020-11-22 RX ORDER — LACTULOSE 10 G/15ML
10 SOLUTION ORAL THREE TIMES A DAY
Refills: 0 | Status: DISCONTINUED | OUTPATIENT
Start: 2020-11-22 | End: 2020-12-02

## 2020-11-22 RX ADMIN — POLYETHYLENE GLYCOL 3350 17 GRAM(S): 17 POWDER, FOR SOLUTION ORAL at 12:11

## 2020-11-22 RX ADMIN — Medication 8 UNIT(S): at 12:10

## 2020-11-22 RX ADMIN — INSULIN GLARGINE 55 UNIT(S): 100 INJECTION, SOLUTION SUBCUTANEOUS at 23:01

## 2020-11-22 RX ADMIN — Medication 10 MILLIGRAM(S): at 17:43

## 2020-11-22 RX ADMIN — TRAMADOL HYDROCHLORIDE 50 MILLIGRAM(S): 50 TABLET ORAL at 17:44

## 2020-11-22 RX ADMIN — TRAMADOL HYDROCHLORIDE 50 MILLIGRAM(S): 50 TABLET ORAL at 09:17

## 2020-11-22 RX ADMIN — FAMOTIDINE 40 MILLIGRAM(S): 10 INJECTION INTRAVENOUS at 05:26

## 2020-11-22 RX ADMIN — SACUBITRIL AND VALSARTAN 1 TABLET(S): 24; 26 TABLET, FILM COATED ORAL at 22:58

## 2020-11-22 RX ADMIN — Medication 975 MILLIGRAM(S): at 13:36

## 2020-11-22 RX ADMIN — Medication 6: at 12:10

## 2020-11-22 RX ADMIN — Medication 975 MILLIGRAM(S): at 05:26

## 2020-11-22 RX ADMIN — LACTULOSE 10 GRAM(S): 10 SOLUTION ORAL at 12:11

## 2020-11-22 RX ADMIN — ZOLPIDEM TARTRATE 5 MILLIGRAM(S): 10 TABLET ORAL at 22:59

## 2020-11-22 RX ADMIN — BUDESONIDE AND FORMOTEROL FUMARATE DIHYDRATE 2 PUFF(S): 160; 4.5 AEROSOL RESPIRATORY (INHALATION) at 09:19

## 2020-11-22 RX ADMIN — Medication 8 UNIT(S): at 17:34

## 2020-11-22 RX ADMIN — HYDROMORPHONE HYDROCHLORIDE 4 MILLIGRAM(S): 2 INJECTION INTRAMUSCULAR; INTRAVENOUS; SUBCUTANEOUS at 05:25

## 2020-11-22 RX ADMIN — Medication 1 MILLIGRAM(S): at 22:58

## 2020-11-22 RX ADMIN — Medication 6: at 08:06

## 2020-11-22 RX ADMIN — Medication 6: at 17:34

## 2020-11-22 RX ADMIN — HYDROMORPHONE HYDROCHLORIDE 4 MILLIGRAM(S): 2 INJECTION INTRAMUSCULAR; INTRAVENOUS; SUBCUTANEOUS at 06:20

## 2020-11-22 RX ADMIN — BUPROPION HYDROCHLORIDE 300 MILLIGRAM(S): 150 TABLET, EXTENDED RELEASE ORAL at 08:26

## 2020-11-22 RX ADMIN — LACTULOSE 10 GRAM(S): 10 SOLUTION ORAL at 22:58

## 2020-11-22 RX ADMIN — TIOTROPIUM BROMIDE 1 CAPSULE(S): 18 CAPSULE ORAL; RESPIRATORY (INHALATION) at 09:21

## 2020-11-22 RX ADMIN — Medication 975 MILLIGRAM(S): at 22:58

## 2020-11-22 RX ADMIN — FAMOTIDINE 40 MILLIGRAM(S): 10 INJECTION INTRAVENOUS at 17:44

## 2020-11-22 RX ADMIN — ATORVASTATIN CALCIUM 20 MILLIGRAM(S): 80 TABLET, FILM COATED ORAL at 22:58

## 2020-11-22 RX ADMIN — Medication 6 UNIT(S): at 08:06

## 2020-11-22 RX ADMIN — TRAMADOL HYDROCHLORIDE 50 MILLIGRAM(S): 50 TABLET ORAL at 08:26

## 2020-11-22 RX ADMIN — Medication 975 MILLIGRAM(S): at 13:35

## 2020-11-22 RX ADMIN — Medication 12.5 MILLIGRAM(S): at 05:27

## 2020-11-22 NOTE — PROGRESS NOTE ADULT - PROBLEM SELECTOR PLAN 3
-Bladder scan q8h. Straight cath as appropriate. If he retains again and BP allows - would start flomax.   -Straight cathed over night. He voided spontaneously this afternoon.  -Would address constipation - miralax started. Titrate PRN. -Appears to have resolved. No need for straight cath in past 24h.  -Remains constipated -- will cont miralax, senna. Took MoM w/o effect. Adding lactulose. Can given enema/suppository if still no BM.

## 2020-11-22 NOTE — PROGRESS NOTE ADULT - PROBLEM SELECTOR PLAN 6
Resolved. -His cardiac regimen suggest chronic sCHF. He is not overloaded at this time.   -Cont BB, statin.  -Reassess ASA/Plavix.  -Reassess remainder of regimen

## 2020-11-22 NOTE — PROGRESS NOTE ADULT - SUBJECTIVE AND OBJECTIVE BOX
[facility] Division of Hospital Medicine  Vaibhav Solis) MD Saroj  Pager [phone]    SUBJECTIVE:  Follow up for DM management.    The patient was seen and evaluated at bedside this AM.  Continues to void w/o issues or the need to straight cath. Overnight events are noted. Pt just finished working with pt. He was c/o of chattering of his teeth and feeling cold. He remained afebrile on temp check (98F). Pt had no other complaints. When asked about his elevated FS - he acknowledged dietary indiscretion these past two days.  He remains constipated.        ROS: All systems negative except as noted.      Vital Signs Last 24 Hrs  T(C): 36.9 (22 Nov 2020 13:30), Max: 37.5 (21 Nov 2020 21:26)  T(F): 98.4 (22 Nov 2020 13:30), Max: 99.5 (21 Nov 2020 21:26)  HR: 79 (22 Nov 2020 13:30) (68 - 86)  BP: 133/74 (22 Nov 2020 13:30) (112/63 - 142/54)  BP(mean): --  RR: 17 (22 Nov 2020 13:30) (17 - 20)  SpO2: 99% (22 Nov 2020 13:30) (83% - 100%)      PHYSICAL EXAM:  Gen- In chair, comfortable, NAD  Eyes- EOMI, PERRLA, nonicteric.  EMNT- Fair dentition. MMM. No tonsilar exudates. No posterior pharynx erythema.  Neck- Supple. No masses. No tracheal deviation.  Resp- CTAB, good effort. No r/r/w. No accessory muscle use.  CVS- RRR, S1S2, no g/r/m. No LE edema.  GI- Soft abd, NT, ND, +BSx4. No HSM.  MSK- No C/C. ROM intact. No crepitus.  Back- HMV/TAMIKA in place.  Neuro- CN II-XII intact. Speech fluent/face symmetric. Sensation intact.  Skin- No rashes/ulcers. Warm/moist.  Psych- AAOx3. Appropriate mood/affect.      MEDICATION:  MEDICATIONS  (STANDING):  acetaminophen   Tablet .. 975 milliGRAM(s) Oral every 8 hours  atorvastatin 20 milliGRAM(s) Oral at bedtime  budesonide  80 MICROgram(s)/formoterol 4.5 MICROgram(s) Inhaler 2 Puff(s) Inhalation two times a day  buPROPion XL . 300 milliGRAM(s) Oral daily  dextrose 40% Gel 15 Gram(s) Oral once  dextrose 5%. 1000 milliLiter(s) (50 mL/Hr) IV Continuous <Continuous>  dextrose 5%. 1000 milliLiter(s) (100 mL/Hr) IV Continuous <Continuous>  dextrose 50% Injectable 25 Gram(s) IV Push once  dextrose 50% Injectable 12.5 Gram(s) IV Push once  dextrose 50% Injectable 25 Gram(s) IV Push once  famotidine    Tablet 40 milliGRAM(s) Oral two times a day  gabapentin 100 milliGRAM(s) Oral three times a day  glucagon  Injectable 1 milliGRAM(s) IntraMuscular once  influenza  Vaccine (HIGH DOSE) 0.7 milliLiter(s) IntraMuscular once  insulin glargine Injectable (LANTUS) 55 Unit(s) SubCutaneous at bedtime  insulin lispro (ADMELOG) corrective regimen sliding scale   SubCutaneous at bedtime  insulin lispro (ADMELOG) corrective regimen sliding scale   SubCutaneous three times a day before meals  insulin lispro Injectable (ADMELOG) 8 Unit(s) SubCutaneous three times a day before meals  lactulose Syrup 10 Gram(s) Oral three times a day  metoprolol tartrate 12.5 milliGRAM(s) Oral daily  polyethylene glycol 3350 17 Gram(s) Oral daily  senna 2 Tablet(s) Oral at bedtime  tiotropium 18 MICROgram(s) Capsule 1 Capsule(s) Inhalation daily  traMADol 50 milliGRAM(s) Oral every 8 hours    MEDICATIONS  (PRN):  ALBUTerol    90 MICROgram(s) HFA Inhaler 2 Puff(s) Inhalation every 6 hours PRN Bronchospasm  clonazePAM  Tablet 1 milliGRAM(s) Oral three times a day PRN Anxiety  HYDROmorphone   Tablet 2 milliGRAM(s) Oral every 4 hours PRN Moderate Pain (4 - 6)  HYDROmorphone   Tablet 4 milliGRAM(s) Oral every 4 hours PRN Severe Pain (7 - 10)  magnesium hydroxide Suspension 30 milliLiter(s) Oral every 12 hours PRN Constipation  zolpidem 5 milliGRAM(s) Oral at bedtime PRN Insomnia  zolpidem 5 milliGRAM(s) Oral at bedtime PRN Insomnia      LABORATORY:                          9.0    12.61 )-----------( 175      ( 22 Nov 2020 05:16 )             28.3     11-22    136  |  102  |  22  ----------------------------<  300<H>  4.4   |  24  |  0.96    Ca    8.8      22 Nov 2020 05:16

## 2020-11-22 NOTE — PROGRESS NOTE ADULT - PROBLEM SELECTOR PLAN 4
-Lantus 55 units.  -Inc lispro insulin to 8 units TIDAC w/ moderate correctional scale.   -Carb controlled diet.  -Encourage dietary precautions.  -Would c/s endocrine if FS remains difficult to manage.

## 2020-11-22 NOTE — PROGRESS NOTE ADULT - PROBLEM SELECTOR PLAN 5
-Improved. Continue metoprolol.  -Holding entresto, aldactone, lasix for now. -Per pt - he had ECHO back in 9/2020 showing LVEF of 25%.  -Euvolemic at this time.  -Cont metoprolol.  -Restart Entresto.   -If BP remains stable -- will reintroduce his remaining HF meds  aldactone, lasix

## 2020-11-22 NOTE — PROGRESS NOTE ADULT - PROBLEM SELECTOR PLAN 2
-Cr back to normal. Encourage PO hydration. Avoid nephrotoxins. Trend labs. -Resolved. Cr back to normal. Encourage PO hydration. Avoid nephrotoxins. Trend labs.

## 2020-11-22 NOTE — PROGRESS NOTE ADULT - SUBJECTIVE AND OBJECTIVE BOX
Orthopaedic Surgery Progress Note    Subjective:   Patient seen and examined. No acute events overnight. Pain well controlled. Requesting Acute rehab when discharged.    Objective:  Vital Signs Last 24 Hrs  T(C): 37.5 (21 Nov 2020 21:26), Max: 38.7 (21 Nov 2020 05:45)  T(F): 99.5 (21 Nov 2020 21:26), Max: 101.6 (21 Nov 2020 05:45)  HR: 86 (21 Nov 2020 21:26) (75 - 93)  BP: 119/65 (21 Nov 2020 21:26) (101/54 - 142/54)  BP(mean): --  RR: 18 (21 Nov 2020 21:26) (18 - 20)  SpO2: 97% (21 Nov 2020 21:26) (96% - 100%)      PE  Gen: NAD, resting in bed  Back:   dressing C/D/I, mild appropriate rina-incisional ttp  HMV in place w/ serosanguinous output  Neuro:  RLE IP 5/5 HS 5/5 Q 5/5 GS 5/5 TA 5/5 EHL 5/5   SILT L2-S1  LLE IP 5/5 HS 5/5 Q 5/5 GS 5/5 TA 5/5 EHL 5/5  SILT L2-S1  WWP BLE                                       9.0    12.84 )-----------( 192      ( 21 Nov 2020 06:15 )             26.7   11-21    137  |  101  |  30<H>  ----------------------------<  223<H>  4.3   |  23  |  1.35<H>    Ca    8.6      21 Nov 2020 06:15          A/P: 70M s/p T10-Pelvis Exploration of rev PSF w/flap    Neuro: Pain control  Resp: IS  GI: Regular diet, bowel reg  MSK: WBAT, PT/OT  Heme: DVT PPX: Venodynes  Kiran    TAMIKA  Prevena   Dispo: Rehab

## 2020-11-22 NOTE — PROGRESS NOTE ADULT - PROBLEM SELECTOR PLAN 7
-His cardiac regimen suggest chronic sCHF. He is not overloaded at this time.   -Cont BB, statin.  -Reassess ASA/Plavix.  -Reassess remainder of regimen Stable. Cont bupropion.

## 2020-11-23 LAB
GLUCOSE BLDC GLUCOMTR-MCNC: 140 MG/DL — HIGH (ref 70–99)
GLUCOSE BLDC GLUCOMTR-MCNC: 163 MG/DL — HIGH (ref 70–99)
GLUCOSE BLDC GLUCOMTR-MCNC: 222 MG/DL — HIGH (ref 70–99)
GLUCOSE BLDC GLUCOMTR-MCNC: 297 MG/DL — HIGH (ref 70–99)

## 2020-11-23 PROCEDURE — 99221 1ST HOSP IP/OBS SF/LOW 40: CPT

## 2020-11-23 PROCEDURE — 99233 SBSQ HOSP IP/OBS HIGH 50: CPT

## 2020-11-23 RX ORDER — ASPIRIN/CALCIUM CARB/MAGNESIUM 324 MG
81 TABLET ORAL DAILY
Refills: 0 | Status: DISCONTINUED | OUTPATIENT
Start: 2020-11-23 | End: 2020-12-02

## 2020-11-23 RX ORDER — INSULIN GLARGINE 100 [IU]/ML
60 INJECTION, SOLUTION SUBCUTANEOUS AT BEDTIME
Refills: 0 | Status: DISCONTINUED | OUTPATIENT
Start: 2020-11-23 | End: 2020-11-25

## 2020-11-23 RX ORDER — CLOPIDOGREL BISULFATE 75 MG/1
75 TABLET, FILM COATED ORAL DAILY
Refills: 0 | Status: DISCONTINUED | OUTPATIENT
Start: 2020-11-24 | End: 2020-12-02

## 2020-11-23 RX ORDER — FUROSEMIDE 40 MG
40 TABLET ORAL DAILY
Refills: 0 | Status: DISCONTINUED | OUTPATIENT
Start: 2020-11-23 | End: 2020-11-24

## 2020-11-23 RX ORDER — INSULIN LISPRO 100/ML
11 VIAL (ML) SUBCUTANEOUS
Refills: 0 | Status: DISCONTINUED | OUTPATIENT
Start: 2020-11-23 | End: 2020-11-25

## 2020-11-23 RX ORDER — CLONAZEPAM 1 MG
1 TABLET ORAL THREE TIMES A DAY
Refills: 0 | Status: DISCONTINUED | OUTPATIENT
Start: 2020-11-23 | End: 2020-11-30

## 2020-11-23 RX ORDER — ACETAMINOPHEN 500 MG
975 TABLET ORAL EVERY 8 HOURS
Refills: 0 | Status: DISCONTINUED | OUTPATIENT
Start: 2020-11-23 | End: 2020-12-02

## 2020-11-23 RX ADMIN — GABAPENTIN 100 MILLIGRAM(S): 400 CAPSULE ORAL at 22:11

## 2020-11-23 RX ADMIN — BUDESONIDE AND FORMOTEROL FUMARATE DIHYDRATE 2 PUFF(S): 160; 4.5 AEROSOL RESPIRATORY (INHALATION) at 09:57

## 2020-11-23 RX ADMIN — ATORVASTATIN CALCIUM 20 MILLIGRAM(S): 80 TABLET, FILM COATED ORAL at 22:11

## 2020-11-23 RX ADMIN — Medication 4: at 07:57

## 2020-11-23 RX ADMIN — ZOLPIDEM TARTRATE 5 MILLIGRAM(S): 10 TABLET ORAL at 22:40

## 2020-11-23 RX ADMIN — SENNA PLUS 2 TABLET(S): 8.6 TABLET ORAL at 22:10

## 2020-11-23 RX ADMIN — TRAMADOL HYDROCHLORIDE 50 MILLIGRAM(S): 50 TABLET ORAL at 01:59

## 2020-11-23 RX ADMIN — Medication 1 MILLIGRAM(S): at 22:42

## 2020-11-23 RX ADMIN — BUDESONIDE AND FORMOTEROL FUMARATE DIHYDRATE 2 PUFF(S): 160; 4.5 AEROSOL RESPIRATORY (INHALATION) at 22:06

## 2020-11-23 RX ADMIN — Medication 975 MILLIGRAM(S): at 14:46

## 2020-11-23 RX ADMIN — SACUBITRIL AND VALSARTAN 1 TABLET(S): 24; 26 TABLET, FILM COATED ORAL at 17:44

## 2020-11-23 RX ADMIN — LACTULOSE 10 GRAM(S): 10 SOLUTION ORAL at 22:06

## 2020-11-23 RX ADMIN — Medication 6: at 11:47

## 2020-11-23 RX ADMIN — FAMOTIDINE 40 MILLIGRAM(S): 10 INJECTION INTRAVENOUS at 06:11

## 2020-11-23 RX ADMIN — SACUBITRIL AND VALSARTAN 1 TABLET(S): 24; 26 TABLET, FILM COATED ORAL at 06:10

## 2020-11-23 RX ADMIN — Medication 975 MILLIGRAM(S): at 06:11

## 2020-11-23 RX ADMIN — FAMOTIDINE 40 MILLIGRAM(S): 10 INJECTION INTRAVENOUS at 17:44

## 2020-11-23 RX ADMIN — TRAMADOL HYDROCHLORIDE 50 MILLIGRAM(S): 50 TABLET ORAL at 09:56

## 2020-11-23 RX ADMIN — TRAMADOL HYDROCHLORIDE 50 MILLIGRAM(S): 50 TABLET ORAL at 17:44

## 2020-11-23 RX ADMIN — Medication 8 UNIT(S): at 07:57

## 2020-11-23 RX ADMIN — TIOTROPIUM BROMIDE 1 CAPSULE(S): 18 CAPSULE ORAL; RESPIRATORY (INHALATION) at 09:56

## 2020-11-23 RX ADMIN — Medication 11 UNIT(S): at 17:02

## 2020-11-23 RX ADMIN — Medication 12.5 MILLIGRAM(S): at 06:11

## 2020-11-23 RX ADMIN — Medication 975 MILLIGRAM(S): at 15:47

## 2020-11-23 RX ADMIN — BUPROPION HYDROCHLORIDE 300 MILLIGRAM(S): 150 TABLET, EXTENDED RELEASE ORAL at 09:56

## 2020-11-23 RX ADMIN — Medication 11 UNIT(S): at 11:47

## 2020-11-23 RX ADMIN — POLYETHYLENE GLYCOL 3350 17 GRAM(S): 17 POWDER, FOR SOLUTION ORAL at 11:48

## 2020-11-23 RX ADMIN — INSULIN GLARGINE 60 UNIT(S): 100 INJECTION, SOLUTION SUBCUTANEOUS at 22:40

## 2020-11-23 RX ADMIN — Medication 2: at 17:01

## 2020-11-23 NOTE — PROGRESS NOTE ADULT - SUBJECTIVE AND OBJECTIVE BOX
[facility]   [id number]    Patient stable, tolerating diet, pain controlled on regimen.      T(C): 37.7 (11-23-20 @ 14:34), Max: 37.7 (11-23-20 @ 14:34)  HR: 70 (11-23-20 @ 14:34) (64 - 82)  BP: 116/68 (11-23-20 @ 14:34) (110/63 - 123/64)  RR: 18 (11-23-20 @ 14:34) (17 - 18)  SpO2: 97% (11-23-20 @ 14:34) (97% - 100%)  Wt(kg): --  NAD  Back: Dressing clean/dry/adherent.  Soft.  No collection.  Drains in situ.  BLE: No calf tenderness.      11-22 @ 07:01  -  11-23 @ 07:00  --------------------------------------------------------  IN: 0 mL / OUT: 1002.5 mL / NET: -1002.5 mL    11-23 @ 07:01  -  11-23 @ 15:28  --------------------------------------------------------  IN: 0 mL / OUT: 390 mL / NET: -390 mL      Hemovac:  TAMIKA:   acetaminophen   Tablet .. 975 milliGRAM(s) Oral every 8 hours PRN  ALBUTerol    90 MICROgram(s) HFA Inhaler 2 Puff(s) Inhalation every 6 hours PRN  atorvastatin 20 milliGRAM(s) Oral at bedtime  bisacodyl Suppository 10 milliGRAM(s) Rectal daily PRN  budesonide  80 MICROgram(s)/formoterol 4.5 MICROgram(s) Inhaler 2 Puff(s) Inhalation two times a day  buPROPion XL . 300 milliGRAM(s) Oral daily  clonazePAM  Tablet 1 milliGRAM(s) Oral three times a day PRN  dextrose 40% Gel 15 Gram(s) Oral once  dextrose 5%. 1000 milliLiter(s) IV Continuous <Continuous>  dextrose 5%. 1000 milliLiter(s) IV Continuous <Continuous>  dextrose 50% Injectable 25 Gram(s) IV Push once  dextrose 50% Injectable 12.5 Gram(s) IV Push once  dextrose 50% Injectable 25 Gram(s) IV Push once  famotidine    Tablet 40 milliGRAM(s) Oral two times a day  furosemide    Tablet 40 milliGRAM(s) Oral daily  gabapentin 100 milliGRAM(s) Oral three times a day  glucagon  Injectable 1 milliGRAM(s) IntraMuscular once  HYDROmorphone   Tablet 2 milliGRAM(s) Oral every 4 hours PRN  HYDROmorphone   Tablet 4 milliGRAM(s) Oral every 4 hours PRN  influenza  Vaccine (HIGH DOSE) 0.7 milliLiter(s) IntraMuscular once  insulin glargine Injectable (LANTUS) 60 Unit(s) SubCutaneous at bedtime  insulin lispro (ADMELOG) corrective regimen sliding scale   SubCutaneous at bedtime  insulin lispro (ADMELOG) corrective regimen sliding scale   SubCutaneous three times a day before meals  insulin lispro Injectable (ADMELOG) 11 Unit(s) SubCutaneous three times a day before meals  lactulose Syrup 10 Gram(s) Oral three times a day  magnesium hydroxide Suspension 30 milliLiter(s) Oral every 12 hours PRN  metoprolol tartrate 12.5 milliGRAM(s) Oral daily  polyethylene glycol 3350 17 Gram(s) Oral daily  sacubitril 24 mG/valsartan 26 mG 1 Tablet(s) Oral two times a day  senna 2 Tablet(s) Oral at bedtime  tiotropium 18 MICROgram(s) Capsule 1 Capsule(s) Inhalation daily  traMADol 50 milliGRAM(s) Oral every 8 hours  zolpidem 5 milliGRAM(s) Oral at bedtime PRN  zolpidem 5 milliGRAM(s) Oral at bedtime PRN                            9.0    12.61 )-----------( 175      ( 22 Nov 2020 05:16 )             28.3     11-22    136  |  102  |  22  ----------------------------<  300<H>  4.4   |  24  |  0.96    Ca    8.8      22 Nov 2020 05:16        A/P: S/P posterior spine fusion with muscle flap reconstruction.  - Diet  - Pain control  - IV ABx  - Drain Monitoring - May remove TAMIKA tomorrow if output less than 30cc in 24 hours  - DVT PPx: SCD, chemoprophylaxis as per spine service  - Will Follow    Thank You  Curt Storey MD  Plastic Surgery

## 2020-11-23 NOTE — PROGRESS NOTE ADULT - PROBLEM SELECTOR PLAN 5
-Per pt - he had ECHO back in 9/2020 showing LVEF of 25%.  -Euvolemic at this time.  -Cont metoprolol, Entresto.  -Restart lasix @ 40mg, can inc to 60 if BP allows.   -If BP remains stable -- will reintroduce his remaining HF med  aldactone.

## 2020-11-23 NOTE — PROGRESS NOTE ADULT - SUBJECTIVE AND OBJECTIVE BOX
[facility] Division of Hospital Medicine  Vaibhav Solis) MD Saroj  Pager [phone]    SUBJECTIVE:  Follow up for DM management.    The patient was seen and evaluated at bedside this AM.  Continues to void w/o issues or the need to straight cath. Had 3 large BMs last night. Feels much better today. In good spirits. Denies any fevers, chills, SOB, CP, abd pain, NV.      ROS: All systems negative except as noted.      Vital Signs Last 24 Hrs  T(C): 36.6 (23 Nov 2020 09:22), Max: 37.6 (22 Nov 2020 21:40)  T(F): 97.9 (23 Nov 2020 09:22), Max: 99.7 (22 Nov 2020 21:40)  HR: 70 (23 Nov 2020 09:22) (64 - 82)  BP: 110/63 (23 Nov 2020 09:22) (110/63 - 133/74)  BP(mean): --  RR: 18 (23 Nov 2020 09:22) (17 - 18)  SpO2: 100% (23 Nov 2020 09:22) (99% - 100%)    PHYSICAL EXAM:  Gen- In chair, comfortable, NAD  Eyes- EOMI, PERRLA, nonicteric.  EMNT- Fair dentition. MMM. No tonsilar exudates. No posterior pharynx erythema.  Neck- Supple. No masses. No tracheal deviation.  Resp- CTAB, good effort. No r/r/w. No accessory muscle use.  CVS- RRR, S1S2, no g/r/m. No LE edema.  GI- Soft abd, NT, ND, +BSx4. No HSM.  MSK- No C/C. ROM intact. No crepitus.  Back- HMV/TAMIKA in place.  Neuro- CN II-XII intact. Speech fluent/face symmetric. Sensation intact.  Skin- No rashes/ulcers. Warm/moist.  Psych- AAOx3. Appropriate mood/affect.      MEDICATION:  MEDICATIONS  (STANDING):  atorvastatin 20 milliGRAM(s) Oral at bedtime  budesonide  80 MICROgram(s)/formoterol 4.5 MICROgram(s) Inhaler 2 Puff(s) Inhalation two times a day  buPROPion XL . 300 milliGRAM(s) Oral daily  dextrose 40% Gel 15 Gram(s) Oral once  dextrose 5%. 1000 milliLiter(s) (50 mL/Hr) IV Continuous <Continuous>  dextrose 5%. 1000 milliLiter(s) (100 mL/Hr) IV Continuous <Continuous>  dextrose 50% Injectable 25 Gram(s) IV Push once  dextrose 50% Injectable 12.5 Gram(s) IV Push once  dextrose 50% Injectable 25 Gram(s) IV Push once  famotidine    Tablet 40 milliGRAM(s) Oral two times a day  gabapentin 100 milliGRAM(s) Oral three times a day  glucagon  Injectable 1 milliGRAM(s) IntraMuscular once  influenza  Vaccine (HIGH DOSE) 0.7 milliLiter(s) IntraMuscular once  insulin glargine Injectable (LANTUS) 60 Unit(s) SubCutaneous at bedtime  insulin lispro (ADMELOG) corrective regimen sliding scale   SubCutaneous three times a day before meals  insulin lispro (ADMELOG) corrective regimen sliding scale   SubCutaneous at bedtime  insulin lispro Injectable (ADMELOG) 11 Unit(s) SubCutaneous three times a day before meals  lactulose Syrup 10 Gram(s) Oral three times a day  metoprolol tartrate 12.5 milliGRAM(s) Oral daily  polyethylene glycol 3350 17 Gram(s) Oral daily  sacubitril 24 mG/valsartan 26 mG 1 Tablet(s) Oral two times a day  senna 2 Tablet(s) Oral at bedtime  tiotropium 18 MICROgram(s) Capsule 1 Capsule(s) Inhalation daily  traMADol 50 milliGRAM(s) Oral every 8 hours    MEDICATIONS  (PRN):  acetaminophen   Tablet .. 975 milliGRAM(s) Oral every 8 hours PRN Mild Pain (1 - 3)  ALBUTerol    90 MICROgram(s) HFA Inhaler 2 Puff(s) Inhalation every 6 hours PRN Bronchospasm  bisacodyl Suppository 10 milliGRAM(s) Rectal daily PRN Constipation  clonazePAM  Tablet 1 milliGRAM(s) Oral three times a day PRN Anxiety  HYDROmorphone   Tablet 2 milliGRAM(s) Oral every 4 hours PRN Moderate Pain (4 - 6)  HYDROmorphone   Tablet 4 milliGRAM(s) Oral every 4 hours PRN Severe Pain (7 - 10)  magnesium hydroxide Suspension 30 milliLiter(s) Oral every 12 hours PRN Constipation  zolpidem 5 milliGRAM(s) Oral at bedtime PRN Insomnia  zolpidem 5 milliGRAM(s) Oral at bedtime PRN Insomnia        LABORATORY:    None today.

## 2020-11-23 NOTE — PROGRESS NOTE ADULT - PROBLEM SELECTOR PLAN 4
-Lantus inc to 60 units.  -Inc lispro insulin to 11 units TIDAC w/ moderate correctional scale.   -Carb controlled diet.  -Encourage dietary precautions.  -Expect further improvement no dietary indiscretion.

## 2020-11-23 NOTE — PROGRESS NOTE ADULT - PROBLEM SELECTOR PLAN 3
-Appears to have resolved. No need for straight cath in past 24h.  -Constipation resolved. Cont bowel regimen.

## 2020-11-23 NOTE — CONSULT NOTE ADULT - SUBJECTIVE AND OBJECTIVE BOX
Patient is a 70y old  Male who presents with a chief complaint of T10-pelvis PSF with PRS closure (22 Nov 2020 08:50)      HPI:  71 y/o male, with PMHx of T2DM, HTN, CABG x 4 (>25 years ago), HLD, CVA (no residual -2013), depression, FREEDOM (mouthpiece), S/P lumbar laminectomy (2015) & T1 spinal fusion in January 2020   c/o  lower back & buttock pain, progressively worsening since the surgery. Patient reports pain worsens while ambulating and has difficulty with balance while using his walker. Patient reports he recently recovered from sepsis in August. He is scheduled for exploration of T10 pelvis fusion with Dr. Roberts, Dr. Storey : muscle flap reconstruction.  (18 Nov 2020 09:52)    Patient has been using rolling walker since sepsis/blood infection in August due to poor balance.  Pt went to [facility] after cva 8 years ago. He went to [facility] after laminectomy in 2015.   Patient had fusion in January of this year and went to subacute rehab.   Patient is now s/p T10-pelvis exploration, revision of PSF with flap.  Patient had removal of spinal padilla rods, insertion of iliac screw on the right, removal of sacral alar screw and insertion of left iliac screw and b/l rods.   Patient reports pain ranges from 3-10/10, worse with transfers and movement.   patient reports BM yesterday.  Post op urinary retention now resolved.   T max 99.7 yesterday overnight, on standing tylenol.    REVIEW OF SYSTEMS  Constitutional - No fever, No weight loss, No fatigue  HEENT - No eye pain, No visual disturbances, No difficulty hearing, No tinnitus, No vertigo, No neck pain  Respiratory - No cough, No wheezing, No shortness of breath  Cardiovascular - No chest pain, No palpitations  Gastrointestinal - No abdominal pain, No nausea, No vomiting, No diarrhea, No constipation  Genitourinary - No dysuria, No frequency, No hematuria, No incontinence  Neurological - No headaches, No memory loss, + loss of strength, No numbness, No tremors  Skin - No itching, No rashes, No lesions   Endocrine - No temperature intolerance  Musculoskeletal - + pain  Psychiatric - No depression, No anxiety    PAST MEDICAL & SURGICAL HISTORY  FREEDOM (obstructive sleep apnea)  Cardiac pacemaker  Hernia  CVA (cerebrovascular accident)  Diabetic neuropathy  Spinal stenosis of lumbar region  DM type 2 (diabetes mellitus, type 2)  CAD (coronary artery disease)  Anxiety  Depression  Acid Reflux  Gout  Adrenal Insufficiency  Benign Tumor of Adrenal Gland  HTN (Hypertension)  Diabetes  History of permanent cardiac pacemaker placement  History of lumbar laminectomy  S/P Coronary Angiogram  S/P Hernia Repair  History of Appendectomy  S/P CABG X 4  Sudden Adrenal Gland Insufficiency      SOCIAL HISTORY  Smoking - Denied  EtOH - Denied   Drugs - Denied    FUNCTIONAL HISTORY  Lives alone,  in apartment without stairs  Independent at baseline, began using rw a few months ago.  after august illness patient had hha 3x/week for bathing, was receiving home PT    CURRENT FUNCTIONAL STATUS  Bed Mobility  Bed Mobility Training Rehab Potential: good, to achieve stated therapy goals  Bed Mobility Training Symptoms Noted During/After Treatment: none  Bed Mobility Training Supine-to-Sit: contact guard;  1 person assist;  bed rails  Bed Mobility Training Limitations: decreased ROM;  impaired balance;  decreased strength    Sit-Stand Transfer Training  Sit-to-Stand Transfer Training Rehab Potential: good, to achieve stated therapy goals  Sit-to-Stand Transfer Training Symptoms Noted During/After Treatment: none  Transfer Training Sit-to-Stand Transfer: contact guard;  1 person assist;  full weight-bearing   rolling walker  Transfer Training Stand-to-Sit Transfer: contact guard;  1 person assist;  full weight-bearing   rolling walker  Sit-to-Stand Transfer Training Transfer Safety Analysis: decreased ROM;  decreased strength;  impaired balance;  rolling walker    Gait Training  Gait Training Rehab Potential: good, to achieve stated therapy goals  Gait Training Symptoms Noted During/After Treatment: none  Gait Training: contact guard;  1 person assist;  full weight-bearing   rolling walker;  50 feet  Gait Analysis: 3-point gait   decreased brittney;  decreased step length;  decreased stride length;  decreased ROM;  decreased strength;  impaired balance;  50 feet;  rolling walker      FAMILY HISTORY   Family history of borderline diabetes mellitus  Family history of early CAD      RECENT LABS/IMAGING  CBC Full  -  ( 22 Nov 2020 05:16 )  WBC Count : 12.61 K/uL  RBC Count : 2.80 M/uL  Hemoglobin : 9.0 g/dL  Hematocrit : 28.3 %  Platelet Count - Automated : 175 K/uL  Mean Cell Volume : 101.1 fL  Mean Cell Hemoglobin : 32.1 pg  Mean Cell Hemoglobin Concentration : 31.8 %  Auto Neutrophil # : 9.41 K/uL  Auto Lymphocyte # : 1.82 K/uL  Auto Monocyte # : 1.15 K/uL  Auto Eosinophil # : 0.13 K/uL  Auto Basophil # : 0.04 K/uL  Auto Neutrophil % : 74.7 %  Auto Lymphocyte % : 14.4 %  Auto Monocyte % : 9.1 %  Auto Eosinophil % : 1.0 %  Auto Basophil % : 0.3 %    11-22    136  |  102  |  22  ----------------------------<  300<H>  4.4   |  24  |  0.96    Ca    8.8      22 Nov 2020 05:16          VITALS  T(C): 37.3 (11-23-20 @ 06:12), Max: 37.6 (11-22-20 @ 21:40)  HR: 64 (11-23-20 @ 06:12) (64 - 82)  BP: 118/67 (11-23-20 @ 06:12) (118/67 - 133/74)  RR: 18 (11-23-20 @ 06:12) (17 - 20)  SpO2: 100% (11-23-20 @ 06:12) (83% - 100%)  Wt(kg): --    ALLERGIES  No Known Allergies      MEDICATIONS   acetaminophen   Tablet .. 975 milliGRAM(s) Oral every 8 hours  ALBUTerol    90 MICROgram(s) HFA Inhaler 2 Puff(s) Inhalation every 6 hours PRN  atorvastatin 20 milliGRAM(s) Oral at bedtime  bisacodyl Suppository 10 milliGRAM(s) Rectal daily PRN  budesonide  80 MICROgram(s)/formoterol 4.5 MICROgram(s) Inhaler 2 Puff(s) Inhalation two times a day  buPROPion XL . 300 milliGRAM(s) Oral daily  clonazePAM  Tablet 1 milliGRAM(s) Oral three times a day PRN  dextrose 40% Gel 15 Gram(s) Oral once  dextrose 5%. 1000 milliLiter(s) IV Continuous <Continuous>  dextrose 5%. 1000 milliLiter(s) IV Continuous <Continuous>  dextrose 50% Injectable 25 Gram(s) IV Push once  dextrose 50% Injectable 12.5 Gram(s) IV Push once  dextrose 50% Injectable 25 Gram(s) IV Push once  famotidine    Tablet 40 milliGRAM(s) Oral two times a day  gabapentin 100 milliGRAM(s) Oral three times a day  glucagon  Injectable 1 milliGRAM(s) IntraMuscular once  HYDROmorphone   Tablet 2 milliGRAM(s) Oral every 4 hours PRN  HYDROmorphone   Tablet 4 milliGRAM(s) Oral every 4 hours PRN  influenza  Vaccine (HIGH DOSE) 0.7 milliLiter(s) IntraMuscular once  insulin glargine Injectable (LANTUS) 60 Unit(s) SubCutaneous at bedtime  insulin lispro (ADMELOG) corrective regimen sliding scale   SubCutaneous three times a day before meals  insulin lispro (ADMELOG) corrective regimen sliding scale   SubCutaneous at bedtime  insulin lispro Injectable (ADMELOG) 11 Unit(s) SubCutaneous three times a day before meals  lactulose Syrup 10 Gram(s) Oral three times a day  magnesium hydroxide Suspension 30 milliLiter(s) Oral every 12 hours PRN  metoprolol tartrate 12.5 milliGRAM(s) Oral daily  polyethylene glycol 3350 17 Gram(s) Oral daily  sacubitril 24 mG/valsartan 26 mG 1 Tablet(s) Oral two times a day  senna 2 Tablet(s) Oral at bedtime  tiotropium 18 MICROgram(s) Capsule 1 Capsule(s) Inhalation daily  traMADol 50 milliGRAM(s) Oral every 8 hours  zolpidem 5 milliGRAM(s) Oral at bedtime PRN  zolpidem 5 milliGRAM(s) Oral at bedtime PRN      ----------------------------------------------------------------------------------------  PHYSICAL EXAM  Constitutional - NAD, Comfortable, + drains  HEENT - NCAT, EOMI  Chest - no respiratory distress  Cardiovascular - RRR, S1S2, No murmurs  Abdomen - BS+, Soft, NTND  Extremities - No C/C/E, No calf tenderness   Neurologic Exam -                    Cognitive - Awake, Alert, AAO to self, place, date, year, situation     Communication - Fluent, No dysarthria     Cranial Nerves - CN 2-12 intact     Motor -                      LEFT    UE - ShAB 5/5, EF 5/5, EE 5/5, WE 5/5,  5/5                    RIGHT UE - ShAB 5/5, EF 5/5, EE 5/5, WE 5/5,  5/5                    LEFT    LE - HF 4-/5, KE 4/5, DF 5/5, PF 5/5                    RIGHT LE - HF 4/5, KE 4/5, DF 5/5, PF 5/5        Sensory - decreased to LT in b/l feet     Coordination - FTN intact     OculoVestibular - No saccades, No nystagmus, VOR         Balance - WNL Static  Psychiatric - Mood stable, Affect WNL  ----------------------------------------------------------------------------------------  ASSESSMENT/PLAN  70 year old male admitted for exploration of PSF, now s/p removal of spinal padilla rods, insertion of iliac screw on the right, removal of sacral alar screw and insertion of left iliac screw and b/l rods.   continue bedside PT/OT  Pain -tylenol, tramadol, gabapentin, dilaudid po, with bowel regimen  DVT PPX - scd  Diet: consistent carb with evening snack, kosher diet  Rehab -  pending progress with bedside therapy, recommend acute rehab.    Recommend ACUTE inpatient rehabilitation for the functional deficits consisting of 3 hours of therapy/day & 24 hour RN/daily PMR physician for comorbid medical management. Will continue to follow for ongoing rehab needs and recommendations.

## 2020-11-23 NOTE — PROGRESS NOTE ADULT - PROBLEM SELECTOR PLAN 1
-Pain well controlled. No new fevers. Afeb >72h now.  -No new chattering of teeth or chills. Feels better today.  -Monitor drain output.  -PMR recs acute rehab.  -Further management as per ortho.

## 2020-11-24 ENCOUNTER — TRANSCRIPTION ENCOUNTER (OUTPATIENT)
Age: 70
End: 2020-11-24

## 2020-11-24 DIAGNOSIS — E78.5 HYPERLIPIDEMIA, UNSPECIFIED: ICD-10-CM

## 2020-11-24 DIAGNOSIS — E11.65 TYPE 2 DIABETES MELLITUS WITH HYPERGLYCEMIA: ICD-10-CM

## 2020-11-24 DIAGNOSIS — I10 ESSENTIAL (PRIMARY) HYPERTENSION: ICD-10-CM

## 2020-11-24 LAB
ANION GAP SERPL CALC-SCNC: 14 MMO/L — SIGNIFICANT CHANGE UP (ref 7–14)
BUN SERPL-MCNC: 15 MG/DL — SIGNIFICANT CHANGE UP (ref 7–23)
CALCIUM SERPL-MCNC: 9.2 MG/DL — SIGNIFICANT CHANGE UP (ref 8.4–10.5)
CHLORIDE SERPL-SCNC: 105 MMOL/L — SIGNIFICANT CHANGE UP (ref 98–107)
CO2 SERPL-SCNC: 22 MMOL/L — SIGNIFICANT CHANGE UP (ref 22–31)
CREAT SERPL-MCNC: 0.88 MG/DL — SIGNIFICANT CHANGE UP (ref 0.5–1.3)
GLUCOSE BLDC GLUCOMTR-MCNC: 145 MG/DL — HIGH (ref 70–99)
GLUCOSE BLDC GLUCOMTR-MCNC: 155 MG/DL — HIGH (ref 70–99)
GLUCOSE BLDC GLUCOMTR-MCNC: 157 MG/DL — HIGH (ref 70–99)
GLUCOSE BLDC GLUCOMTR-MCNC: 190 MG/DL — HIGH (ref 70–99)
GLUCOSE SERPL-MCNC: 154 MG/DL — HIGH (ref 70–99)
HCT VFR BLD CALC: 30.2 % — LOW (ref 39–50)
HGB BLD-MCNC: 9.3 G/DL — LOW (ref 13–17)
MCHC RBC-ENTMCNC: 30.8 % — LOW (ref 32–36)
MCHC RBC-ENTMCNC: 31.7 PG — SIGNIFICANT CHANGE UP (ref 27–34)
MCV RBC AUTO: 103.1 FL — HIGH (ref 80–100)
NRBC # FLD: 0 K/UL — SIGNIFICANT CHANGE UP (ref 0–0)
PLATELET # BLD AUTO: 259 K/UL — SIGNIFICANT CHANGE UP (ref 150–400)
PMV BLD: 10.9 FL — SIGNIFICANT CHANGE UP (ref 7–13)
POTASSIUM SERPL-MCNC: 4.8 MMOL/L — SIGNIFICANT CHANGE UP (ref 3.5–5.3)
POTASSIUM SERPL-SCNC: 4.8 MMOL/L — SIGNIFICANT CHANGE UP (ref 3.5–5.3)
RBC # BLD: 2.93 M/UL — LOW (ref 4.2–5.8)
RBC # FLD: 14.2 % — SIGNIFICANT CHANGE UP (ref 10.3–14.5)
SODIUM SERPL-SCNC: 141 MMOL/L — SIGNIFICANT CHANGE UP (ref 135–145)
SURGICAL PATHOLOGY STUDY: SIGNIFICANT CHANGE UP
WBC # BLD: 9.81 K/UL — SIGNIFICANT CHANGE UP (ref 3.8–10.5)
WBC # FLD AUTO: 9.81 K/UL — SIGNIFICANT CHANGE UP (ref 3.8–10.5)

## 2020-11-24 PROCEDURE — 99232 SBSQ HOSP IP/OBS MODERATE 35: CPT

## 2020-11-24 PROCEDURE — 99223 1ST HOSP IP/OBS HIGH 75: CPT

## 2020-11-24 RX ORDER — FUROSEMIDE 40 MG
20 TABLET ORAL ONCE
Refills: 0 | Status: COMPLETED | OUTPATIENT
Start: 2020-11-24 | End: 2020-11-24

## 2020-11-24 RX ORDER — ZOLPIDEM TARTRATE 10 MG/1
5 TABLET ORAL AT BEDTIME
Refills: 0 | Status: DISCONTINUED | OUTPATIENT
Start: 2020-11-24 | End: 2020-12-01

## 2020-11-24 RX ORDER — FUROSEMIDE 40 MG
60 TABLET ORAL DAILY
Refills: 0 | Status: DISCONTINUED | OUTPATIENT
Start: 2020-11-25 | End: 2020-12-02

## 2020-11-24 RX ORDER — TRAMADOL HYDROCHLORIDE 50 MG/1
50 TABLET ORAL EVERY 8 HOURS
Refills: 0 | Status: DISCONTINUED | OUTPATIENT
Start: 2020-11-24 | End: 2020-12-01

## 2020-11-24 RX ORDER — SPIRONOLACTONE 25 MG/1
25 TABLET, FILM COATED ORAL DAILY
Refills: 0 | Status: DISCONTINUED | OUTPATIENT
Start: 2020-11-24 | End: 2020-12-02

## 2020-11-24 RX ADMIN — GABAPENTIN 100 MILLIGRAM(S): 400 CAPSULE ORAL at 15:05

## 2020-11-24 RX ADMIN — Medication 2: at 17:18

## 2020-11-24 RX ADMIN — TRAMADOL HYDROCHLORIDE 50 MILLIGRAM(S): 50 TABLET ORAL at 10:48

## 2020-11-24 RX ADMIN — Medication 12.5 MILLIGRAM(S): at 06:45

## 2020-11-24 RX ADMIN — Medication 975 MILLIGRAM(S): at 18:48

## 2020-11-24 RX ADMIN — Medication 81 MILLIGRAM(S): at 11:53

## 2020-11-24 RX ADMIN — TRAMADOL HYDROCHLORIDE 50 MILLIGRAM(S): 50 TABLET ORAL at 18:48

## 2020-11-24 RX ADMIN — Medication 1 MILLIGRAM(S): at 22:47

## 2020-11-24 RX ADMIN — Medication 40 MILLIGRAM(S): at 06:50

## 2020-11-24 RX ADMIN — FAMOTIDINE 40 MILLIGRAM(S): 10 INJECTION INTRAVENOUS at 06:44

## 2020-11-24 RX ADMIN — LACTULOSE 10 GRAM(S): 10 SOLUTION ORAL at 06:43

## 2020-11-24 RX ADMIN — CLOPIDOGREL BISULFATE 75 MILLIGRAM(S): 75 TABLET, FILM COATED ORAL at 11:53

## 2020-11-24 RX ADMIN — GABAPENTIN 100 MILLIGRAM(S): 400 CAPSULE ORAL at 22:47

## 2020-11-24 RX ADMIN — Medication 975 MILLIGRAM(S): at 06:44

## 2020-11-24 RX ADMIN — Medication 2: at 11:53

## 2020-11-24 RX ADMIN — ATORVASTATIN CALCIUM 20 MILLIGRAM(S): 80 TABLET, FILM COATED ORAL at 22:47

## 2020-11-24 RX ADMIN — POLYETHYLENE GLYCOL 3350 17 GRAM(S): 17 POWDER, FOR SOLUTION ORAL at 11:53

## 2020-11-24 RX ADMIN — SACUBITRIL AND VALSARTAN 1 TABLET(S): 24; 26 TABLET, FILM COATED ORAL at 17:17

## 2020-11-24 RX ADMIN — FAMOTIDINE 40 MILLIGRAM(S): 10 INJECTION INTRAVENOUS at 17:17

## 2020-11-24 RX ADMIN — GABAPENTIN 100 MILLIGRAM(S): 400 CAPSULE ORAL at 06:44

## 2020-11-24 RX ADMIN — BUPROPION HYDROCHLORIDE 300 MILLIGRAM(S): 150 TABLET, EXTENDED RELEASE ORAL at 09:06

## 2020-11-24 RX ADMIN — TRAMADOL HYDROCHLORIDE 50 MILLIGRAM(S): 50 TABLET ORAL at 10:53

## 2020-11-24 RX ADMIN — Medication 11 UNIT(S): at 17:18

## 2020-11-24 RX ADMIN — Medication 11 UNIT(S): at 07:37

## 2020-11-24 RX ADMIN — LACTULOSE 10 GRAM(S): 10 SOLUTION ORAL at 15:05

## 2020-11-24 RX ADMIN — INSULIN GLARGINE 60 UNIT(S): 100 INJECTION, SOLUTION SUBCUTANEOUS at 22:47

## 2020-11-24 RX ADMIN — Medication 11 UNIT(S): at 11:52

## 2020-11-24 RX ADMIN — TRAMADOL HYDROCHLORIDE 50 MILLIGRAM(S): 50 TABLET ORAL at 01:17

## 2020-11-24 RX ADMIN — BUDESONIDE AND FORMOTEROL FUMARATE DIHYDRATE 2 PUFF(S): 160; 4.5 AEROSOL RESPIRATORY (INHALATION) at 22:47

## 2020-11-24 RX ADMIN — Medication 20 MILLIGRAM(S): at 09:06

## 2020-11-24 RX ADMIN — BUDESONIDE AND FORMOTEROL FUMARATE DIHYDRATE 2 PUFF(S): 160; 4.5 AEROSOL RESPIRATORY (INHALATION) at 09:06

## 2020-11-24 RX ADMIN — SACUBITRIL AND VALSARTAN 1 TABLET(S): 24; 26 TABLET, FILM COATED ORAL at 06:44

## 2020-11-24 RX ADMIN — Medication 975 MILLIGRAM(S): at 07:15

## 2020-11-24 RX ADMIN — Medication 1 MILLIGRAM(S): at 06:44

## 2020-11-24 RX ADMIN — TIOTROPIUM BROMIDE 1 CAPSULE(S): 18 CAPSULE ORAL; RESPIRATORY (INHALATION) at 09:06

## 2020-11-24 NOTE — DISCHARGE NOTE NURSING/CASE MANAGEMENT/SOCIAL WORK - NSDPDISTO_GEN_ALL_CORE
Home with home care Pt. is afebrile and offers no complaints. In no acute distress. Back dressing: clean, dry and intact. Pt is ambulating with a walker, tolerating diet well, and voiding in adequate amounts./Home with home care Pt. is afebrile and offers no complaints. In no acute distress. Back dressing: clean, dry and intact. Pt is ambulating with a walker, tolerating diet well, and voiding in adequate amounts./Sub-Acute rehab

## 2020-11-24 NOTE — DISCHARGE NOTE NURSING/CASE MANAGEMENT/SOCIAL WORK - NSDCVIVACCINE_GEN_ALL_CORE_FT
Tdap , 2015/5/26 01:17 , Mitzy Torres (RN)   Influenza , 2020/11/27 08:10 , Jaclyn Corona (RN)  Tdap , 2015/5/26 01:17 , Mitzy Torres (RN)

## 2020-11-24 NOTE — DISCHARGE NOTE NURSING/CASE MANAGEMENT/SOCIAL WORK - NSDCPNINST_GEN_ALL_CORE
call MD for any numbness or tingling, pain unrelieved by pain medications, nausea, vomiting, fever greater then 100.4, drainage, redness or bleeding from around the surgical site. Take over the counter stool softeners to help prevent constipation which may be a side effect of narcotic pain medications You have a post op appointment with Dr Roberts on 12/9/2020 @ 3:15 PM.  Call MD for any numbness or tingling, pain unrelieved by pain medications, nausea, vomiting, fever greater then 100.4, drainage, redness or bleeding from around the surgical site. Take over the counter stool softeners to help prevent constipation which may be a side effect of narcotic pain medications.  No heavy lifting or straining.  Maintain proper body alignment, no bending or twisting at the waist.  Continue to follow consistent carb diet and follow up with PMD for continued management of your diabetes.

## 2020-11-24 NOTE — DISCHARGE NOTE NURSING/CASE MANAGEMENT/SOCIAL WORK - PATIENT PORTAL LINK FT
You can access the FollowMyHealth Patient Portal offered by [facility] by registering at the following website: [email or web address]. By joining [facility]’s FollowMyHealth portal, you will also be able to view your health information using other applications (apps) compatible with our system.

## 2020-11-24 NOTE — CONSULT NOTE ADULT - SUBJECTIVE AND OBJECTIVE BOX
HPI:  69 y/o male, with PMHx of T2DM, HTN, CABG x 4 (>25 years ago), HLD, CVA (no residual -2013), depression, FREEDOM (mouthpiece), S/P lumbar laminectomy (2015) & T1 spinal fusion in January 2020   c/o  lower back & buttock pain, progressively worsening since the surgery. Patient reports pain worsens while ambulating and has difficulty with balance while using his walker. Patient reports he recently recovered from sepsis in August. He is scheduled for exploration of T10 pelvis fusion with Dr. Roberts, Dr. Storey : muscle flap reconstruction.  (18 Nov 2020 09:52)      Endocrine History:   70 yr old M with Type 2 DM, CABG, HTN, CVA, Hx of L adrenalectomy for benign tumor, FREEDOM s/p T10-pelvis fusion. Patient follows with endocrinologist Dr Lal. He was diagnosed with DM2 20 years ago. DM is complicated by neuropathy, CAD and CVA. At home he takes soliqua 55 Units daily and Novolog 6-10 units TIDAC. Generally follows a healthy diet but occasionally cheats by consuming sugary drinks such as frappuccinos. Has had extensive hospitalizations since being diagnosed with sepsis in August 2020. A1C is 7.6%. His FS at home are 150s in the AM and go up to 250s 2 hours after meals. Has rare hypoglycemia. Has FH of DM in mother and father.              PAST MEDICAL & SURGICAL HISTORY:  FREEDOM (obstructive sleep apnea)    Cardiac pacemaker    Hernia  umbilical, inguinal, abdominal wall    CVA (cerebrovascular accident)  04/8/2013- no residual effects    Diabetic neuropathy    Spinal stenosis of lumbar region    DM type 2 (diabetes mellitus, type 2)    CAD (coronary artery disease)    Anxiety    Depression    Acid Reflux    Gout    Benign Tumor of Adrenal Gland    HTN (Hypertension)    History of permanent cardiac pacemaker placement  placed Medtronic Kristina PPM Model# [id number] , implanted April 4, 2019    History of lumbar laminectomy  L1-2 &amp; L4-5   2015    S/P Coronary Angiogram  12/26/2010 &amp; 2015 at [facility], 4/2019 @[facility]    S/P Hernia Repair  incisional hernia repair on 1/19/17    History of Appendectomy    S/P CABG X 4  ~25 years ago    Sudden Adrenal Gland Insufficiency  left adrenalectomy &gt;20 years ago        FAMILY HISTORY:  Family history of borderline diabetes mellitus    Family history of early CAD        Social History: No ETOH abuse, No illicit drug use    Outpatient Medications:  · 	magnesium gluconate 500 mg oral tablet: one tab orally  daily  · 	Lasix 20 mg oral tablet:  three tabs ( 60 mg) orally daily   · 	Ambien 10 mg oral tablet: 1 tab(s) orally once a day (at bedtime)  · 	Crestor 5 mg oral tablet: 1 tab(s) orally once a day  · 	Entresto 24 mg-26 mg oral tablet: 1 tab(s) orally 2 times a day  · 	ipratropium-albuterol 0.5 mg-2.5 mg/3 mLinhalation solution: 3 milliliter(s) inhaled every 4 hours as needed  · 	Vitamin C 100 mg oral tablet: one tab orally daily           Soliqua 55 Units daily          Novolog 6-10 Units before meals    MEDICATIONS  (STANDING):  aspirin enteric coated 81 milliGRAM(s) Oral daily  atorvastatin 20 milliGRAM(s) Oral at bedtime  budesonide  80 MICROgram(s)/formoterol 4.5 MICROgram(s) Inhaler 2 Puff(s) Inhalation two times a day  buPROPion XL . 300 milliGRAM(s) Oral daily  clopidogrel Tablet 75 milliGRAM(s) Oral daily  dextrose 40% Gel 15 Gram(s) Oral once  dextrose 5%. 1000 milliLiter(s) (50 mL/Hr) IV Continuous <Continuous>  dextrose 5%. 1000 milliLiter(s) (100 mL/Hr) IV Continuous <Continuous>  dextrose 50% Injectable 25 Gram(s) IV Push once  dextrose 50% Injectable 12.5 Gram(s) IV Push once  dextrose 50% Injectable 25 Gram(s) IV Push once  famotidine    Tablet 40 milliGRAM(s) Oral two times a day  gabapentin 100 milliGRAM(s) Oral three times a day  glucagon  Injectable 1 milliGRAM(s) IntraMuscular once  influenza  Vaccine (HIGH DOSE) 0.7 milliLiter(s) IntraMuscular once  insulin glargine Injectable (LANTUS) 60 Unit(s) SubCutaneous at bedtime  insulin lispro (ADMELOG) corrective regimen sliding scale   SubCutaneous at bedtime  insulin lispro (ADMELOG) corrective regimen sliding scale   SubCutaneous three times a day before meals  insulin lispro Injectable (ADMELOG) 11 Unit(s) SubCutaneous three times a day before meals  lactulose Syrup 10 Gram(s) Oral three times a day  metoprolol tartrate 12.5 milliGRAM(s) Oral daily  polyethylene glycol 3350 17 Gram(s) Oral daily  sacubitril 24 mG/valsartan 26 mG 1 Tablet(s) Oral two times a day  senna 2 Tablet(s) Oral at bedtime  spironolactone 25 milliGRAM(s) Oral daily  tiotropium 18 MICROgram(s) Capsule 1 Capsule(s) Inhalation daily  traMADol 50 milliGRAM(s) Oral every 8 hours    MEDICATIONS  (PRN):  acetaminophen   Tablet .. 975 milliGRAM(s) Oral every 8 hours PRN Mild Pain (1 - 3)  ALBUTerol    90 MICROgram(s) HFA Inhaler 2 Puff(s) Inhalation every 6 hours PRN Bronchospasm  bisacodyl Suppository 10 milliGRAM(s) Rectal daily PRN Constipation  clonazePAM  Tablet 1 milliGRAM(s) Oral three times a day PRN Anxiety  HYDROmorphone   Tablet 2 milliGRAM(s) Oral every 4 hours PRN Moderate Pain (4 - 6)  HYDROmorphone   Tablet 4 milliGRAM(s) Oral every 4 hours PRN Severe Pain (7 - 10)  magnesium hydroxide Suspension 30 milliLiter(s) Oral every 12 hours PRN Constipation  zolpidem 5 milliGRAM(s) Oral at bedtime PRN Insomnia  zolpidem 5 milliGRAM(s) Oral at bedtime PRN Insomnia      Allergies    No Known Allergies    Intolerances      Review of Systems:  Constitutional: No fever  Eyes: No blurry vision  Neuro: No tremors  HEENT: No pain  Cardiovascular: No chest pain, palpitations  Respiratory: No SOB, no cough  GI: No nausea, vomiting, abdominal pain  : No dysuria  Skin: no rash  Psych: no depression  Endocrine: no polyuria, polydipsia      ALL OTHER SYSTEMS REVIEWED AND NEGATIVE        PHYSICAL EXAM:  VITALS: T(C): 36.8 (11-24-20 @ 12:54)  T(F): 98.3 (11-24-20 @ 12:54), Max: 99.9 (11-23-20 @ 14:34)  HR: 71 (11-24-20 @ 12:54) (69 - 77)  BP: 132/72 (11-24-20 @ 12:54) (114/72 - 143/67)  RR:  (16 - 18)  SpO2:  (97% - 100%)  Wt(kg): --  GENERAL: NAD, well-groomed, well-developed  EYES: No proptosis, no lid lag, anicteric  HEENT:  Atraumatic, Normocephalic, moist mucous membranes  RESPIRATORY: Clear to auscultation bilaterally; No rales, rhonchi, wheezing, or rubs  CARDIOVASCULAR: Regular rate and rhythm  GI: Soft, nontender, non distended, normal bowel sounds  SKIN: Dry, intact, No rashes or lesions  PSYCH: Alert and oriented x 3, normal affect, normal mood      POCT Blood Glucose.: 190 mg/dL (11-24-20 @ 11:39)  POCT Blood Glucose.: 145 mg/dL (11-24-20 @ 07:28)  POCT Blood Glucose.: 140 mg/dL (11-23-20 @ 21:59)  POCT Blood Glucose.: 163 mg/dL (11-23-20 @ 16:59)  POCT Blood Glucose.: 297 mg/dL (11-23-20 @ 11:33)  POCT Blood Glucose.: 222 mg/dL (11-23-20 @ 07:45)  POCT Blood Glucose.: 216 mg/dL (11-22-20 @ 22:13)  POCT Blood Glucose.: 293 mg/dL (11-22-20 @ 17:30)  POCT Blood Glucose.: 279 mg/dL (11-22-20 @ 11:56)  POCT Blood Glucose.: 283 mg/dL (11-22-20 @ 07:20)  POCT Blood Glucose.: 322 mg/dL (11-21-20 @ 21:54)  POCT Blood Glucose.: 284 mg/dL (11-21-20 @ 18:04)                            9.3    9.81  )-----------( 259      ( 24 Nov 2020 05:45 )             30.2       11-24    141  |  105  |  15  ----------------------------<  154<H>  4.8   |  22  |  0.88    EGFR if : 102  EGFR if non : 88    Ca    9.2      11-24

## 2020-11-24 NOTE — PROGRESS NOTE ADULT - PROBLEM SELECTOR PLAN 5
-Per pt - he had ECHO back in 9/2020 showing LVEF of 25%.  -Euvolemic at this time.  -Cont metoprolol, Entresto.  -Restart lasix @ 40mg, can inc to 60 if BP allows.   -If BP remains stable -- will reintroduce his remaining HF med  aldactone. -Per pt - he had ECHO back in 9/2020 showing LVEF of 25% - reportedly related to "sepsis" per pt.  -Euvolemic at this time.  -Cont metoprolol, Entresto, lasix.  -Resuming aldactone today.

## 2020-11-24 NOTE — CONSULT NOTE ADULT - PROBLEM SELECTOR RECOMMENDATION 9
While inpatient CHO diet and FS AC and HS  Goal Glucose 100-180  Recommend continuation of Lantus 60 Units HS and Admelog 11 Units TIDAC plus moderate scale before meals and low scale at bedtime  Patient can resume home regimen on discharge  He will f/u with his endocrinologist Dr Lal upon discharge from rehab

## 2020-11-24 NOTE — PROGRESS NOTE ADULT - PROBLEM SELECTOR PLAN 6
-Cont BB, statin.  -Reassess ASA/Plavix. Resume if ok w/ ortho. -Cont BB, statin.  -Restarting ASA/Plavix as per ortho

## 2020-11-24 NOTE — PROGRESS NOTE ADULT - PROBLEM SELECTOR PLAN 4
-Lantus inc to 60 units.  -Inc lispro insulin to 11 units TIDAC w/ moderate correctional scale.   -Carb controlled diet.  -Encourage dietary precautions.  -Expect further improvement no dietary indiscretion. A1c 7.6. Glycemic control improved.   -Lantus 60 units. Lispro insulin 11 units TIDAC w/ moderate correctional scale.   -Carb controlled diet.  -Encourage dietary precautions.  -Endo was c/s for input re: optimizing insulin regimen.

## 2020-11-24 NOTE — PROGRESS NOTE ADULT - SUBJECTIVE AND OBJECTIVE BOX
[facility] Division of Hospital Medicine  Vaibhav Solis) MD Saroj  Pager [phone]    SUBJECTIVE:  Follow up for DM management.    The patient was seen and evaluated at bedside this AM.  Continues to void w/o issues or the need to straight cath. Had 3 large BMs last night. Feels much better today. In good spirits. Denies any fevers, chills, SOB, CP, abd pain, NV.      ROS: All systems negative except as noted.      Vital Signs Last 24 Hrs  T(C): 36.6 (23 Nov 2020 09:22), Max: 37.6 (22 Nov 2020 21:40)  T(F): 97.9 (23 Nov 2020 09:22), Max: 99.7 (22 Nov 2020 21:40)  HR: 70 (23 Nov 2020 09:22) (64 - 82)  BP: 110/63 (23 Nov 2020 09:22) (110/63 - 133/74)  BP(mean): --  RR: 18 (23 Nov 2020 09:22) (17 - 18)  SpO2: 100% (23 Nov 2020 09:22) (99% - 100%)    PHYSICAL EXAM:  Gen- In chair, comfortable, NAD  Eyes- EOMI, PERRLA, nonicteric.  EMNT- Fair dentition. MMM. No tonsilar exudates. No posterior pharynx erythema.  Neck- Supple. No masses. No tracheal deviation.  Resp- CTAB, good effort. No r/r/w. No accessory muscle use.  CVS- RRR, S1S2, no g/r/m. No LE edema.  GI- Soft abd, NT, ND, +BSx4. No HSM.  MSK- No C/C. ROM intact. No crepitus.  Back- HMV/TAMIKA in place.  Neuro- CN II-XII intact. Speech fluent/face symmetric. Sensation intact.  Skin- No rashes/ulcers. Warm/moist.  Psych- AAOx3. Appropriate mood/affect.      MEDICATION:  MEDICATIONS  (STANDING):  aspirin enteric coated 81 milliGRAM(s) Oral daily  atorvastatin 20 milliGRAM(s) Oral at bedtime  budesonide  80 MICROgram(s)/formoterol 4.5 MICROgram(s) Inhaler 2 Puff(s) Inhalation two times a day  buPROPion XL . 300 milliGRAM(s) Oral daily  clopidogrel Tablet 75 milliGRAM(s) Oral daily  dextrose 40% Gel 15 Gram(s) Oral once  dextrose 5%. 1000 milliLiter(s) (50 mL/Hr) IV Continuous <Continuous>  dextrose 5%. 1000 milliLiter(s) (100 mL/Hr) IV Continuous <Continuous>  dextrose 50% Injectable 25 Gram(s) IV Push once  dextrose 50% Injectable 12.5 Gram(s) IV Push once  dextrose 50% Injectable 25 Gram(s) IV Push once  famotidine    Tablet 40 milliGRAM(s) Oral two times a day  gabapentin 100 milliGRAM(s) Oral three times a day  glucagon  Injectable 1 milliGRAM(s) IntraMuscular once  influenza  Vaccine (HIGH DOSE) 0.7 milliLiter(s) IntraMuscular once  insulin glargine Injectable (LANTUS) 60 Unit(s) SubCutaneous at bedtime  insulin lispro (ADMELOG) corrective regimen sliding scale   SubCutaneous at bedtime  insulin lispro (ADMELOG) corrective regimen sliding scale   SubCutaneous three times a day before meals  insulin lispro Injectable (ADMELOG) 11 Unit(s) SubCutaneous three times a day before meals  lactulose Syrup 10 Gram(s) Oral three times a day  metoprolol tartrate 12.5 milliGRAM(s) Oral daily  polyethylene glycol 3350 17 Gram(s) Oral daily  sacubitril 24 mG/valsartan 26 mG 1 Tablet(s) Oral two times a day  senna 2 Tablet(s) Oral at bedtime  spironolactone 25 milliGRAM(s) Oral daily  tiotropium 18 MICROgram(s) Capsule 1 Capsule(s) Inhalation daily  traMADol 50 milliGRAM(s) Oral every 8 hours    MEDICATIONS  (PRN):  acetaminophen   Tablet .. 975 milliGRAM(s) Oral every 8 hours PRN Mild Pain (1 - 3)  ALBUTerol    90 MICROgram(s) HFA Inhaler 2 Puff(s) Inhalation every 6 hours PRN Bronchospasm  bisacodyl Suppository 10 milliGRAM(s) Rectal daily PRN Constipation  clonazePAM  Tablet 1 milliGRAM(s) Oral three times a day PRN Anxiety  HYDROmorphone   Tablet 2 milliGRAM(s) Oral every 4 hours PRN Moderate Pain (4 - 6)  HYDROmorphone   Tablet 4 milliGRAM(s) Oral every 4 hours PRN Severe Pain (7 - 10)  magnesium hydroxide Suspension 30 milliLiter(s) Oral every 12 hours PRN Constipation  zolpidem 5 milliGRAM(s) Oral at bedtime PRN Insomnia  zolpidem 5 milliGRAM(s) Oral at bedtime PRN Insomnia          LABORATORY:                          9.3    9.81  )-----------( 259      ( 24 Nov 2020 05:45 )             30.2     11-24    141  |  105  |  15  ----------------------------<  154<H>  4.8   |  22  |  0.88    Ca    9.2      24 Nov 2020 05:45                                           [facility] Division of Hospital Medicine  Vaibhav Solis) MD Saroj  Pager [phone]    SUBJECTIVE:  Follow up for DM management.    The patient was seen and evaluated at bedside this AM.  Continues to void w/o issues or the need to straight cath. Had 3 large BMs last night. Feels much better today. In good spirits. Denies any fevers, chills, SOB, CP, abd pain, NV.      ROS: All systems negative except as noted.      Vital Signs Last 24 Hrs  Vital Signs Last 24 Hrs  T(C): 36.4 (24 Nov 2020 09:33), Max: 37.7 (23 Nov 2020 14:34)  T(F): 97.5 (24 Nov 2020 09:33), Max: 99.9 (23 Nov 2020 14:34)  HR: 77 (24 Nov 2020 09:33) (69 - 77)  BP: 114/72 (24 Nov 2020 09:33) (114/72 - 143/67)  BP(mean): --  RR: 18 (24 Nov 2020 09:33) (16 - 18)  SpO2: 98% (24 Nov 2020 09:33) (97% - 100%)    PHYSICAL EXAM:  Gen- In chair, comfortable, NAD  Eyes- EOMI, PERRLA, nonicteric.  EMNT- Fair dentition. MMM. No tonsilar exudates. No posterior pharynx erythema.  Neck- Supple. No masses. No tracheal deviation.  Resp- CTAB, good effort. No r/r/w. No accessory muscle use.  CVS- RRR, S1S2, no g/r/m. No LE edema.  GI- Soft abd, NT, ND, +BSx4. No HSM.  MSK- No C/C. ROM intact. No crepitus.  Back- HMV/TAMIKA in place.  Neuro- CN II-XII intact. Speech fluent/face symmetric. Sensation intact.  Skin- No rashes/ulcers. Warm/moist.  Psych- AAOx3. Appropriate mood/affect.      MEDICATION:  MEDICATIONS  (STANDING):  aspirin enteric coated 81 milliGRAM(s) Oral daily  atorvastatin 20 milliGRAM(s) Oral at bedtime  budesonide  80 MICROgram(s)/formoterol 4.5 MICROgram(s) Inhaler 2 Puff(s) Inhalation two times a day  buPROPion XL . 300 milliGRAM(s) Oral daily  clopidogrel Tablet 75 milliGRAM(s) Oral daily  dextrose 40% Gel 15 Gram(s) Oral once  dextrose 5%. 1000 milliLiter(s) (50 mL/Hr) IV Continuous <Continuous>  dextrose 5%. 1000 milliLiter(s) (100 mL/Hr) IV Continuous <Continuous>  dextrose 50% Injectable 25 Gram(s) IV Push once  dextrose 50% Injectable 12.5 Gram(s) IV Push once  dextrose 50% Injectable 25 Gram(s) IV Push once  famotidine    Tablet 40 milliGRAM(s) Oral two times a day  gabapentin 100 milliGRAM(s) Oral three times a day  glucagon  Injectable 1 milliGRAM(s) IntraMuscular once  influenza  Vaccine (HIGH DOSE) 0.7 milliLiter(s) IntraMuscular once  insulin glargine Injectable (LANTUS) 60 Unit(s) SubCutaneous at bedtime  insulin lispro (ADMELOG) corrective regimen sliding scale   SubCutaneous at bedtime  insulin lispro (ADMELOG) corrective regimen sliding scale   SubCutaneous three times a day before meals  insulin lispro Injectable (ADMELOG) 11 Unit(s) SubCutaneous three times a day before meals  lactulose Syrup 10 Gram(s) Oral three times a day  metoprolol tartrate 12.5 milliGRAM(s) Oral daily  polyethylene glycol 3350 17 Gram(s) Oral daily  sacubitril 24 mG/valsartan 26 mG 1 Tablet(s) Oral two times a day  senna 2 Tablet(s) Oral at bedtime  spironolactone 25 milliGRAM(s) Oral daily  tiotropium 18 MICROgram(s) Capsule 1 Capsule(s) Inhalation daily  traMADol 50 milliGRAM(s) Oral every 8 hours    MEDICATIONS  (PRN):  acetaminophen   Tablet .. 975 milliGRAM(s) Oral every 8 hours PRN Mild Pain (1 - 3)  ALBUTerol    90 MICROgram(s) HFA Inhaler 2 Puff(s) Inhalation every 6 hours PRN Bronchospasm  bisacodyl Suppository 10 milliGRAM(s) Rectal daily PRN Constipation  clonazePAM  Tablet 1 milliGRAM(s) Oral three times a day PRN Anxiety  HYDROmorphone   Tablet 2 milliGRAM(s) Oral every 4 hours PRN Moderate Pain (4 - 6)  HYDROmorphone   Tablet 4 milliGRAM(s) Oral every 4 hours PRN Severe Pain (7 - 10)  magnesium hydroxide Suspension 30 milliLiter(s) Oral every 12 hours PRN Constipation  zolpidem 5 milliGRAM(s) Oral at bedtime PRN Insomnia  zolpidem 5 milliGRAM(s) Oral at bedtime PRN Insomnia      LABORATORY:                          9.3    9.81  )-----------( 259      ( 24 Nov 2020 05:45 )             30.2     11-24    141  |  105  |  15  ----------------------------<  154<H>  4.8   |  22  |  0.88    Ca    9.2      24 Nov 2020 05:45

## 2020-11-24 NOTE — PROGRESS NOTE ADULT - SUBJECTIVE AND OBJECTIVE BOX
Orthopaedic Surgery Progress Note    Subjective:   Patient seen and examined. No acute events overnight. Pain well controlled. Requesting Acute rehab placement at [facility] rehab. Denies numbness/tingling.    Objective:  ICU Vital Signs Last 24 Hrs  T(C): 36.7 (24 Nov 2020 05:43), Max: 37.7 (23 Nov 2020 14:34)  T(F): 98.1 (24 Nov 2020 05:43), Max: 99.9 (23 Nov 2020 14:34)  HR: 72 (24 Nov 2020 05:43) (69 - 75)  BP: 143/67 (24 Nov 2020 05:43) (110/63 - 143/67)  RR: 18 (24 Nov 2020 05:43) (16 - 18)  SpO2: 98% (24 Nov 2020 05:43) (97% - 100%)    PE  Gen: NAD, resting in bed  Back:   dressing C/D/I, mild appropriate rina-incisional ttp  HMV/TAMIKA in place w/ serosanguinous output  Neuro:  RLE IP 5/5 HS 5/5 Q 5/5 GS 5/5 TA 5/5 EHL 5/5   SILT L2-S1  LLE IP 5/5 HS 5/5 Q 5/5 GS 5/5 TA 5/5 EHL 5/5  SILT L2-S1  WWP BLE            A/P: 70M s/p T10-Pelvis Exploration of rev PSF w/flap    Neuro: Pain control  Resp: IS  GI: Regular diet, bowel reg  MSK: WBAT, PT/OT  Heme: DVT PPX: Venodynes, restarting Plavix today  Monitor HV and TAMIKA  Prevena   Dispo: Acute Rehab

## 2020-11-24 NOTE — CONSULT NOTE ADULT - ASSESSMENT
70 yr old M Type 2 DM A1C 7.6%, CABG, HTN, CVA, Hx of L adrenalectomy for benign tumor, FREEDOM s/p T10-pelvis fusion.

## 2020-11-24 NOTE — PROGRESS NOTE ADULT - PROBLEM SELECTOR PLAN 3
-Appears to have resolved. No need for straight cath in past 24h.  -Constipation resolved. Cont bowel regimen. -Resolved. Voiding w/o issues.  -Constipation resolved. Cont bowel regimen.

## 2020-11-24 NOTE — CONSULT NOTE ADULT - ATTENDING COMMENTS
Chichi Coreas (pager [phone])  On evenings and weekends, please call [phone] or page endocrine fellow on call.   Please note that this patient may be followed by different provider tomorrow. If no answer, contact endocrine fellow on call.
[facility] Division of Hospital Medicine  Vaibhav ELIZABET (Tarik) MD Saroj  Pager [phone]

## 2020-11-25 LAB
GLUCOSE BLDC GLUCOMTR-MCNC: 106 MG/DL — HIGH (ref 70–99)
GLUCOSE BLDC GLUCOMTR-MCNC: 204 MG/DL — HIGH (ref 70–99)
GLUCOSE BLDC GLUCOMTR-MCNC: 263 MG/DL — HIGH (ref 70–99)
GLUCOSE BLDC GLUCOMTR-MCNC: 337 MG/DL — HIGH (ref 70–99)

## 2020-11-25 PROCEDURE — 99232 SBSQ HOSP IP/OBS MODERATE 35: CPT

## 2020-11-25 RX ORDER — INSULIN GLARGINE 100 [IU]/ML
56 INJECTION, SOLUTION SUBCUTANEOUS AT BEDTIME
Refills: 0 | Status: DISCONTINUED | OUTPATIENT
Start: 2020-11-25 | End: 2020-11-25

## 2020-11-25 RX ORDER — SIMETHICONE 80 MG/1
80 TABLET, CHEWABLE ORAL ONCE
Refills: 0 | Status: COMPLETED | OUTPATIENT
Start: 2020-11-25 | End: 2020-11-25

## 2020-11-25 RX ORDER — INSULIN GLARGINE 100 [IU]/ML
60 INJECTION, SOLUTION SUBCUTANEOUS AT BEDTIME
Refills: 0 | Status: DISCONTINUED | OUTPATIENT
Start: 2020-11-25 | End: 2020-11-27

## 2020-11-25 RX ORDER — INSULIN LISPRO 100/ML
12 VIAL (ML) SUBCUTANEOUS
Refills: 0 | Status: DISCONTINUED | OUTPATIENT
Start: 2020-11-25 | End: 2020-11-25

## 2020-11-25 RX ORDER — INSULIN LISPRO 100/ML
15 VIAL (ML) SUBCUTANEOUS
Refills: 0 | Status: DISCONTINUED | OUTPATIENT
Start: 2020-11-26 | End: 2020-11-27

## 2020-11-25 RX ADMIN — LACTULOSE 10 GRAM(S): 10 SOLUTION ORAL at 13:35

## 2020-11-25 RX ADMIN — BUDESONIDE AND FORMOTEROL FUMARATE DIHYDRATE 2 PUFF(S): 160; 4.5 AEROSOL RESPIRATORY (INHALATION) at 09:20

## 2020-11-25 RX ADMIN — Medication 975 MILLIGRAM(S): at 17:19

## 2020-11-25 RX ADMIN — Medication 60 MILLIGRAM(S): at 06:34

## 2020-11-25 RX ADMIN — SACUBITRIL AND VALSARTAN 1 TABLET(S): 24; 26 TABLET, FILM COATED ORAL at 17:20

## 2020-11-25 RX ADMIN — ATORVASTATIN CALCIUM 20 MILLIGRAM(S): 80 TABLET, FILM COATED ORAL at 21:57

## 2020-11-25 RX ADMIN — TRAMADOL HYDROCHLORIDE 50 MILLIGRAM(S): 50 TABLET ORAL at 13:36

## 2020-11-25 RX ADMIN — TIOTROPIUM BROMIDE 1 CAPSULE(S): 18 CAPSULE ORAL; RESPIRATORY (INHALATION) at 09:20

## 2020-11-25 RX ADMIN — Medication 12 UNIT(S): at 16:51

## 2020-11-25 RX ADMIN — SACUBITRIL AND VALSARTAN 1 TABLET(S): 24; 26 TABLET, FILM COATED ORAL at 06:34

## 2020-11-25 RX ADMIN — FAMOTIDINE 40 MILLIGRAM(S): 10 INJECTION INTRAVENOUS at 06:34

## 2020-11-25 RX ADMIN — Medication 11 UNIT(S): at 11:43

## 2020-11-25 RX ADMIN — CLOPIDOGREL BISULFATE 75 MILLIGRAM(S): 75 TABLET, FILM COATED ORAL at 12:21

## 2020-11-25 RX ADMIN — TRAMADOL HYDROCHLORIDE 50 MILLIGRAM(S): 50 TABLET ORAL at 06:38

## 2020-11-25 RX ADMIN — SIMETHICONE 80 MILLIGRAM(S): 80 TABLET, CHEWABLE ORAL at 17:20

## 2020-11-25 RX ADMIN — INSULIN GLARGINE 60 UNIT(S): 100 INJECTION, SOLUTION SUBCUTANEOUS at 22:02

## 2020-11-25 RX ADMIN — BUPROPION HYDROCHLORIDE 300 MILLIGRAM(S): 150 TABLET, EXTENDED RELEASE ORAL at 09:20

## 2020-11-25 RX ADMIN — FAMOTIDINE 40 MILLIGRAM(S): 10 INJECTION INTRAVENOUS at 17:20

## 2020-11-25 RX ADMIN — Medication 81 MILLIGRAM(S): at 11:44

## 2020-11-25 RX ADMIN — Medication 6: at 11:43

## 2020-11-25 RX ADMIN — Medication 12.5 MILLIGRAM(S): at 06:34

## 2020-11-25 RX ADMIN — Medication 11 UNIT(S): at 08:01

## 2020-11-25 RX ADMIN — TRAMADOL HYDROCHLORIDE 50 MILLIGRAM(S): 50 TABLET ORAL at 21:57

## 2020-11-25 RX ADMIN — GABAPENTIN 100 MILLIGRAM(S): 400 CAPSULE ORAL at 13:36

## 2020-11-25 RX ADMIN — GABAPENTIN 100 MILLIGRAM(S): 400 CAPSULE ORAL at 21:57

## 2020-11-25 RX ADMIN — SPIRONOLACTONE 25 MILLIGRAM(S): 25 TABLET, FILM COATED ORAL at 06:33

## 2020-11-25 RX ADMIN — Medication 1 MILLIGRAM(S): at 07:46

## 2020-11-25 RX ADMIN — Medication 8: at 16:51

## 2020-11-25 NOTE — PROGRESS NOTE ADULT - PROBLEM SELECTOR PLAN 1
- adjust Lantus to 56 units qhs  - adjust Admelog to 14/12/12  - c/w moderate correction scale qac and qhs  - consistent carb diet  - check FS qac and qhs  - will follow  - for discharge: plan to dc on Soliqua and Novolog, doses to be determined, and to follow up with his endocrinologist Dr. Lal. - c/w Lantus 60 units qhs  - adjust Admelog to 15 units before meals  - c/w moderate correction scale qac and qhs  - consistent carb diet  - check FS qac and qhs  - will follow  - for discharge: plan to dc on Soliqua and Novolog, doses to be determined, and to follow up with his endocrinologist Dr. Lal.

## 2020-11-25 NOTE — PROGRESS NOTE ADULT - SUBJECTIVE AND OBJECTIVE BOX
[facility] Division of Hospital Medicine  Vaibhav MCNEAL (Kent) MD Saroj  Pager [phone]    SUBJECTIVE:  Follow up for DM management.    The patient was seen and evaluated at bedside this AM.  In good spirits. Denies any fevers, chills, SOB, CP, abd pain, NV. Ready for acute rehab.      ROS: All systems negative except as noted.      Vital Signs Last 24 Hrs  T(C): 37.3 (25 Nov 2020 09:22), Max: 37.6 (25 Nov 2020 06:26)  T(F): 99.2 (25 Nov 2020 09:22), Max: 99.6 (25 Nov 2020 06:26)  HR: 71 (25 Nov 2020 09:22) (69 - 81)  BP: 112/69 (25 Nov 2020 09:22) (112/69 - 132/72)  BP(mean): --  RR: 18 (25 Nov 2020 09:22) (17 - 18)  SpO2: 100% (25 Nov 2020 09:22) (98% - 100%    PHYSICAL EXAM:  Gen- In chair, comfortable, NAD  Resp- CTAB, good effort. No r/r/w. No accessory muscle use.  CVS- RRR, S1S2, no g/r/m. No LE edema.  GI- Soft abd, NT, ND, +BSx4. No HSM.  MSK- No C/C. ROM intact. No crepitus.  Neuro- CN II-XII intact. Speech fluent/face symmetric. Sensation intact.  Skin- No rashes/ulcers. Warm/moist.  Psych- AAOx3. Appropriate mood/affect.      MEDICATION:  MEDICATIONS  (STANDING):  aspirin enteric coated 81 milliGRAM(s) Oral daily  atorvastatin 20 milliGRAM(s) Oral at bedtime  budesonide  80 MICROgram(s)/formoterol 4.5 MICROgram(s) Inhaler 2 Puff(s) Inhalation two times a day  buPROPion XL . 300 milliGRAM(s) Oral daily  clopidogrel Tablet 75 milliGRAM(s) Oral daily  dextrose 40% Gel 15 Gram(s) Oral once  dextrose 5%. 1000 milliLiter(s) (50 mL/Hr) IV Continuous <Continuous>  dextrose 5%. 1000 milliLiter(s) (100 mL/Hr) IV Continuous <Continuous>  dextrose 50% Injectable 25 Gram(s) IV Push once  dextrose 50% Injectable 12.5 Gram(s) IV Push once  dextrose 50% Injectable 25 Gram(s) IV Push once  famotidine    Tablet 40 milliGRAM(s) Oral two times a day  furosemide    Tablet 60 milliGRAM(s) Oral daily  gabapentin 100 milliGRAM(s) Oral three times a day  glucagon  Injectable 1 milliGRAM(s) IntraMuscular once  influenza  Vaccine (HIGH DOSE) 0.7 milliLiter(s) IntraMuscular once  insulin glargine Injectable (LANTUS) 60 Unit(s) SubCutaneous at bedtime  insulin lispro (ADMELOG) corrective regimen sliding scale   SubCutaneous at bedtime  insulin lispro (ADMELOG) corrective regimen sliding scale   SubCutaneous three times a day before meals  insulin lispro Injectable (ADMELOG) 11 Unit(s) SubCutaneous three times a day before meals  lactulose Syrup 10 Gram(s) Oral three times a day  metoprolol tartrate 12.5 milliGRAM(s) Oral daily  polyethylene glycol 3350 17 Gram(s) Oral daily  sacubitril 24 mG/valsartan 26 mG 1 Tablet(s) Oral two times a day  senna 2 Tablet(s) Oral at bedtime  spironolactone 25 milliGRAM(s) Oral daily  tiotropium 18 MICROgram(s) Capsule 1 Capsule(s) Inhalation daily  traMADol 50 milliGRAM(s) Oral every 8 hours    MEDICATIONS  (PRN):  acetaminophen   Tablet .. 975 milliGRAM(s) Oral every 8 hours PRN Mild Pain (1 - 3)  ALBUTerol    90 MICROgram(s) HFA Inhaler 2 Puff(s) Inhalation every 6 hours PRN Bronchospasm  bisacodyl Suppository 10 milliGRAM(s) Rectal daily PRN Constipation  clonazePAM  Tablet 1 milliGRAM(s) Oral three times a day PRN Anxiety  HYDROmorphone   Tablet 2 milliGRAM(s) Oral every 4 hours PRN Moderate Pain (4 - 6)  HYDROmorphone   Tablet 4 milliGRAM(s) Oral every 4 hours PRN Severe Pain (7 - 10)  magnesium hydroxide Suspension 30 milliLiter(s) Oral every 12 hours PRN Constipation  zolpidem 5 milliGRAM(s) Oral at bedtime PRN Insomnia  zolpidem 5 milliGRAM(s) Oral at bedtime PRN Insomnia        LABORATORY:                          9.3    9.81  )-----------( 259      ( 24 Nov 2020 05:45 )             30.2     11-24    141  |  105  |  15  ----------------------------<  154<H>  4.8   |  22  |  0.88    Ca    9.2      24 Nov 2020 05:45

## 2020-11-25 NOTE — PROGRESS NOTE ADULT - SUBJECTIVE AND OBJECTIVE BOX
Patient is a 70y old  Male who presents with a chief complaint of T10-pelvis PSF with PRS closure (25 Nov 2020 10:25)      HPI:  71 y/o male, with PMHx of T2DM, HTN, CABG x 4 (>25 years ago), HLD, CVA (no residual -2013), depression, FREEDOM (mouthpiece), S/P lumbar laminectomy (2015) & T1 spinal fusion in January 2020   c/o  lower back & buttock pain, progressively worsening since the surgery. Patient reports pain worsens while ambulating and has difficulty with balance while using his walker. Patient reports he recently recovered from sepsis in August. He is scheduled for exploration of T10 pelvis fusion with Dr. Roberts, Dr. Storey : muscle flap reconstruction.  (18 Nov 2020 09:52)    Drains removed yesterday.  Pt reports difficulty with balance, has been working with PT.  Reports BM daily.     REVIEW OF SYSTEMS  Constitutional - No fever, No weight loss, No fatigue  HEENT - No eye pain, No visual disturbances, No difficulty hearing, No tinnitus, No vertigo, No neck pain  Respiratory - No cough, No wheezing, No shortness of breath  Cardiovascular - No chest pain, No palpitations  Gastrointestinal - No abdominal pain, No nausea, No vomiting, No diarrhea, No constipation  Genitourinary - No dysuria, No frequency, No hematuria, No incontinence  Neurological - No headaches, No memory loss, + loss of strength, No numbness, No tremors  Skin - No itching, No rashes, No lesions   Endocrine - No temperature intolerance  Musculoskeletal - + pain  Psychiatric - No depression, No anxiety    PAST MEDICAL & SURGICAL HISTORY  FREEDOM (obstructive sleep apnea)  Cardiac pacemaker  Hernia  CVA (cerebrovascular accident)  Diabetic neuropathy  Spinal stenosis of lumbar region  DM type 2 (diabetes mellitus, type 2)  CAD (coronary artery disease)  Anxiety  Depression  Acid Reflux  Gout  Adrenal Insufficiency  HTN (Hypertension)  Diabetes  History of permanent cardiac pacemaker placement  History of lumbar laminectomy  S/P Coronary Angiogram  S/P Hernia Repair  History of Appendectomy  S/P CABG X 4      SOCIAL HISTORY  Smoking - Denied  EtOH - Denied   Drugs - Denied    FUNCTIONAL HISTORY  Lives alone,  in apartment without stairs  Independent at baseline, began using rw a few months ago.  after august illness patient had hha 3x/week for bathing, was receiving home PT      Current function:  11/24  ambulated 50 feet with Rolling walker and min assist x 1, portable o2 follow    FAMILY HISTORY   Family history of borderline diabetes mellitus  Family history of early CAD        RECENT LABS/IMAGING  CBC Full  -  ( 24 Nov 2020 05:45 )  WBC Count : 9.81 K/uL  RBC Count : 2.93 M/uL  Hemoglobin : 9.3 g/dL  Hematocrit : 30.2 %  Platelet Count - Automated : 259 K/uL  Mean Cell Volume : 103.1 fL  Mean Cell Hemoglobin : 31.7 pg  Mean Cell Hemoglobin Concentration : 30.8 %  Auto Neutrophil # : x  Auto Lymphocyte # : x  Auto Monocyte # : x  Auto Eosinophil # : x  Auto Basophil # : x  Auto Neutrophil % : x  Auto Lymphocyte % : x  Auto Monocyte % : x  Auto Eosinophil % : x  Auto Basophil % : x    11-24    141  |  105  |  15  ----------------------------<  154<H>  4.8   |  22  |  0.88    Ca    9.2      24 Nov 2020 05:45          VITALS  T(C): 37.3 (11-25-20 @ 09:22), Max: 37.6 (11-25-20 @ 06:26)  HR: 71 (11-25-20 @ 09:22) (69 - 81)  BP: 112/69 (11-25-20 @ 09:22) (112/69 - 132/72)  RR: 18 (11-25-20 @ 09:22) (17 - 18)  SpO2: 100% (11-25-20 @ 09:22) (98% - 100%)  Wt(kg): --    ALLERGIES  No Known Allergies      MEDICATIONS   acetaminophen   Tablet .. 975 milliGRAM(s) Oral every 8 hours PRN  ALBUTerol    90 MICROgram(s) HFA Inhaler 2 Puff(s) Inhalation every 6 hours PRN  aspirin enteric coated 81 milliGRAM(s) Oral daily  atorvastatin 20 milliGRAM(s) Oral at bedtime  bisacodyl Suppository 10 milliGRAM(s) Rectal daily PRN  budesonide  80 MICROgram(s)/formoterol 4.5 MICROgram(s) Inhaler 2 Puff(s) Inhalation two times a day  buPROPion XL . 300 milliGRAM(s) Oral daily  clonazePAM  Tablet 1 milliGRAM(s) Oral three times a day PRN  clopidogrel Tablet 75 milliGRAM(s) Oral daily  dextrose 40% Gel 15 Gram(s) Oral once  dextrose 5%. 1000 milliLiter(s) IV Continuous <Continuous>  dextrose 5%. 1000 milliLiter(s) IV Continuous <Continuous>  dextrose 50% Injectable 25 Gram(s) IV Push once  dextrose 50% Injectable 12.5 Gram(s) IV Push once  dextrose 50% Injectable 25 Gram(s) IV Push once  famotidine    Tablet 40 milliGRAM(s) Oral two times a day  furosemide    Tablet 60 milliGRAM(s) Oral daily  gabapentin 100 milliGRAM(s) Oral three times a day  glucagon  Injectable 1 milliGRAM(s) IntraMuscular once  HYDROmorphone   Tablet 2 milliGRAM(s) Oral every 4 hours PRN  HYDROmorphone   Tablet 4 milliGRAM(s) Oral every 4 hours PRN  influenza  Vaccine (HIGH DOSE) 0.7 milliLiter(s) IntraMuscular once  insulin glargine Injectable (LANTUS) 60 Unit(s) SubCutaneous at bedtime  insulin lispro (ADMELOG) corrective regimen sliding scale   SubCutaneous at bedtime  insulin lispro (ADMELOG) corrective regimen sliding scale   SubCutaneous three times a day before meals  insulin lispro Injectable (ADMELOG) 11 Unit(s) SubCutaneous three times a day before meals  lactulose Syrup 10 Gram(s) Oral three times a day  magnesium hydroxide Suspension 30 milliLiter(s) Oral every 12 hours PRN  metoprolol tartrate 12.5 milliGRAM(s) Oral daily  polyethylene glycol 3350 17 Gram(s) Oral daily  sacubitril 24 mG/valsartan 26 mG 1 Tablet(s) Oral two times a day  senna 2 Tablet(s) Oral at bedtime  spironolactone 25 milliGRAM(s) Oral daily  tiotropium 18 MICROgram(s) Capsule 1 Capsule(s) Inhalation daily  traMADol 50 milliGRAM(s) Oral every 8 hours  zolpidem 5 milliGRAM(s) Oral at bedtime PRN  zolpidem 5 milliGRAM(s) Oral at bedtime PRN      ----------------------------------------------------------------------------------------   PHYSICAL EXAM  Constitutional - NAD, Comfortable  HEENT - NCAT, EOMI  Chest - no respiratory distress, wearing nasal cannula  Cardiovascular - no edema  Abdomen - BS+, Soft, NTND  Extremities - No C/C/E, No calf tenderness   Neurologic Exam -                    Cognitive - Awake, Alert, AAO to self, place, date, year, situation     Communication - Fluent, No dysarthria     Cranial Nerves - CN 2-12 intact     Motor -                      LEFT    UE - ShAB 5/5, EF 5/5, EE 5/5, WE 5/5,  5/5                    RIGHT UE - ShAB 5/5, EF 5/5, EE 5/5, WE 5/5,  5/5                    LEFT    LE - HF 4-/5, KE 4/5, DF 5/5, PF 5/5                    RIGHT LE - HF 4/5, KE 4/5, DF 5/5, PF 5/5        Sensory - decreased to LT in b/l feet      OculoVestibular - No saccades, No nystagmus, VOR         Balance - WNL Static  Psychiatric - Mood stable, Affect WNL  ----------------------------------------------------------------------------------------  ASSESSMENT/PLAN  70 year old male admitted for exploration of PSF, now s/p removal of spinal padilla rods, insertion of iliac screw on the right, removal of sacral alar screw and insertion of left iliac screw and b/l rods.   continue bedside PT/OT  Pain -tylenol, tramadol, gabapentin, dilaudid po, with bowel regimen  DVT PPX - scd  Diet: consistent carb with evening snack, kosher diet  Rehab -      Recommend ACUTE inpatient rehabilitation for the functional deficits consisting of 3 hours of therapy/day & 24 hour RN/daily PMR physician for comorbid medical management. Will continue to follow for ongoing rehab needs and recommendations.

## 2020-11-25 NOTE — PROGRESS NOTE ADULT - PROBLEM SELECTOR PLAN 4
A1c 7.6. Glycemic control improved.   -Lantus 60 units. Lispro insulin 11 units TIDAC w/ moderate correctional scale.   -Carb controlled diet.  -Encourage dietary precautions.  -Appreciate endo input.   -Resume home regimen upon DC.

## 2020-11-25 NOTE — PROGRESS NOTE ADULT - SUBJECTIVE AND OBJECTIVE BOX
Orthopaedic Surgery Progress Note    Subjective:   Patient seen and examined. No acute events overnight. Pain well controlled. Denies numbness/tingling in extremities.    Objective:  Vital Signs Last 24 Hrs  T(C): 37 (24 Nov 2020 21:39), Max: 37 (24 Nov 2020 21:39)  T(F): 98.6 (24 Nov 2020 21:39), Max: 98.6 (24 Nov 2020 21:39)  HR: 81 (24 Nov 2020 21:39) (69 - 81)  BP: 117/63 (24 Nov 2020 21:39) (114/72 - 132/72)  BP(mean): --  RR: 17 (24 Nov 2020 21:39) (17 - 18)  SpO2: 100% (24 Nov 2020 21:39) (98% - 100%)    PE  Gen: NAD, resting in bed  Back:   dressing C/D/I, mild appropriate rina-incisional ttp  Neuro:  RLE IP 5/5 HS 5/5 Q 5/5 GS 5/5 TA 5/5 EHL 5/5   SILT L2-S1  LLE IP 5/5 HS 5/5 Q 5/5 GS 5/5 TA 5/5 EHL 5/5  SILT L2-S1  WWP BLE            A/P: 70M s/p T10-Pelvis Exploration of rev PSF w/flap    Neuro: Pain control  Resp: IS  GI: Regular diet, bowel reg  MSK: WBAT, PT/OT  Heme: DVT PPX: Venodynes, Plavix  Dispo: Acute Rehab pending insurance authorization

## 2020-11-25 NOTE — PROGRESS NOTE ADULT - PROBLEM SELECTOR PLAN 5
-Per pt - he had ECHO back in 9/2020 showing LVEF of 25% - reportedly related to "sepsis" per pt.  -Euvolemic at this time.  -Cont metoprolol, Entresto, lasix, entresto.  -Follow up with cardio as OP.

## 2020-11-25 NOTE — PROGRESS NOTE ADULT - SUBJECTIVE AND OBJECTIVE BOX
Chief Complaint: f/u DM2    History:  Patient seen at bedside this morning. Reports his appetite is low but eating all of his meals. Had low appetite prior to surgery as well. Does not have nausea, vomiting, abdominal pain.     MEDICATIONS  (STANDING):  aspirin enteric coated 81 milliGRAM(s) Oral daily  atorvastatin 20 milliGRAM(s) Oral at bedtime  budesonide  80 MICROgram(s)/formoterol 4.5 MICROgram(s) Inhaler 2 Puff(s) Inhalation two times a day  buPROPion XL . 300 milliGRAM(s) Oral daily  clopidogrel Tablet 75 milliGRAM(s) Oral daily  dextrose 40% Gel 15 Gram(s) Oral once  dextrose 5%. 1000 milliLiter(s) (50 mL/Hr) IV Continuous <Continuous>  dextrose 5%. 1000 milliLiter(s) (100 mL/Hr) IV Continuous <Continuous>  dextrose 50% Injectable 25 Gram(s) IV Push once  dextrose 50% Injectable 12.5 Gram(s) IV Push once  dextrose 50% Injectable 25 Gram(s) IV Push once  famotidine    Tablet 40 milliGRAM(s) Oral two times a day  furosemide    Tablet 60 milliGRAM(s) Oral daily  gabapentin 100 milliGRAM(s) Oral three times a day  glucagon  Injectable 1 milliGRAM(s) IntraMuscular once  influenza  Vaccine (HIGH DOSE) 0.7 milliLiter(s) IntraMuscular once  insulin glargine Injectable (LANTUS) 60 Unit(s) SubCutaneous at bedtime  insulin lispro (ADMELOG) corrective regimen sliding scale   SubCutaneous three times a day before meals  insulin lispro (ADMELOG) corrective regimen sliding scale   SubCutaneous at bedtime  insulin lispro Injectable (ADMELOG) 11 Unit(s) SubCutaneous three times a day before meals  lactulose Syrup 10 Gram(s) Oral three times a day  metoprolol tartrate 12.5 milliGRAM(s) Oral daily  polyethylene glycol 3350 17 Gram(s) Oral daily  sacubitril 24 mG/valsartan 26 mG 1 Tablet(s) Oral two times a day  senna 2 Tablet(s) Oral at bedtime  spironolactone 25 milliGRAM(s) Oral daily  tiotropium 18 MICROgram(s) Capsule 1 Capsule(s) Inhalation daily  traMADol 50 milliGRAM(s) Oral every 8 hours    MEDICATIONS  (PRN):  acetaminophen   Tablet .. 975 milliGRAM(s) Oral every 8 hours PRN Mild Pain (1 - 3)  ALBUTerol    90 MICROgram(s) HFA Inhaler 2 Puff(s) Inhalation every 6 hours PRN Bronchospasm  bisacodyl Suppository 10 milliGRAM(s) Rectal daily PRN Constipation  clonazePAM  Tablet 1 milliGRAM(s) Oral three times a day PRN Anxiety  HYDROmorphone   Tablet 2 milliGRAM(s) Oral every 4 hours PRN Moderate Pain (4 - 6)  HYDROmorphone   Tablet 4 milliGRAM(s) Oral every 4 hours PRN Severe Pain (7 - 10)  magnesium hydroxide Suspension 30 milliLiter(s) Oral every 12 hours PRN Constipation  zolpidem 5 milliGRAM(s) Oral at bedtime PRN Insomnia  zolpidem 5 milliGRAM(s) Oral at bedtime PRN Insomnia      Allergies  No Known Allergies    Review of Systems:  ALL OTHER SYSTEMS REVIEWED AND NEGATIVE    PHYSICAL EXAM:  VITALS: T(C): 37.3 (11-25-20 @ 09:22)  T(F): 99.2 (11-25-20 @ 09:22), Max: 99.6 (11-25-20 @ 06:26)  HR: 71 (11-25-20 @ 09:22) (69 - 81)  BP: 112/69 (11-25-20 @ 09:22) (112/69 - 131/60)  RR:  (17 - 18)  SpO2:  (100% - 100%)  Wt(kg): --  GENERAL: NAD, well-developed  EYES: No proptosis, anicteric  HEENT:  Atraumatic, Normocephalic  RESPIRATORY: + air movement bilaterally, no respiratory distress   PSYCH: Alert and oriented x 3, reactive affect, euthymic mood     POCT Blood Glucose.: 263 mg/dL (11-25-20 @ 11:40) A 11, H 6   POCT Blood Glucose.: 106 mg/dL (11-25-20 @ 07:34) A 11  POCT Blood Glucose.: 155 mg/dL (11-24-20 @ 22:00) L 60   POCT Blood Glucose.: 157 mg/dL (11-24-20 @ 16:32) A 11, H 2  POCT Blood Glucose.: 190 mg/dL (11-24-20 @ 11:39) A 11, H 2  POCT Blood Glucose.: 145 mg/dL (11-24-20 @ 07:28) A 11  POCT Blood Glucose.: 140 mg/dL (11-23-20 @ 21:59)   POCT Blood Glucose.: 163 mg/dL (11-23-20 @ 16:59)  POCT Blood Glucose.: 297 mg/dL (11-23-20 @ 11:33)  POCT Blood Glucose.: 222 mg/dL (11-23-20 @ 07:45)  POCT Blood Glucose.: 216 mg/dL (11-22-20 @ 22:13)  POCT Blood Glucose.: 293 mg/dL (11-22-20 @ 17:30)      11-24    141  |  105  |  15  ----------------------------<  154<H>  4.8   |  22  |  0.88    EGFR if : 102  EGFR if non : 88    Ca    9.2      11-24

## 2020-11-25 NOTE — PROGRESS NOTE ADULT - PROBLEM SELECTOR PLAN 1
-Stable from medical standpoint. No objections to DC once insurance auth obtained and cleared by ortho.  -PMR recs acute rehab.  -Further management as per ortho.

## 2020-11-26 LAB
GLUCOSE BLDC GLUCOMTR-MCNC: 138 MG/DL — HIGH (ref 70–99)
GLUCOSE BLDC GLUCOMTR-MCNC: 144 MG/DL — HIGH (ref 70–99)
GLUCOSE BLDC GLUCOMTR-MCNC: 169 MG/DL — HIGH (ref 70–99)
GLUCOSE BLDC GLUCOMTR-MCNC: 215 MG/DL — HIGH (ref 70–99)

## 2020-11-26 PROCEDURE — 99232 SBSQ HOSP IP/OBS MODERATE 35: CPT

## 2020-11-26 RX ORDER — SIMETHICONE 80 MG/1
80 TABLET, CHEWABLE ORAL DAILY
Refills: 0 | Status: DISCONTINUED | OUTPATIENT
Start: 2020-11-26 | End: 2020-12-02

## 2020-11-26 RX ADMIN — SIMETHICONE 80 MILLIGRAM(S): 80 TABLET, CHEWABLE ORAL at 21:19

## 2020-11-26 RX ADMIN — Medication 4: at 08:15

## 2020-11-26 RX ADMIN — Medication 1 MILLIGRAM(S): at 00:43

## 2020-11-26 RX ADMIN — SACUBITRIL AND VALSARTAN 1 TABLET(S): 24; 26 TABLET, FILM COATED ORAL at 21:18

## 2020-11-26 RX ADMIN — LACTULOSE 10 GRAM(S): 10 SOLUTION ORAL at 07:09

## 2020-11-26 RX ADMIN — Medication 15 UNIT(S): at 08:15

## 2020-11-26 RX ADMIN — SACUBITRIL AND VALSARTAN 1 TABLET(S): 24; 26 TABLET, FILM COATED ORAL at 12:43

## 2020-11-26 RX ADMIN — Medication 12.5 MILLIGRAM(S): at 07:09

## 2020-11-26 RX ADMIN — ATORVASTATIN CALCIUM 20 MILLIGRAM(S): 80 TABLET, FILM COATED ORAL at 21:18

## 2020-11-26 RX ADMIN — GABAPENTIN 100 MILLIGRAM(S): 400 CAPSULE ORAL at 21:18

## 2020-11-26 RX ADMIN — Medication 60 MILLIGRAM(S): at 07:09

## 2020-11-26 RX ADMIN — Medication 15 UNIT(S): at 12:43

## 2020-11-26 RX ADMIN — FAMOTIDINE 40 MILLIGRAM(S): 10 INJECTION INTRAVENOUS at 17:21

## 2020-11-26 RX ADMIN — Medication 975 MILLIGRAM(S): at 19:49

## 2020-11-26 RX ADMIN — Medication 15 UNIT(S): at 17:21

## 2020-11-26 RX ADMIN — GABAPENTIN 100 MILLIGRAM(S): 400 CAPSULE ORAL at 07:09

## 2020-11-26 RX ADMIN — FAMOTIDINE 40 MILLIGRAM(S): 10 INJECTION INTRAVENOUS at 07:09

## 2020-11-26 RX ADMIN — SPIRONOLACTONE 25 MILLIGRAM(S): 25 TABLET, FILM COATED ORAL at 07:10

## 2020-11-26 RX ADMIN — BUPROPION HYDROCHLORIDE 300 MILLIGRAM(S): 150 TABLET, EXTENDED RELEASE ORAL at 12:42

## 2020-11-26 RX ADMIN — TRAMADOL HYDROCHLORIDE 50 MILLIGRAM(S): 50 TABLET ORAL at 07:10

## 2020-11-26 RX ADMIN — TRAMADOL HYDROCHLORIDE 50 MILLIGRAM(S): 50 TABLET ORAL at 21:00

## 2020-11-26 RX ADMIN — Medication 1 MILLIGRAM(S): at 21:58

## 2020-11-26 RX ADMIN — Medication 1 MILLIGRAM(S): at 08:14

## 2020-11-26 RX ADMIN — POLYETHYLENE GLYCOL 3350 17 GRAM(S): 17 POWDER, FOR SOLUTION ORAL at 12:43

## 2020-11-26 RX ADMIN — ZOLPIDEM TARTRATE 5 MILLIGRAM(S): 10 TABLET ORAL at 23:06

## 2020-11-26 RX ADMIN — INSULIN GLARGINE 60 UNIT(S): 100 INJECTION, SOLUTION SUBCUTANEOUS at 22:20

## 2020-11-26 RX ADMIN — TRAMADOL HYDROCHLORIDE 50 MILLIGRAM(S): 50 TABLET ORAL at 13:41

## 2020-11-26 RX ADMIN — CLOPIDOGREL BISULFATE 75 MILLIGRAM(S): 75 TABLET, FILM COATED ORAL at 12:42

## 2020-11-26 RX ADMIN — Medication 81 MILLIGRAM(S): at 12:42

## 2020-11-26 RX ADMIN — Medication 2: at 12:42

## 2020-11-26 NOTE — PROGRESS NOTE ADULT - PROBLEM SELECTOR PLAN 4
A1c 7.6.   Glycemic control improving.   -Lantus 60 units. Lispro insulin 15 units TIDAC w/ moderate correctional scale.   -Carb controlled diet.  -Encourage dietary precautions.  -F/U endo d/c recs  -Resume home regimen upon DC.

## 2020-11-26 NOTE — PROGRESS NOTE ADULT - SUBJECTIVE AND OBJECTIVE BOX
Orthopaedic Surgery Progress Note    Subjective:   Patient seen and examined. No acute events overnight.     Objective:  T(C): 37.7 (11-25-20 @ 21:53), Max: 37.7 (11-25-20 @ 21:53)  HR: 79 (11-25-20 @ 21:53) (71 - 79)  BP: 129/55 (11-25-20 @ 21:53) (112/54 - 129/55)  RR: 17 (11-25-20 @ 21:53) (17 - 18)  SpO2: 100% (11-25-20 @ 21:53) (97% - 100%)  Wt(kg): --    11-24 @ 07:01  -  11-25 @ 07:00  --------------------------------------------------------  IN: 0 mL / OUT: 862 mL / NET: -862 mL        PE  Gen: NAD  Back:   Neuro:  RLE IP 5/5 HS 5/5 Q 5/5 GS 5/5 TA 5/5 EHL 5/5   SILT L2-S1  LLE IP 5/5 HS 5/5 Q 5/5 GS 5/5 TA 5/5 EHL 5/5  SILT L2-S1  WWP BLE                          9.3    9.81  )-----------( 259      ( 24 Nov 2020 05:45 )             30.2     11-24    141  |  105  |  15  ----------------------------<  154<H>  4.8   |  22  |  0.88    Ca    9.2      24 Nov 2020 05:45      A/P: 70M s/p T10-Pelvis Exploration of rev PSF w/flap    Neuro: Pain control  Resp: IS  GI: Regular diet, bowel reg  MSK: WBAT, PT/OT  Heme: DVT PPX: Venodynes, Plavix  Dispo: Acute Rehab pending insurance authorization

## 2020-11-26 NOTE — PROGRESS NOTE ADULT - SUBJECTIVE AND OBJECTIVE BOX
Patient is a 70y old  Male who presents with a chief complaint of pelvic fusion (25 Nov 2020 13:03)      SUBJECTIVE / OVERNIGHT EVENTS:    MEDICATIONS  (STANDING):  aspirin enteric coated 81 milliGRAM(s) Oral daily  atorvastatin 20 milliGRAM(s) Oral at bedtime  budesonide  80 MICROgram(s)/formoterol 4.5 MICROgram(s) Inhaler 2 Puff(s) Inhalation two times a day  buPROPion XL . 300 milliGRAM(s) Oral daily  clopidogrel Tablet 75 milliGRAM(s) Oral daily  dextrose 40% Gel 15 Gram(s) Oral once  dextrose 5%. 1000 milliLiter(s) (50 mL/Hr) IV Continuous <Continuous>  dextrose 5%. 1000 milliLiter(s) (100 mL/Hr) IV Continuous <Continuous>  dextrose 50% Injectable 25 Gram(s) IV Push once  dextrose 50% Injectable 12.5 Gram(s) IV Push once  dextrose 50% Injectable 25 Gram(s) IV Push once  famotidine    Tablet 40 milliGRAM(s) Oral two times a day  furosemide    Tablet 60 milliGRAM(s) Oral daily  gabapentin 100 milliGRAM(s) Oral three times a day  glucagon  Injectable 1 milliGRAM(s) IntraMuscular once  influenza  Vaccine (HIGH DOSE) 0.7 milliLiter(s) IntraMuscular once  insulin glargine Injectable (LANTUS) 60 Unit(s) SubCutaneous at bedtime  insulin lispro (ADMELOG) corrective regimen sliding scale   SubCutaneous at bedtime  insulin lispro (ADMELOG) corrective regimen sliding scale   SubCutaneous three times a day before meals  insulin lispro Injectable (ADMELOG) 15 Unit(s) SubCutaneous three times a day before meals  lactulose Syrup 10 Gram(s) Oral three times a day  metoprolol tartrate 12.5 milliGRAM(s) Oral daily  polyethylene glycol 3350 17 Gram(s) Oral daily  sacubitril 24 mG/valsartan 26 mG 1 Tablet(s) Oral two times a day  senna 2 Tablet(s) Oral at bedtime  spironolactone 25 milliGRAM(s) Oral daily  tiotropium 18 MICROgram(s) Capsule 1 Capsule(s) Inhalation daily  traMADol 50 milliGRAM(s) Oral every 8 hours    MEDICATIONS  (PRN):  acetaminophen   Tablet .. 975 milliGRAM(s) Oral every 8 hours PRN Mild Pain (1 - 3)  ALBUTerol    90 MICROgram(s) HFA Inhaler 2 Puff(s) Inhalation every 6 hours PRN Bronchospasm  bisacodyl Suppository 10 milliGRAM(s) Rectal daily PRN Constipation  clonazePAM  Tablet 1 milliGRAM(s) Oral three times a day PRN Anxiety  HYDROmorphone   Tablet 2 milliGRAM(s) Oral every 4 hours PRN Moderate Pain (4 - 6)  HYDROmorphone   Tablet 4 milliGRAM(s) Oral every 4 hours PRN Severe Pain (7 - 10)  magnesium hydroxide Suspension 30 milliLiter(s) Oral every 12 hours PRN Constipation  zolpidem 5 milliGRAM(s) Oral at bedtime PRN Insomnia  zolpidem 5 milliGRAM(s) Oral at bedtime PRN Insomnia      Vital Signs Last 24 Hrs  T(C): 37.2 (26 Nov 2020 13:58), Max: 37.7 (25 Nov 2020 21:53)  T(F): 98.9 (26 Nov 2020 13:58), Max: 99.8 (25 Nov 2020 21:53)  HR: 74 (26 Nov 2020 13:58) (69 - 92)  BP: 104/59 (26 Nov 2020 13:58) (100/55 - 129/55)  BP(mean): --  RR: 17 (26 Nov 2020 13:58) (17 - 18)  SpO2: 97% (26 Nov 2020 13:58) (97% - 100%)  CAPILLARY BLOOD GLUCOSE      POCT Blood Glucose.: 169 mg/dL (26 Nov 2020 12:33)  POCT Blood Glucose.: 215 mg/dL (26 Nov 2020 07:50)  POCT Blood Glucose.: 204 mg/dL (25 Nov 2020 22:00)  POCT Blood Glucose.: 337 mg/dL (25 Nov 2020 16:40)    I&O's Summary    26 Nov 2020 07:01  -  26 Nov 2020 15:50  --------------------------------------------------------  IN: 0 mL / OUT: 650 mL / NET: -650 mL        PHYSICAL EXAM:  GENERAL: NAD, well-developed  HEAD:  Atraumatic, Normocephalic  EYES: EOMI, PERRLA, conjunctiva and sclera clear  NECK: Supple, No JVD  CHEST/LUNG: Clear to auscultation bilaterally; No wheeze  HEART: Regular rate and rhythm; No murmurs, rubs, or gallops  ABDOMEN: Soft, Nontender, Nondistended; Bowel sounds present  EXTREMITIES:  2+ Peripheral Pulses, No clubbing, cyanosis, or edema  PSYCH: AAOx3  NEUROLOGY: non-focal  SKIN: No rashes or lesions    LABS:                    RADIOLOGY & ADDITIONAL TESTS:    Imaging Personally Reviewed:    Consultant(s) Notes Reviewed:      Care Discussed with Consultants/Other Providers:   Patient is a 70y old  Male who presents with a chief complaint of pelvic fusion (25 Nov 2020 13:03)      SUBJECTIVE / OVERNIGHT EVENTS:  Patient says his surgical bandage came off. Otherwise no new complaints. Denies cp, SOB, abdominal pain, N/V/D     MEDICATIONS  (STANDING):  aspirin enteric coated 81 milliGRAM(s) Oral daily  atorvastatin 20 milliGRAM(s) Oral at bedtime  budesonide  80 MICROgram(s)/formoterol 4.5 MICROgram(s) Inhaler 2 Puff(s) Inhalation two times a day  buPROPion XL . 300 milliGRAM(s) Oral daily  clopidogrel Tablet 75 milliGRAM(s) Oral daily  dextrose 40% Gel 15 Gram(s) Oral once  dextrose 5%. 1000 milliLiter(s) (50 mL/Hr) IV Continuous <Continuous>  dextrose 5%. 1000 milliLiter(s) (100 mL/Hr) IV Continuous <Continuous>  dextrose 50% Injectable 25 Gram(s) IV Push once  dextrose 50% Injectable 12.5 Gram(s) IV Push once  dextrose 50% Injectable 25 Gram(s) IV Push once  famotidine    Tablet 40 milliGRAM(s) Oral two times a day  furosemide    Tablet 60 milliGRAM(s) Oral daily  gabapentin 100 milliGRAM(s) Oral three times a day  glucagon  Injectable 1 milliGRAM(s) IntraMuscular once  influenza  Vaccine (HIGH DOSE) 0.7 milliLiter(s) IntraMuscular once  insulin glargine Injectable (LANTUS) 60 Unit(s) SubCutaneous at bedtime  insulin lispro (ADMELOG) corrective regimen sliding scale   SubCutaneous at bedtime  insulin lispro (ADMELOG) corrective regimen sliding scale   SubCutaneous three times a day before meals  insulin lispro Injectable (ADMELOG) 15 Unit(s) SubCutaneous three times a day before meals  lactulose Syrup 10 Gram(s) Oral three times a day  metoprolol tartrate 12.5 milliGRAM(s) Oral daily  polyethylene glycol 3350 17 Gram(s) Oral daily  sacubitril 24 mG/valsartan 26 mG 1 Tablet(s) Oral two times a day  senna 2 Tablet(s) Oral at bedtime  spironolactone 25 milliGRAM(s) Oral daily  tiotropium 18 MICROgram(s) Capsule 1 Capsule(s) Inhalation daily  traMADol 50 milliGRAM(s) Oral every 8 hours    MEDICATIONS  (PRN):  acetaminophen   Tablet .. 975 milliGRAM(s) Oral every 8 hours PRN Mild Pain (1 - 3)  ALBUTerol    90 MICROgram(s) HFA Inhaler 2 Puff(s) Inhalation every 6 hours PRN Bronchospasm  bisacodyl Suppository 10 milliGRAM(s) Rectal daily PRN Constipation  clonazePAM  Tablet 1 milliGRAM(s) Oral three times a day PRN Anxiety  HYDROmorphone   Tablet 2 milliGRAM(s) Oral every 4 hours PRN Moderate Pain (4 - 6)  HYDROmorphone   Tablet 4 milliGRAM(s) Oral every 4 hours PRN Severe Pain (7 - 10)  magnesium hydroxide Suspension 30 milliLiter(s) Oral every 12 hours PRN Constipation  zolpidem 5 milliGRAM(s) Oral at bedtime PRN Insomnia  zolpidem 5 milliGRAM(s) Oral at bedtime PRN Insomnia      Vital Signs Last 24 Hrs  T(C): 37.2 (26 Nov 2020 13:58), Max: 37.7 (25 Nov 2020 21:53)  T(F): 98.9 (26 Nov 2020 13:58), Max: 99.8 (25 Nov 2020 21:53)  HR: 74 (26 Nov 2020 13:58) (69 - 92)  BP: 104/59 (26 Nov 2020 13:58) (100/55 - 129/55)  BP(mean): --  RR: 17 (26 Nov 2020 13:58) (17 - 18)  SpO2: 97% (26 Nov 2020 13:58) (97% - 100%)  CAPILLARY BLOOD GLUCOSE      POCT Blood Glucose.: 169 mg/dL (26 Nov 2020 12:33)  POCT Blood Glucose.: 215 mg/dL (26 Nov 2020 07:50)  POCT Blood Glucose.: 204 mg/dL (25 Nov 2020 22:00)  POCT Blood Glucose.: 337 mg/dL (25 Nov 2020 16:40)    I&O's Summary    26 Nov 2020 07:01  -  26 Nov 2020 15:50  --------------------------------------------------------  IN: 0 mL / OUT: 650 mL / NET: -650 mL        PHYSICAL EXAM:  GENERAL: NAD, well-developed  HEAD:  Atraumatic, Normocephalic  EYES: EOMI, PERRLA, conjunctiva and sclera clear  NECK: Supple, No JVD  CHEST/LUNG: Clear to auscultation bilaterally; No wheeze  HEART: Regular rate and rhythm; No murmurs, rubs, or gallops  ABDOMEN: Soft, Nontender, Nondistended; Bowel sounds present  EXTREMITIES:  2+ Peripheral Pulses, No clubbing, cyanosis, or edema  PSYCH: AAOx3  NEUROLOGY: non-focal  SKIN: No rashes or lesions    LABS:                    RADIOLOGY & ADDITIONAL TESTS:    Imaging Personally Reviewed:    Consultant(s) Notes Reviewed:      Care Discussed with Consultants/Other Providers:

## 2020-11-27 LAB
GLUCOSE BLDC GLUCOMTR-MCNC: 173 MG/DL — HIGH (ref 70–99)
GLUCOSE BLDC GLUCOMTR-MCNC: 295 MG/DL — HIGH (ref 70–99)
GLUCOSE BLDC GLUCOMTR-MCNC: 302 MG/DL — HIGH (ref 70–99)
GLUCOSE BLDC GLUCOMTR-MCNC: 336 MG/DL — HIGH (ref 70–99)
GLUCOSE BLDC GLUCOMTR-MCNC: 94 MG/DL — SIGNIFICANT CHANGE UP (ref 70–99)

## 2020-11-27 PROCEDURE — 99232 SBSQ HOSP IP/OBS MODERATE 35: CPT

## 2020-11-27 RX ORDER — TAMSULOSIN HYDROCHLORIDE 0.4 MG/1
0.4 CAPSULE ORAL AT BEDTIME
Refills: 0 | Status: DISCONTINUED | OUTPATIENT
Start: 2020-11-27 | End: 2020-12-02

## 2020-11-27 RX ORDER — HYDROMORPHONE HYDROCHLORIDE 2 MG/ML
2 INJECTION INTRAMUSCULAR; INTRAVENOUS; SUBCUTANEOUS EVERY 4 HOURS
Refills: 0 | Status: DISCONTINUED | OUTPATIENT
Start: 2020-11-27 | End: 2020-12-02

## 2020-11-27 RX ORDER — INSULIN GLARGINE 100 [IU]/ML
55 INJECTION, SOLUTION SUBCUTANEOUS AT BEDTIME
Refills: 0 | Status: DISCONTINUED | OUTPATIENT
Start: 2020-11-27 | End: 2020-12-02

## 2020-11-27 RX ORDER — INSULIN LISPRO 100/ML
12 VIAL (ML) SUBCUTANEOUS
Refills: 0 | Status: DISCONTINUED | OUTPATIENT
Start: 2020-11-27 | End: 2020-11-30

## 2020-11-27 RX ORDER — HYDROMORPHONE HYDROCHLORIDE 2 MG/ML
4 INJECTION INTRAMUSCULAR; INTRAVENOUS; SUBCUTANEOUS EVERY 4 HOURS
Refills: 0 | Status: DISCONTINUED | OUTPATIENT
Start: 2020-11-27 | End: 2020-12-02

## 2020-11-27 RX ADMIN — FAMOTIDINE 40 MILLIGRAM(S): 10 INJECTION INTRAVENOUS at 18:25

## 2020-11-27 RX ADMIN — TRAMADOL HYDROCHLORIDE 50 MILLIGRAM(S): 50 TABLET ORAL at 22:11

## 2020-11-27 RX ADMIN — POLYETHYLENE GLYCOL 3350 17 GRAM(S): 17 POWDER, FOR SOLUTION ORAL at 12:20

## 2020-11-27 RX ADMIN — Medication 12 UNIT(S): at 12:20

## 2020-11-27 RX ADMIN — Medication 12.5 MILLIGRAM(S): at 07:16

## 2020-11-27 RX ADMIN — ATORVASTATIN CALCIUM 20 MILLIGRAM(S): 80 TABLET, FILM COATED ORAL at 22:10

## 2020-11-27 RX ADMIN — INSULIN GLARGINE 55 UNIT(S): 100 INJECTION, SOLUTION SUBCUTANEOUS at 22:13

## 2020-11-27 RX ADMIN — Medication 2: at 18:25

## 2020-11-27 RX ADMIN — SACUBITRIL AND VALSARTAN 1 TABLET(S): 24; 26 TABLET, FILM COATED ORAL at 18:26

## 2020-11-27 RX ADMIN — Medication 8: at 12:19

## 2020-11-27 RX ADMIN — Medication 81 MILLIGRAM(S): at 12:19

## 2020-11-27 RX ADMIN — SPIRONOLACTONE 25 MILLIGRAM(S): 25 TABLET, FILM COATED ORAL at 07:16

## 2020-11-27 RX ADMIN — Medication 1 MILLIGRAM(S): at 22:07

## 2020-11-27 RX ADMIN — SIMETHICONE 80 MILLIGRAM(S): 80 TABLET, CHEWABLE ORAL at 12:20

## 2020-11-27 RX ADMIN — TRAMADOL HYDROCHLORIDE 50 MILLIGRAM(S): 50 TABLET ORAL at 06:00

## 2020-11-27 RX ADMIN — SACUBITRIL AND VALSARTAN 1 TABLET(S): 24; 26 TABLET, FILM COATED ORAL at 07:16

## 2020-11-27 RX ADMIN — Medication 1 MILLIGRAM(S): at 08:14

## 2020-11-27 RX ADMIN — TRAMADOL HYDROCHLORIDE 50 MILLIGRAM(S): 50 TABLET ORAL at 14:09

## 2020-11-27 RX ADMIN — Medication 12 UNIT(S): at 18:25

## 2020-11-27 RX ADMIN — Medication 1: at 22:12

## 2020-11-27 RX ADMIN — TIOTROPIUM BROMIDE 1 CAPSULE(S): 18 CAPSULE ORAL; RESPIRATORY (INHALATION) at 08:54

## 2020-11-27 RX ADMIN — CLOPIDOGREL BISULFATE 75 MILLIGRAM(S): 75 TABLET, FILM COATED ORAL at 12:19

## 2020-11-27 RX ADMIN — GABAPENTIN 100 MILLIGRAM(S): 400 CAPSULE ORAL at 22:10

## 2020-11-27 RX ADMIN — FAMOTIDINE 40 MILLIGRAM(S): 10 INJECTION INTRAVENOUS at 07:15

## 2020-11-27 RX ADMIN — LACTULOSE 10 GRAM(S): 10 SOLUTION ORAL at 07:16

## 2020-11-27 RX ADMIN — BUDESONIDE AND FORMOTEROL FUMARATE DIHYDRATE 2 PUFF(S): 160; 4.5 AEROSOL RESPIRATORY (INHALATION) at 11:00

## 2020-11-27 RX ADMIN — TAMSULOSIN HYDROCHLORIDE 0.4 MILLIGRAM(S): 0.4 CAPSULE ORAL at 22:14

## 2020-11-27 RX ADMIN — INFLUENZA VIRUS VACCINE 0.7 MILLILITER(S): 15; 15; 15; 15 SUSPENSION INTRAMUSCULAR at 08:10

## 2020-11-27 RX ADMIN — GABAPENTIN 100 MILLIGRAM(S): 400 CAPSULE ORAL at 07:16

## 2020-11-27 RX ADMIN — BUDESONIDE AND FORMOTEROL FUMARATE DIHYDRATE 2 PUFF(S): 160; 4.5 AEROSOL RESPIRATORY (INHALATION) at 22:11

## 2020-11-27 RX ADMIN — BUPROPION HYDROCHLORIDE 300 MILLIGRAM(S): 150 TABLET, EXTENDED RELEASE ORAL at 11:33

## 2020-11-27 RX ADMIN — Medication 60 MILLIGRAM(S): at 07:16

## 2020-11-27 NOTE — PROGRESS NOTE ADULT - SUBJECTIVE AND OBJECTIVE BOX
Chief Complaint: DM2    History: Tolerating po  No hypoglycemia symptoms  Morning premeal Admelog dose held by RN for glucose 94.    MEDICATIONS  (STANDING):  aspirin enteric coated 81 milliGRAM(s) Oral daily  atorvastatin 20 milliGRAM(s) Oral at bedtime  budesonide  80 MICROgram(s)/formoterol 4.5 MICROgram(s) Inhaler 2 Puff(s) Inhalation two times a day  buPROPion XL . 300 milliGRAM(s) Oral daily  clopidogrel Tablet 75 milliGRAM(s) Oral daily  dextrose 40% Gel 15 Gram(s) Oral once  dextrose 5%. 1000 milliLiter(s) (50 mL/Hr) IV Continuous <Continuous>  dextrose 5%. 1000 milliLiter(s) (100 mL/Hr) IV Continuous <Continuous>  dextrose 50% Injectable 25 Gram(s) IV Push once  dextrose 50% Injectable 12.5 Gram(s) IV Push once  dextrose 50% Injectable 25 Gram(s) IV Push once  famotidine    Tablet 40 milliGRAM(s) Oral two times a day  furosemide    Tablet 60 milliGRAM(s) Oral daily  gabapentin 100 milliGRAM(s) Oral three times a day  glucagon  Injectable 1 milliGRAM(s) IntraMuscular once  insulin glargine Injectable (LANTUS) 55 Unit(s) SubCutaneous at bedtime  insulin lispro (ADMELOG) corrective regimen sliding scale   SubCutaneous at bedtime  insulin lispro (ADMELOG) corrective regimen sliding scale   SubCutaneous three times a day before meals  insulin lispro Injectable (ADMELOG) 12 Unit(s) SubCutaneous three times a day before meals  lactulose Syrup 10 Gram(s) Oral three times a day  metoprolol tartrate 12.5 milliGRAM(s) Oral daily  polyethylene glycol 3350 17 Gram(s) Oral daily  sacubitril 24 mG/valsartan 26 mG 1 Tablet(s) Oral two times a day  senna 2 Tablet(s) Oral at bedtime  simethicone 80 milliGRAM(s) Chew daily  spironolactone 25 milliGRAM(s) Oral daily  tamsulosin 0.4 milliGRAM(s) Oral at bedtime  tiotropium 18 MICROgram(s) Capsule 1 Capsule(s) Inhalation daily  traMADol 50 milliGRAM(s) Oral every 8 hours    MEDICATIONS  (PRN):  acetaminophen   Tablet .. 975 milliGRAM(s) Oral every 8 hours PRN Mild Pain (1 - 3)  ALBUTerol    90 MICROgram(s) HFA Inhaler 2 Puff(s) Inhalation every 6 hours PRN Bronchospasm  bisacodyl Suppository 10 milliGRAM(s) Rectal daily PRN Constipation  clonazePAM  Tablet 1 milliGRAM(s) Oral three times a day PRN Anxiety  HYDROmorphone   Tablet 2 milliGRAM(s) Oral every 4 hours PRN Moderate Pain (4 - 6)  HYDROmorphone   Tablet 4 milliGRAM(s) Oral every 4 hours PRN Severe Pain (7 - 10)  magnesium hydroxide Suspension 30 milliLiter(s) Oral every 12 hours PRN Constipation  zolpidem 5 milliGRAM(s) Oral at bedtime PRN Insomnia  zolpidem 5 milliGRAM(s) Oral at bedtime PRN Insomnia      Allergies    No Known Allergies    Intolerances      Review of Systems:    ALL OTHER SYSTEMS REVIEWED AND NEGATIVE      PHYSICAL EXAM:  VITALS: T(C): 36.9 (11-27-20 @ 09:10)  T(F): 98.4 (11-27-20 @ 09:10), Max: 99.1 (11-26-20 @ 18:00)  HR: 70 (11-27-20 @ 09:10) (70 - 74)  BP: 106/58 (11-27-20 @ 09:10) (104/59 - 117/69)  RR:  (17 - 18)  SpO2:  (97% - 100%)  Wt(kg): --  GENERAL: NAD, well-groomed, well-developed  EYES: No proptosis, no lid lag, anicteric  HEENT:  Atraumatic, Normocephalic, moist mucous membranes  RESPIRATORY: nonlabored respirations  PSYCH: Alert and oriented x 3, normal affect, normal mood    CAPILLARY BLOOD GLUCOSE      POCT Blood Glucose.: 94 mg/dL (27 Nov 2020 07:25)  POCT Blood Glucose.: 144 mg/dL (26 Nov 2020 22:07)  POCT Blood Glucose.: 138 mg/dL (26 Nov 2020 17:17)  POCT Blood Glucose.: 169 mg/dL (26 Nov 2020 12:33)              A1C with Estimated Average Glucose: 7.6 % (11-18-20 @ 10:35)  Hemoglobin A1C, Whole Blood: 7.7 % (01-02-20 @ 20:13)  Hemoglobin A1C, Whole Blood: 7.2 % (12-20-19 @ 08:50)      Thyroid Function Tests:

## 2020-11-27 NOTE — PROGRESS NOTE ADULT - SUBJECTIVE AND OBJECTIVE BOX
Patient is a 70y old  Male who presents with a chief complaint of back surgery (27 Nov 2020 11:09)      HPI:  69 y/o male, with PMHx of T2DM, HTN, CABG x 4 (>25 years ago), HLD, CVA (no residual -2013), depression, FREEDOM (mouthpiece), S/P lumbar laminectomy (2015) & T1 spinal fusion in January 2020   c/o  lower back & buttock pain, progressively worsening since the surgery. Patient reports pain worsens while ambulating and has difficulty with balance while using his walker. Patient reports he recently recovered from sepsis in August. He is scheduled for exploration of T10 pelvis fusion with Dr. Roberts, Dr. Storey : muscle flap reconstruction.  (18 Nov 2020 09:52)    no new complaints today.    REVIEW OF SYSTEMS  Constitutional - No fever, No weight loss, No fatigue  HEENT - No eye pain, No visual disturbances, No difficulty hearing, No tinnitus, No vertigo, No neck pain  Respiratory - No cough, No wheezing, No shortness of breath  Cardiovascular - No chest pain, No palpitations  Gastrointestinal - No abdominal pain, No nausea, No vomiting, No diarrhea, No constipation  Genitourinary - No dysuria, No frequency, No hematuria, No incontinence  Neurological - No headaches, No memory loss, + loss of strength, No numbness, No tremors  Skin - No itching, No rashes, No lesions   Endocrine - No temperature intolerance  Musculoskeletal -+ pain  Psychiatric - No depression, No anxiety    PAST MEDICAL & SURGICAL HISTORY  FREEDOM (obstructive sleep apnea)    Cardiac pacemaker    Hernia    CVA (cerebrovascular accident)    Diabetic neuropathy    Spinal stenosis of lumbar region    DM type 2 (diabetes mellitus, type 2)    CAD (coronary artery disease)    Anxiety    Depression    Acid Reflux    Gout    Adrenal Insufficiency    Benign Tumor of Adrenal Gland    HTN (Hypertension)    Diabetes    History of permanent cardiac pacemaker placement    Presence of permanent cardiac pacemaker    History of lumbar laminectomy    S/P Coronary Angiogram    S/P Hernia Repair    History of Appendectomy    S/P CABG X 4    Sudden Adrenal Gland Insufficiency    SOCIAL HISTORY  Smoking - Denied  EtOH - Denied   Drugs - Denied    FUNCTIONAL HISTORY  Lives alone,  in apartment without stairs  Independent at baseline, began using rw a few months ago.  after august illness patient had hha 3x/week for bathing, was receiving home PT      CURRENT FUNCTIONAL STATUS  11/25  Bed Mobility  Bed Mobility Training Rehab Potential: good, to achieve stated therapy goals  Bed Mobility Training Symptoms Noted During/After Treatment: fatigue  Bed Mobility Training Rolling/Turning: minimum assist (75% patient effort);  1 person assist;  bed rails  Bed Mobility Training Sit-to-Supine: minimum assist (75% patient effort);  1 person assist;  bed rails  Bed Mobility Training Supine-to-Sit: minimum assist (75% patient effort);  1 person assist;  bed rails  Bed Mobility Training Limitations: decreased ability to use legs for bridging/pushing;  impaired ability to control trunk for mobility;  decreased strength;  impaired balance;  decreased flexibility    Sit-Stand Transfer Training  Sit-to-Stand Transfer Training Rehab Potential: good, to achieve stated therapy goals  Sit-to-Stand Transfer Training Symptoms Noted During/After Treatment: none  Transfer Training Sit-to-Stand Transfer: minimum assist (75% patient effort);  1 person assist;  weight-bearing as tolerated   rolling walker  Transfer Training Stand-to-Sit Transfer: minimum assist (75% patient effort);  1 person assist;  weight-bearing as tolerated   rolling walker  Sit-to-Stand Transfer Training Transfer Safety Analysis: decreased step length;  impaired balance;  decreased strength;  rolling walker    Gait Training  Gait Training Rehab Potential: none  Gait Training Symptoms Noted During/After Treatment: dizziness    FAMILY HISTORY   Family history of borderline diabetes mellitus    Family history of early CAD        RECENT LABS/IMAGING  non new today      VITALS  T(C): 36.9 (11-27-20 @ 09:10), Max: 37.3 (11-26-20 @ 18:00)  HR: 70 (11-27-20 @ 09:10) (70 - 74)  BP: 106/58 (11-27-20 @ 09:10) (104/59 - 117/69)  RR: 17 (11-27-20 @ 09:10) (17 - 18)  SpO2: 100% (11-27-20 @ 09:10) (97% - 100%)  Wt(kg): --    ALLERGIES  No Known Allergies      MEDICATIONS   acetaminophen   Tablet .. 975 milliGRAM(s) Oral every 8 hours PRN  ALBUTerol    90 MICROgram(s) HFA Inhaler 2 Puff(s) Inhalation every 6 hours PRN  aspirin enteric coated 81 milliGRAM(s) Oral daily  atorvastatin 20 milliGRAM(s) Oral at bedtime  bisacodyl Suppository 10 milliGRAM(s) Rectal daily PRN  budesonide  80 MICROgram(s)/formoterol 4.5 MICROgram(s) Inhaler 2 Puff(s) Inhalation two times a day  buPROPion XL . 300 milliGRAM(s) Oral daily  clonazePAM  Tablet 1 milliGRAM(s) Oral three times a day PRN  clopidogrel Tablet 75 milliGRAM(s) Oral daily  dextrose 40% Gel 15 Gram(s) Oral once  dextrose 5%. 1000 milliLiter(s) IV Continuous <Continuous>  dextrose 5%. 1000 milliLiter(s) IV Continuous <Continuous>  dextrose 50% Injectable 25 Gram(s) IV Push once  dextrose 50% Injectable 12.5 Gram(s) IV Push once  dextrose 50% Injectable 25 Gram(s) IV Push once  famotidine    Tablet 40 milliGRAM(s) Oral two times a day  furosemide    Tablet 60 milliGRAM(s) Oral daily  gabapentin 100 milliGRAM(s) Oral three times a day  glucagon  Injectable 1 milliGRAM(s) IntraMuscular once  HYDROmorphone   Tablet 2 milliGRAM(s) Oral every 4 hours PRN  HYDROmorphone   Tablet 4 milliGRAM(s) Oral every 4 hours PRN  insulin glargine Injectable (LANTUS) 55 Unit(s) SubCutaneous at bedtime  insulin lispro (ADMELOG) corrective regimen sliding scale   SubCutaneous at bedtime  insulin lispro (ADMELOG) corrective regimen sliding scale   SubCutaneous three times a day before meals  insulin lispro Injectable (ADMELOG) 12 Unit(s) SubCutaneous three times a day before meals  lactulose Syrup 10 Gram(s) Oral three times a day  magnesium hydroxide Suspension 30 milliLiter(s) Oral every 12 hours PRN  metoprolol tartrate 12.5 milliGRAM(s) Oral daily  polyethylene glycol 3350 17 Gram(s) Oral daily  sacubitril 24 mG/valsartan 26 mG 1 Tablet(s) Oral two times a day  senna 2 Tablet(s) Oral at bedtime  simethicone 80 milliGRAM(s) Chew daily  spironolactone 25 milliGRAM(s) Oral daily  tamsulosin 0.4 milliGRAM(s) Oral at bedtime  tiotropium 18 MICROgram(s) Capsule 1 Capsule(s) Inhalation daily  traMADol 50 milliGRAM(s) Oral every 8 hours  zolpidem 5 milliGRAM(s) Oral at bedtime PRN  zolpidem 5 milliGRAM(s) Oral at bedtime PRN      ----------------------------------------------------------------------------------------  PHYSICAL EXAM  Constitutional - NAD, Comfortable  HEENT - NCAT, EOMI  Chest - no respiratory distress   Cardiovascular - no edema  Abdomen - BS+, Soft, NTND  Extremities - No C/C/E, No calf tenderness   Neurologic Exam -                    Cognitive - Awake, Alert, AAO to self, place, date, year, situation     Communication - Fluent, No dysarthria     Cranial Nerves - CN 2-12 intact     Motor -                      LEFT    UE - ShAB 5/5, EF 5/5, EE 5/5, WE 5/5,  5/5                    RIGHT UE - ShAB 5/5, EF 5/5, EE 5/5, WE 5/5,  5/5                    LEFT    LE - HF 4-/5, KE 4/5, DF 5/5, PF 5/5                    RIGHT LE - HF 4/5, KE 4/5, DF 5/5, PF 5/5        Sensory - decreased to LT in b/l feet      OculoVestibular - No saccades, No nystagmus, VOR         Balance - WNL Static  Psychiatric - Mood stable, Affect WNL  ----------------------------------------------------------------------------------------  ASSESSMENT/PLAN  70 year old male admitted for exploration of PSF, now s/p removal of spinal padilla rods, insertion of iliac screw on the right, removal of sacral alar screw and insertion of left iliac screw and b/l rods.   continue bedside PT/OT  Pain -tylenol, tramadol, gabapentin, dilaudid po prn, with bowel regimen  DVT PPX - scd  Diet: consistent carb with evening snack, kosher diet  Rehab -      Recommend ACUTE inpatient rehabilitation for the functional deficits consisting of 3 hours of therapy/day & 24 hour RN/daily PMR physician for comorbid medical management. Will continue to follow for ongoing rehab needs and recommendations.

## 2020-11-27 NOTE — PROVIDER CONTACT NOTE (OTHER) - ASSESSMENT
Pt is asymptomatic at this time. Denies lightheadedness. No notable tremors or mental status changes.

## 2020-11-27 NOTE — PROVIDER CONTACT NOTE (OTHER) - ACTION/TREATMENT ORDERED:
Hole insulin administration as per Jan WILLSON. Administer before next meal & recheck blood glucose one hour after.

## 2020-11-27 NOTE — PROGRESS NOTE ADULT - PROBLEM SELECTOR PLAN 4
A1c 7.6. FS controlled.   -DM regimen as per endocrine - adjusted appreciated.   -Carb controlled diet.  -Encourage dietary precautions.  -DC recs as per endo.

## 2020-11-27 NOTE — PROGRESS NOTE ADULT - PROBLEM SELECTOR PLAN 1
BG now too tightly controlled. Higher requirements earlier in admission likely due to stress/pain. Now trending down will lower insulin doses.  Prelunch glucose may be high due to not receiving Admelog for breakfast.  - Decreased Lantus to 55 units qhs  - Decreased Admelog to 12 units before meals TID  - c/w moderate correction scale qac and low bedtime scale  - consistent carb diet  - check FS qac and qhs    - for discharge: plan to resume Soliqua and Novolog, and to follow up with his outside endocrinologist Dr. Joycelyn Lal. Based on reported home glucose trends he will likely resume Soliqua 55 units qhs and slightly adjust home Novolog from 8/8/8 to 8/10/8 (also he takes higher dose if he knows he is eating a higher carb meal). Reviewed dietary modifications for home.

## 2020-11-27 NOTE — PROGRESS NOTE ADULT - SUBJECTIVE AND OBJECTIVE BOX
Orthopaedic Surgery Progress Note    Subjective:   Patient seen and examined this morning. No acute events overnight. Pain well controlled. Tolerating diet. Denies N/V/D/F/C.     Objective:  Vital Signs Last 24 Hrs  T(C): 37.3 (26 Nov 2020 21:12), Max: 37.4 (26 Nov 2020 06:34)  T(F): 99.1 (26 Nov 2020 21:12), Max: 99.3 (26 Nov 2020 06:34)  HR: 73 (26 Nov 2020 21:12) (69 - 92)  BP: 111/61 (26 Nov 2020 21:12) (100/55 - 118/60)  BP(mean): --  RR: 18 (26 Nov 2020 21:12) (17 - 18)  SpO2: 100% (26 Nov 2020 21:12) (97% - 100%)    I&O's Summary    26 Nov 2020 07:01  -  27 Nov 2020 06:21  --------------------------------------------------------  IN: 0 mL / OUT: 850 mL / NET: -850 mL          PE  Gen: NAD  Back:   Neuro:  RLE IP 5/5 HS 5/5 Q 5/5 GS 5/5 TA 5/5 EHL 5/5   SILT L2-S1  LLE IP 5/5 HS 5/5 Q 5/5 GS 5/5 TA 5/5 EHL 5/5  SILT L2-S1  WWP BLE                             A/P: 70M s/p T10-Pelvis Exploration of rev PSF w/flap  - Pain control  - IS  - Regular diet, bowel reg  - WBAT, PT/OT  - DVT PPX: Venodynes, Plavix  - Dispo: Acute Rehab pending insurance authorization

## 2020-11-27 NOTE — PROGRESS NOTE ADULT - SUBJECTIVE AND OBJECTIVE BOX
[facility] Division of Hospital Medicine  Vaibhav Solis) MD Saroj  Pager [phone]    SUBJECTIVE:  Follow up for ________.      ROS: All systems negative except as noted.      Vital Signs Last 24 Hrs  T(C): 36.9 (27 Nov 2020 09:10), Max: 37.3 (26 Nov 2020 18:00)  T(F): 98.4 (27 Nov 2020 09:10), Max: 99.1 (26 Nov 2020 18:00)  HR: 70 (27 Nov 2020 09:10) (70 - 74)  BP: 106/58 (27 Nov 2020 09:10) (104/59 - 117/69)  BP(mean): --  RR: 17 (27 Nov 2020 09:10) (17 - 18)  SpO2: 100% (27 Nov 2020 09:10) (97% - 100%)      PHYSICAL EXAM:              MEDICATION:  MEDICATIONS  (STANDING):  aspirin enteric coated 81 milliGRAM(s) Oral daily  atorvastatin 20 milliGRAM(s) Oral at bedtime  budesonide  80 MICROgram(s)/formoterol 4.5 MICROgram(s) Inhaler 2 Puff(s) Inhalation two times a day  buPROPion XL . 300 milliGRAM(s) Oral daily  clopidogrel Tablet 75 milliGRAM(s) Oral daily  dextrose 40% Gel 15 Gram(s) Oral once  dextrose 5%. 1000 milliLiter(s) (50 mL/Hr) IV Continuous <Continuous>  dextrose 5%. 1000 milliLiter(s) (100 mL/Hr) IV Continuous <Continuous>  dextrose 50% Injectable 25 Gram(s) IV Push once  dextrose 50% Injectable 12.5 Gram(s) IV Push once  dextrose 50% Injectable 25 Gram(s) IV Push once  famotidine    Tablet 40 milliGRAM(s) Oral two times a day  furosemide    Tablet 60 milliGRAM(s) Oral daily  gabapentin 100 milliGRAM(s) Oral three times a day  glucagon  Injectable 1 milliGRAM(s) IntraMuscular once  insulin glargine Injectable (LANTUS) 55 Unit(s) SubCutaneous at bedtime  insulin lispro (ADMELOG) corrective regimen sliding scale   SubCutaneous at bedtime  insulin lispro (ADMELOG) corrective regimen sliding scale   SubCutaneous three times a day before meals  insulin lispro Injectable (ADMELOG) 12 Unit(s) SubCutaneous three times a day before meals  lactulose Syrup 10 Gram(s) Oral three times a day  metoprolol tartrate 12.5 milliGRAM(s) Oral daily  polyethylene glycol 3350 17 Gram(s) Oral daily  sacubitril 24 mG/valsartan 26 mG 1 Tablet(s) Oral two times a day  senna 2 Tablet(s) Oral at bedtime  simethicone 80 milliGRAM(s) Chew daily  spironolactone 25 milliGRAM(s) Oral daily  tiotropium 18 MICROgram(s) Capsule 1 Capsule(s) Inhalation daily  traMADol 50 milliGRAM(s) Oral every 8 hours    MEDICATIONS  (PRN):  acetaminophen   Tablet .. 975 milliGRAM(s) Oral every 8 hours PRN Mild Pain (1 - 3)  ALBUTerol    90 MICROgram(s) HFA Inhaler 2 Puff(s) Inhalation every 6 hours PRN Bronchospasm  bisacodyl Suppository 10 milliGRAM(s) Rectal daily PRN Constipation  clonazePAM  Tablet 1 milliGRAM(s) Oral three times a day PRN Anxiety  HYDROmorphone   Tablet 2 milliGRAM(s) Oral every 4 hours PRN Moderate Pain (4 - 6)  HYDROmorphone   Tablet 4 milliGRAM(s) Oral every 4 hours PRN Severe Pain (7 - 10)  magnesium hydroxide Suspension 30 milliLiter(s) Oral every 12 hours PRN Constipation  zolpidem 5 milliGRAM(s) Oral at bedtime PRN Insomnia  zolpidem 5 milliGRAM(s) Oral at bedtime PRN Insomnia      LABORATORY:  No labs today.                             [facility] Division of Hospital Medicine  Vaibhav HoltTarik) MD Saroj  Pager [phone]    SUBJECTIVE:  Follow up for DM/CHF.    Pt seen and evaluated at bedside this AM. No o/n events. Dressing changed overnight. Tolerating PO. No SOB/CP. Voiding w/o difficulties. Reports regular BMs.       ROS: All systems negative except as noted.      Vital Signs Last 24 Hrs  T(C): 36.9 (27 Nov 2020 09:10), Max: 37.3 (26 Nov 2020 18:00)  T(F): 98.4 (27 Nov 2020 09:10), Max: 99.1 (26 Nov 2020 18:00)  HR: 70 (27 Nov 2020 09:10) (70 - 74)  BP: 106/58 (27 Nov 2020 09:10) (104/59 - 117/69)  BP(mean): --  RR: 17 (27 Nov 2020 09:10) (17 - 18)  SpO2: 100% (27 Nov 2020 09:10) (97% - 100%)      PHYSICAL EXAM:  Gen- In chair, comfortable, NAD  Resp- CTAB, good effort. No r/r/w. No accessory muscle use.  CVS- RRR, S1S2, no g/r/m. No LE edema.  GI- Soft abd, NT, ND, +BSx4. No HSM.  MSK- No C/C. ROM intact. No crepitus.  Neuro- CN II-XII intact. Speech fluent/face symmetric. Sensation intact.  Skin- No rashes/ulcers. Warm/moist.  Psych- AAOx3. Appropriate mood/affect.      MEDICATION:  MEDICATIONS  (STANDING):  aspirin enteric coated 81 milliGRAM(s) Oral daily  atorvastatin 20 milliGRAM(s) Oral at bedtime  budesonide  80 MICROgram(s)/formoterol 4.5 MICROgram(s) Inhaler 2 Puff(s) Inhalation two times a day  buPROPion XL . 300 milliGRAM(s) Oral daily  clopidogrel Tablet 75 milliGRAM(s) Oral daily  dextrose 40% Gel 15 Gram(s) Oral once  dextrose 5%. 1000 milliLiter(s) (50 mL/Hr) IV Continuous <Continuous>  dextrose 5%. 1000 milliLiter(s) (100 mL/Hr) IV Continuous <Continuous>  dextrose 50% Injectable 25 Gram(s) IV Push once  dextrose 50% Injectable 12.5 Gram(s) IV Push once  dextrose 50% Injectable 25 Gram(s) IV Push once  famotidine    Tablet 40 milliGRAM(s) Oral two times a day  furosemide    Tablet 60 milliGRAM(s) Oral daily  gabapentin 100 milliGRAM(s) Oral three times a day  glucagon  Injectable 1 milliGRAM(s) IntraMuscular once  insulin glargine Injectable (LANTUS) 55 Unit(s) SubCutaneous at bedtime  insulin lispro (ADMELOG) corrective regimen sliding scale   SubCutaneous at bedtime  insulin lispro (ADMELOG) corrective regimen sliding scale   SubCutaneous three times a day before meals  insulin lispro Injectable (ADMELOG) 12 Unit(s) SubCutaneous three times a day before meals  lactulose Syrup 10 Gram(s) Oral three times a day  metoprolol tartrate 12.5 milliGRAM(s) Oral daily  polyethylene glycol 3350 17 Gram(s) Oral daily  sacubitril 24 mG/valsartan 26 mG 1 Tablet(s) Oral two times a day  senna 2 Tablet(s) Oral at bedtime  simethicone 80 milliGRAM(s) Chew daily  spironolactone 25 milliGRAM(s) Oral daily  tiotropium 18 MICROgram(s) Capsule 1 Capsule(s) Inhalation daily  traMADol 50 milliGRAM(s) Oral every 8 hours    MEDICATIONS  (PRN):  acetaminophen   Tablet .. 975 milliGRAM(s) Oral every 8 hours PRN Mild Pain (1 - 3)  ALBUTerol    90 MICROgram(s) HFA Inhaler 2 Puff(s) Inhalation every 6 hours PRN Bronchospasm  bisacodyl Suppository 10 milliGRAM(s) Rectal daily PRN Constipation  clonazePAM  Tablet 1 milliGRAM(s) Oral three times a day PRN Anxiety  HYDROmorphone   Tablet 2 milliGRAM(s) Oral every 4 hours PRN Moderate Pain (4 - 6)  HYDROmorphone   Tablet 4 milliGRAM(s) Oral every 4 hours PRN Severe Pain (7 - 10)  magnesium hydroxide Suspension 30 milliLiter(s) Oral every 12 hours PRN Constipation  zolpidem 5 milliGRAM(s) Oral at bedtime PRN Insomnia  zolpidem 5 milliGRAM(s) Oral at bedtime PRN Insomnia      LABORATORY:  No labs today.

## 2020-11-27 NOTE — PROGRESS NOTE ADULT - PROBLEM SELECTOR PLAN 3
-Resolved. He is voiding w/o need for straight cath. Does report urge and difficulty initiating stream.   -Would start tamsulosin (he was on this previously).  -Constipation resolved. Cont bowel regimen. -Resolved. He is voiding w/o need for straight cath. Does report urge and difficulty initiating stream. Likely has BPH.  -Would start tamsulosin (he was on this previously).   -Constipation resolved. Cont bowel regimen.  -He will follow up with outside urology on DC.

## 2020-11-28 DIAGNOSIS — D62 ACUTE POSTHEMORRHAGIC ANEMIA: ICD-10-CM

## 2020-11-28 LAB
ANION GAP SERPL CALC-SCNC: 16 MMO/L — HIGH (ref 7–14)
BLD GP AB SCN SERPL QL: NEGATIVE — SIGNIFICANT CHANGE UP
BUN SERPL-MCNC: 22 MG/DL — SIGNIFICANT CHANGE UP (ref 7–23)
CALCIUM SERPL-MCNC: 9 MG/DL — SIGNIFICANT CHANGE UP (ref 8.4–10.5)
CHLORIDE SERPL-SCNC: 99 MMOL/L — SIGNIFICANT CHANGE UP (ref 98–107)
CO2 SERPL-SCNC: 24 MMOL/L — SIGNIFICANT CHANGE UP (ref 22–31)
CREAT SERPL-MCNC: 1.05 MG/DL — SIGNIFICANT CHANGE UP (ref 0.5–1.3)
GLUCOSE BLDC GLUCOMTR-MCNC: 160 MG/DL — HIGH (ref 70–99)
GLUCOSE BLDC GLUCOMTR-MCNC: 161 MG/DL — HIGH (ref 70–99)
GLUCOSE BLDC GLUCOMTR-MCNC: 277 MG/DL — HIGH (ref 70–99)
GLUCOSE BLDC GLUCOMTR-MCNC: 341 MG/DL — HIGH (ref 70–99)
GLUCOSE SERPL-MCNC: 300 MG/DL — HIGH (ref 70–99)
HCT VFR BLD CALC: 30.1 % — LOW (ref 39–50)
HGB BLD-MCNC: 8.9 G/DL — LOW (ref 13–17)
MCHC RBC-ENTMCNC: 29.6 % — LOW (ref 32–36)
MCHC RBC-ENTMCNC: 31.3 PG — SIGNIFICANT CHANGE UP (ref 27–34)
MCV RBC AUTO: 106 FL — HIGH (ref 80–100)
NRBC # FLD: 0 K/UL — SIGNIFICANT CHANGE UP (ref 0–0)
PLATELET # BLD AUTO: 413 K/UL — HIGH (ref 150–400)
PMV BLD: 10.7 FL — SIGNIFICANT CHANGE UP (ref 7–13)
POTASSIUM SERPL-MCNC: 4.3 MMOL/L — SIGNIFICANT CHANGE UP (ref 3.5–5.3)
POTASSIUM SERPL-SCNC: 4.3 MMOL/L — SIGNIFICANT CHANGE UP (ref 3.5–5.3)
RBC # BLD: 2.84 M/UL — LOW (ref 4.2–5.8)
RBC # FLD: 13.7 % — SIGNIFICANT CHANGE UP (ref 10.3–14.5)
RH IG SCN BLD-IMP: POSITIVE — SIGNIFICANT CHANGE UP
SODIUM SERPL-SCNC: 139 MMOL/L — SIGNIFICANT CHANGE UP (ref 135–145)
WBC # BLD: 12.5 K/UL — HIGH (ref 3.8–10.5)
WBC # FLD AUTO: 12.5 K/UL — HIGH (ref 3.8–10.5)

## 2020-11-28 PROCEDURE — 99232 SBSQ HOSP IP/OBS MODERATE 35: CPT

## 2020-11-28 RX ADMIN — FAMOTIDINE 40 MILLIGRAM(S): 10 INJECTION INTRAVENOUS at 17:50

## 2020-11-28 RX ADMIN — ZOLPIDEM TARTRATE 5 MILLIGRAM(S): 10 TABLET ORAL at 22:58

## 2020-11-28 RX ADMIN — BUDESONIDE AND FORMOTEROL FUMARATE DIHYDRATE 2 PUFF(S): 160; 4.5 AEROSOL RESPIRATORY (INHALATION) at 21:11

## 2020-11-28 RX ADMIN — Medication 1 MILLIGRAM(S): at 21:11

## 2020-11-28 RX ADMIN — Medication 8: at 17:50

## 2020-11-28 RX ADMIN — CLOPIDOGREL BISULFATE 75 MILLIGRAM(S): 75 TABLET, FILM COATED ORAL at 11:54

## 2020-11-28 RX ADMIN — BUPROPION HYDROCHLORIDE 300 MILLIGRAM(S): 150 TABLET, EXTENDED RELEASE ORAL at 11:54

## 2020-11-28 RX ADMIN — SACUBITRIL AND VALSARTAN 1 TABLET(S): 24; 26 TABLET, FILM COATED ORAL at 17:51

## 2020-11-28 RX ADMIN — Medication 81 MILLIGRAM(S): at 11:54

## 2020-11-28 RX ADMIN — FAMOTIDINE 40 MILLIGRAM(S): 10 INJECTION INTRAVENOUS at 05:31

## 2020-11-28 RX ADMIN — Medication 12 UNIT(S): at 11:46

## 2020-11-28 RX ADMIN — GABAPENTIN 100 MILLIGRAM(S): 400 CAPSULE ORAL at 05:32

## 2020-11-28 RX ADMIN — SIMETHICONE 80 MILLIGRAM(S): 80 TABLET, CHEWABLE ORAL at 11:55

## 2020-11-28 RX ADMIN — TAMSULOSIN HYDROCHLORIDE 0.4 MILLIGRAM(S): 0.4 CAPSULE ORAL at 21:12

## 2020-11-28 RX ADMIN — Medication 60 MILLIGRAM(S): at 05:31

## 2020-11-28 RX ADMIN — POLYETHYLENE GLYCOL 3350 17 GRAM(S): 17 POWDER, FOR SOLUTION ORAL at 11:55

## 2020-11-28 RX ADMIN — ZOLPIDEM TARTRATE 5 MILLIGRAM(S): 10 TABLET ORAL at 00:25

## 2020-11-28 RX ADMIN — Medication 12.5 MILLIGRAM(S): at 05:32

## 2020-11-28 RX ADMIN — SENNA PLUS 2 TABLET(S): 8.6 TABLET ORAL at 21:12

## 2020-11-28 RX ADMIN — Medication 1: at 22:04

## 2020-11-28 RX ADMIN — ATORVASTATIN CALCIUM 20 MILLIGRAM(S): 80 TABLET, FILM COATED ORAL at 21:12

## 2020-11-28 RX ADMIN — Medication 2: at 11:45

## 2020-11-28 RX ADMIN — Medication 1 MILLIGRAM(S): at 05:29

## 2020-11-28 RX ADMIN — GABAPENTIN 100 MILLIGRAM(S): 400 CAPSULE ORAL at 21:12

## 2020-11-28 RX ADMIN — LACTULOSE 10 GRAM(S): 10 SOLUTION ORAL at 21:11

## 2020-11-28 RX ADMIN — Medication 12 UNIT(S): at 17:51

## 2020-11-28 RX ADMIN — SPIRONOLACTONE 25 MILLIGRAM(S): 25 TABLET, FILM COATED ORAL at 05:33

## 2020-11-28 RX ADMIN — INSULIN GLARGINE 55 UNIT(S): 100 INJECTION, SOLUTION SUBCUTANEOUS at 22:04

## 2020-11-28 RX ADMIN — SACUBITRIL AND VALSARTAN 1 TABLET(S): 24; 26 TABLET, FILM COATED ORAL at 05:33

## 2020-11-28 NOTE — PROGRESS NOTE ADULT - SUBJECTIVE AND OBJECTIVE BOX
Patient is a 70y old  Male who presents with a chief complaint of back surgery (27 Nov 2020 11:09)      SUBJECTIVE / OVERNIGHT EVENTS:  Patient has no new complaints. Denies cp, SOB, abdominal pain, N/V/D     MEDICATIONS  (STANDING):  aspirin enteric coated 81 milliGRAM(s) Oral daily  atorvastatin 20 milliGRAM(s) Oral at bedtime  budesonide  80 MICROgram(s)/formoterol 4.5 MICROgram(s) Inhaler 2 Puff(s) Inhalation two times a day  buPROPion XL . 300 milliGRAM(s) Oral daily  clopidogrel Tablet 75 milliGRAM(s) Oral daily  dextrose 40% Gel 15 Gram(s) Oral once  dextrose 5%. 1000 milliLiter(s) (50 mL/Hr) IV Continuous <Continuous>  dextrose 5%. 1000 milliLiter(s) (100 mL/Hr) IV Continuous <Continuous>  dextrose 50% Injectable 25 Gram(s) IV Push once  dextrose 50% Injectable 12.5 Gram(s) IV Push once  dextrose 50% Injectable 25 Gram(s) IV Push once  famotidine    Tablet 40 milliGRAM(s) Oral two times a day  furosemide    Tablet 60 milliGRAM(s) Oral daily  gabapentin 100 milliGRAM(s) Oral three times a day  glucagon  Injectable 1 milliGRAM(s) IntraMuscular once  insulin glargine Injectable (LANTUS) 55 Unit(s) SubCutaneous at bedtime  insulin lispro (ADMELOG) corrective regimen sliding scale   SubCutaneous at bedtime  insulin lispro (ADMELOG) corrective regimen sliding scale   SubCutaneous three times a day before meals  insulin lispro Injectable (ADMELOG) 12 Unit(s) SubCutaneous three times a day before meals  lactulose Syrup 10 Gram(s) Oral three times a day  metoprolol tartrate 12.5 milliGRAM(s) Oral daily  polyethylene glycol 3350 17 Gram(s) Oral daily  sacubitril 24 mG/valsartan 26 mG 1 Tablet(s) Oral two times a day  senna 2 Tablet(s) Oral at bedtime  simethicone 80 milliGRAM(s) Chew daily  spironolactone 25 milliGRAM(s) Oral daily  tamsulosin 0.4 milliGRAM(s) Oral at bedtime  tiotropium 18 MICROgram(s) Capsule 1 Capsule(s) Inhalation daily  traMADol 50 milliGRAM(s) Oral every 8 hours    MEDICATIONS  (PRN):  acetaminophen   Tablet .. 975 milliGRAM(s) Oral every 8 hours PRN Mild Pain (1 - 3)  ALBUTerol    90 MICROgram(s) HFA Inhaler 2 Puff(s) Inhalation every 6 hours PRN Bronchospasm  bisacodyl Suppository 10 milliGRAM(s) Rectal daily PRN Constipation  clonazePAM  Tablet 1 milliGRAM(s) Oral three times a day PRN Anxiety  HYDROmorphone   Tablet 2 milliGRAM(s) Oral every 4 hours PRN Moderate Pain (4 - 6)  HYDROmorphone   Tablet 4 milliGRAM(s) Oral every 4 hours PRN Severe Pain (7 - 10)  magnesium hydroxide Suspension 30 milliLiter(s) Oral every 12 hours PRN Constipation  zolpidem 5 milliGRAM(s) Oral at bedtime PRN Insomnia  zolpidem 5 milliGRAM(s) Oral at bedtime PRN Insomnia      Vital Signs Last 24 Hrs  T(C): 36.8 (28 Nov 2020 09:00), Max: 37.2 (27 Nov 2020 14:16)  T(F): 98.3 (28 Nov 2020 09:00), Max: 98.9 (27 Nov 2020 14:16)  HR: 82 (28 Nov 2020 10:10) (72 - 82)  BP: 103/76 (28 Nov 2020 10:10) (98/73 - 127/55)  BP(mean): --  RR: 16 (28 Nov 2020 09:00) (16 - 17)  SpO2: 97% (28 Nov 2020 09:00) (97% - 100%)  CAPILLARY BLOOD GLUCOSE      POCT Blood Glucose.: 160 mg/dL (28 Nov 2020 11:34)  POCT Blood Glucose.: 161 mg/dL (28 Nov 2020 07:43)  POCT Blood Glucose.: 295 mg/dL (27 Nov 2020 21:54)  POCT Blood Glucose.: 173 mg/dL (27 Nov 2020 17:36)    I&O's Summary    27 Nov 2020 07:01  -  28 Nov 2020 07:00  --------------------------------------------------------  IN: 0 mL / OUT: 975 mL / NET: -975 mL    28 Nov 2020 07:01  -  28 Nov 2020 12:17  --------------------------------------------------------  IN: 0 mL / OUT: 300 mL / NET: -300 mL        PHYSICAL EXAM:  GENERAL: NAD, well-developed  HEAD:  Atraumatic, Normocephalic  EYES: EOMI, PERRLA, conjunctiva and sclera clear  NECK: Supple, No JVD  CHEST/LUNG: Clear to auscultation bilaterally; No wheeze  HEART: Regular rate and rhythm; No murmurs, rubs, or gallops  ABDOMEN: Soft, Nontender, Nondistended; Bowel sounds present  EXTREMITIES:  2+ Peripheral Pulses, No clubbing, cyanosis, or edema  PSYCH: AAOx3  NEUROLOGY: non-focal  SKIN: No rashes or lesions    LABS:                    RADIOLOGY & ADDITIONAL TESTS:    Imaging Personally Reviewed:    Consultant(s) Notes Reviewed:      Care Discussed with Consultants/Other Providers:   Patient is a 70y old  Male who presents with a chief complaint of back surgery (27 Nov 2020 11:09)      SUBJECTIVE / OVERNIGHT EVENTS:  Patient says he looks pale and was dizzy with physical therapy today. Denies cp, SOB, abdominal pain, N/V/D     MEDICATIONS  (STANDING):  aspirin enteric coated 81 milliGRAM(s) Oral daily  atorvastatin 20 milliGRAM(s) Oral at bedtime  budesonide  80 MICROgram(s)/formoterol 4.5 MICROgram(s) Inhaler 2 Puff(s) Inhalation two times a day  buPROPion XL . 300 milliGRAM(s) Oral daily  clopidogrel Tablet 75 milliGRAM(s) Oral daily  dextrose 40% Gel 15 Gram(s) Oral once  dextrose 5%. 1000 milliLiter(s) (50 mL/Hr) IV Continuous <Continuous>  dextrose 5%. 1000 milliLiter(s) (100 mL/Hr) IV Continuous <Continuous>  dextrose 50% Injectable 25 Gram(s) IV Push once  dextrose 50% Injectable 12.5 Gram(s) IV Push once  dextrose 50% Injectable 25 Gram(s) IV Push once  famotidine    Tablet 40 milliGRAM(s) Oral two times a day  furosemide    Tablet 60 milliGRAM(s) Oral daily  gabapentin 100 milliGRAM(s) Oral three times a day  glucagon  Injectable 1 milliGRAM(s) IntraMuscular once  insulin glargine Injectable (LANTUS) 55 Unit(s) SubCutaneous at bedtime  insulin lispro (ADMELOG) corrective regimen sliding scale   SubCutaneous at bedtime  insulin lispro (ADMELOG) corrective regimen sliding scale   SubCutaneous three times a day before meals  insulin lispro Injectable (ADMELOG) 12 Unit(s) SubCutaneous three times a day before meals  lactulose Syrup 10 Gram(s) Oral three times a day  metoprolol tartrate 12.5 milliGRAM(s) Oral daily  polyethylene glycol 3350 17 Gram(s) Oral daily  sacubitril 24 mG/valsartan 26 mG 1 Tablet(s) Oral two times a day  senna 2 Tablet(s) Oral at bedtime  simethicone 80 milliGRAM(s) Chew daily  spironolactone 25 milliGRAM(s) Oral daily  tamsulosin 0.4 milliGRAM(s) Oral at bedtime  tiotropium 18 MICROgram(s) Capsule 1 Capsule(s) Inhalation daily  traMADol 50 milliGRAM(s) Oral every 8 hours    MEDICATIONS  (PRN):  acetaminophen   Tablet .. 975 milliGRAM(s) Oral every 8 hours PRN Mild Pain (1 - 3)  ALBUTerol    90 MICROgram(s) HFA Inhaler 2 Puff(s) Inhalation every 6 hours PRN Bronchospasm  bisacodyl Suppository 10 milliGRAM(s) Rectal daily PRN Constipation  clonazePAM  Tablet 1 milliGRAM(s) Oral three times a day PRN Anxiety  HYDROmorphone   Tablet 2 milliGRAM(s) Oral every 4 hours PRN Moderate Pain (4 - 6)  HYDROmorphone   Tablet 4 milliGRAM(s) Oral every 4 hours PRN Severe Pain (7 - 10)  magnesium hydroxide Suspension 30 milliLiter(s) Oral every 12 hours PRN Constipation  zolpidem 5 milliGRAM(s) Oral at bedtime PRN Insomnia  zolpidem 5 milliGRAM(s) Oral at bedtime PRN Insomnia      Vital Signs Last 24 Hrs  T(C): 36.8 (28 Nov 2020 09:00), Max: 37.2 (27 Nov 2020 14:16)  T(F): 98.3 (28 Nov 2020 09:00), Max: 98.9 (27 Nov 2020 14:16)  HR: 82 (28 Nov 2020 10:10) (72 - 82)  BP: 103/76 (28 Nov 2020 10:10) (98/73 - 127/55)  BP(mean): --  RR: 16 (28 Nov 2020 09:00) (16 - 17)  SpO2: 97% (28 Nov 2020 09:00) (97% - 100%)  CAPILLARY BLOOD GLUCOSE      POCT Blood Glucose.: 160 mg/dL (28 Nov 2020 11:34)  POCT Blood Glucose.: 161 mg/dL (28 Nov 2020 07:43)  POCT Blood Glucose.: 295 mg/dL (27 Nov 2020 21:54)  POCT Blood Glucose.: 173 mg/dL (27 Nov 2020 17:36)    I&O's Summary    27 Nov 2020 07:01  -  28 Nov 2020 07:00  --------------------------------------------------------  IN: 0 mL / OUT: 975 mL / NET: -975 mL    28 Nov 2020 07:01  -  28 Nov 2020 12:17  --------------------------------------------------------  IN: 0 mL / OUT: 300 mL / NET: -300 mL        PHYSICAL EXAM:  GENERAL: NAD, well-developed  HEAD:  Atraumatic, Normocephalic  EYES: EOMI, PERRLA, pale conjunctiva, and sclera clear  NECK: Supple, No JVD  CHEST/LUNG: Clear to auscultation bilaterally; No wheeze  HEART: Regular rate and rhythm; No murmurs, rubs, or gallops  ABDOMEN: Soft, Nontender, Nondistended; Bowel sounds present  EXTREMITIES:  2+ Peripheral Pulses, No clubbing, cyanosis, or edema  PSYCH: AAOx3  NEUROLOGY: non-focal  SKIN: Pale. No rashes or lesions    LABS:                    RADIOLOGY & ADDITIONAL TESTS:    Imaging Personally Reviewed:    Consultant(s) Notes Reviewed:      Care Discussed with Consultants/Other Providers:

## 2020-11-28 NOTE — PROGRESS NOTE ADULT - PROBLEM SELECTOR PLAN 1
-Stable from medical standpoint. No objections to DC once insurance auth obtained and cleared by ortho.  -PMR recs acute rehab.  -Further management as per ortho. dizziness may be a symptom of anemia as patient appears as such clinically.  check CBC today and type and screen  If Hb < 8 transfuse 1 Unit PRBCs

## 2020-11-28 NOTE — PROGRESS NOTE ADULT - PROBLEM SELECTOR PLAN 5
-Per pt - he had ECHO back in 9/2020 showing LVEF of 25% - reportedly related to "sepsis" per pt.  -Euvolemic at this time.  -Cont metoprolol, Entresto, lasix, entresto.  -Follow up with cardio as OP. A1c 7.6. FS controlled.   -DM regimen as per endocrine - adjusted appreciated.   -Carb controlled diet.  -Encourage dietary precautions.  -DC recs as per endo.

## 2020-11-28 NOTE — PROGRESS NOTE ADULT - PROBLEM SELECTOR PLAN 3
-Resolved. He is voiding w/o need for straight cath. Does report urge and difficulty initiating stream. Likely has BPH.  -c/w tamsulosin   -He will follow up with outside urology on DC. -Resolved.   -Encourage PO hydration.   -Avoid nephrotoxins.   -Trend labs.

## 2020-11-28 NOTE — PROGRESS NOTE ADULT - PROBLEM SELECTOR PLAN 6
-Cont BB, statin, ASA, Plavix.  -Further f/u with cards as OP. -Per pt - he had ECHO back in 9/2020 showing LVEF of 25% - reportedly related to "sepsis" per pt.  -Euvolemic at this time.  -Cont metoprolol, Entresto, lasix, entresto.  -Follow up with cardio as OP.

## 2020-11-28 NOTE — PROGRESS NOTE ADULT - SUBJECTIVE AND OBJECTIVE BOX
Orthopaedic Surgery Progress Note    Subjective:   Patient seen and examined this morning. No acute events overnight. Pain well controlled. Tolerating diet. Denies N/V/D/F/C.     Objective:  Vital Signs Last 24 Hrs  T(C): 36.8 (27 Nov 2020 21:55), Max: 37.2 (27 Nov 2020 14:16)  T(F): 98.3 (27 Nov 2020 21:55), Max: 98.9 (27 Nov 2020 14:16)  HR: 77 (27 Nov 2020 21:55) (70 - 77)  BP: 122/49 (27 Nov 2020 21:55) (106/58 - 127/55)  BP(mean): --  RR: 17 (27 Nov 2020 21:55) (16 - 17)  SpO2: 99% (27 Nov 2020 21:55) (98% - 100%)            PE  Gen: NAD  Back:   Neuro:  RLE IP 5/5 HS 5/5 Q 5/5 GS 5/5 TA 5/5 EHL 5/5   SILT L2-S1  LLE IP 5/5 HS 5/5 Q 5/5 GS 5/5 TA 5/5 EHL 5/5  SILT L2-S1  WWP BLE                             A/P: 70M s/p T10-Pelvis Exploration of rev PSF w/flap  - Pain control  - IS  - Regular diet, bowel reg  - WBAT, PT/OT  - DVT PPX: Venodynes, Plavix  - Dispo: SubAcute Rehab pending insurance authorization  - Appreciate endo recs

## 2020-11-28 NOTE — PROGRESS NOTE ADULT - PROBLEM SELECTOR PLAN 2
-Resolved.   -Encourage PO hydration.   -Avoid nephrotoxins.   -Trend labs. -No objections to DC if anemia stable and once insurance auth obtained and cleared by ortho.  -PMR recs acute rehab.  -Further management as per ortho.

## 2020-11-28 NOTE — PROGRESS NOTE ADULT - PROBLEM SELECTOR PLAN 4
A1c 7.6. FS controlled.   -DM regimen as per endocrine - adjusted appreciated.   -Carb controlled diet.  -Encourage dietary precautions.  -DC recs as per endo. -Resolved. He is voiding w/o need for straight cath. Does report urge and difficulty initiating stream. Likely has BPH.  -c/w tamsulosin   -He will follow up with outside urology on DC.

## 2020-11-29 LAB
GLUCOSE BLDC GLUCOMTR-MCNC: 136 MG/DL — HIGH (ref 70–99)
GLUCOSE BLDC GLUCOMTR-MCNC: 199 MG/DL — HIGH (ref 70–99)
GLUCOSE BLDC GLUCOMTR-MCNC: 230 MG/DL — HIGH (ref 70–99)
GLUCOSE BLDC GLUCOMTR-MCNC: 239 MG/DL — HIGH (ref 70–99)
HCT VFR BLD CALC: 30 % — LOW (ref 39–50)
HGB BLD-MCNC: 9.2 G/DL — LOW (ref 13–17)
MCHC RBC-ENTMCNC: 30.7 % — LOW (ref 32–36)
MCHC RBC-ENTMCNC: 31.5 PG — SIGNIFICANT CHANGE UP (ref 27–34)
MCV RBC AUTO: 102.7 FL — HIGH (ref 80–100)
NRBC # FLD: 0 K/UL — SIGNIFICANT CHANGE UP (ref 0–0)
PLATELET # BLD AUTO: 453 K/UL — HIGH (ref 150–400)
PMV BLD: 10.5 FL — SIGNIFICANT CHANGE UP (ref 7–13)
RBC # BLD: 2.92 M/UL — LOW (ref 4.2–5.8)
RBC # FLD: 13.7 % — SIGNIFICANT CHANGE UP (ref 10.3–14.5)
WBC # BLD: 9.64 K/UL — SIGNIFICANT CHANGE UP (ref 3.8–10.5)
WBC # FLD AUTO: 9.64 K/UL — SIGNIFICANT CHANGE UP (ref 3.8–10.5)

## 2020-11-29 PROCEDURE — 99232 SBSQ HOSP IP/OBS MODERATE 35: CPT

## 2020-11-29 RX ORDER — ALBUTEROL 90 UG/1
2 AEROSOL, METERED ORAL EVERY 6 HOURS
Refills: 0 | Status: DISCONTINUED | OUTPATIENT
Start: 2020-11-29 | End: 2020-12-02

## 2020-11-29 RX ORDER — SODIUM CHLORIDE 9 MG/ML
1000 INJECTION INTRAMUSCULAR; INTRAVENOUS; SUBCUTANEOUS ONCE
Refills: 0 | Status: COMPLETED | OUTPATIENT
Start: 2020-11-29 | End: 2020-11-29

## 2020-11-29 RX ADMIN — Medication 12.5 MILLIGRAM(S): at 05:55

## 2020-11-29 RX ADMIN — GABAPENTIN 100 MILLIGRAM(S): 400 CAPSULE ORAL at 05:55

## 2020-11-29 RX ADMIN — SENNA PLUS 2 TABLET(S): 8.6 TABLET ORAL at 21:42

## 2020-11-29 RX ADMIN — TRAMADOL HYDROCHLORIDE 50 MILLIGRAM(S): 50 TABLET ORAL at 21:42

## 2020-11-29 RX ADMIN — ALBUTEROL 2 PUFF(S): 90 AEROSOL, METERED ORAL at 15:13

## 2020-11-29 RX ADMIN — Medication 12 UNIT(S): at 12:37

## 2020-11-29 RX ADMIN — TAMSULOSIN HYDROCHLORIDE 0.4 MILLIGRAM(S): 0.4 CAPSULE ORAL at 21:42

## 2020-11-29 RX ADMIN — Medication 1 MILLIGRAM(S): at 05:54

## 2020-11-29 RX ADMIN — SPIRONOLACTONE 25 MILLIGRAM(S): 25 TABLET, FILM COATED ORAL at 05:55

## 2020-11-29 RX ADMIN — BUDESONIDE AND FORMOTEROL FUMARATE DIHYDRATE 2 PUFF(S): 160; 4.5 AEROSOL RESPIRATORY (INHALATION) at 21:43

## 2020-11-29 RX ADMIN — ZOLPIDEM TARTRATE 5 MILLIGRAM(S): 10 TABLET ORAL at 23:16

## 2020-11-29 RX ADMIN — ATORVASTATIN CALCIUM 20 MILLIGRAM(S): 80 TABLET, FILM COATED ORAL at 21:56

## 2020-11-29 RX ADMIN — POLYETHYLENE GLYCOL 3350 17 GRAM(S): 17 POWDER, FOR SOLUTION ORAL at 12:36

## 2020-11-29 RX ADMIN — SACUBITRIL AND VALSARTAN 1 TABLET(S): 24; 26 TABLET, FILM COATED ORAL at 17:07

## 2020-11-29 RX ADMIN — FAMOTIDINE 40 MILLIGRAM(S): 10 INJECTION INTRAVENOUS at 17:07

## 2020-11-29 RX ADMIN — BUDESONIDE AND FORMOTEROL FUMARATE DIHYDRATE 2 PUFF(S): 160; 4.5 AEROSOL RESPIRATORY (INHALATION) at 09:24

## 2020-11-29 RX ADMIN — SACUBITRIL AND VALSARTAN 1 TABLET(S): 24; 26 TABLET, FILM COATED ORAL at 05:55

## 2020-11-29 RX ADMIN — Medication 81 MILLIGRAM(S): at 12:36

## 2020-11-29 RX ADMIN — CLOPIDOGREL BISULFATE 75 MILLIGRAM(S): 75 TABLET, FILM COATED ORAL at 12:36

## 2020-11-29 RX ADMIN — GABAPENTIN 100 MILLIGRAM(S): 400 CAPSULE ORAL at 21:42

## 2020-11-29 RX ADMIN — Medication 2: at 12:36

## 2020-11-29 RX ADMIN — Medication 1 MILLIGRAM(S): at 21:42

## 2020-11-29 RX ADMIN — Medication 12 UNIT(S): at 17:06

## 2020-11-29 RX ADMIN — SODIUM CHLORIDE 1000 MILLILITER(S): 9 INJECTION INTRAMUSCULAR; INTRAVENOUS; SUBCUTANEOUS at 12:37

## 2020-11-29 RX ADMIN — LACTULOSE 10 GRAM(S): 10 SOLUTION ORAL at 14:40

## 2020-11-29 RX ADMIN — SIMETHICONE 80 MILLIGRAM(S): 80 TABLET, CHEWABLE ORAL at 12:36

## 2020-11-29 RX ADMIN — Medication 1 MILLIGRAM(S): at 14:40

## 2020-11-29 RX ADMIN — Medication 12 UNIT(S): at 08:26

## 2020-11-29 RX ADMIN — FAMOTIDINE 40 MILLIGRAM(S): 10 INJECTION INTRAVENOUS at 05:55

## 2020-11-29 RX ADMIN — Medication 60 MILLIGRAM(S): at 05:55

## 2020-11-29 RX ADMIN — INSULIN GLARGINE 55 UNIT(S): 100 INJECTION, SOLUTION SUBCUTANEOUS at 21:42

## 2020-11-29 RX ADMIN — BUPROPION HYDROCHLORIDE 300 MILLIGRAM(S): 150 TABLET, EXTENDED RELEASE ORAL at 09:24

## 2020-11-29 RX ADMIN — Medication 4: at 17:06

## 2020-11-29 RX ADMIN — TIOTROPIUM BROMIDE 1 CAPSULE(S): 18 CAPSULE ORAL; RESPIRATORY (INHALATION) at 09:24

## 2020-11-29 NOTE — PROGRESS NOTE ADULT - SUBJECTIVE AND OBJECTIVE BOX
Patient is a 70y old  Male who presents with a chief complaint of back surgery (28 Nov 2020 12:17)      SUBJECTIVE / OVERNIGHT EVENTS:  Patient says he feels weak and is concerned about looking pale. His BPs were low in a.m. now in normal range.    MEDICATIONS  (STANDING):  aspirin enteric coated 81 milliGRAM(s) Oral daily  atorvastatin 20 milliGRAM(s) Oral at bedtime  budesonide  80 MICROgram(s)/formoterol 4.5 MICROgram(s) Inhaler 2 Puff(s) Inhalation two times a day  buPROPion XL . 300 milliGRAM(s) Oral daily  clopidogrel Tablet 75 milliGRAM(s) Oral daily  dextrose 40% Gel 15 Gram(s) Oral once  dextrose 5%. 1000 milliLiter(s) (50 mL/Hr) IV Continuous <Continuous>  dextrose 5%. 1000 milliLiter(s) (100 mL/Hr) IV Continuous <Continuous>  dextrose 50% Injectable 25 Gram(s) IV Push once  dextrose 50% Injectable 12.5 Gram(s) IV Push once  dextrose 50% Injectable 25 Gram(s) IV Push once  famotidine    Tablet 40 milliGRAM(s) Oral two times a day  furosemide    Tablet 60 milliGRAM(s) Oral daily  gabapentin 100 milliGRAM(s) Oral three times a day  glucagon  Injectable 1 milliGRAM(s) IntraMuscular once  insulin glargine Injectable (LANTUS) 55 Unit(s) SubCutaneous at bedtime  insulin lispro (ADMELOG) corrective regimen sliding scale   SubCutaneous at bedtime  insulin lispro (ADMELOG) corrective regimen sliding scale   SubCutaneous three times a day before meals  insulin lispro Injectable (ADMELOG) 12 Unit(s) SubCutaneous three times a day before meals  lactulose Syrup 10 Gram(s) Oral three times a day  metoprolol tartrate 12.5 milliGRAM(s) Oral daily  polyethylene glycol 3350 17 Gram(s) Oral daily  sacubitril 24 mG/valsartan 26 mG 1 Tablet(s) Oral two times a day  senna 2 Tablet(s) Oral at bedtime  simethicone 80 milliGRAM(s) Chew daily  spironolactone 25 milliGRAM(s) Oral daily  tamsulosin 0.4 milliGRAM(s) Oral at bedtime  tiotropium 18 MICROgram(s) Capsule 1 Capsule(s) Inhalation daily  traMADol 50 milliGRAM(s) Oral every 8 hours    MEDICATIONS  (PRN):  acetaminophen   Tablet .. 975 milliGRAM(s) Oral every 8 hours PRN Mild Pain (1 - 3)  ALBUTerol    90 MICROgram(s) HFA Inhaler 2 Puff(s) Inhalation every 6 hours PRN Bronchospasm  bisacodyl Suppository 10 milliGRAM(s) Rectal daily PRN Constipation  clonazePAM  Tablet 1 milliGRAM(s) Oral three times a day PRN Anxiety  HYDROmorphone   Tablet 2 milliGRAM(s) Oral every 4 hours PRN Moderate Pain (4 - 6)  HYDROmorphone   Tablet 4 milliGRAM(s) Oral every 4 hours PRN Severe Pain (7 - 10)  magnesium hydroxide Suspension 30 milliLiter(s) Oral every 12 hours PRN Constipation  zolpidem 5 milliGRAM(s) Oral at bedtime PRN Insomnia  zolpidem 5 milliGRAM(s) Oral at bedtime PRN Insomnia      Vital Signs Last 24 Hrs  T(C): 37.1 (29 Nov 2020 13:05), Max: 37.2 (29 Nov 2020 05:45)  T(F): 98.7 (29 Nov 2020 13:05), Max: 98.9 (29 Nov 2020 05:45)  HR: 63 (29 Nov 2020 13:05) (63 - 119)  BP: 119/53 (29 Nov 2020 13:05) (84/55 - 132/58)  BP(mean): --  RR: 20 (29 Nov 2020 13:05) (16 - 22)  SpO2: 98% (29 Nov 2020 13:05) (93% - 100%)  CAPILLARY BLOOD GLUCOSE      POCT Blood Glucose.: 199 mg/dL (29 Nov 2020 12:27)  POCT Blood Glucose.: 136 mg/dL (29 Nov 2020 07:43)  POCT Blood Glucose.: 277 mg/dL (28 Nov 2020 21:26)  POCT Blood Glucose.: 341 mg/dL (28 Nov 2020 17:08)    I&O's Summary    28 Nov 2020 07:01  -  29 Nov 2020 07:00  --------------------------------------------------------  IN: 0 mL / OUT: 750 mL / NET: -750 mL    29 Nov 2020 07:01  -  29 Nov 2020 14:37  --------------------------------------------------------  IN: 0 mL / OUT: 200 mL / NET: -200 mL        PHYSICAL EXAM:  GENERAL: NAD, well-developed  HEAD:  Atraumatic, Normocephalic  EYES: EOMI, PERRLA, conjunctiva and sclera clear  NECK: Supple, No JVD  CHEST/LUNG: Clear to auscultation bilaterally; No wheeze  HEART: Regular rate and rhythm; No murmurs, rubs, or gallops  ABDOMEN: Soft, Nontender, Nondistended; Bowel sounds present  EXTREMITIES:  2+ Peripheral Pulses, No clubbing, cyanosis, or edema  PSYCH: AAOx3  NEUROLOGY: non-focal  SKIN: pale. No rashes or lesions    LABS:                        9.2    9.64  )-----------( 453      ( 29 Nov 2020 11:45 )             30.0     11-28    139  |  99  |  22  ----------------------------<  300<H>  4.3   |  24  |  1.05    Ca    9.0      28 Nov 2020 12:54                RADIOLOGY & ADDITIONAL TESTS:    Imaging Personally Reviewed:    Consultant(s) Notes Reviewed:      Care Discussed with Consultants/Other Providers:   Patient is a 70y old  Male who presents with a chief complaint of back surgery (28 Nov 2020 12:17)      SUBJECTIVE / OVERNIGHT EVENTS:  Patient says he feels weak and is concerned about looking pale. His BPs were low in a.m. now in normal range.    MEDICATIONS  (STANDING):  aspirin enteric coated 81 milliGRAM(s) Oral daily  atorvastatin 20 milliGRAM(s) Oral at bedtime  budesonide  80 MICROgram(s)/formoterol 4.5 MICROgram(s) Inhaler 2 Puff(s) Inhalation two times a day  buPROPion XL . 300 milliGRAM(s) Oral daily  clopidogrel Tablet 75 milliGRAM(s) Oral daily  dextrose 40% Gel 15 Gram(s) Oral once  dextrose 5%. 1000 milliLiter(s) (50 mL/Hr) IV Continuous <Continuous>  dextrose 5%. 1000 milliLiter(s) (100 mL/Hr) IV Continuous <Continuous>  dextrose 50% Injectable 25 Gram(s) IV Push once  dextrose 50% Injectable 12.5 Gram(s) IV Push once  dextrose 50% Injectable 25 Gram(s) IV Push once  famotidine    Tablet 40 milliGRAM(s) Oral two times a day  furosemide    Tablet 60 milliGRAM(s) Oral daily  gabapentin 100 milliGRAM(s) Oral three times a day  glucagon  Injectable 1 milliGRAM(s) IntraMuscular once  insulin glargine Injectable (LANTUS) 55 Unit(s) SubCutaneous at bedtime  insulin lispro (ADMELOG) corrective regimen sliding scale   SubCutaneous at bedtime  insulin lispro (ADMELOG) corrective regimen sliding scale   SubCutaneous three times a day before meals  insulin lispro Injectable (ADMELOG) 12 Unit(s) SubCutaneous three times a day before meals  lactulose Syrup 10 Gram(s) Oral three times a day  metoprolol tartrate 12.5 milliGRAM(s) Oral daily  polyethylene glycol 3350 17 Gram(s) Oral daily  sacubitril 24 mG/valsartan 26 mG 1 Tablet(s) Oral two times a day  senna 2 Tablet(s) Oral at bedtime  simethicone 80 milliGRAM(s) Chew daily  spironolactone 25 milliGRAM(s) Oral daily  tamsulosin 0.4 milliGRAM(s) Oral at bedtime  tiotropium 18 MICROgram(s) Capsule 1 Capsule(s) Inhalation daily  traMADol 50 milliGRAM(s) Oral every 8 hours    MEDICATIONS  (PRN):  acetaminophen   Tablet .. 975 milliGRAM(s) Oral every 8 hours PRN Mild Pain (1 - 3)  ALBUTerol    90 MICROgram(s) HFA Inhaler 2 Puff(s) Inhalation every 6 hours PRN Bronchospasm  bisacodyl Suppository 10 milliGRAM(s) Rectal daily PRN Constipation  clonazePAM  Tablet 1 milliGRAM(s) Oral three times a day PRN Anxiety  HYDROmorphone   Tablet 2 milliGRAM(s) Oral every 4 hours PRN Moderate Pain (4 - 6)  HYDROmorphone   Tablet 4 milliGRAM(s) Oral every 4 hours PRN Severe Pain (7 - 10)  magnesium hydroxide Suspension 30 milliLiter(s) Oral every 12 hours PRN Constipation  zolpidem 5 milliGRAM(s) Oral at bedtime PRN Insomnia  zolpidem 5 milliGRAM(s) Oral at bedtime PRN Insomnia      Vital Signs Last 24 Hrs  T(C): 37.1 (29 Nov 2020 13:05), Max: 37.2 (29 Nov 2020 05:45)  T(F): 98.7 (29 Nov 2020 13:05), Max: 98.9 (29 Nov 2020 05:45)  HR: 63 (29 Nov 2020 13:05) (63 - 119)  BP: 119/53 (29 Nov 2020 13:05) (84/55 - 132/58)  BP(mean): --  RR: 20 (29 Nov 2020 13:05) (16 - 22)  SpO2: 98% (29 Nov 2020 13:05) (93% - 100%)  CAPILLARY BLOOD GLUCOSE      POCT Blood Glucose.: 199 mg/dL (29 Nov 2020 12:27)  POCT Blood Glucose.: 136 mg/dL (29 Nov 2020 07:43)  POCT Blood Glucose.: 277 mg/dL (28 Nov 2020 21:26)  POCT Blood Glucose.: 341 mg/dL (28 Nov 2020 17:08)    I&O's Summary    28 Nov 2020 07:01  -  29 Nov 2020 07:00  --------------------------------------------------------  IN: 0 mL / OUT: 750 mL / NET: -750 mL    29 Nov 2020 07:01  -  29 Nov 2020 14:37  --------------------------------------------------------  IN: 0 mL / OUT: 200 mL / NET: -200 mL        PHYSICAL EXAM:  GENERAL: NAD, well-developed  HEAD:  Atraumatic, Normocephalic  EYES: EOMI, PERRLA, conjunctiva and sclera clear  NECK: Supple, No JVD  CHEST/LUNG: Clear to auscultation bilaterally; No wheeze  HEART: Regular rate and rhythm; No murmurs, rubs, or gallops  ABDOMEN: Soft, Nontender, Nondistended; Bowel sounds present  EXTREMITIES:  2+ Peripheral Pulses, No clubbing, cyanosis, or edema  PSYCH: AAOx3  NEUROLOGY: non-focal  SKIN: now pink. No rashes or lesions    LABS:                        9.2    9.64  )-----------( 453      ( 29 Nov 2020 11:45 )             30.0     11-28    139  |  99  |  22  ----------------------------<  300<H>  4.3   |  24  |  1.05    Ca    9.0      28 Nov 2020 12:54                RADIOLOGY & ADDITIONAL TESTS:    Imaging Personally Reviewed:    Consultant(s) Notes Reviewed:      Care Discussed with Consultants/Other Providers:

## 2020-11-29 NOTE — PROGRESS NOTE ADULT - SUBJECTIVE AND OBJECTIVE BOX
Chief Complaint: DM2    History: Tolerating po  No hypoglycemia symptoms  BG's increased pre dinner and at HS yesterday.  Pt endorses having grape juice at lunch time yesterday in addition to fruit bowl.  Pt prefers to maintain current insulin doses for now     MEDICATIONS  (STANDING):  aspirin enteric coated 81 milliGRAM(s) Oral daily  atorvastatin 20 milliGRAM(s) Oral at bedtime  budesonide  80 MICROgram(s)/formoterol 4.5 MICROgram(s) Inhaler 2 Puff(s) Inhalation two times a day  buPROPion XL . 300 milliGRAM(s) Oral daily  clopidogrel Tablet 75 milliGRAM(s) Oral daily  dextrose 40% Gel 15 Gram(s) Oral once  dextrose 5%. 1000 milliLiter(s) (50 mL/Hr) IV Continuous <Continuous>  dextrose 5%. 1000 milliLiter(s) (100 mL/Hr) IV Continuous <Continuous>  dextrose 50% Injectable 25 Gram(s) IV Push once  dextrose 50% Injectable 12.5 Gram(s) IV Push once  dextrose 50% Injectable 25 Gram(s) IV Push once  famotidine    Tablet 40 milliGRAM(s) Oral two times a day  furosemide    Tablet 60 milliGRAM(s) Oral daily  gabapentin 100 milliGRAM(s) Oral three times a day  glucagon  Injectable 1 milliGRAM(s) IntraMuscular once  insulin glargine Injectable (LANTUS) 55 Unit(s) SubCutaneous at bedtime  insulin lispro (ADMELOG) corrective regimen sliding scale   SubCutaneous at bedtime  insulin lispro (ADMELOG) corrective regimen sliding scale   SubCutaneous three times a day before meals  insulin lispro Injectable (ADMELOG) 12 Unit(s) SubCutaneous three times a day before meals  lactulose Syrup 10 Gram(s) Oral three times a day  metoprolol tartrate 12.5 milliGRAM(s) Oral daily  polyethylene glycol 3350 17 Gram(s) Oral daily  sacubitril 24 mG/valsartan 26 mG 1 Tablet(s) Oral two times a day  senna 2 Tablet(s) Oral at bedtime  simethicone 80 milliGRAM(s) Chew daily  spironolactone 25 milliGRAM(s) Oral daily  tamsulosin 0.4 milliGRAM(s) Oral at bedtime  tiotropium 18 MICROgram(s) Capsule 1 Capsule(s) Inhalation daily  traMADol 50 milliGRAM(s) Oral every 8 hours    MEDICATIONS  (PRN):  acetaminophen   Tablet .. 975 milliGRAM(s) Oral every 8 hours PRN Mild Pain (1 - 3)  ALBUTerol    90 MICROgram(s) HFA Inhaler 2 Puff(s) Inhalation every 6 hours PRN Bronchospasm  bisacodyl Suppository 10 milliGRAM(s) Rectal daily PRN Constipation  clonazePAM  Tablet 1 milliGRAM(s) Oral three times a day PRN Anxiety  HYDROmorphone   Tablet 2 milliGRAM(s) Oral every 4 hours PRN Moderate Pain (4 - 6)  HYDROmorphone   Tablet 4 milliGRAM(s) Oral every 4 hours PRN Severe Pain (7 - 10)  magnesium hydroxide Suspension 30 milliLiter(s) Oral every 12 hours PRN Constipation  zolpidem 5 milliGRAM(s) Oral at bedtime PRN Insomnia  zolpidem 5 milliGRAM(s) Oral at bedtime PRN Insomnia      Allergies    No Known Allergies    Intolerances      Review of Systems:    ALL OTHER SYSTEMS REVIEWED AND NEGATIVE      PHYSICAL EXAM:  Vital Signs Last 24 Hrs  T(C): 37.1 (29 Nov 2020 13:05), Max: 37.2 (29 Nov 2020 05:45)  T(F): 98.7 (29 Nov 2020 13:05), Max: 98.9 (29 Nov 2020 05:45)  HR: 63 (29 Nov 2020 13:05) (63 - 119)  BP: 119/53 (29 Nov 2020 13:05) (84/55 - 132/58)  BP(mean): --  RR: 20 (29 Nov 2020 13:05) (16 - 22)  SpO2: 98% (29 Nov 2020 13:05) (93% - 100%)  GENERAL: NAD, well-groomed, well-developed  EYES: No proptosis, no lid lag, anicteric  HEENT:  Atraumatic, Normocephalic, moist mucous membranes  RESPIRATORY: nonlabored respirations  PSYCH: Alert and oriented x 3, normal affect, normal mood    CAPILLARY BLOOD GLUCOSE  POCT Blood Glucose.: 199 mg/dL (29 Nov 2020 12:27)  POCT Blood Glucose.: 136 mg/dL (29 Nov 2020 07:43)  POCT Blood Glucose.: 277 mg/dL (28 Nov 2020 21:26)  POCT Blood Glucose.: 341 mg/dL (28 Nov 2020 17:08)  POCT Blood Glucose.: 94 mg/dL (27 Nov 2020 07:25)  POCT Blood Glucose.: 144 mg/dL (26 Nov 2020 22:07)  POCT Blood Glucose.: 138 mg/dL (26 Nov 2020 17:17)  POCT Blood Glucose.: 169 mg/dL (26 Nov 2020 12:33)    A1C with Estimated Average Glucose: 7.6 % (11-18-20 @ 10:35)  Hemoglobin A1C, Whole Blood: 7.7 % (01-02-20 @ 20:13)  Hemoglobin A1C, Whole Blood: 7.2 % (12-20-19 @ 08:50)

## 2020-11-29 NOTE — PROGRESS NOTE ADULT - SUBJECTIVE AND OBJECTIVE BOX
Orthopaedic Surgery Progress Note    Subjective:   Patient seen and examined this morning. No acute events overnight. Pain well controlled. Tolerating diet. Denies N/V/D/F/C.     ICU Vital Signs Last 24 Hrs  T(C): 36.5 (29 Nov 2020 01:47), Max: 37.1 (28 Nov 2020 13:49)  T(F): 97.7 (29 Nov 2020 01:47), Max: 98.8 (28 Nov 2020 13:49)  HR: 70 (29 Nov 2020 01:47) (70 - 82)  BP: 109/48 (29 Nov 2020 01:47) (98/73 - 132/58)  BP(mean): --  ABP: --  ABP(mean): --  RR: 16 (29 Nov 2020 01:47) (16 - 17)  SpO2: 98% (29 Nov 2020 01:47) (97% - 100%)      PE  Gen: NAD  Back:   Neuro:  RLE IP 5/5 HS 5/5 Q 5/5 GS 5/5 TA 5/5 EHL 5/5   SILT L2-S1  LLE IP 5/5 HS 5/5 Q 5/5 GS 5/5 TA 5/5 EHL 5/5  SILT L2-S1  WWP BLE      A/P: 70M s/p T10-Pelvis Exploration of rev PSF w/flap  - Pain control  - IS  - Regular diet, bowel reg  - WBAT, PT/OT  - DVT PPX: Venodynes, Plavix  - Dispo: SubAcute Rehab pending insurance authorization  - Appreciate endo recs

## 2020-11-29 NOTE — PROGRESS NOTE ADULT - PROBLEM SELECTOR PLAN 1
Ptaient pale and had hypotension but repeat H/H was improved on 11/28 so wasn't transfused.  Today Hb continues to improve and hypotension resolved so holding off on transfusion for now. Patient was pale and had hypotension but repeat H/H improved on 11/28 so wasn't transfused.  Today Hb continues to improve, hypotension resolved, and skin pink so holding off on transfusion for now.  Hypotension was most likely due to dehydration.

## 2020-11-29 NOTE — PROGRESS NOTE ADULT - PROBLEM SELECTOR PLAN 2
-No objections to DC if anemia stable and once insurance auth obtained and cleared by ortho.  -PMR recs acute rehab.  -Further management as per ortho.

## 2020-11-29 NOTE — CHART NOTE - NSCHARTNOTEFT_GEN_A_CORE
Patient requesting to see doctor. Patient seen and examined. Repeat /80's, patient appears at baseline. Will check repeat CBC and transfuse as needed.

## 2020-11-29 NOTE — PROGRESS NOTE ADULT - PROBLEM SELECTOR PLAN 1
- target bg 100-180 mg/dl, aboce target at dinner yesterday and at bedtime.  Patient prefers I do c/w current doses for now as patient does not plan to drink any juice or eat a lot of sweets  - c/w Lantus to 55 units qhs  - c/w Admelog 12 units before meals TID for now  - c/w moderate correction scale qac and low bedtime scale  - consistent carb diet  - check FS qac and qhs    - for discharge: plan to resume Soliqua and Novolog, and to follow up with his outside endocrinologist Dr. Joycelyn Lal. Based on reported home glucose trends he will likely resume Soliqua 55 units qhs and slightly adjust home Novolog from 8/8/8 to 8/10/8 (also he takes higher dose if he knows he is eating a higher carb meal). endo team previously reviewed dietary modifications for home.

## 2020-11-29 NOTE — PROGRESS NOTE ADULT - PROBLEM SELECTOR PLAN 4
-Resolved. He is voiding w/o need for straight cath. Does report urge and difficulty initiating stream. Likely has BPH.  -c/w tamsulosin   -He will follow up with outside urology on DC.

## 2020-11-30 LAB
GLUCOSE BLDC GLUCOMTR-MCNC: 175 MG/DL — HIGH (ref 70–99)
GLUCOSE BLDC GLUCOMTR-MCNC: 203 MG/DL — HIGH (ref 70–99)
GLUCOSE BLDC GLUCOMTR-MCNC: 214 MG/DL — HIGH (ref 70–99)
GLUCOSE BLDC GLUCOMTR-MCNC: 302 MG/DL — HIGH (ref 70–99)
SARS-COV-2 RNA SPEC QL NAA+PROBE: SIGNIFICANT CHANGE UP

## 2020-11-30 PROCEDURE — 99232 SBSQ HOSP IP/OBS MODERATE 35: CPT

## 2020-11-30 RX ORDER — INSULIN LISPRO 100/ML
13 VIAL (ML) SUBCUTANEOUS
Refills: 0 | Status: DISCONTINUED | OUTPATIENT
Start: 2020-11-30 | End: 2020-12-02

## 2020-11-30 RX ADMIN — Medication 4: at 07:48

## 2020-11-30 RX ADMIN — GABAPENTIN 100 MILLIGRAM(S): 400 CAPSULE ORAL at 21:54

## 2020-11-30 RX ADMIN — Medication 12 UNIT(S): at 12:13

## 2020-11-30 RX ADMIN — GABAPENTIN 100 MILLIGRAM(S): 400 CAPSULE ORAL at 17:35

## 2020-11-30 RX ADMIN — Medication 975 MILLIGRAM(S): at 05:25

## 2020-11-30 RX ADMIN — BUPROPION HYDROCHLORIDE 300 MILLIGRAM(S): 150 TABLET, EXTENDED RELEASE ORAL at 09:02

## 2020-11-30 RX ADMIN — ZOLPIDEM TARTRATE 5 MILLIGRAM(S): 10 TABLET ORAL at 22:11

## 2020-11-30 RX ADMIN — FAMOTIDINE 40 MILLIGRAM(S): 10 INJECTION INTRAVENOUS at 21:05

## 2020-11-30 RX ADMIN — BUDESONIDE AND FORMOTEROL FUMARATE DIHYDRATE 2 PUFF(S): 160; 4.5 AEROSOL RESPIRATORY (INHALATION) at 21:05

## 2020-11-30 RX ADMIN — SACUBITRIL AND VALSARTAN 1 TABLET(S): 24; 26 TABLET, FILM COATED ORAL at 17:35

## 2020-11-30 RX ADMIN — FAMOTIDINE 40 MILLIGRAM(S): 10 INJECTION INTRAVENOUS at 08:51

## 2020-11-30 RX ADMIN — Medication 975 MILLIGRAM(S): at 15:00

## 2020-11-30 RX ADMIN — TAMSULOSIN HYDROCHLORIDE 0.4 MILLIGRAM(S): 0.4 CAPSULE ORAL at 21:55

## 2020-11-30 RX ADMIN — POLYETHYLENE GLYCOL 3350 17 GRAM(S): 17 POWDER, FOR SOLUTION ORAL at 12:14

## 2020-11-30 RX ADMIN — Medication 1 MILLIGRAM(S): at 21:05

## 2020-11-30 RX ADMIN — SIMETHICONE 80 MILLIGRAM(S): 80 TABLET, CHEWABLE ORAL at 12:14

## 2020-11-30 RX ADMIN — SACUBITRIL AND VALSARTAN 1 TABLET(S): 24; 26 TABLET, FILM COATED ORAL at 08:51

## 2020-11-30 RX ADMIN — Medication 13 UNIT(S): at 17:35

## 2020-11-30 RX ADMIN — Medication 8: at 12:13

## 2020-11-30 RX ADMIN — Medication 81 MILLIGRAM(S): at 12:14

## 2020-11-30 RX ADMIN — ATORVASTATIN CALCIUM 20 MILLIGRAM(S): 80 TABLET, FILM COATED ORAL at 21:54

## 2020-11-30 RX ADMIN — BUDESONIDE AND FORMOTEROL FUMARATE DIHYDRATE 2 PUFF(S): 160; 4.5 AEROSOL RESPIRATORY (INHALATION) at 08:50

## 2020-11-30 RX ADMIN — Medication 12 UNIT(S): at 07:48

## 2020-11-30 RX ADMIN — Medication 2: at 17:35

## 2020-11-30 RX ADMIN — Medication 975 MILLIGRAM(S): at 04:24

## 2020-11-30 RX ADMIN — CLOPIDOGREL BISULFATE 75 MILLIGRAM(S): 75 TABLET, FILM COATED ORAL at 12:14

## 2020-11-30 RX ADMIN — INSULIN GLARGINE 55 UNIT(S): 100 INJECTION, SOLUTION SUBCUTANEOUS at 21:54

## 2020-11-30 RX ADMIN — GABAPENTIN 100 MILLIGRAM(S): 400 CAPSULE ORAL at 08:51

## 2020-11-30 RX ADMIN — TIOTROPIUM BROMIDE 1 CAPSULE(S): 18 CAPSULE ORAL; RESPIRATORY (INHALATION) at 09:02

## 2020-11-30 RX ADMIN — Medication 975 MILLIGRAM(S): at 14:03

## 2020-11-30 NOTE — DIETITIAN INITIAL EVALUATION ADULT. - ORAL INTAKE PTA/DIET HISTORY
Patient endorses decreased appetite prior to admission likely in setting of pain. Patient follow Kosher Vegetarian diet (accepts fish and dairy only, no meat, no poultry). Reports mainly "forcing self to eat" at home. HbA1c 7.6%. NKFA. Reports generally following a healthy diet, likes to have a salad daily with salmon and tuna.

## 2020-11-30 NOTE — PROGRESS NOTE ADULT - SUBJECTIVE AND OBJECTIVE BOX
Chief Complaint: DM2    History: Tolerating po  No hypoglycemia events  possible dc planning to rehab today    MEDICATIONS  (STANDING):  aspirin enteric coated 81 milliGRAM(s) Oral daily  atorvastatin 20 milliGRAM(s) Oral at bedtime  budesonide  80 MICROgram(s)/formoterol 4.5 MICROgram(s) Inhaler 2 Puff(s) Inhalation two times a day  buPROPion XL . 300 milliGRAM(s) Oral daily  clopidogrel Tablet 75 milliGRAM(s) Oral daily  dextrose 40% Gel 15 Gram(s) Oral once  dextrose 5%. 1000 milliLiter(s) (50 mL/Hr) IV Continuous <Continuous>  dextrose 5%. 1000 milliLiter(s) (100 mL/Hr) IV Continuous <Continuous>  dextrose 50% Injectable 25 Gram(s) IV Push once  dextrose 50% Injectable 12.5 Gram(s) IV Push once  dextrose 50% Injectable 25 Gram(s) IV Push once  famotidine    Tablet 40 milliGRAM(s) Oral two times a day  furosemide    Tablet 60 milliGRAM(s) Oral daily  gabapentin 100 milliGRAM(s) Oral three times a day  glucagon  Injectable 1 milliGRAM(s) IntraMuscular once  insulin glargine Injectable (LANTUS) 55 Unit(s) SubCutaneous at bedtime  insulin lispro (ADMELOG) corrective regimen sliding scale   SubCutaneous at bedtime  insulin lispro (ADMELOG) corrective regimen sliding scale   SubCutaneous three times a day before meals  insulin lispro Injectable (ADMELOG) 12 Unit(s) SubCutaneous three times a day before meals  lactulose Syrup 10 Gram(s) Oral three times a day  metoprolol tartrate 12.5 milliGRAM(s) Oral daily  polyethylene glycol 3350 17 Gram(s) Oral daily  sacubitril 24 mG/valsartan 26 mG 1 Tablet(s) Oral two times a day  senna 2 Tablet(s) Oral at bedtime  simethicone 80 milliGRAM(s) Chew daily  spironolactone 25 milliGRAM(s) Oral daily  tamsulosin 0.4 milliGRAM(s) Oral at bedtime  tiotropium 18 MICROgram(s) Capsule 1 Capsule(s) Inhalation daily  traMADol 50 milliGRAM(s) Oral every 8 hours    MEDICATIONS  (PRN):  acetaminophen   Tablet .. 975 milliGRAM(s) Oral every 8 hours PRN Mild Pain (1 - 3)  ALBUTerol    90 MICROgram(s) HFA Inhaler 2 Puff(s) Inhalation every 6 hours PRN Bronchospasm  ALBUTerol    90 MICROgram(s) HFA Inhaler 2 Puff(s) Inhalation every 6 hours PRN Bronchospasm  bisacodyl Suppository 10 milliGRAM(s) Rectal daily PRN Constipation  clonazePAM  Tablet 1 milliGRAM(s) Oral three times a day PRN Anxiety  HYDROmorphone   Tablet 2 milliGRAM(s) Oral every 4 hours PRN Moderate Pain (4 - 6)  HYDROmorphone   Tablet 4 milliGRAM(s) Oral every 4 hours PRN Severe Pain (7 - 10)  magnesium hydroxide Suspension 30 milliLiter(s) Oral every 12 hours PRN Constipation  zolpidem 5 milliGRAM(s) Oral at bedtime PRN Insomnia  zolpidem 5 milliGRAM(s) Oral at bedtime PRN Insomnia      Allergies    No Known Allergies    Intolerances      Review of Systems:  ALL OTHER SYSTEMS REVIEWED AND NEGATIVE      PHYSICAL EXAM:  VITALS: T(C): 36.6 (11-30-20 @ 08:50)  T(F): 97.9 (11-30-20 @ 08:50), Max: 98.8 (11-30-20 @ 01:23)  HR: 72 (11-30-20 @ 08:50) (69 - 89)  BP: 117/59 (11-30-20 @ 08:50) (107/58 - 145/65)  RR:  (16 - 19)  SpO2:  (97% - 100%)  Wt(kg): --  GENERAL: NAD, well-groomed, well-developed  EYES: No proptosis, no lid lag, anicteric  HEENT:  Atraumatic, Normocephalic, moist mucous membranes  RESPIRATORY: nonlabored respirations  PSYCH: Alert and oriented x 3, normal affect, normal mood    CAPILLARY BLOOD GLUCOSE      POCT Blood Glucose.: 302 mg/dL (30 Nov 2020 12:07)  POCT Blood Glucose.: 214 mg/dL (30 Nov 2020 07:30)  POCT Blood Glucose.: 239 mg/dL (29 Nov 2020 21:24)  POCT Blood Glucose.: 230 mg/dL (29 Nov 2020 16:54)      11-28    139  |  99  |  22  ----------------------------<  300<H>  4.3   |  24  |  1.05    EGFR if : 84  EGFR if non : 73    Ca    9.0      11-28        A1C with Estimated Average Glucose: 7.6 % (11-18-20 @ 10:35)  Hemoglobin A1C, Whole Blood: 7.7 % (01-02-20 @ 20:13)  Hemoglobin A1C, Whole Blood: 7.2 % (12-20-19 @ 08:50)      Thyroid Function Tests:

## 2020-11-30 NOTE — PROGRESS NOTE ADULT - PROBLEM SELECTOR PLAN 1
- target bg 100-180 mg/dl, BG have been elevated to 200s with occasional 300s based on foods eaten.  Patient prefers not to adjust his insulin regimen but does agree to raise premeal dose by small amount.  Explained that in hospital he is receiving basal and bolus insulin while at home his basal insulin is combined with a GLP-1 plus additional bolus insulin. Also he self adjusts premeal dose at home based on foods consumed.  - continue Lantus 55 units qhs  - Increase Admelog to 13 units before meals TID   - c/w moderate correction scale qac and low bedtime scale  - consistent carb diet  - check FS qac and qhs    - for discharge:     For rehab - continue same as above regimen used in hospital.    For going home - plan to resume Soliqua and Novolog, and to follow up with his outside endocrinologist Dr. Joycelyn Lal. Based on reported home glucose trends he will likely resume Soliqua 55 units qhs and slightly adjust home Novolog from 8/8/8 to 8/10/8 (also he takes higher dose if he knows he is eating a higher carb meal). endo team previously reviewed dietary modifications for home.

## 2020-11-30 NOTE — PROGRESS NOTE ADULT - SUBJECTIVE AND OBJECTIVE BOX
Orthopaedic Surgery Progress Note    Subjective:   Patient seen and examined. No acute events overnight.     Objective:  T(C): 37.1 (11-30-20 @ 01:23), Max: 37.1 (11-29-20 @ 13:05)  HR: 79 (11-30-20 @ 01:23) (63 - 119)  BP: 118/51 (11-30-20 @ 01:23) (84/55 - 145/65)  RR: 18 (11-30-20 @ 01:23) (17 - 22)  SpO2: 100% (11-30-20 @ 01:23) (93% - 100%)  Wt(kg): --    11-28 @ 07:01  -  11-29 @ 07:00  --------------------------------------------------------  IN: 0 mL / OUT: 750 mL / NET: -750 mL    11-29 @ 07:01  -  11-30 @ 06:19  --------------------------------------------------------  IN: 0 mL / OUT: 800 mL / NET: -800 mL                              9.2    9.64  )-----------( 453      ( 29 Nov 2020 11:45 )             30.0     11-28    139  |  99  |  22  ----------------------------<  300<H>  4.3   |  24  |  1.05    Ca    9.0      28 Nov 2020 12:54      PE  Gen: NAD  Back:   Neuro:  RLE IP 5/5 HS 5/5 Q 5/5 GS 5/5 TA 5/5 EHL 5/5   SILT L2-S1  LLE IP 5/5 HS 5/5 Q 5/5 GS 5/5 TA 5/5 EHL 5/5  SILT L2-S1  WWP BLE      A/P: 70M s/p T10-Pelvis Exploration of rev PSF w/flap  - Pain control  - IS  - Regular diet, bowel reg  - WBAT, PT/OT  - DVT PPX: Venodynes, Plavix  - Dispo: SubAcute Rehab pending insurance authorization  - Rick endo recs

## 2020-11-30 NOTE — PROGRESS NOTE ADULT - PROBLEM SELECTOR PLAN 1
Patient was pale and had hypotension but repeat H/H improved on 11/28 so wasn't transfused.  Today Hb continues to improve, hypotension resolved, and skin pink so holding off on transfusion for now.  Hypotension was most likely due to dehydration.

## 2020-11-30 NOTE — PROGRESS NOTE ADULT - SUBJECTIVE AND OBJECTIVE BOX
[facility] Division of Hospital Medicine  Renato Hernandes MD  Pager (M-F, 6H-5P): [phone]  Other Times:  [phone]    Patient is a 70y old  Male who presents with a chief complaint of back surgery (30 Nov 2020 12:41)    SUBJECTIVE / OVERNIGHT EVENTS:  Concerned about inconsistency of his BP.  No F/C, N/V, CP, SOB, Cough, lightheadedness, dizziness, abdominal pain, diarrhea, dysuria.    MEDICATIONS  (STANDING):  aspirin enteric coated 81 milliGRAM(s) Oral daily  atorvastatin 20 milliGRAM(s) Oral at bedtime  budesonide  80 MICROgram(s)/formoterol 4.5 MICROgram(s) Inhaler 2 Puff(s) Inhalation two times a day  buPROPion XL . 300 milliGRAM(s) Oral daily  clopidogrel Tablet 75 milliGRAM(s) Oral daily  dextrose 40% Gel 15 Gram(s) Oral once  dextrose 5%. 1000 milliLiter(s) (50 mL/Hr) IV Continuous <Continuous>  dextrose 5%. 1000 milliLiter(s) (100 mL/Hr) IV Continuous <Continuous>  dextrose 50% Injectable 25 Gram(s) IV Push once  dextrose 50% Injectable 12.5 Gram(s) IV Push once  dextrose 50% Injectable 25 Gram(s) IV Push once  famotidine    Tablet 40 milliGRAM(s) Oral two times a day  furosemide    Tablet 60 milliGRAM(s) Oral daily  gabapentin 100 milliGRAM(s) Oral three times a day  glucagon  Injectable 1 milliGRAM(s) IntraMuscular once  insulin glargine Injectable (LANTUS) 55 Unit(s) SubCutaneous at bedtime  insulin lispro (ADMELOG) corrective regimen sliding scale   SubCutaneous at bedtime  insulin lispro (ADMELOG) corrective regimen sliding scale   SubCutaneous three times a day before meals  insulin lispro Injectable (ADMELOG) 12 Unit(s) SubCutaneous three times a day before meals  lactulose Syrup 10 Gram(s) Oral three times a day  metoprolol tartrate 12.5 milliGRAM(s) Oral daily  polyethylene glycol 3350 17 Gram(s) Oral daily  sacubitril 24 mG/valsartan 26 mG 1 Tablet(s) Oral two times a day  senna 2 Tablet(s) Oral at bedtime  simethicone 80 milliGRAM(s) Chew daily  spironolactone 25 milliGRAM(s) Oral daily  tamsulosin 0.4 milliGRAM(s) Oral at bedtime  tiotropium 18 MICROgram(s) Capsule 1 Capsule(s) Inhalation daily  traMADol 50 milliGRAM(s) Oral every 8 hours    MEDICATIONS  (PRN):  acetaminophen   Tablet .. 975 milliGRAM(s) Oral every 8 hours PRN Mild Pain (1 - 3)  ALBUTerol    90 MICROgram(s) HFA Inhaler 2 Puff(s) Inhalation every 6 hours PRN Bronchospasm  ALBUTerol    90 MICROgram(s) HFA Inhaler 2 Puff(s) Inhalation every 6 hours PRN Bronchospasm  bisacodyl Suppository 10 milliGRAM(s) Rectal daily PRN Constipation  clonazePAM  Tablet 1 milliGRAM(s) Oral three times a day PRN Anxiety  HYDROmorphone   Tablet 2 milliGRAM(s) Oral every 4 hours PRN Moderate Pain (4 - 6)  HYDROmorphone   Tablet 4 milliGRAM(s) Oral every 4 hours PRN Severe Pain (7 - 10)  magnesium hydroxide Suspension 30 milliLiter(s) Oral every 12 hours PRN Constipation  zolpidem 5 milliGRAM(s) Oral at bedtime PRN Insomnia  zolpidem 5 milliGRAM(s) Oral at bedtime PRN Insomnia      Vital Signs Last 24 Hrs  T(C): 36.6 (30 Nov 2020 08:50), Max: 37.1 (29 Nov 2020 13:05)  T(F): 97.9 (30 Nov 2020 08:50), Max: 98.8 (30 Nov 2020 01:23)  HR: 72 (30 Nov 2020 08:50) (63 - 89)  BP: 117/59 (30 Nov 2020 08:50) (107/58 - 145/65)  BP(mean): --  RR: 16 (30 Nov 2020 08:50) (16 - 20)  SpO2: 97% (30 Nov 2020 08:50) (97% - 100%)  CAPILLARY BLOOD GLUCOSE      POCT Blood Glucose.: 302 mg/dL (30 Nov 2020 12:07)  POCT Blood Glucose.: 214 mg/dL (30 Nov 2020 07:30)  POCT Blood Glucose.: 239 mg/dL (29 Nov 2020 21:24)  POCT Blood Glucose.: 230 mg/dL (29 Nov 2020 16:54)    I&O's Summary    29 Nov 2020 07:01  -  30 Nov 2020 07:00  --------------------------------------------------------  IN: 0 mL / OUT: 800 mL / NET: -800 mL        PHYSICAL EXAM:  GENERAL: NAD  HEAD:  Atraumatic, Normocephalic  EYES: EOMI, PERRLA, conjunctiva and sclera clear  NECK: Supple, No JVD  CHEST/LUNG: Clear to auscultation bilaterally; No wheeze  HEART: Regular rate and rhythm; No murmurs, rubs, or gallops  ABDOMEN: Soft, Nontender, Nondistended; Bowel sounds present  BACK: Non tender, ROM limited due to pain.  EXTREMITIES:  2+ Peripheral Pulses, No clubbing, cyanosis.  PSYCH: Calm  NEUROLOGY: AAOx3, non-focal neurological exam  SKIN: Surgical dressing C/D/I    LABS:                        9.2    9.64  )-----------( 453      ( 29 Nov 2020 11:45 )             30.0                     RADIOLOGY & ADDITIONAL TESTS:    Imaging Personally Reviewed:    Care Discussed with Consultants/Other Providers:    Care Discussed with Orthopedic PA about: Pain control, BP control.

## 2020-11-30 NOTE — CHART NOTE - TREATMENT: THE FOLLOWING DIET HAS BEEN RECOMMENDED
Diet, Consistent Carbohydrate w/Evening Snack:   Kosher (11-20-20 @ 11:28) [Active]    Add Glucerna Therapeutic Nutrition Shake 240mls 1x daily (220kcals, 10g protein).

## 2020-11-30 NOTE — DIETITIAN INITIAL EVALUATION ADULT. - PERTINENT LABORATORY DATA
11-29 Hgb 9.2 g/dL<L> Hct 30.0 %<L> 24Hr FS:302 mg/dL  214 mg/dL  239 mg/dL  230 mg/dL  11-18 HbA1c 7.6%

## 2020-11-30 NOTE — DIETITIAN INITIAL EVALUATION ADULT. - RD TO REMAIN AVAILABLE
4. Nutrition and  Department will honor food and beverage preferences of patient in an effort to maximize level of nutrient intake.

## 2020-11-30 NOTE — DIETITIAN INITIAL EVALUATION ADULT. - PERTINENT MEDS FT
acetaminophen   Tablet .. PRN  aspirin enteric coated  bisacodyl Suppository PRN  clonazePAM  Tablet PRN  clopidogrel Tablet  furosemide    Tablet  HYDROmorphone   Tablet PRN  insulin glargine Injectable (LANTUS)  insulin lispro (ADMELOG) corrective regimen sliding scale  insulin lispro Injectable (ADMELOG)  lactulose Syrup  metoprolol tartrate  polyethylene glycol 3350  sacubitril 24 mG/valsartan 26 mG  simethicone  spironolactone  tamsulosin  traMADol  zolpidem PRN

## 2020-11-30 NOTE — DIETITIAN INITIAL EVALUATION ADULT. - COLLABORATION WITH OTHER PROVIDERS
5. Suggest outpatient follow up with an endocrinologist to ensure long-term DM diet comprehension and compliance.

## 2020-11-30 NOTE — DIETITIAN INITIAL EVALUATION ADULT. - SIGNS/SYMPTOMS
As evidenced by <75% intake of nutrition needs >7 days, 3.6% weight loss in 3 weeks As evidenced by s/p surgery

## 2020-12-01 LAB
GLUCOSE BLDC GLUCOMTR-MCNC: 182 MG/DL — HIGH (ref 70–99)
GLUCOSE BLDC GLUCOMTR-MCNC: 194 MG/DL — HIGH (ref 70–99)
GLUCOSE BLDC GLUCOMTR-MCNC: 201 MG/DL — HIGH (ref 70–99)
GLUCOSE BLDC GLUCOMTR-MCNC: 339 MG/DL — HIGH (ref 70–99)

## 2020-12-01 PROCEDURE — 99232 SBSQ HOSP IP/OBS MODERATE 35: CPT

## 2020-12-01 RX ORDER — ZOLPIDEM TARTRATE 10 MG/1
5 TABLET ORAL AT BEDTIME
Refills: 0 | Status: DISCONTINUED | OUTPATIENT
Start: 2020-12-01 | End: 2020-12-02

## 2020-12-01 RX ORDER — TRAMADOL HYDROCHLORIDE 50 MG/1
50 TABLET ORAL EVERY 8 HOURS
Refills: 0 | Status: DISCONTINUED | OUTPATIENT
Start: 2020-12-01 | End: 2020-12-02

## 2020-12-01 RX ORDER — CLONAZEPAM 1 MG
1 TABLET ORAL EVERY 8 HOURS
Refills: 0 | Status: DISCONTINUED | OUTPATIENT
Start: 2020-12-01 | End: 2020-12-02

## 2020-12-01 RX ADMIN — ZOLPIDEM TARTRATE 5 MILLIGRAM(S): 10 TABLET ORAL at 23:00

## 2020-12-01 RX ADMIN — GABAPENTIN 100 MILLIGRAM(S): 400 CAPSULE ORAL at 14:05

## 2020-12-01 RX ADMIN — TIOTROPIUM BROMIDE 1 CAPSULE(S): 18 CAPSULE ORAL; RESPIRATORY (INHALATION) at 11:14

## 2020-12-01 RX ADMIN — SPIRONOLACTONE 25 MILLIGRAM(S): 25 TABLET, FILM COATED ORAL at 06:17

## 2020-12-01 RX ADMIN — Medication 1 MILLIGRAM(S): at 21:34

## 2020-12-01 RX ADMIN — Medication 975 MILLIGRAM(S): at 08:45

## 2020-12-01 RX ADMIN — Medication 2: at 17:50

## 2020-12-01 RX ADMIN — Medication 975 MILLIGRAM(S): at 20:24

## 2020-12-01 RX ADMIN — Medication 60 MILLIGRAM(S): at 06:17

## 2020-12-01 RX ADMIN — Medication 13 UNIT(S): at 07:34

## 2020-12-01 RX ADMIN — FAMOTIDINE 40 MILLIGRAM(S): 10 INJECTION INTRAVENOUS at 17:49

## 2020-12-01 RX ADMIN — Medication 975 MILLIGRAM(S): at 21:00

## 2020-12-01 RX ADMIN — BUDESONIDE AND FORMOTEROL FUMARATE DIHYDRATE 2 PUFF(S): 160; 4.5 AEROSOL RESPIRATORY (INHALATION) at 08:44

## 2020-12-01 RX ADMIN — Medication 81 MILLIGRAM(S): at 12:15

## 2020-12-01 RX ADMIN — Medication 13 UNIT(S): at 17:50

## 2020-12-01 RX ADMIN — SIMETHICONE 80 MILLIGRAM(S): 80 TABLET, CHEWABLE ORAL at 12:16

## 2020-12-01 RX ADMIN — Medication 8: at 12:15

## 2020-12-01 RX ADMIN — Medication 1 MILLIGRAM(S): at 12:42

## 2020-12-01 RX ADMIN — INSULIN GLARGINE 55 UNIT(S): 100 INJECTION, SOLUTION SUBCUTANEOUS at 21:34

## 2020-12-01 RX ADMIN — GABAPENTIN 100 MILLIGRAM(S): 400 CAPSULE ORAL at 06:17

## 2020-12-01 RX ADMIN — GABAPENTIN 100 MILLIGRAM(S): 400 CAPSULE ORAL at 21:33

## 2020-12-01 RX ADMIN — TRAMADOL HYDROCHLORIDE 50 MILLIGRAM(S): 50 TABLET ORAL at 04:20

## 2020-12-01 RX ADMIN — TRAMADOL HYDROCHLORIDE 50 MILLIGRAM(S): 50 TABLET ORAL at 14:06

## 2020-12-01 RX ADMIN — SACUBITRIL AND VALSARTAN 1 TABLET(S): 24; 26 TABLET, FILM COATED ORAL at 17:49

## 2020-12-01 RX ADMIN — Medication 13 UNIT(S): at 12:15

## 2020-12-01 RX ADMIN — Medication 975 MILLIGRAM(S): at 09:45

## 2020-12-01 RX ADMIN — FAMOTIDINE 40 MILLIGRAM(S): 10 INJECTION INTRAVENOUS at 06:17

## 2020-12-01 RX ADMIN — CLOPIDOGREL BISULFATE 75 MILLIGRAM(S): 75 TABLET, FILM COATED ORAL at 12:16

## 2020-12-01 RX ADMIN — Medication 4: at 07:34

## 2020-12-01 RX ADMIN — ATORVASTATIN CALCIUM 20 MILLIGRAM(S): 80 TABLET, FILM COATED ORAL at 21:33

## 2020-12-01 RX ADMIN — TAMSULOSIN HYDROCHLORIDE 0.4 MILLIGRAM(S): 0.4 CAPSULE ORAL at 21:33

## 2020-12-01 RX ADMIN — POLYETHYLENE GLYCOL 3350 17 GRAM(S): 17 POWDER, FOR SOLUTION ORAL at 12:16

## 2020-12-01 RX ADMIN — SACUBITRIL AND VALSARTAN 1 TABLET(S): 24; 26 TABLET, FILM COATED ORAL at 06:17

## 2020-12-01 RX ADMIN — BUPROPION HYDROCHLORIDE 300 MILLIGRAM(S): 150 TABLET, EXTENDED RELEASE ORAL at 08:44

## 2020-12-01 NOTE — PROGRESS NOTE ADULT - SUBJECTIVE AND OBJECTIVE BOX
Orthopaedic Surgery Progress Note    Subjective:   Patient seen and examined. No acute events overnight.     Objective:  T(C): 37.3 (12-01-20 @ 04:15), Max: 37.3 (12-01-20 @ 04:15)  HR: 83 (12-01-20 @ 04:15) (70 - 83)  BP: 124/59 (12-01-20 @ 04:15) (107/58 - 135/52)  RR: 16 (12-01-20 @ 04:15) (16 - 18)  SpO2: 100% (12-01-20 @ 04:15) (97% - 100%)  Wt(kg): --    11-29 @ 07:01  -  11-30 @ 07:00  --------------------------------------------------------  IN: 0 mL / OUT: 800 mL / NET: -800 mL    11-30 @ 07:01  -  12-01 @ 06:16  --------------------------------------------------------  IN: 0 mL / OUT: 550 mL / NET: -550 mL        PE  Gen: NAD  Back:   Prevena/aquacel dressing C/D/I, mild appropriate rina-incisional ttp  Neuro:  RLE IP 5/5 HS 5/5 Q 5/5 GS 5/5 TA 5/5 EHL 5/5   SILT L2-S1  LLE IP 5/5 HS 5/5 Q 5/5 GS 5/5 TA 5/5 EHL 5/5  SILT L2-S1  WWP BLE                          9.2    9.64  )-----------( 453      ( 29 Nov 2020 11:45 )             30.0             A/P: 70M s/p T10-Pelvis Exploration of rev PSF w/flap  - Pain control  - IS  - Regular diet, bowel reg  - WBAT, PT/OT  - DVT PPX: Venodynes, Plavix  - Dispo: SubAcute Rehab pending insurance authorization  - Appreciate endo recs

## 2020-12-01 NOTE — PROGRESS NOTE ADULT - SUBJECTIVE AND OBJECTIVE BOX
Chief Complaint: DM 2 with hyperglycemia     History: Patient seen at bedside before lunch. Reports he is eating meals, denies n/v, denies s/s of hypoglycemia  Plan for discharge to rehab - reviewed current insulin regimen, as well as resuming home DM regimen once dc back home - patient is agreeable    MEDICATIONS  (STANDING):  aspirin enteric coated 81 milliGRAM(s) Oral daily  atorvastatin 20 milliGRAM(s) Oral at bedtime  budesonide  80 MICROgram(s)/formoterol 4.5 MICROgram(s) Inhaler 2 Puff(s) Inhalation two times a day  buPROPion XL . 300 milliGRAM(s) Oral daily  clopidogrel Tablet 75 milliGRAM(s) Oral daily  dextrose 40% Gel 15 Gram(s) Oral once  dextrose 5%. 1000 milliLiter(s) (50 mL/Hr) IV Continuous <Continuous>  dextrose 5%. 1000 milliLiter(s) (100 mL/Hr) IV Continuous <Continuous>  dextrose 50% Injectable 25 Gram(s) IV Push once  dextrose 50% Injectable 12.5 Gram(s) IV Push once  dextrose 50% Injectable 25 Gram(s) IV Push once  famotidine    Tablet 40 milliGRAM(s) Oral two times a day  furosemide    Tablet 60 milliGRAM(s) Oral daily  gabapentin 100 milliGRAM(s) Oral three times a day  glucagon  Injectable 1 milliGRAM(s) IntraMuscular once  insulin glargine Injectable (LANTUS) 55 Unit(s) SubCutaneous at bedtime  insulin lispro (ADMELOG) corrective regimen sliding scale   SubCutaneous at bedtime  insulin lispro (ADMELOG) corrective regimen sliding scale   SubCutaneous three times a day before meals  insulin lispro Injectable (ADMELOG) 13 Unit(s) SubCutaneous three times a day before meals  lactulose Syrup 10 Gram(s) Oral three times a day  metoprolol tartrate 12.5 milliGRAM(s) Oral daily  polyethylene glycol 3350 17 Gram(s) Oral daily  sacubitril 24 mG/valsartan 26 mG 1 Tablet(s) Oral two times a day  senna 2 Tablet(s) Oral at bedtime  simethicone 80 milliGRAM(s) Chew daily  spironolactone 25 milliGRAM(s) Oral daily  tamsulosin 0.4 milliGRAM(s) Oral at bedtime  tiotropium 18 MICROgram(s) Capsule 1 Capsule(s) Inhalation daily  traMADol 50 milliGRAM(s) Oral every 8 hours    MEDICATIONS  (PRN):  acetaminophen   Tablet .. 975 milliGRAM(s) Oral every 8 hours PRN Mild Pain (1 - 3)  ALBUTerol    90 MICROgram(s) HFA Inhaler 2 Puff(s) Inhalation every 6 hours PRN Bronchospasm  ALBUTerol    90 MICROgram(s) HFA Inhaler 2 Puff(s) Inhalation every 6 hours PRN Bronchospasm  bisacodyl Suppository 10 milliGRAM(s) Rectal daily PRN Constipation  clonazePAM  Tablet 1 milliGRAM(s) Oral every 8 hours PRN Anxiety  HYDROmorphone   Tablet 2 milliGRAM(s) Oral every 4 hours PRN Moderate Pain (4 - 6)  HYDROmorphone   Tablet 4 milliGRAM(s) Oral every 4 hours PRN Severe Pain (7 - 10)  magnesium hydroxide Suspension 30 milliLiter(s) Oral every 12 hours PRN Constipation  zolpidem 5 milliGRAM(s) Oral at bedtime PRN Insomnia  zolpidem 5 milliGRAM(s) Oral at bedtime PRN Insomnia    No Known Allergies    Review of Systems:  HEENT: No pain  Cardiovascular: No chest pain  Respiratory: No SOB  GI: No nausea, vomiting    PHYSICAL EXAM:  VITALS: T(C): 36.5 (12-01-20 @ 14:15)  T(F): 97.7 (12-01-20 @ 14:15), Max: 99.2 (12-01-20 @ 04:15)  HR: 81 (12-01-20 @ 14:15) (70 - 91)  BP: 122/61 (12-01-20 @ 14:15) (100/57 - 135/52)  RR:  (16 - 17)  SpO2:  (98% - 100%)  Wt(kg): --  GENERAL: NAD  EYES: No proptosis, no lid lag, anicteric  HEENT:  Atraumatic, Normocephalic, moist mucous membranes  RESPIRATORY: unlabored respirations   PSYCH: Alert and oriented x 3    CAPILLARY BLOOD GLUCOSE    POCT Blood Glucose.: 339 mg/dL (01 Dec 2020 11:42)  POCT Blood Glucose.: 201 mg/dL (01 Dec 2020 07:23)  POCT Blood Glucose.: 203 mg/dL (30 Nov 2020 21:14)  POCT Blood Glucose.: 175 mg/dL (30 Nov 2020 17:11)      Thyroid Function Tests:      A1C with Estimated Average Glucose: 7.6 % (11-18-20 @ 10:35)  Hemoglobin A1C, Whole Blood: 7.7 % (01-02-20 @ 20:13)  Hemoglobin A1C, Whole Blood: 7.2 % (12-20-19 @ 08:50)

## 2020-12-01 NOTE — PROGRESS NOTE ADULT - REASON FOR ADMISSION
Spine
pelvic fusion
T10-pelvis PSF with PRS closure
back surgery
T10-pelvis PSF with PRS closure
T10-pelvis PSF with PRS closure
back surgery

## 2020-12-01 NOTE — PROGRESS NOTE ADULT - PROBLEM SELECTOR PLAN 6
-Per pt - he had ECHO back in 9/2020 showing LVEF of 25% - reportedly related to "sepsis" per pt.  -Euvolemic at this time.  -Cont metoprolol, Entresto, lasix.  - will decrease lasix dosing for soft BP.  -Follow up with cardio as OP.

## 2020-12-01 NOTE — PROGRESS NOTE ADULT - ATTENDING COMMENTS
Tevin Llamas MD   Pager # [phone]  On evenings and weekends, please call the office at [phone] or page endocrine fellow on call. Please note that this patient may be followed by different provider tomorrow. If no answer, contact the office.
Zayda Adams MD  Division of Endocrinology  Pager: [phone]    If after 6PM or before 9AM, or on weekends/holidays, please call endocrine answering service for assistance ([phone]).  For nonurgent matters email [email or web address] for assistance.
Zayda Adams MD  Division of Endocrinology  Pager: [phone]    If after 6PM or before 9AM, or on weekends/holidays, please call endocrine answering service for assistance ([phone]).  For nonurgent matters email [email or web address] for assistance.
Patient medically optimized for discharge.  Discharge planning 40 minutes - discussed with patient and consultants.
Patient medically optimized for discharge.  Discharge planning 40 minutes - discussed with patient and consultants.

## 2020-12-01 NOTE — PROGRESS NOTE ADULT - SUBJECTIVE AND OBJECTIVE BOX
[facility] Division of Hospital Medicine  Renato Hernandes MD  Pager (M-F, [phone]): [phone]  Other Times:  [phone]    Patient is a 70y old  Male who presents with a chief complaint of back surgery (30 Nov 2020 13:00)    SUBJECTIVE / OVERNIGHT EVENTS:  Patient offers no new complaints.  Still concerned about overall low BP.  No F/C, N/V, CP, SOB, Cough, lightheadedness, dizziness, abdominal pain, diarrhea, dysuria.    MEDICATIONS  (STANDING):  aspirin enteric coated 81 milliGRAM(s) Oral daily  atorvastatin 20 milliGRAM(s) Oral at bedtime  budesonide  80 MICROgram(s)/formoterol 4.5 MICROgram(s) Inhaler 2 Puff(s) Inhalation two times a day  buPROPion XL . 300 milliGRAM(s) Oral daily  clopidogrel Tablet 75 milliGRAM(s) Oral daily  dextrose 40% Gel 15 Gram(s) Oral once  dextrose 5%. 1000 milliLiter(s) (50 mL/Hr) IV Continuous <Continuous>  dextrose 5%. 1000 milliLiter(s) (100 mL/Hr) IV Continuous <Continuous>  dextrose 50% Injectable 25 Gram(s) IV Push once  dextrose 50% Injectable 12.5 Gram(s) IV Push once  dextrose 50% Injectable 25 Gram(s) IV Push once  famotidine    Tablet 40 milliGRAM(s) Oral two times a day  furosemide    Tablet 60 milliGRAM(s) Oral daily  gabapentin 100 milliGRAM(s) Oral three times a day  glucagon  Injectable 1 milliGRAM(s) IntraMuscular once  insulin glargine Injectable (LANTUS) 55 Unit(s) SubCutaneous at bedtime  insulin lispro (ADMELOG) corrective regimen sliding scale   SubCutaneous at bedtime  insulin lispro (ADMELOG) corrective regimen sliding scale   SubCutaneous three times a day before meals  insulin lispro Injectable (ADMELOG) 13 Unit(s) SubCutaneous three times a day before meals  lactulose Syrup 10 Gram(s) Oral three times a day  metoprolol tartrate 12.5 milliGRAM(s) Oral daily  polyethylene glycol 3350 17 Gram(s) Oral daily  sacubitril 24 mG/valsartan 26 mG 1 Tablet(s) Oral two times a day  senna 2 Tablet(s) Oral at bedtime  simethicone 80 milliGRAM(s) Chew daily  spironolactone 25 milliGRAM(s) Oral daily  tamsulosin 0.4 milliGRAM(s) Oral at bedtime  tiotropium 18 MICROgram(s) Capsule 1 Capsule(s) Inhalation daily  traMADol 50 milliGRAM(s) Oral every 8 hours    MEDICATIONS  (PRN):  acetaminophen   Tablet .. 975 milliGRAM(s) Oral every 8 hours PRN Mild Pain (1 - 3)  ALBUTerol    90 MICROgram(s) HFA Inhaler 2 Puff(s) Inhalation every 6 hours PRN Bronchospasm  ALBUTerol    90 MICROgram(s) HFA Inhaler 2 Puff(s) Inhalation every 6 hours PRN Bronchospasm  bisacodyl Suppository 10 milliGRAM(s) Rectal daily PRN Constipation  clonazePAM  Tablet 1 milliGRAM(s) Oral every 8 hours PRN Anxiety  HYDROmorphone   Tablet 2 milliGRAM(s) Oral every 4 hours PRN Moderate Pain (4 - 6)  HYDROmorphone   Tablet 4 milliGRAM(s) Oral every 4 hours PRN Severe Pain (7 - 10)  magnesium hydroxide Suspension 30 milliLiter(s) Oral every 12 hours PRN Constipation  zolpidem 5 milliGRAM(s) Oral at bedtime PRN Insomnia  zolpidem 5 milliGRAM(s) Oral at bedtime PRN Insomnia      Vital Signs Last 24 Hrs  T(C): 37 (01 Dec 2020 09:49), Max: 37.3 (01 Dec 2020 04:15)  T(F): 98.6 (01 Dec 2020 09:49), Max: 99.2 (01 Dec 2020 04:15)  HR: 91 (01 Dec 2020 09:49) (70 - 91)  BP: 100/57 (01 Dec 2020 09:49) (100/57 - 135/52)  BP(mean): --  RR: 16 (01 Dec 2020 09:49) (16 - 17)  SpO2: 100% (01 Dec 2020 09:49) (98% - 100%)  CAPILLARY BLOOD GLUCOSE      POCT Blood Glucose.: 339 mg/dL (01 Dec 2020 11:42)  POCT Blood Glucose.: 201 mg/dL (01 Dec 2020 07:23)  POCT Blood Glucose.: 203 mg/dL (30 Nov 2020 21:14)  POCT Blood Glucose.: 175 mg/dL (30 Nov 2020 17:11)    I&O's Summary    30 Nov 2020 07:01  -  01 Dec 2020 07:00  --------------------------------------------------------  IN: 0 mL / OUT: 850 mL / NET: -850 mL    01 Dec 2020 07:01  -  01 Dec 2020 13:02  --------------------------------------------------------  IN: 0 mL / OUT: 400 mL / NET: -400 mL        PHYSICAL EXAM:  GENERAL: NAD  HEAD:  Atraumatic, Normocephalic  EYES: EOMI, PERRLA, conjunctiva and sclera clear  NECK: Supple, No JVD  CHEST/LUNG: Clear to auscultation bilaterally; No wheeze  HEART: Regular rate and rhythm; No murmurs, rubs, or gallops  ABDOMEN: Soft, Nontender, Nondistended; Bowel sounds present  BACK: Non tender, ROM limited due to pain.  EXTREMITIES:  2+ Peripheral Pulses, No clubbing, cyanosis.  PSYCH: Calm  NEUROLOGY: AAOx3, non-focal neurological exam  SKIN: Surgical dressing C/D/I    LABS:                    RADIOLOGY & ADDITIONAL TESTS:    Imaging Personally Reviewed:    Care Discussed with Consultants/Other Providers:    Care Discussed with Orthopedic PA about:

## 2020-12-02 VITALS
OXYGEN SATURATION: 100 % | HEART RATE: 72 BPM | DIASTOLIC BLOOD PRESSURE: 63 MMHG | TEMPERATURE: 98 F | SYSTOLIC BLOOD PRESSURE: 109 MMHG | RESPIRATION RATE: 16 BRPM

## 2020-12-02 LAB
GLUCOSE BLDC GLUCOMTR-MCNC: 158 MG/DL — HIGH (ref 70–99)
GLUCOSE BLDC GLUCOMTR-MCNC: 188 MG/DL — HIGH (ref 70–99)
GLUCOSE BLDC GLUCOMTR-MCNC: 272 MG/DL — HIGH (ref 70–99)

## 2020-12-02 PROCEDURE — 99232 SBSQ HOSP IP/OBS MODERATE 35: CPT

## 2020-12-02 PROCEDURE — 99231 SBSQ HOSP IP/OBS SF/LOW 25: CPT

## 2020-12-02 RX ORDER — FOLIC ACID 7.5 MG
0 TABLET ORAL
Qty: 0 | Refills: 0 | DISCHARGE

## 2020-12-02 RX ORDER — CLONAZEPAM 1 MG
1 TABLET ORAL
Qty: 0 | Refills: 0 | DISCHARGE
Start: 2020-12-02

## 2020-12-02 RX ORDER — METHYLPREDNISOLONE 4 MG
0 TABLET ORAL
Qty: 0 | Refills: 0 | DISCHARGE

## 2020-12-02 RX ORDER — ALBUTEROL 90 UG/1
2 AEROSOL, METERED ORAL
Qty: 0 | Refills: 0 | DISCHARGE
Start: 2020-12-02

## 2020-12-02 RX ORDER — POLYETHYLENE GLYCOL 3350 17 G/17G
17 POWDER, FOR SOLUTION ORAL
Qty: 0 | Refills: 0 | DISCHARGE
Start: 2020-12-02

## 2020-12-02 RX ORDER — CLONAZEPAM 1 MG
1 TABLET ORAL
Qty: 0 | Refills: 0 | DISCHARGE

## 2020-12-02 RX ORDER — HYDROMORPHONE HYDROCHLORIDE 2 MG/ML
1 INJECTION INTRAMUSCULAR; INTRAVENOUS; SUBCUTANEOUS
Qty: 0 | Refills: 0 | DISCHARGE
Start: 2020-12-02

## 2020-12-02 RX ORDER — TRAMADOL HYDROCHLORIDE 50 MG/1
1 TABLET ORAL
Qty: 0 | Refills: 0 | DISCHARGE
Start: 2020-12-02

## 2020-12-02 RX ORDER — ZOLPIDEM TARTRATE 10 MG/1
1 TABLET ORAL
Qty: 0 | Refills: 0 | DISCHARGE

## 2020-12-02 RX ORDER — SENNA PLUS 8.6 MG/1
2 TABLET ORAL
Qty: 0 | Refills: 0 | DISCHARGE
Start: 2020-12-02

## 2020-12-02 RX ORDER — TAMSULOSIN HYDROCHLORIDE 0.4 MG/1
1 CAPSULE ORAL
Qty: 0 | Refills: 0 | DISCHARGE
Start: 2020-12-02

## 2020-12-02 RX ORDER — CHOLECALCIFEROL (VITAMIN D3) 125 MCG
0 CAPSULE ORAL
Qty: 0 | Refills: 0 | DISCHARGE

## 2020-12-02 RX ORDER — SIMETHICONE 80 MG/1
1 TABLET, CHEWABLE ORAL
Qty: 0 | Refills: 0 | DISCHARGE
Start: 2020-12-02

## 2020-12-02 RX ORDER — ASCORBIC ACID 60 MG
0 TABLET,CHEWABLE ORAL
Qty: 0 | Refills: 0 | DISCHARGE

## 2020-12-02 RX ORDER — LACTULOSE 10 G/15ML
15 SOLUTION ORAL
Qty: 0 | Refills: 0 | DISCHARGE
Start: 2020-12-02

## 2020-12-02 RX ORDER — GABAPENTIN 400 MG/1
1 CAPSULE ORAL
Qty: 0 | Refills: 0 | DISCHARGE
Start: 2020-12-02

## 2020-12-02 RX ORDER — METOPROLOL TARTRATE 50 MG
0 TABLET ORAL
Qty: 0 | Refills: 0 | DISCHARGE

## 2020-12-02 RX ORDER — ZOLPIDEM TARTRATE 10 MG/1
1 TABLET ORAL
Qty: 0 | Refills: 0 | DISCHARGE
Start: 2020-12-02

## 2020-12-02 RX ADMIN — Medication 2: at 17:50

## 2020-12-02 RX ADMIN — Medication 60 MILLIGRAM(S): at 06:11

## 2020-12-02 RX ADMIN — TIOTROPIUM BROMIDE 1 CAPSULE(S): 18 CAPSULE ORAL; RESPIRATORY (INHALATION) at 09:10

## 2020-12-02 RX ADMIN — TRAMADOL HYDROCHLORIDE 50 MILLIGRAM(S): 50 TABLET ORAL at 13:36

## 2020-12-02 RX ADMIN — GABAPENTIN 100 MILLIGRAM(S): 400 CAPSULE ORAL at 06:12

## 2020-12-02 RX ADMIN — SACUBITRIL AND VALSARTAN 1 TABLET(S): 24; 26 TABLET, FILM COATED ORAL at 18:47

## 2020-12-02 RX ADMIN — BUDESONIDE AND FORMOTEROL FUMARATE DIHYDRATE 2 PUFF(S): 160; 4.5 AEROSOL RESPIRATORY (INHALATION) at 09:42

## 2020-12-02 RX ADMIN — Medication 975 MILLIGRAM(S): at 13:39

## 2020-12-02 RX ADMIN — Medication 81 MILLIGRAM(S): at 12:41

## 2020-12-02 RX ADMIN — Medication 1 MILLIGRAM(S): at 13:39

## 2020-12-02 RX ADMIN — POLYETHYLENE GLYCOL 3350 17 GRAM(S): 17 POWDER, FOR SOLUTION ORAL at 12:42

## 2020-12-02 RX ADMIN — FAMOTIDINE 40 MILLIGRAM(S): 10 INJECTION INTRAVENOUS at 06:12

## 2020-12-02 RX ADMIN — Medication 13 UNIT(S): at 17:50

## 2020-12-02 RX ADMIN — Medication 975 MILLIGRAM(S): at 15:44

## 2020-12-02 RX ADMIN — Medication 12.5 MILLIGRAM(S): at 06:12

## 2020-12-02 RX ADMIN — Medication 975 MILLIGRAM(S): at 17:50

## 2020-12-02 RX ADMIN — Medication 13 UNIT(S): at 13:17

## 2020-12-02 RX ADMIN — SACUBITRIL AND VALSARTAN 1 TABLET(S): 24; 26 TABLET, FILM COATED ORAL at 06:11

## 2020-12-02 RX ADMIN — GABAPENTIN 100 MILLIGRAM(S): 400 CAPSULE ORAL at 13:30

## 2020-12-02 RX ADMIN — BUPROPION HYDROCHLORIDE 300 MILLIGRAM(S): 150 TABLET, EXTENDED RELEASE ORAL at 09:03

## 2020-12-02 RX ADMIN — CLOPIDOGREL BISULFATE 75 MILLIGRAM(S): 75 TABLET, FILM COATED ORAL at 12:41

## 2020-12-02 RX ADMIN — Medication 13 UNIT(S): at 08:05

## 2020-12-02 RX ADMIN — TRAMADOL HYDROCHLORIDE 50 MILLIGRAM(S): 50 TABLET ORAL at 06:11

## 2020-12-02 RX ADMIN — FAMOTIDINE 40 MILLIGRAM(S): 10 INJECTION INTRAVENOUS at 18:47

## 2020-12-02 RX ADMIN — SIMETHICONE 80 MILLIGRAM(S): 80 TABLET, CHEWABLE ORAL at 12:41

## 2020-12-02 RX ADMIN — Medication 2: at 08:04

## 2020-12-02 RX ADMIN — Medication 6: at 13:16

## 2020-12-02 RX ADMIN — SPIRONOLACTONE 25 MILLIGRAM(S): 25 TABLET, FILM COATED ORAL at 06:12

## 2020-12-02 NOTE — PROGRESS NOTE ADULT - SUBJECTIVE AND OBJECTIVE BOX
CHIEF COMPLAINT: f/u spinal surgery    SUBJECTIVE / OVERNIGHT EVENTS: Patient seen and examined. No acute events overnight. Pain well controlled and patient without any complaints. Patient reports his frustration with having to wait for rehab authorization.    MEDICATIONS  (STANDING):  aspirin enteric coated 81 milliGRAM(s) Oral daily  atorvastatin 20 milliGRAM(s) Oral at bedtime  budesonide  80 MICROgram(s)/formoterol 4.5 MICROgram(s) Inhaler 2 Puff(s) Inhalation two times a day  buPROPion XL . 300 milliGRAM(s) Oral daily  clopidogrel Tablet 75 milliGRAM(s) Oral daily  dextrose 40% Gel 15 Gram(s) Oral once  dextrose 5%. 1000 milliLiter(s) (50 mL/Hr) IV Continuous <Continuous>  dextrose 5%. 1000 milliLiter(s) (100 mL/Hr) IV Continuous <Continuous>  dextrose 50% Injectable 25 Gram(s) IV Push once  dextrose 50% Injectable 12.5 Gram(s) IV Push once  dextrose 50% Injectable 25 Gram(s) IV Push once  famotidine    Tablet 40 milliGRAM(s) Oral two times a day  furosemide    Tablet 60 milliGRAM(s) Oral daily  gabapentin 100 milliGRAM(s) Oral three times a day  glucagon  Injectable 1 milliGRAM(s) IntraMuscular once  insulin glargine Injectable (LANTUS) 55 Unit(s) SubCutaneous at bedtime  insulin lispro (ADMELOG) corrective regimen sliding scale   SubCutaneous at bedtime  insulin lispro (ADMELOG) corrective regimen sliding scale   SubCutaneous three times a day before meals  insulin lispro Injectable (ADMELOG) 13 Unit(s) SubCutaneous three times a day before meals  lactulose Syrup 10 Gram(s) Oral three times a day  metoprolol tartrate 12.5 milliGRAM(s) Oral daily  polyethylene glycol 3350 17 Gram(s) Oral daily  sacubitril 24 mG/valsartan 26 mG 1 Tablet(s) Oral two times a day  senna 2 Tablet(s) Oral at bedtime  simethicone 80 milliGRAM(s) Chew daily  spironolactone 25 milliGRAM(s) Oral daily  tamsulosin 0.4 milliGRAM(s) Oral at bedtime  tiotropium 18 MICROgram(s) Capsule 1 Capsule(s) Inhalation daily  traMADol 50 milliGRAM(s) Oral every 8 hours    MEDICATIONS  (PRN):  acetaminophen   Tablet .. 975 milliGRAM(s) Oral every 8 hours PRN Mild Pain (1 - 3)  ALBUTerol    90 MICROgram(s) HFA Inhaler 2 Puff(s) Inhalation every 6 hours PRN Bronchospasm  ALBUTerol    90 MICROgram(s) HFA Inhaler 2 Puff(s) Inhalation every 6 hours PRN Bronchospasm  bisacodyl Suppository 10 milliGRAM(s) Rectal daily PRN Constipation  clonazePAM  Tablet 1 milliGRAM(s) Oral every 8 hours PRN Anxiety  HYDROmorphone   Tablet 2 milliGRAM(s) Oral every 4 hours PRN Moderate Pain (4 - 6)  HYDROmorphone   Tablet 4 milliGRAM(s) Oral every 4 hours PRN Severe Pain (7 - 10)  magnesium hydroxide Suspension 30 milliLiter(s) Oral every 12 hours PRN Constipation  zolpidem 5 milliGRAM(s) Oral at bedtime PRN Insomnia  zolpidem 5 milliGRAM(s) Oral at bedtime PRN Insomnia      VITALS:  T(F): 98 (12-02-20 @ 14:03), Max: 99.1 (12-01-20 @ 22:00)  HR: 72 (12-02-20 @ 14:03) (70 - 78)  BP: 109/63 (12-02-20 @ 14:03) (103/65 - 114/59)  RR: 16 (12-02-20 @ 14:03) (16 - 17)  SpO2: 100% (12-02-20 @ 14:03)  Wt(kg): --    PHYSICAL EXAM:  GENERAL: NAD, heard of hearing  HEAD:  Atraumatic, Normocephalic  EYES: EOMI, PERRLA, conjunctiva and sclera clear  NECK: Supple, No JVD  CHEST/LUNG: Clear to auscultation bilaterally; No wheeze  HEART: Regular rate and rhythm; No murmurs, rubs, or gallops  ABDOMEN: Soft, Nontender, Nondistended; Bowel sounds present  BACK: Non tender, ROM limited due to pain.  EXTREMITIES:  2+ Peripheral Pulses, No clubbing, cyanosis.  PSYCH: Calm  NEUROLOGY: AAOx3, non-focal neurological exam  SKIN: Surgical dressing C/D/I    LABS:     CAPILLARY BLOOD GLUCOSE  POCT Blood Glucose.: 158 mg/dL (02 Dec 2020 17:28)  POCT Blood Glucose.: 272 mg/dL (02 Dec 2020 12:16)  POCT Blood Glucose.: 188 mg/dL (02 Dec 2020 07:56)  POCT Blood Glucose.: 194 mg/dL (01 Dec 2020 22:09)    [ ] Consultant(s) Notes Reviewed:  [x] Care Discussed with Consultants/Other Providers: Orthopedic PA - discussed disposition

## 2020-12-02 NOTE — PROGRESS NOTE ADULT - NUTRITIONAL ASSESSMENT
This patient has been assessed with a concern for Malnutrition and has been determined to have a diagnosis/diagnoses of Moderate protein-calorie malnutrition.    This patient is being managed with:   Diet Consistent Carbohydrate w/Evening Snack-  [place]  Entered: Nov 20 2020 11:28AM    

## 2020-12-02 NOTE — PROGRESS NOTE ADULT - SUBJECTIVE AND OBJECTIVE BOX
Patient is seen and examined. Denies CP/SOB/Dizziness/N/V/D/HA. Pain is controlled.     Vital Signs Last 24 Hrs  T(C): 37.3 (01 Dec 2020 22:00), Max: 37.3 (01 Dec 2020 22:00)  T(F): 99.1 (01 Dec 2020 22:00), Max: 99.1 (01 Dec 2020 22:00)  HR: 78 (01 Dec 2020 22:00) (78 - 91)  BP: 103/65 (01 Dec 2020 22:00) (100/57 - 122/61)  BP(mean): --  RR: 17 (01 Dec 2020 22:00) (16 - 18)  SpO2: 100% (01 Dec 2020 22:00) (98% - 100%)    Gen: NAD    BACK: Dressing rolling off skin.  Compartments are soft, extremities are warm. Motor intact  5/5 EHL/FHL/TA/GS. Sensation is grossly intact in the BL LE. 1+DP BL LE.       A/P: Patient is a 70y y/o Male s/p T10-pelvis revision Posterior Spinal Fusion    -Pain control/Analgesia  -Inc Spirometry  -Venodynes  -F/U AM Labs  -PT/OT  -WBAT  -New Aquacel placed   -Notify Ortho with any questions

## 2020-12-02 NOTE — PROGRESS NOTE ADULT - ASSESSMENT
70M with PMH of HTN, T2DM, CAD s/p CABG, CVA no residual deficits, depression, s/p lumbar laminectomy 2015, T1 spinal fusion in Jan 2020 s/p T10-pelvis PSF with PRS closure on 11/19.
70 y.o. Male w/ Hx  HTN, T2DM, CAD s/p CABG, CVA no residual deficits, depression, s/p lumbar laminectomy 2015, T1 spinal fusion in Jan 2020 now s/p T10-pelvis PSF with PRS closure on 11/19.
70 y.o. Male w/ Hx  HTN, T2DM, CAD s/p CABG, CVA no residual deficits, depression, s/p lumbar laminectomy 2015, T1 spinal fusion in Jan 2020 now s/p T10-pelvis PSF with PRS closure on 11/19.
70 y.o. Male w/ Hx  HTN, T2DM, CAD s/p CABG, CVA no residual deficits, depression, s/p lumbar laminectomy 2015, T1 spinal fusion in Jan 2020 now s/p T10-pelvis PSF with PRS closure on 11/19. Awaiting acute rehab placement.
70 yr old M Type 2 DM A1C 7.6%, CABG, HTN, CVA, Hx of L adrenalectomy for benign tumor, FREEDOM s/p T10-pelvis fusion.
70 yr old M Type 2 DM A1C 7.6%, CABG, HTN, CVA, Hx of L adrenalectomy for benign tumor, FREEDOM s/p T10-pelvis fusion. Endocrien following for hyperglycemia management.
70 yr old M Type 2 DM A1C 7.6%, CABG, HTN, CVA, Hx of L adrenalectomy for benign tumor, FREEDOM s/p T10-pelvis fusion. Endocrine following for hyperglycemia management.    1. DM 2  -Target -180 mg/dl, BG have been elevated to 200s with occasional 300s based on foods eaten.  -BG acceptable this AM, then elevated to 339 mg/dl at lunch. He received pre-meal and correctional Admelog. If BG remains elevated at dinner, would consider increasing Admelog standing dose, HOWEVER; unable to discuss this with patient so he may refuse. Patient prefers not to adjust his insulin regimen  -Explained that in hospital he is receiving basal and bolus insulin while at home his basal insulin is combined with a GLP-1 plus additional bolus insulin. Also he self adjusts premeal dose at home based on foods consumed.  For now:  -Continue Lantus 55 units SQ qHS   -Continue Admelog 13 units SQ TID before meals (Hold if NPO/not eating meal)  -Continue with Admelog moderate dose correctional scale before meals and Admelog low dose bedtime scale  -Consistent carb diet, RD consult done  -Check BG before meals and bedtime    Discharge Plan:  For REHAB - continue same as above regimen used in hospital.    For going HOME - plan to resume Soliqua and Novolog, and to follow up with his outside endocrinologist Dr. Joycelyn Lal. Based on reported home glucose trends he will likely resume Soliqua 55 units qhs and slightly adjust home Novolog from 8/8/8 to 8/10/8 (also he takes higher dose if he knows he is eating a higher carb meal). Endo team previously reviewed dietary modifications for home.    2. HTN  -Goal less than 130/80, at target  -Management per primary team    3. HLD  -Goal LDL less than 70  -Continue Atorvastatin 20 mg daily if no contraindications  -Followup fasting lipid profile as outpatient    Ese Carey  Nurse Practitioner  Division of Endocrinology & Diabetes  In house pager #[phone]/long range pager #[phone]    If before 9AM or after 6PM, or on weekends/holidays, please call endocrine answering service for assistance ([phone]).  For nonurgent matters email [email or web address] for assistance.
70M with PMH of HTN, T2DM, CAD s/p CABG, CVA no residual deficits, depression, s/p lumbar laminectomy 2015, T1 spinal fusion in Jan 2020 s/p T10-pelvis PSF with PRS closure on 11/19.

## 2020-12-02 NOTE — PROGRESS NOTE ADULT - PROBLEM SELECTOR PLAN 10
DVT ppx- SCD. As per ortho.
DVT ppx- As per ortho.
DVT ppx- SCD. As per ortho.
DVT ppx- As per ortho.

## 2020-12-21 ENCOUNTER — APPOINTMENT (OUTPATIENT)
Dept: ORTHOPEDIC SURGERY | Facility: CLINIC | Age: 70
End: 2020-12-21

## 2020-12-30 ENCOUNTER — NON-APPOINTMENT (OUTPATIENT)
Age: 70
End: 2020-12-30

## 2021-01-04 ENCOUNTER — NON-APPOINTMENT (OUTPATIENT)
Age: 71
End: 2021-01-04

## 2021-01-07 ENCOUNTER — NON-APPOINTMENT (OUTPATIENT)
Age: 71
End: 2021-01-07

## 2021-01-20 ENCOUNTER — APPOINTMENT (OUTPATIENT)
Dept: ORTHOPEDIC SURGERY | Facility: CLINIC | Age: 71
End: 2021-01-20
Payer: MEDICARE

## 2021-01-20 VITALS — TEMPERATURE: 97.2 F

## 2021-01-20 PROCEDURE — 72082 X-RAY EXAM ENTIRE SPI 2/3 VW: CPT

## 2021-01-20 PROCEDURE — 99024 POSTOP FOLLOW-UP VISIT: CPT

## 2021-01-20 NOTE — PHYSICAL EXAM
[Stooped] : stooped [Walker] : ambulates with walker [de-identified] : His incision is healed.  Stable neurologic exam.  He does appear to have slight ulnar escape on the right.  He also has slow alternating hand movements. [de-identified] : AP lateral scoliosis x-rays taken today reveals revision construct now with 3 rods.  He has some slight proximal junctional kyphosis.

## 2021-01-20 NOTE — DISCUSSION/SUMMARY
[de-identified] : We discussed further treatment options.  We reviewed his imaging.  So far he is recovering well from his lumbar procedure.  He does appear to have some evidence of possible cervical myelopathy.  An MRI of his cervical spine will be obtained.  Follow-up afterwards.

## 2021-01-25 ENCOUNTER — NON-APPOINTMENT (OUTPATIENT)
Age: 71
End: 2021-01-25

## 2021-01-27 ENCOUNTER — NON-APPOINTMENT (OUTPATIENT)
Age: 71
End: 2021-01-27

## 2021-01-27 NOTE — ASU PATIENT PROFILE, ADULT - PRO MENTAL HEALTH SX RECENT
1/27 LVM reguarding visit and Consents on 1/28 at 10am  QUESTIONNAIRES NOT SENT  MyC NOT ACTIVE   none

## 2021-02-03 ENCOUNTER — NON-APPOINTMENT (OUTPATIENT)
Age: 71
End: 2021-02-03

## 2021-02-16 RX ORDER — GABAPENTIN 100 MG/1
100 CAPSULE ORAL 3 TIMES DAILY
Qty: 90 | Refills: 0 | Status: ACTIVE | COMMUNITY
Start: 2021-02-16 | End: 1900-01-01

## 2021-02-17 ENCOUNTER — FORM ENCOUNTER (OUTPATIENT)
Age: 71
End: 2021-02-17

## 2021-02-25 ENCOUNTER — NON-APPOINTMENT (OUTPATIENT)
Age: 71
End: 2021-02-25

## 2021-03-24 RX ORDER — GABAPENTIN 100 MG/1
100 CAPSULE ORAL
Qty: 90 | Refills: 0 | Status: ACTIVE | COMMUNITY
Start: 2021-03-24 | End: 1900-01-01

## 2021-04-22 ENCOUNTER — APPOINTMENT (OUTPATIENT)
Dept: ORTHOPEDIC SURGERY | Facility: CLINIC | Age: 71
End: 2021-04-22

## 2021-04-22 ENCOUNTER — NON-APPOINTMENT (OUTPATIENT)
Age: 71
End: 2021-04-22

## 2021-04-28 ENCOUNTER — NON-APPOINTMENT (OUTPATIENT)
Age: 71
End: 2021-04-28

## 2021-05-03 RX ORDER — GABAPENTIN 100 MG/1
100 CAPSULE ORAL
Qty: 90 | Refills: 0 | Status: ACTIVE | COMMUNITY
Start: 2021-05-03 | End: 1900-01-01

## 2021-05-11 ENCOUNTER — APPOINTMENT (OUTPATIENT)
Dept: CT IMAGING | Facility: IMAGING CENTER | Age: 71
End: 2021-05-11

## 2021-05-18 NOTE — SBIRT NOTE ADULT - NSSBIRTAUDITSCORE_GEN_A_CORE_CAL
Patient: Josh Lackey    Procedure(s):  ROBOTIC ASSITED SIGMOID COLECTOMY    Diagnosis:Diverticulitis [K57.92]  Diagnosis Additional Information: No value filed.    Anesthesia Type:  General    Note:  Disposition: Admission   Postop Pain Control: Uneventful            Sign Out: Well controlled pain   PONV: No   Neuro/Psych: Uneventful            Sign Out: Acceptable/Baseline neuro status   Airway/Respiratory: Uneventful            Sign Out: Acceptable/Baseline resp. status   CV/Hemodynamics: Uneventful            Sign Out: Acceptable CV status; No obvious hypovolemia; No obvious fluid overload   Other NRE: NONE   DID A NON-ROUTINE EVENT OCCUR? No           Last vitals:  Vitals:    05/18/21 1400 05/18/21 1455 05/18/21 1500   BP: 109/43  98/60   Pulse: 85  81   Resp: 14 14 14   Temp:   36.7  C (98.1  F)   SpO2: 100%  98%       Last vitals prior to Anesthesia Care Transfer:  CRNA VITALS  5/18/2021 1030 - 5/18/2021 1130      5/18/2021             Pulse:  106    SpO2:  98 %    Resp Rate (observed):  (!) 2    Resp Rate (set):  10    EKG:  Sinus rhythm;PVC's          Electronically Signed By: Anant Calderón DO, DO  May 18, 2021  4:59 PM  
6

## 2021-05-27 ENCOUNTER — OUTPATIENT (OUTPATIENT)
Dept: OUTPATIENT SERVICES | Facility: HOSPITAL | Age: 71
LOS: 1 days | End: 2021-05-27
Payer: MEDICARE

## 2021-05-27 ENCOUNTER — APPOINTMENT (OUTPATIENT)
Dept: CT IMAGING | Facility: CLINIC | Age: 71
End: 2021-05-27
Payer: MEDICARE

## 2021-05-27 DIAGNOSIS — M48.02 SPINAL STENOSIS, CERVICAL REGION: ICD-10-CM

## 2021-05-27 DIAGNOSIS — Z00.8 ENCOUNTER FOR OTHER GENERAL EXAMINATION: ICD-10-CM

## 2021-05-27 DIAGNOSIS — Z95.0 PRESENCE OF CARDIAC PACEMAKER: Chronic | ICD-10-CM

## 2021-05-27 DIAGNOSIS — Z98.890 OTHER SPECIFIED POSTPROCEDURAL STATES: Chronic | ICD-10-CM

## 2021-05-27 PROCEDURE — 72125 CT NECK SPINE W/O DYE: CPT | Mod: 26

## 2021-05-27 PROCEDURE — 72125 CT NECK SPINE W/O DYE: CPT

## 2021-05-27 NOTE — ED ADULT NURSE NOTE - NSHOSCREENINGQ1_ED_ALL_ED
No
follow up with your liver specialist at [facility] within one week.  return to er for recurrent pain or swelling that wont resolve in right groin, fever, uncontrolled vomiting.

## 2021-06-01 ENCOUNTER — INPATIENT (INPATIENT)
Facility: HOSPITAL | Age: 71
LOS: 1 days | Discharge: HOME HEALTH | End: 2021-06-03
Attending: HOSPITALIST | Admitting: HOSPITALIST
Payer: MEDICARE

## 2021-06-01 VITALS — HEIGHT: 69 IN

## 2021-06-01 DIAGNOSIS — Z95.0 PRESENCE OF CARDIAC PACEMAKER: Chronic | ICD-10-CM

## 2021-06-01 DIAGNOSIS — E16.2 HYPOGLYCEMIA, UNSPECIFIED: ICD-10-CM

## 2021-06-01 DIAGNOSIS — Z98.890 OTHER SPECIFIED POSTPROCEDURAL STATES: Chronic | ICD-10-CM

## 2021-06-01 LAB
ALBUMIN SERPL ELPH-MCNC: 4.1 G/DL — SIGNIFICANT CHANGE UP (ref 3.3–5)
ALP SERPL-CCNC: 97 U/L — SIGNIFICANT CHANGE UP (ref 40–120)
ALT FLD-CCNC: 22 U/L — SIGNIFICANT CHANGE UP (ref 4–41)
ANION GAP SERPL CALC-SCNC: 15 MMOL/L — HIGH (ref 7–14)
AST SERPL-CCNC: 19 U/L — SIGNIFICANT CHANGE UP (ref 4–40)
BASE EXCESS BLDV CALC-SCNC: 1.3 MMOL/L — SIGNIFICANT CHANGE UP (ref -3–2)
BASOPHILS # BLD AUTO: 0.06 K/UL — SIGNIFICANT CHANGE UP (ref 0–0.2)
BASOPHILS NFR BLD AUTO: 0.4 % — SIGNIFICANT CHANGE UP (ref 0–2)
BILIRUB SERPL-MCNC: <0.2 MG/DL — SIGNIFICANT CHANGE UP (ref 0.2–1.2)
BLOOD GAS VENOUS - CREATININE: 1.8 MG/DL — HIGH (ref 0.5–1.3)
BLOOD GAS VENOUS COMPREHENSIVE RESULT: SIGNIFICANT CHANGE UP
BUN SERPL-MCNC: 41 MG/DL — HIGH (ref 7–23)
CALCIUM SERPL-MCNC: 9 MG/DL — SIGNIFICANT CHANGE UP (ref 8.4–10.5)
CHLORIDE BLDV-SCNC: 101 MMOL/L — SIGNIFICANT CHANGE UP (ref 96–108)
CHLORIDE SERPL-SCNC: 97 MMOL/L — LOW (ref 98–107)
CK MB CFR SERPL CALC: 4.9 NG/ML — SIGNIFICANT CHANGE UP
CO2 SERPL-SCNC: 23 MMOL/L — SIGNIFICANT CHANGE UP (ref 22–31)
CREAT SERPL-MCNC: 1.67 MG/DL — HIGH (ref 0.5–1.3)
EOSINOPHIL # BLD AUTO: 0.25 K/UL — SIGNIFICANT CHANGE UP (ref 0–0.5)
EOSINOPHIL NFR BLD AUTO: 1.7 % — SIGNIFICANT CHANGE UP (ref 0–6)
GAS PNL BLDV: 131 MMOL/L — LOW (ref 136–146)
GLUCOSE BLDC GLUCOMTR-MCNC: 96 MG/DL — SIGNIFICANT CHANGE UP (ref 70–99)
GLUCOSE BLDV-MCNC: 126 MG/DL — HIGH (ref 70–99)
GLUCOSE SERPL-MCNC: 130 MG/DL — HIGH (ref 70–99)
HCO3 BLDV-SCNC: 25 MMOL/L — SIGNIFICANT CHANGE UP (ref 20–27)
HCT VFR BLD CALC: 36 % — LOW (ref 39–50)
HCT VFR BLDA CALC: 33.3 % — LOW (ref 39–51)
HGB BLD CALC-MCNC: 10.8 G/DL — LOW (ref 13–17)
HGB BLD-MCNC: 10.8 G/DL — LOW (ref 13–17)
IANC: 11.92 K/UL — HIGH (ref 1.5–8.5)
IMM GRANULOCYTES NFR BLD AUTO: 0.7 % — SIGNIFICANT CHANGE UP (ref 0–1.5)
LACTATE BLDV-MCNC: 2.9 MMOL/L — HIGH (ref 0.5–2)
LYMPHOCYTES # BLD AUTO: 1.14 K/UL — SIGNIFICANT CHANGE UP (ref 1–3.3)
LYMPHOCYTES # BLD AUTO: 7.9 % — LOW (ref 13–44)
MCHC RBC-ENTMCNC: 25.3 PG — LOW (ref 27–34)
MCHC RBC-ENTMCNC: 30 GM/DL — LOW (ref 32–36)
MCV RBC AUTO: 84.3 FL — SIGNIFICANT CHANGE UP (ref 80–100)
MONOCYTES # BLD AUTO: 0.87 K/UL — SIGNIFICANT CHANGE UP (ref 0–0.9)
MONOCYTES NFR BLD AUTO: 6.1 % — SIGNIFICANT CHANGE UP (ref 2–14)
NEUTROPHILS # BLD AUTO: 11.92 K/UL — HIGH (ref 1.8–7.4)
NEUTROPHILS NFR BLD AUTO: 83.2 % — HIGH (ref 43–77)
NRBC # BLD: 0 /100 WBCS — SIGNIFICANT CHANGE UP
NRBC # FLD: 0 K/UL — SIGNIFICANT CHANGE UP
NT-PROBNP SERPL-SCNC: 527 PG/ML — HIGH
PCO2 BLDV: 50 MMHG — SIGNIFICANT CHANGE UP (ref 41–51)
PH BLDV: 7.34 — SIGNIFICANT CHANGE UP (ref 7.32–7.43)
PLATELET # BLD AUTO: 328 K/UL — SIGNIFICANT CHANGE UP (ref 150–400)
PO2 BLDV: 43 MMHG — HIGH (ref 35–40)
POTASSIUM BLDV-SCNC: 4.2 MMOL/L — SIGNIFICANT CHANGE UP (ref 3.4–4.5)
POTASSIUM SERPL-MCNC: 4.4 MMOL/L — SIGNIFICANT CHANGE UP (ref 3.5–5.3)
POTASSIUM SERPL-SCNC: 4.4 MMOL/L — SIGNIFICANT CHANGE UP (ref 3.5–5.3)
PROT SERPL-MCNC: 6.9 G/DL — SIGNIFICANT CHANGE UP (ref 6–8.3)
RBC # BLD: 4.27 M/UL — SIGNIFICANT CHANGE UP (ref 4.2–5.8)
RBC # FLD: 15.9 % — HIGH (ref 10.3–14.5)
SAO2 % BLDV: 71.6 % — SIGNIFICANT CHANGE UP (ref 60–85)
SODIUM SERPL-SCNC: 135 MMOL/L — SIGNIFICANT CHANGE UP (ref 135–145)
TROPONIN T, HIGH SENSITIVITY RESULT: 50 NG/L — SIGNIFICANT CHANGE UP
TROPONIN T, HIGH SENSITIVITY RESULT: 54 NG/L — CRITICAL HIGH
WBC # BLD: 14.34 K/UL — HIGH (ref 3.8–10.5)
WBC # FLD AUTO: 14.34 K/UL — HIGH (ref 3.8–10.5)

## 2021-06-01 PROCEDURE — 93010 ELECTROCARDIOGRAM REPORT: CPT

## 2021-06-01 PROCEDURE — 99223 1ST HOSP IP/OBS HIGH 75: CPT | Mod: GC

## 2021-06-01 PROCEDURE — 71045 X-RAY EXAM CHEST 1 VIEW: CPT | Mod: 26

## 2021-06-01 PROCEDURE — 99285 EMERGENCY DEPT VISIT HI MDM: CPT | Mod: 25

## 2021-06-01 RX ORDER — VANCOMYCIN HCL 1 G
1000 VIAL (EA) INTRAVENOUS ONCE
Refills: 0 | Status: COMPLETED | OUTPATIENT
Start: 2021-06-01 | End: 2021-06-01

## 2021-06-01 RX ORDER — ASPIRIN/CALCIUM CARB/MAGNESIUM 324 MG
162 TABLET ORAL ONCE
Refills: 0 | Status: COMPLETED | OUTPATIENT
Start: 2021-06-01 | End: 2021-06-01

## 2021-06-01 RX ORDER — PIPERACILLIN AND TAZOBACTAM 4; .5 G/20ML; G/20ML
3.38 INJECTION, POWDER, LYOPHILIZED, FOR SOLUTION INTRAVENOUS ONCE
Refills: 0 | Status: COMPLETED | OUTPATIENT
Start: 2021-06-01 | End: 2021-06-01

## 2021-06-01 RX ADMIN — Medication 250 MILLIGRAM(S): at 23:08

## 2021-06-01 RX ADMIN — PIPERACILLIN AND TAZOBACTAM 200 GRAM(S): 4; .5 INJECTION, POWDER, LYOPHILIZED, FOR SOLUTION INTRAVENOUS at 21:30

## 2021-06-01 RX ADMIN — Medication 162 MILLIGRAM(S): at 19:35

## 2021-06-01 NOTE — ED ADULT NURSE NOTE - NSIMPLEMENTINTERV_GEN_ALL_ED
Implemented All Fall Risk Interventions:  [place] to call system. Call bell, personal items and telephone within reach. Instruct patient to call for assistance. Room bathroom lighting operational. Non-slip footwear when patient is off stretcher. Physically safe environment: no spills, clutter or unnecessary equipment. Stretcher in lowest position, wheels locked, appropriate side rails in place. Provide visual cue, wrist band, yellow gown, etc. Monitor gait and stability. Monitor for mental status changes and reorient to person, place, and time. Review medications for side effects contributing to fall risk. Reinforce activity limits and safety measures with patient and family.

## 2021-06-01 NOTE — ED PROVIDER NOTE - PSH
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

## 2021-06-01 NOTE — ED ADULT NURSE NOTE - OBJECTIVE STATEMENT
Vic RN: Patient presents as notification from neurologist office for episode of near syncope, hypoglycemia fs-40. D5 infusion given by ems en route. Also endorsing chest pain on arrival to ED with hx of CABG/stents. Patient endorses feeling weak and lethargic. As per family member patient is having unsteady gait recently. arrives with #20g LT forearm by ems. #20g RT forearm placed in ED. MD at bedside. NSR on cardiac monitor. Will endorse to primary RN.

## 2021-06-01 NOTE — ED PROVIDER NOTE - NS ED ROS FT
ROS:  GENERAL: +near-syncope. No fever, no chills  EYES: no change in vision  HEENT: no trouble swallowing, no trouble speaking  CARDIAC: +chest pressure   PULMONARY: no cough, no shortness of breath  GI: no abdominal pain, no nausea, no vomiting, no diarrhea, no constipation  : No dysuria, no frequency, no change in appearance, or odor of urine  SKIN: no rashes  NEURO: no headache, no weakness  MSK: No joint pain    Nikita Del Rio PGY3

## 2021-06-01 NOTE — ED PROVIDER NOTE - ATTENDING CONTRIBUTION TO CARE
71M with PMH including HTN, HLD, CVA, CAD s/p stents/CABG, DM on Novolog and Metformin presents to the ER for evaluation after near-syncopal episode while at his neurologist's office for management of cervical spine and sensory complaints. Pt became lethargic and diaphoretic, was found by EMS to have FS 40 and hypotensive to 90s systolic. Upon arrival to ED, aptient endorses chest pain. Denies recent illness. Took medications as advised, at breakfast today.  Neurology: Dr. Crum  Cardiology: Dr. Mathieu Sutherland  PMD: Dr. Manolo Rice  Gen: no acute distress, well appearing, awake, alert and oriented x 3  Cardiac: regular rate and rhythm, +S1S2  Pulm: Clear to auscultation bilaterally  Abd: soft, nontender, nondistended, no guarding  Back: neg CVA ttp, nontender spine  Extremity: no edema, no deformity, warm and well perfused, FROM all extremities    Neuro: awake, alert, oriented x 3, sensorimotor intact   A/P hypoglycemia and chest pain, r/o infection vs acs - labs, iamging, ekg, medicate, hydrate, adm

## 2021-06-01 NOTE — ED PROVIDER NOTE - PHYSICAL EXAMINATION
Gen: AAOx3, non-toxic  Head: NCAT  HEENT: EOMI, oral mucosa moist, normal conjunctiva  Lung: CTAB, no respiratory distress, no wheezes/rhonchi/rales B/L, speaking in full sentences  CV: RRR, no murmurs, rubs or gallops  Abd: soft, NTND  MSK: no visible deformities  Neuro: No focal sensory or motor deficits  Skin: Warm, well perfused, no rash, no LE edema or calf tenderness   Psych: normal affect.     Nikita Del Rio PGY3

## 2021-06-01 NOTE — ED PROVIDER NOTE - CLINICAL SUMMARY MEDICAL DECISION MAKING FREE TEXT BOX
71M with PMH including HTN, HLD, PPM, CAD s/p stents/CABG, DM on Novolog and Metformin presents to the ER after a near-syncopal episode. Noted to be hypoglycemic, now resolved s/p d5 by EMS. Developed mild chest pressure in route to ER. Denies any other symptoms. Pt well appearing, HD stable, neuro intact, EKG sig for ventricularly paced rhythm without signs of ischemia. Not on sulfonylureas. Will assess for ACS, infection, lab abnormalities then determine need for further evaluation.

## 2021-06-01 NOTE — ED PROVIDER NOTE - PROGRESS NOTE DETAILS
Demetrius Loomis(Resident) I discussed the case with patient's PMD, Dr. Rice- agrees with plan for vanco and zosyn. I disucssed the case with Dr. Mathieu Sutherland who recommends trend troponin, no indication for florencia

## 2021-06-01 NOTE — ED PROVIDER NOTE - OBJECTIVE STATEMENT
71M with PMH including HTN, HLD, CVA, CAD s/p stents/CABG, DM on Novolog and Metformin presents to the ER after a near-syncopal episodes while at his neurologist's office for chronic neck pain. Reportedly became lethargic while sitting down, almost passed out. EMS found pt to be hypoglycemic to 40 with borderline hypotension to 90s systolic. In route to hospital pt began to experience mild non-radiating right-sided chest pain. Denies any other symptoms including HA, visual changes, numbness, weakness, SOB, fever, abd pain, N/V/D, or recent illness. States he has been in his usual state of health. Ate his usual breakfast of oatmeal and fruit this morning. Took 6 of Novolog. Only on insulin and metformin, not any sulfonylureas.

## 2021-06-02 ENCOUNTER — NON-APPOINTMENT (OUTPATIENT)
Age: 71
End: 2021-06-02

## 2021-06-02 DIAGNOSIS — E11.649 TYPE 2 DIABETES MELLITUS WITH HYPOGLYCEMIA WITHOUT COMA: ICD-10-CM

## 2021-06-02 DIAGNOSIS — R55 SYNCOPE AND COLLAPSE: ICD-10-CM

## 2021-06-02 DIAGNOSIS — E11.65 TYPE 2 DIABETES MELLITUS WITH HYPERGLYCEMIA: ICD-10-CM

## 2021-06-02 DIAGNOSIS — E78.5 HYPERLIPIDEMIA, UNSPECIFIED: ICD-10-CM

## 2021-06-02 DIAGNOSIS — Z95.5 PRESENCE OF CORONARY ANGIOPLASTY IMPLANT AND GRAFT: Chronic | ICD-10-CM

## 2021-06-02 DIAGNOSIS — I10 ESSENTIAL (PRIMARY) HYPERTENSION: ICD-10-CM

## 2021-06-02 DIAGNOSIS — R65.10 SYSTEMIC INFLAMMATORY RESPONSE SYNDROME (SIRS) OF NON-INFECTIOUS ORIGIN WITHOUT ACUTE ORGAN DYSFUNCTION: ICD-10-CM

## 2021-06-02 DIAGNOSIS — N17.9 ACUTE KIDNEY FAILURE, UNSPECIFIED: ICD-10-CM

## 2021-06-02 DIAGNOSIS — E11.9 TYPE 2 DIABETES MELLITUS WITHOUT COMPLICATIONS: ICD-10-CM

## 2021-06-02 DIAGNOSIS — I25.10 ATHEROSCLEROTIC HEART DISEASE OF NATIVE CORONARY ARTERY WITHOUT ANGINA PECTORIS: ICD-10-CM

## 2021-06-02 DIAGNOSIS — E16.2 HYPOGLYCEMIA, UNSPECIFIED: ICD-10-CM

## 2021-06-02 DIAGNOSIS — Z02.9 ENCOUNTER FOR ADMINISTRATIVE EXAMINATIONS, UNSPECIFIED: ICD-10-CM

## 2021-06-02 LAB
ALBUMIN SERPL ELPH-MCNC: 3.9 G/DL — SIGNIFICANT CHANGE UP (ref 3.3–5)
ALP SERPL-CCNC: 101 U/L — SIGNIFICANT CHANGE UP (ref 40–120)
ALT FLD-CCNC: 21 U/L — SIGNIFICANT CHANGE UP (ref 4–41)
ANION GAP SERPL CALC-SCNC: 13 MMOL/L — SIGNIFICANT CHANGE UP (ref 7–14)
APPEARANCE UR: CLEAR — SIGNIFICANT CHANGE UP
AST SERPL-CCNC: 20 U/L — SIGNIFICANT CHANGE UP (ref 4–40)
BASOPHILS # BLD AUTO: 0.07 K/UL — SIGNIFICANT CHANGE UP (ref 0–0.2)
BASOPHILS NFR BLD AUTO: 0.8 % — SIGNIFICANT CHANGE UP (ref 0–2)
BILIRUB SERPL-MCNC: 0.2 MG/DL — SIGNIFICANT CHANGE UP (ref 0.2–1.2)
BILIRUB UR-MCNC: NEGATIVE — SIGNIFICANT CHANGE UP
BUN SERPL-MCNC: 38 MG/DL — HIGH (ref 7–23)
CALCIUM SERPL-MCNC: 9.6 MG/DL — SIGNIFICANT CHANGE UP (ref 8.4–10.5)
CHLORIDE SERPL-SCNC: 97 MMOL/L — LOW (ref 98–107)
CK SERPL-CCNC: 91 U/L — SIGNIFICANT CHANGE UP (ref 30–200)
CO2 SERPL-SCNC: 24 MMOL/L — SIGNIFICANT CHANGE UP (ref 22–31)
COLOR SPEC: SIGNIFICANT CHANGE UP
CORTIS AM PEAK SERPL-MCNC: 10.4 UG/DL — SIGNIFICANT CHANGE UP (ref 6–18.4)
CREAT ?TM UR-MCNC: 50 MG/DL — SIGNIFICANT CHANGE UP
CREAT SERPL-MCNC: 1.54 MG/DL — HIGH (ref 0.5–1.3)
DIFF PNL FLD: NEGATIVE — SIGNIFICANT CHANGE UP
EOSINOPHIL # BLD AUTO: 0.29 K/UL — SIGNIFICANT CHANGE UP (ref 0–0.5)
EOSINOPHIL NFR BLD AUTO: 3.1 % — SIGNIFICANT CHANGE UP (ref 0–6)
GLUCOSE BLDC GLUCOMTR-MCNC: 184 MG/DL — HIGH (ref 70–99)
GLUCOSE BLDC GLUCOMTR-MCNC: 193 MG/DL — HIGH (ref 70–99)
GLUCOSE BLDC GLUCOMTR-MCNC: 193 MG/DL — HIGH (ref 70–99)
GLUCOSE BLDC GLUCOMTR-MCNC: 242 MG/DL — HIGH (ref 70–99)
GLUCOSE BLDC GLUCOMTR-MCNC: 256 MG/DL — HIGH (ref 70–99)
GLUCOSE BLDC GLUCOMTR-MCNC: 269 MG/DL — HIGH (ref 70–99)
GLUCOSE BLDC GLUCOMTR-MCNC: 319 MG/DL — HIGH (ref 70–99)
GLUCOSE SERPL-MCNC: 224 MG/DL — HIGH (ref 70–99)
GLUCOSE UR QL: NEGATIVE — SIGNIFICANT CHANGE UP
HCT VFR BLD CALC: 35.8 % — LOW (ref 39–50)
HGB BLD-MCNC: 10.5 G/DL — LOW (ref 13–17)
IANC: 6.19 K/UL — SIGNIFICANT CHANGE UP (ref 1.5–8.5)
IMM GRANULOCYTES NFR BLD AUTO: 0.3 % — SIGNIFICANT CHANGE UP (ref 0–1.5)
KETONES UR-MCNC: NEGATIVE — SIGNIFICANT CHANGE UP
LEUKOCYTE ESTERASE UR-ACNC: NEGATIVE — SIGNIFICANT CHANGE UP
LYMPHOCYTES # BLD AUTO: 1.88 K/UL — SIGNIFICANT CHANGE UP (ref 1–3.3)
LYMPHOCYTES # BLD AUTO: 20.3 % — SIGNIFICANT CHANGE UP (ref 13–44)
MAGNESIUM SERPL-MCNC: 2.3 MG/DL — SIGNIFICANT CHANGE UP (ref 1.6–2.6)
MCHC RBC-ENTMCNC: 24.8 PG — LOW (ref 27–34)
MCHC RBC-ENTMCNC: 29.3 GM/DL — LOW (ref 32–36)
MCV RBC AUTO: 84.6 FL — SIGNIFICANT CHANGE UP (ref 80–100)
MONOCYTES # BLD AUTO: 0.82 K/UL — SIGNIFICANT CHANGE UP (ref 0–0.9)
MONOCYTES NFR BLD AUTO: 8.8 % — SIGNIFICANT CHANGE UP (ref 2–14)
NEUTROPHILS # BLD AUTO: 6.19 K/UL — SIGNIFICANT CHANGE UP (ref 1.8–7.4)
NEUTROPHILS NFR BLD AUTO: 66.7 % — SIGNIFICANT CHANGE UP (ref 43–77)
NITRITE UR-MCNC: NEGATIVE — SIGNIFICANT CHANGE UP
NRBC # BLD: 0 /100 WBCS — SIGNIFICANT CHANGE UP
NRBC # FLD: 0 K/UL — SIGNIFICANT CHANGE UP
PH UR: 6 — SIGNIFICANT CHANGE UP (ref 5–8)
PLATELET # BLD AUTO: 315 K/UL — SIGNIFICANT CHANGE UP (ref 150–400)
POTASSIUM SERPL-MCNC: 4.3 MMOL/L — SIGNIFICANT CHANGE UP (ref 3.5–5.3)
POTASSIUM SERPL-SCNC: 4.3 MMOL/L — SIGNIFICANT CHANGE UP (ref 3.5–5.3)
PROT ?TM UR-MCNC: 4 MG/DL — SIGNIFICANT CHANGE UP
PROT SERPL-MCNC: 6.7 G/DL — SIGNIFICANT CHANGE UP (ref 6–8.3)
PROT UR-MCNC: NEGATIVE — SIGNIFICANT CHANGE UP
PROT/CREAT UR-RTO: 0.1 RATIO — SIGNIFICANT CHANGE UP (ref 0–0.2)
RBC # BLD: 4.23 M/UL — SIGNIFICANT CHANGE UP (ref 4.2–5.8)
RBC # FLD: 15.9 % — HIGH (ref 10.3–14.5)
SARS-COV-2 RNA SPEC QL NAA+PROBE: SIGNIFICANT CHANGE UP
SODIUM SERPL-SCNC: 134 MMOL/L — LOW (ref 135–145)
SODIUM UR-SCNC: 28 MMOL/L — SIGNIFICANT CHANGE UP
SP GR SPEC: 1.01 — SIGNIFICANT CHANGE UP (ref 1.01–1.02)
TSH SERPL-MCNC: 1.96 UIU/ML — SIGNIFICANT CHANGE UP (ref 0.27–4.2)
UROBILINOGEN FLD QL: SIGNIFICANT CHANGE UP
WBC # BLD: 9.28 K/UL — SIGNIFICANT CHANGE UP (ref 3.8–10.5)
WBC # FLD AUTO: 9.28 K/UL — SIGNIFICANT CHANGE UP (ref 3.8–10.5)

## 2021-06-02 PROCEDURE — 76770 US EXAM ABDO BACK WALL COMP: CPT | Mod: 26

## 2021-06-02 PROCEDURE — 99233 SBSQ HOSP IP/OBS HIGH 50: CPT

## 2021-06-02 PROCEDURE — 99223 1ST HOSP IP/OBS HIGH 75: CPT

## 2021-06-02 RX ORDER — FAMOTIDINE 10 MG/ML
40 INJECTION INTRAVENOUS
Refills: 0 | Status: DISCONTINUED | OUTPATIENT
Start: 2021-06-02 | End: 2021-06-03

## 2021-06-02 RX ORDER — ASPIRIN/CALCIUM CARB/MAGNESIUM 324 MG
81 TABLET ORAL DAILY
Refills: 0 | Status: DISCONTINUED | OUTPATIENT
Start: 2021-06-02 | End: 2021-06-03

## 2021-06-02 RX ORDER — INSULIN LISPRO 100/ML
5 VIAL (ML) SUBCUTANEOUS
Refills: 0 | Status: DISCONTINUED | OUTPATIENT
Start: 2021-06-02 | End: 2021-06-03

## 2021-06-02 RX ORDER — INSULIN GLARGINE 100 [IU]/ML
35 INJECTION, SOLUTION SUBCUTANEOUS AT BEDTIME
Refills: 0 | Status: DISCONTINUED | OUTPATIENT
Start: 2021-06-03 | End: 2021-06-03

## 2021-06-02 RX ORDER — FAMOTIDINE 10 MG/ML
0 INJECTION INTRAVENOUS
Qty: 0 | Refills: 0 | DISCHARGE

## 2021-06-02 RX ORDER — IPRATROPIUM/ALBUTEROL SULFATE 18-103MCG
3 AEROSOL WITH ADAPTER (GRAM) INHALATION
Qty: 0 | Refills: 0 | DISCHARGE

## 2021-06-02 RX ORDER — TIOTROPIUM BROMIDE 18 UG/1
1 CAPSULE ORAL; RESPIRATORY (INHALATION) DAILY
Refills: 0 | Status: DISCONTINUED | OUTPATIENT
Start: 2021-06-02 | End: 2021-06-03

## 2021-06-02 RX ORDER — MULTIVIT WITH MIN/MFOLATE/K2 340-15/3 G
400 POWDER (GRAM) ORAL
Qty: 0 | Refills: 0 | DISCHARGE

## 2021-06-02 RX ORDER — ASPIRIN/CALCIUM CARB/MAGNESIUM 324 MG
1 TABLET ORAL
Qty: 0 | Refills: 0 | DISCHARGE

## 2021-06-02 RX ORDER — SENNA PLUS 8.6 MG/1
2 TABLET ORAL AT BEDTIME
Refills: 0 | Status: DISCONTINUED | OUTPATIENT
Start: 2021-06-02 | End: 2021-06-03

## 2021-06-02 RX ORDER — FLUTICASONE FUROATE, UMECLIDINIUM BROMIDE AND VILANTEROL TRIFENATATE 200; 62.5; 25 UG/1; UG/1; UG/1
1 POWDER RESPIRATORY (INHALATION)
Qty: 0 | Refills: 0 | DISCHARGE

## 2021-06-02 RX ORDER — GLUCAGON INJECTION, SOLUTION 0.5 MG/.1ML
1 INJECTION, SOLUTION SUBCUTANEOUS ONCE
Refills: 0 | Status: DISCONTINUED | OUTPATIENT
Start: 2021-06-02 | End: 2021-06-03

## 2021-06-02 RX ORDER — ATORVASTATIN CALCIUM 80 MG/1
20 TABLET, FILM COATED ORAL AT BEDTIME
Refills: 0 | Status: DISCONTINUED | OUTPATIENT
Start: 2021-06-02 | End: 2021-06-03

## 2021-06-02 RX ORDER — HYDROXYZINE HCL 10 MG
1 TABLET ORAL
Qty: 0 | Refills: 0 | DISCHARGE

## 2021-06-02 RX ORDER — ROSUVASTATIN CALCIUM 5 MG/1
1 TABLET ORAL
Qty: 0 | Refills: 0 | DISCHARGE

## 2021-06-02 RX ORDER — CLOPIDOGREL BISULFATE 75 MG/1
0 TABLET, FILM COATED ORAL
Qty: 0 | Refills: 0 | DISCHARGE

## 2021-06-02 RX ORDER — LANOLIN ALCOHOL/MO/W.PET/CERES
1 CREAM (GRAM) TOPICAL
Qty: 0 | Refills: 0 | DISCHARGE

## 2021-06-02 RX ORDER — SACUBITRIL AND VALSARTAN 24; 26 MG/1; MG/1
1 TABLET, FILM COATED ORAL
Qty: 0 | Refills: 0 | DISCHARGE

## 2021-06-02 RX ORDER — DIAZEPAM 5 MG
0.5 TABLET ORAL
Qty: 0 | Refills: 0 | DISCHARGE

## 2021-06-02 RX ORDER — FOLIC ACID 7.5 MG
150 TABLET ORAL
Qty: 0 | Refills: 0 | DISCHARGE

## 2021-06-02 RX ORDER — POLYETHYLENE GLYCOL 3350 17 G/17G
17 POWDER, FOR SOLUTION ORAL DAILY
Refills: 0 | Status: DISCONTINUED | OUTPATIENT
Start: 2021-06-02 | End: 2021-06-03

## 2021-06-02 RX ORDER — BUPROPION HYDROCHLORIDE 150 MG/1
0 TABLET, EXTENDED RELEASE ORAL
Qty: 0 | Refills: 0 | DISCHARGE

## 2021-06-02 RX ORDER — METOPROLOL TARTRATE 50 MG
12.5 TABLET ORAL DAILY
Refills: 0 | Status: DISCONTINUED | OUTPATIENT
Start: 2021-06-02 | End: 2021-06-03

## 2021-06-02 RX ORDER — DOCUSATE SODIUM 100 MG
200 CAPSULE ORAL ONCE
Refills: 0 | Status: COMPLETED | OUTPATIENT
Start: 2021-06-02 | End: 2021-06-03

## 2021-06-02 RX ORDER — TRAMADOL HYDROCHLORIDE 50 MG/1
1 TABLET ORAL
Qty: 0 | Refills: 0 | DISCHARGE

## 2021-06-02 RX ORDER — SODIUM CHLORIDE 9 MG/ML
1000 INJECTION, SOLUTION INTRAVENOUS
Refills: 0 | Status: DISCONTINUED | OUTPATIENT
Start: 2021-06-02 | End: 2021-06-03

## 2021-06-02 RX ORDER — DEXTROSE 50 % IN WATER 50 %
25 SYRINGE (ML) INTRAVENOUS ONCE
Refills: 0 | Status: DISCONTINUED | OUTPATIENT
Start: 2021-06-02 | End: 2021-06-03

## 2021-06-02 RX ORDER — LANOLIN ALCOHOL/MO/W.PET/CERES
3 CREAM (GRAM) TOPICAL AT BEDTIME
Refills: 0 | Status: DISCONTINUED | OUTPATIENT
Start: 2021-06-02 | End: 2021-06-03

## 2021-06-02 RX ORDER — HUMAN INSULIN 100 [IU]/ML
15 INJECTION, SUSPENSION SUBCUTANEOUS
Refills: 0 | Status: COMPLETED | OUTPATIENT
Start: 2021-06-03 | End: 2021-06-03

## 2021-06-02 RX ORDER — INSULIN ASPART 100 [IU]/ML
0 INJECTION, SOLUTION SUBCUTANEOUS
Qty: 0 | Refills: 0 | DISCHARGE

## 2021-06-02 RX ORDER — BUPROPION HYDROCHLORIDE 150 MG/1
300 TABLET, EXTENDED RELEASE ORAL DAILY
Refills: 0 | Status: DISCONTINUED | OUTPATIENT
Start: 2021-06-02 | End: 2021-06-03

## 2021-06-02 RX ORDER — INSULIN LISPRO 100/ML
VIAL (ML) SUBCUTANEOUS AT BEDTIME
Refills: 0 | Status: DISCONTINUED | OUTPATIENT
Start: 2021-06-02 | End: 2021-06-03

## 2021-06-02 RX ORDER — TAMSULOSIN HYDROCHLORIDE 0.4 MG/1
0.4 CAPSULE ORAL AT BEDTIME
Refills: 0 | Status: DISCONTINUED | OUTPATIENT
Start: 2021-06-02 | End: 2021-06-02

## 2021-06-02 RX ORDER — INSULIN LISPRO 100/ML
VIAL (ML) SUBCUTANEOUS
Refills: 0 | Status: DISCONTINUED | OUTPATIENT
Start: 2021-06-02 | End: 2021-06-03

## 2021-06-02 RX ORDER — INSULIN GLARGINE 100 [IU]/ML
35 INJECTION, SOLUTION SUBCUTANEOUS ONCE
Refills: 0 | Status: COMPLETED | OUTPATIENT
Start: 2021-06-02 | End: 2021-06-02

## 2021-06-02 RX ORDER — BUDESONIDE AND FORMOTEROL FUMARATE DIHYDRATE 160; 4.5 UG/1; UG/1
2 AEROSOL RESPIRATORY (INHALATION)
Refills: 0 | Status: DISCONTINUED | OUTPATIENT
Start: 2021-06-02 | End: 2021-06-03

## 2021-06-02 RX ORDER — BUPROPION HYDROCHLORIDE 150 MG/1
400 TABLET, EXTENDED RELEASE ORAL
Qty: 0 | Refills: 0 | DISCHARGE

## 2021-06-02 RX ORDER — METFORMIN HYDROCHLORIDE 850 MG/1
1 TABLET ORAL
Qty: 0 | Refills: 0 | DISCHARGE

## 2021-06-02 RX ORDER — LANOLIN ALCOHOL/MO/W.PET/CERES
5 CREAM (GRAM) TOPICAL AT BEDTIME
Refills: 0 | Status: DISCONTINUED | OUTPATIENT
Start: 2021-06-02 | End: 2021-06-02

## 2021-06-02 RX ORDER — ACETAMINOPHEN 500 MG
650 TABLET ORAL EVERY 6 HOURS
Refills: 0 | Status: DISCONTINUED | OUTPATIENT
Start: 2021-06-02 | End: 2021-06-03

## 2021-06-02 RX ORDER — SPIRONOLACTONE 25 MG/1
1 TABLET, FILM COATED ORAL
Qty: 0 | Refills: 0 | DISCHARGE

## 2021-06-02 RX ORDER — TAMSULOSIN HYDROCHLORIDE 0.4 MG/1
0.8 CAPSULE ORAL AT BEDTIME
Refills: 0 | Status: DISCONTINUED | OUTPATIENT
Start: 2021-06-02 | End: 2021-06-03

## 2021-06-02 RX ORDER — CLOPIDOGREL BISULFATE 75 MG/1
75 TABLET, FILM COATED ORAL DAILY
Refills: 0 | Status: DISCONTINUED | OUTPATIENT
Start: 2021-06-02 | End: 2021-06-03

## 2021-06-02 RX ORDER — SIMETHICONE 80 MG/1
80 TABLET, CHEWABLE ORAL DAILY
Refills: 0 | Status: DISCONTINUED | OUTPATIENT
Start: 2021-06-02 | End: 2021-06-03

## 2021-06-02 RX ORDER — INSULIN GLARGINE AND LIXISENATIDE 100; 33 U/ML; UG/ML
0 INJECTION, SOLUTION SUBCUTANEOUS
Qty: 0 | Refills: 0 | DISCHARGE

## 2021-06-02 RX ORDER — METOPROLOL TARTRATE 50 MG
12.5 TABLET ORAL DAILY
Refills: 0 | Status: DISCONTINUED | OUTPATIENT
Start: 2021-06-02 | End: 2021-06-02

## 2021-06-02 RX ORDER — FUROSEMIDE 40 MG
0 TABLET ORAL
Qty: 0 | Refills: 0 | DISCHARGE

## 2021-06-02 RX ORDER — METOPROLOL TARTRATE 50 MG
0 TABLET ORAL
Qty: 0 | Refills: 0 | DISCHARGE

## 2021-06-02 RX ORDER — DIAZEPAM 5 MG
0.5 TABLET ORAL
Refills: 0 | Status: DISCONTINUED | OUTPATIENT
Start: 2021-06-02 | End: 2021-06-03

## 2021-06-02 RX ORDER — DEXTROSE 50 % IN WATER 50 %
15 SYRINGE (ML) INTRAVENOUS ONCE
Refills: 0 | Status: DISCONTINUED | OUTPATIENT
Start: 2021-06-02 | End: 2021-06-03

## 2021-06-02 RX ORDER — GABAPENTIN 400 MG/1
100 CAPSULE ORAL THREE TIMES A DAY
Refills: 0 | Status: DISCONTINUED | OUTPATIENT
Start: 2021-06-02 | End: 2021-06-03

## 2021-06-02 RX ORDER — DEXTROSE 50 % IN WATER 50 %
12.5 SYRINGE (ML) INTRAVENOUS ONCE
Refills: 0 | Status: DISCONTINUED | OUTPATIENT
Start: 2021-06-02 | End: 2021-06-03

## 2021-06-02 RX ADMIN — SIMETHICONE 80 MILLIGRAM(S): 80 TABLET, CHEWABLE ORAL at 12:04

## 2021-06-02 RX ADMIN — BUDESONIDE AND FORMOTEROL FUMARATE DIHYDRATE 2 PUFF(S): 160; 4.5 AEROSOL RESPIRATORY (INHALATION) at 12:00

## 2021-06-02 RX ADMIN — GABAPENTIN 100 MILLIGRAM(S): 400 CAPSULE ORAL at 05:47

## 2021-06-02 RX ADMIN — Medication 3: at 12:48

## 2021-06-02 RX ADMIN — Medication 650 MILLIGRAM(S): at 12:50

## 2021-06-02 RX ADMIN — FAMOTIDINE 40 MILLIGRAM(S): 10 INJECTION INTRAVENOUS at 15:57

## 2021-06-02 RX ADMIN — CLOPIDOGREL BISULFATE 75 MILLIGRAM(S): 75 TABLET, FILM COATED ORAL at 12:04

## 2021-06-02 RX ADMIN — TIOTROPIUM BROMIDE 1 CAPSULE(S): 18 CAPSULE ORAL; RESPIRATORY (INHALATION) at 12:01

## 2021-06-02 RX ADMIN — BUDESONIDE AND FORMOTEROL FUMARATE DIHYDRATE 2 PUFF(S): 160; 4.5 AEROSOL RESPIRATORY (INHALATION) at 20:38

## 2021-06-02 RX ADMIN — Medication 3 MILLIGRAM(S): at 20:35

## 2021-06-02 RX ADMIN — Medication 5 UNIT(S): at 18:02

## 2021-06-02 RX ADMIN — Medication 650 MILLIGRAM(S): at 13:35

## 2021-06-02 RX ADMIN — Medication 12.5 MILLIGRAM(S): at 05:47

## 2021-06-02 RX ADMIN — Medication 4: at 18:01

## 2021-06-02 RX ADMIN — Medication 0.5 MILLIGRAM(S): at 14:52

## 2021-06-02 RX ADMIN — GABAPENTIN 100 MILLIGRAM(S): 400 CAPSULE ORAL at 15:52

## 2021-06-02 RX ADMIN — ATORVASTATIN CALCIUM 20 MILLIGRAM(S): 80 TABLET, FILM COATED ORAL at 20:35

## 2021-06-02 RX ADMIN — Medication 81 MILLIGRAM(S): at 12:03

## 2021-06-02 RX ADMIN — BUPROPION HYDROCHLORIDE 300 MILLIGRAM(S): 150 TABLET, EXTENDED RELEASE ORAL at 12:04

## 2021-06-02 RX ADMIN — INSULIN GLARGINE 35 UNIT(S): 100 INJECTION, SOLUTION SUBCUTANEOUS at 05:47

## 2021-06-02 RX ADMIN — TAMSULOSIN HYDROCHLORIDE 0.8 MILLIGRAM(S): 0.4 CAPSULE ORAL at 20:35

## 2021-06-02 RX ADMIN — POLYETHYLENE GLYCOL 3350 17 GRAM(S): 17 POWDER, FOR SOLUTION ORAL at 12:49

## 2021-06-02 RX ADMIN — Medication 1: at 08:59

## 2021-06-02 NOTE — CONSULT NOTE ADULT - PROBLEM SELECTOR RECOMMENDATION 3
- c/w Lipitor 20 mg qhs    Tevin Llamas MD   Pager # [phone]  On evenings and weekends, please call the office at [phone] or page endocrine fellow on call. Please note that this patient may be followed by different provider tomorrow. If no answer, contact the office.

## 2021-06-02 NOTE — CONSULT NOTE ADULT - PROBLEM SELECTOR RECOMMENDATION 9
- check HbA1c  - hypoglycemia may have been in setting of NIKUNJ  - add Admelog 5 units before meals  - administer NPH 15 units x 1 tomorrow AM to transition to Lantus 35 units qhs for tmrw night (received 35 units this AM)  - low correction scale qac and qhs  - consistent carb diet  - check FS qac and qhs  - will follow  - for discharge: plan to dc on Novolog and Soliqua, dose TBD. To follow up with his own endocrinologist Dr. Lal.

## 2021-06-02 NOTE — H&P ADULT - PROBLEM SELECTOR PLAN 2
Patient felt faint at neurology office - likely 2/2 hypoglycemia from diabetic management. Other ddx includes dehydration vs primary cardiac (trops overall negative) vs PPM dysfunction (less likely) vs thyroid vs adrenal  - monitor fingersticks; avoid hypoglycemic episodes. May be 2/2 too tight of blood sugar control from patient.  - check orthostatics  - monitor on tele   - EKG without any signs of ischemia, vpaced. Finger-stick = 41 at Neurologist's office  At home on 6-8 units Novolog pre-meal, Soliqua 55 units qhs, and metformin 1000 BID.   - start diet for patient  - will give 35 units long acting Lantus tonight  - give sliding scale pre-meal  - check fingersticks  - check plan as seen in diabetes  - Endocrinology consult in the AM (please call)  - A1c level in the AM

## 2021-06-02 NOTE — H&P ADULT - ATTENDING COMMENTS
71 year old male, with past history as above, being managed for hypoglycemia and near syncope.  Glucose at neurologist's office = 41.  Hypoglycemia in the past to 60's.  Patient states eating 2 meals prior to the hypoglycemic event.  May need adjustment in medication therapy; on metformin 1000, Novolog 6 to 8 units and Soliqua PTA.  Prescribed Lantus 35 units and ISS per FS for now.  Would benefit from Endocrinology consult, Diabetic teaching.  Telemetry with heart rate at 70.  ECG ordered.  Orthostatics ordered (please f/u).  Unclear etiology of renal compromise; may be multifactorial: medication side effect, ?rhabdo - in the setting of increased exercise activity since cardiac stent placement ~ 6 weeks ago.  If renal function worsens, would get renal ultrasound, Nephrology consult.    All other management as prescribed.

## 2021-06-02 NOTE — H&P ADULT - NSICDXPASTSURGICALHX_GEN_ALL_CORE_FT
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

## 2021-06-02 NOTE — PROGRESS NOTE ADULT - PROBLEM SELECTOR PLAN 1
Patient felt faint at neurology office - likely 2/2 hypoglycemia from diabetic management. Other ddx includes dehydration vs primary cardiac (trops overall negative) vs PPM dysfunction (less likely) vs thyroid vs adrenal  - monitor fingersticks; avoid hypoglycemic episodes. May be 2/2 too tight of blood sugar control from patient.  - check orthostatics  - ensure optimal hydration  - monitor on tele   - EKG without any signs of ischemia, v-paced Patient felt faint at neurology office - likely 2/2 hypoglycemia from diabetic management. Pt also with significant cardiac issues - recent cath and 2 new stents place 6 weeks ago  - check orthostatics  - ensure optimal hydration  - monitor on tele   - EKG without any signs of ischemia, v-paced

## 2021-06-02 NOTE — H&P ADULT - PROBLEM SELECTOR PLAN 5
Patient states his baseline is around 0.8; has nephrologist. On flomax right now but no problems with urinary obstruction  - will obtain U/A; urine lytes  - check PVR  - monitor SCr; avoid nephrotoxins Continue with home medications, asa and Plavix  cw home beta blocker  hold diuretics for now given NIKUNJ

## 2021-06-02 NOTE — CONSULT NOTE ADULT - ASSESSMENT
71M with PMH including HTN, HLD, CVA, CAD s/p stents/CABG s/p PPM, adrenalectomy, DM on insulin presenting from neurologist office for feeling faint. Requested by Dr. Velazquez, his neurologist to evaluate. Neuro exam continue with Diabetic Neuropathy      No further Neuro w/u  F/u with Dr. Velazquez
71 year old man with PMH including HTN, HLD, CVA, CAD s/p stents/CABG s/p PPM, adrenalectomy, DM2 on insulin presenting from neurologist office for feeling faint, after found to have hypoglycemia to the 40's. Consult called for evaluation of hypoglycemia and DM2. BG goal 100-180 mg/dL. High risk patient with high level decision making.

## 2021-06-02 NOTE — PROGRESS NOTE ADULT - SUBJECTIVE AND OBJECTIVE BOX
[facility] Division of Hospital Medicine  Sujatha Parker MD  Pager (M-F, 8A-5P): [phone]  Other Times:      Patient is a 71y old  Male who presents with a chief complaint of Hypoglycemia (2021 10:35)      SUBJECTIVE / OVERNIGHT EVENTS:  pt able to speak in long sentences without sob or chest pain.      MEDICATIONS  (STANDING):  aspirin enteric coated 81 milliGRAM(s) Oral daily  atorvastatin 20 milliGRAM(s) Oral at bedtime  budesonide  80 MICROgram(s)/formoterol 4.5 MICROgram(s) Inhaler 2 Puff(s) Inhalation two times a day  buPROPion XL (24-Hour) . 300 milliGRAM(s) Oral daily  clopidogrel Tablet 75 milliGRAM(s) Oral daily  dextrose 40% Gel 15 Gram(s) Oral once  dextrose 5%. 1000 milliLiter(s) (50 mL/Hr) IV Continuous <Continuous>  dextrose 5%. 1000 milliLiter(s) (100 mL/Hr) IV Continuous <Continuous>  dextrose 50% Injectable 25 Gram(s) IV Push once  dextrose 50% Injectable 12.5 Gram(s) IV Push once  dextrose 50% Injectable 25 Gram(s) IV Push once  famotidine    Tablet 40 milliGRAM(s) Oral two times a day  gabapentin 100 milliGRAM(s) Oral three times a day  glucagon  Injectable 1 milliGRAM(s) IntraMuscular once  insulin lispro (ADMELOG) corrective regimen sliding scale   SubCutaneous three times a day before meals  insulin lispro (ADMELOG) corrective regimen sliding scale   SubCutaneous at bedtime  melatonin 3 milliGRAM(s) Oral at bedtime  metoprolol tartrate 12.5 milliGRAM(s) Oral daily  simethicone 80 milliGRAM(s) Chew daily  tamsulosin 0.4 milliGRAM(s) Oral at bedtime  tiotropium 18 MICROgram(s) Capsule 1 Capsule(s) Inhalation daily    MEDICATIONS  (PRN):  diazepam    Tablet 0.5 milliGRAM(s) Oral two times a day PRN anxiety      CAPILLARY BLOOD GLUCOSE      POCT Blood Glucose.: 184 mg/dL (2021 08:40)  POCT Blood Glucose.: 269 mg/dL (2021 05:31)  POCT Blood Glucose.: 193 mg/dL (2021 01:43)  POCT Blood Glucose.: 193 mg/dL (2021 01:15)  POCT Blood Glucose.: 96 mg/dL (2021 23:58)  POCT Blood Glucose.: 134 mg/dL (2021 19:08)  POCT Blood Glucose.: 139 mg/dL (2021 19:03)    I&O's Summary      PHYSICAL EXAM:  Vital Signs Last 24 Hrs  T(C): 36.7 (2021 05:45), Max: 36.7 (2021 05:45)  T(F): 98 (2021 05:45), Max: 98 (2021 05:45)  HR: 66 (2021 05:45) (66 - 71)  BP: 131/72 (2021 05:45) (124/72 - 131/72)  BP(mean): --  RR: 17 (2021 05:45) (16 - 17)  SpO2: 100% (2021 05:45) (98% - 100%)    CONSTITUTIONAL: NAD, well-developed, well-groomed  EYES: EOMI; conjunctiva and sclera clear  ENMT: Moist oral mucosa  RESPIRATORY: Normal respiratory effort; lungs are clear to auscultation bilaterally  CARDIOVASCULAR: Regular rate and rhythm, normal S1 and S2, no murmur; No lower extremity edema  ABDOMEN: Nontender to palpation, normoactive bowel sounds, no rebound/guarding  MUSCULOSKELETAL:   no clubbing or cyanosis of digits; no joint swelling or tenderness to palpation  PSYCH: A+O to person, place, and time; affect appropriate  NEUROLOGY: CN 2-12 are intact and symmetric; no gross sensory deficits   SKIN: No rashes; no palpable lesions    LABS:                        10.5   9.28  )-----------( 315      ( 2021 07:16 )             35.8     06-02    134<L>  |  97<L>  |  38<H>  ----------------------------<  224<H>  4.3   |  24  |  1.54<H>    Ca    9.6      2021 07:16  Mg     2.3     06-02    TPro  6.7  /  Alb  3.9  /  TBili  0.2  /  DBili  x   /  AST  20  /  ALT  21  /  AlkPhos  101  06-02      CARDIAC MARKERS ( 2021 07:16 )  x     / x     / 91 U/L / x     / x      CARDIAC MARKERS ( 2021 20:00 )  x     / x     / x     / x     / 4.9 ng/mL      Urinalysis Basic - ( 2021 07:16 )    Color: Light Yellow / Appearance: Clear / S.013 / pH: x  Gluc: x / Ketone: Negative  / Bili: Negative / Urobili: <2 mg/dL   Blood: x / Protein: Negative / Nitrite: Negative   Leuk Esterase: Negative / RBC: x / WBC x   Sq Epi: x / Non Sq Epi: x / Bacteria: x        RADIOLOGY & ADDITIONAL TESTS:  Results Reviewed:   Imaging Personally Reviewed:  Electrocardiogram Personally Reviewed:    COORDINATION OF CARE:  Care Discussed with Consultants/Other Providers [Y/N]: Y  Prior or Outpatient Records Reviewed [Y/N]: Y   [facility] Division of Hospital Medicine  Sujatha Parker MD  Pager (M-F, 8A-5P): [phone]  Other Times:      Patient is a 71y old  Male who presents with a chief complaint of Hypoglycemia (2021 10:35)      SUBJECTIVE / OVERNIGHT EVENTS:  pt able to speak in long sentences without sob or chest pain.  He voided this AM but was found to have 600cc residual on bladder scan.  States he once saw a urologist and was told everything is fine.  Denies n/v/abd pain.      MEDICATIONS  (STANDING):  aspirin enteric coated 81 milliGRAM(s) Oral daily  atorvastatin 20 milliGRAM(s) Oral at bedtime  budesonide  80 MICROgram(s)/formoterol 4.5 MICROgram(s) Inhaler 2 Puff(s) Inhalation two times a day  buPROPion XL (24-Hour) . 300 milliGRAM(s) Oral daily  clopidogrel Tablet 75 milliGRAM(s) Oral daily  dextrose 40% Gel 15 Gram(s) Oral once  dextrose 5%. 1000 milliLiter(s) (50 mL/Hr) IV Continuous <Continuous>  dextrose 5%. 1000 milliLiter(s) (100 mL/Hr) IV Continuous <Continuous>  dextrose 50% Injectable 25 Gram(s) IV Push once  dextrose 50% Injectable 12.5 Gram(s) IV Push once  dextrose 50% Injectable 25 Gram(s) IV Push once  famotidine    Tablet 40 milliGRAM(s) Oral two times a day  gabapentin 100 milliGRAM(s) Oral three times a day  glucagon  Injectable 1 milliGRAM(s) IntraMuscular once  insulin lispro (ADMELOG) corrective regimen sliding scale   SubCutaneous three times a day before meals  insulin lispro (ADMELOG) corrective regimen sliding scale   SubCutaneous at bedtime  melatonin 3 milliGRAM(s) Oral at bedtime  metoprolol tartrate 12.5 milliGRAM(s) Oral daily  simethicone 80 milliGRAM(s) Chew daily  tamsulosin 0.4 milliGRAM(s) Oral at bedtime  tiotropium 18 MICROgram(s) Capsule 1 Capsule(s) Inhalation daily    MEDICATIONS  (PRN):  diazepam    Tablet 0.5 milliGRAM(s) Oral two times a day PRN anxiety      CAPILLARY BLOOD GLUCOSE      POCT Blood Glucose.: 184 mg/dL (2021 08:40)  POCT Blood Glucose.: 269 mg/dL (2021 05:31)  POCT Blood Glucose.: 193 mg/dL (2021 01:43)  POCT Blood Glucose.: 193 mg/dL (2021 01:15)  POCT Blood Glucose.: 96 mg/dL (2021 23:58)  POCT Blood Glucose.: 134 mg/dL (2021 19:08)  POCT Blood Glucose.: 139 mg/dL (2021 19:03)    I&O's Summary      PHYSICAL EXAM:  Vital Signs Last 24 Hrs  T(C): 36.7 (2021 05:45), Max: 36.7 (2021 05:45)  T(F): 98 (2021 05:45), Max: 98 (2021 05:45)  HR: 66 (2021 05:45) (66 - 71)  BP: 131/72 (2021 05:45) (124/72 - 131/72)  BP(mean): --  RR: 17 (2021 05:45) (16 - 17)  SpO2: 100% (2021 05:45) (98% - 100%)    CONSTITUTIONAL: NAD, well-developed, well-groomed  EYES: EOMI; conjunctiva and sclera clear  ENMT: Moist oral mucosa  RESPIRATORY: Normal respiratory effort; lungs are clear to auscultation bilaterally  CARDIOVASCULAR: Regular rate and rhythm, normal S1 and S2, no murmur; No lower extremity edema  ABDOMEN: Nontender to palpation, normoactive bowel sounds, no rebound/guarding  MUSCULOSKELETAL:   no clubbing or cyanosis of digits; no joint swelling or tenderness to palpation  PSYCH: A+O to person, place, and time; affect appropriate  NEUROLOGY: CN 2-12 are intact and symmetric; no gross sensory deficits   SKIN: No rashes; no palpable lesions    LABS:                        10.5   9.28  )-----------( 315      ( 2021 07:16 )             35.8     06-02    134<L>  |  97<L>  |  38<H>  ----------------------------<  224<H>  4.3   |  24  |  1.54<H>    Ca    9.6      2021 07:16  Mg     2.3     06-02    TPro  6.7  /  Alb  3.9  /  TBili  0.2  /  DBili  x   /  AST  20  /  ALT  21  /  AlkPhos  101  06-02      CARDIAC MARKERS ( 2021 07:16 )  x     / x     / 91 U/L / x     / x      CARDIAC MARKERS ( 2021 20:00 )  x     / x     / x     / x     / 4.9 ng/mL      Urinalysis Basic - ( 2021 07:16 )    Color: Light Yellow / Appearance: Clear / S.013 / pH: x  Gluc: x / Ketone: Negative  / Bili: Negative / Urobili: <2 mg/dL   Blood: x / Protein: Negative / Nitrite: Negative   Leuk Esterase: Negative / RBC: x / WBC x   Sq Epi: x / Non Sq Epi: x / Bacteria: x        RADIOLOGY & ADDITIONAL TESTS:  Results Reviewed:   Imaging Personally Reviewed:  Electrocardiogram Personally Reviewed:    COORDINATION OF CARE:  Care Discussed with Consultants/Other Providers [Y/N]: Y  Prior or Outpatient Records Reviewed [Y/N]: Y

## 2021-06-02 NOTE — PROGRESS NOTE ADULT - PROBLEM SELECTOR PLAN 2
Finger-stick = 41 at Neurologist's office  At home on 6-8 units Novolog pre-meal, Soliqua 55 units qhs, and metformin 1000 BID.   - start diet for patient, monitor FS and ISS  - will give 35 units long acting Lantus tonight  - Endocrinology consult in the AM (please call) - pt follows with Dr. Asuncion Lal as outpt   - A1c level in the AM Finger-stick = 41 at Neurologist's office  At home on 6-8 units Novolog pre-meal, Soliqua 55 units qhs, and metformin 1000 BID.   - start diet for patient, monitor FS and ISS  - Endocrinology consult in the AM - pt follows with Dr. Asuncion Lal as outpt   - A1c level in the AM

## 2021-06-02 NOTE — H&P ADULT - NSHPLABSRESULTS_GEN_ALL_CORE
10.8   14.34 )-----------( 328      ( 01 Jun 2021 19:56 )             36.0       06-01    135  |  97<L>  |  41<H>  ----------------------------<  130<H>  4.4   |  23  |  1.67<H>    Ca    9.0      01 Jun 2021 19:56    TPro  6.9  /  Alb  4.1  /  TBili  <0.2  /  DBili  x   /  AST  19  /  ALT  22  /  AlkPhos  97  06-01              < from: Xray Chest 1 View- PORTABLE-Urgent (06.01.21 @ 19:47) >      ******PRELIMINARY REPORT******    ******PRELIMINARY REPORT******            INTERPRETATION:  Trace bilateral pleural effusions.    < end of copied text >

## 2021-06-02 NOTE — PROGRESS NOTE ADULT - PROBLEM SELECTOR PLAN 4
BUN/Cr = 41/1.67  Patient states his baseline is around 0.8; has nephrologist. On Flomax right now but no problems with urinary obstruction  - has increased exercise intensity (including PT) since cardiac stent placement ~ 6 weeks ago  - will obtain U/A; urine lytes  - check PVR  - monitor SCr; avoid nephrotoxins  - ensure optimal hydration  - f/u CPK level  - if renal function worsens, would get Nephrology consult  - Dr. Manolo Rice -[phone], [phone]  - Dr. Yanez Renal [phone], [phone] BUN/Cr = 41/1.67  Patient states his baseline is around 0.8; pt sees - Dr. Yanez Renal [phone], [phone] - as per renal his baseline is around 1.1 in 3/2021   - pt with retention in the hospital, s/p 600 straight cath after voiding this am - will resume home dose of Flomax and repeat PVR this evening  - UA neg   - monitor SCr; avoid nephrotoxins  - renal US   - ensure po hydration  - if renal function worsens, would get Nephrology consult  - Dr. Manolo Rice -[phone], [phone]

## 2021-06-02 NOTE — PHYSICAL THERAPY INITIAL EVALUATION ADULT - WEIGHT-BEARING RESTRICTIONS: STAND/SIT, REHAB EVAL
Robert Freeman is a 59 year old year old male presenting for a follow up to his toenail fungus. Patient was last seen on 4/8/2019, patient is currently taking his second course of Lamisil, he states that fungus is on Left great toe, it is not getting worse, he is not sure if fungus is improving.      Past medical, surgical, family and social history was reviewed from last visit .    Medications verified and updated.  Tobacco history verified.  Allergies reviewed and updated.  Denies Latex allergy    Health Maintenance Reviewed and updated.  Health Maintenance Due   Topic Date Due   • Shingles Vaccine (1 of 2) 11/13/2011   • Hepatitis C Screening  11/13/2012       Patient is due for the topics as listed above and will proceed with Shingrix when available.           weight-bearing as tolerated

## 2021-06-02 NOTE — H&P ADULT - PROBLEM SELECTOR PLAN 4
At home on 6-8 units novolog premeal, soliqua 55 units qhs, and metformin 1000 BID.   - start diet for patient  - will give 30 units long acting lantus tonight  - give sliding scale premeal. At home on 6-8 units novolog premeal, soliqua 55 units qhs, and metformin 1000 BID.   - start diet for patient  - will give 35 units long acting lantus tonight  - give sliding scale premeal. BUN/Cr = 41/1.67  Patient states his baseline is around 0.8; has nephrologist. On Flomax right now but no problems with urinary obstruction  - has increased exercise intensity (including PT) since cardiac stent placement ~ 6 weeks ago  - will obtain U/A; urine lytes  - check PVR  - monitor SCr; avoid nephrotoxins  - ensure optimal hydration  - f/u CPK level  - if renal function worsens, would get Nephrology consult

## 2021-06-02 NOTE — H&P ADULT - ASSESSMENT
71M with PMH including HTN, HLD, CVA, CAD s/p stents/CABG s/p PPM, adrenalectomy, DM on insulin presenting from neurologist office for feeling faint likely 2/2 hypoglycemia  [ x ]  Lab studies reviewed  [ x ]  Radiology reviewed  [ x ]  Old records reviewed    71M with PMH including HTN, HLD, CVA, CAD s/p stents/CABG s/p PPM, adrenalectomy, DM on insulin presenting from neurologist office for feeling faint likely 2/2 hypoglycemia

## 2021-06-02 NOTE — H&P ADULT - NSHPPHYSICALEXAM_GEN_ALL_CORE
PHYSICAL EXAM:  GENERAL: NAD, lying in bed comfortably  HEAD:  Atraumatic, Normocephalic  EYES: EOMI, PERRLA, conjunctiva and sclera clear  ENT: Moist mucous membranes  NECK: Supple, No JVD  CHEST/LUNG: Clear to auscultation bilaterally; No rales, rhonchi, wheezing, or rubs. Unlabored respirations  HEART: Regular rate and rhythm; No murmurs, rubs, or gallops  ABDOMEN: Bowel sounds present; Soft, Nontender, Nondistended. No hepatomegally  EXTREMITIES:  2+ Peripheral Pulses, brisk capillary refill. No clubbing, cyanosis, or edema  NERVOUS SYSTEM:  Alert & Oriented X3, speech clear. No deficits   MSK: FROM all 4 extremities, full and equal strength  SKIN: No rashes or lesions PHYSICAL EXAM:  GENERAL: NAD, lying in bed comfortably  HEAD:  Atraumatic, Normocephalic  EYES: EOMI, PERRLA, conjunctiva and sclera clear  ENT: Moist mucous membranes  NECK: Supple, No JVD  CHEST/LUNG: Clear to auscultation bilaterally; No rales, rhonchi, wheezing, or rubs. Unlabored respirations  HEART: Regular rate and rhythm; No murmurs, rubs, or gallops  ABDOMEN: Bowel sounds present; Soft, Nontender, Nondistended. No hepatomegaly  EXTREMITIES:  2+ Peripheral Pulses, brisk capillary refill. No clubbing, cyanosis, or edema  NERVOUS SYSTEM:  Alert & Oriented X3, speech clear. No deficits   MSK: FROM all 4 extremities, full and equal strength  SKIN: No rashes or lesions

## 2021-06-02 NOTE — H&P ADULT - HISTORY OF PRESENT ILLNESS
71M with PMH including HTN, HLD, CVA, CAD s/p stents/CABG s/p PPM, adrenalectomy, DM on insulin presenting from neurologist office for feeling faint. Reportedly became lethargic while sitting down, almost passed out. EMS found pt to be hypoglycemic to 40 with borderline hypotension to 90s systolic. In route to hospital pt began to experience mild non-radiating right-sided chest pain, which he experienced a few months ago prior to last cardiac stents. Chest pain has since resolved. Patient state he has felt fine over the last few days. He monitors his blood sugars quite closely. States before this event he took 6 unts of premeal and had glass of orange juice and canteloupe because he did not want his blood sugars to be low. Notes that this has never happened before - feels that if anything over the last year since his back surgeries he has been on the higher side as his last a1c was 8.3. Currently no fevers, chills, chest pain, sob, abdominal pain - though some bloating. no urinary symptoms, no LE edema.     In the ED patient noted to be hypothermic. Labs notable for Lynne and elevated WBC. he was given vanc/zosyn.

## 2021-06-02 NOTE — H&P ADULT - PROBLEM SELECTOR PLAN 3
Plan as above  - check fingersticks  - check plan as seen in diabetes Patient presenting after near syncope with elevated WBC and Hypothermia. No obvious signs of infection at this time- no complaints of urinary symptoms, no acute findings of consolidation on CXR. Mildly elevated lactate.   - received vanc/zosyn in ED. Will hold off on further antibiotics at this time.   - follow up blood cultures  - evaluate for any other signs of overt infection

## 2021-06-02 NOTE — CONSULT NOTE ADULT - SUBJECTIVE AND OBJECTIVE BOX
HPI:  Patient is a 71 year old man with PMH including HTN, HLD, CVA, CAD s/p stents/CABG s/p PPM, adrenalectomy, DM2 on insulin presenting from neurologist office for feeling faint, after found to have hypoglycemia to the 40's. Consult called for evaluation of hypoglycemia and DM2. Patient known to endocrine service from prior admissions, last seen in Dec 2020. He was diagnosed with DM2 over 20 years ago, follows with endocrinologist Dr. Lal. He is on Novolog 5-6 units before meals as well as Soliqua 55 units qhs (last dose on Monday night) and Metformin 1 gram BID. He checks his BG several times a day at home and notes that his BG usually ranges from 120 to 170 mg/dL. Yesterday morning, his fasting BG was around 160 mg/dL - he had cantelope and oatmeal for breakfast. His BG before lunch was around 140 mg/dL - he had orange juice, cantelope as well as cottage cheese and then went to his doctor's office. There, he had a presyncopal episode and was noted to have BG in the 40's, which was treated in the field and he was brought to the ED. Noted to have NIKUNJ here, and per his nurse at the bedside, was retaining urine earlier. He has neuropathy, CAD as well as history of CVA.     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;20 years ago    S/P CABG X 4  ~25 years ago    History of Appendectomy    S/P Hernia Repair  incisional hernia repair on 1/19/17    S/P Coronary Angiogram  12/26/2010 &amp; 2015 at [place], 4/2019 @[place]    History of lumbar laminectomy  L1-2 &amp; L4-5   2015    History of permanent cardiac pacemaker placement  placed Medtronic [place] PPM Model# [id number] , implanted April 4, 2019 ([facility])    Stented coronary artery  ~ last 2 stents placed at [facility] in 04/2021      FAMILY HISTORY:  Family history of early CAD    DM2 in parents     Social History:  no cigarette use  no alcohol use  Rabbi    Outpatient Medications:  · 	tamsulosin 0.4 mg oral capsule: Last Dose Taken:  , 1 cap(s) orally once a day (at bedtime)  · 	gabapentin 100 mg oral capsule: Last Dose Taken:  , 1 cap(s) orally 3 times a day  · 	simethicone 80 mg oral tablet, chewable: Last Dose Taken:  , 1 tab(s) orally once a day  · 	metFORMIN 1000 mg oral tablet: Last Dose Taken:  , 1 tab(s) orally 2 times a day  · 	Soliqua 100/33 subcutaneous solution: Last Dose Taken:  , 55 units subcutaneous at night  · 	NovoLOG FlexPen 100 units/mL injectable solution: Last Dose Taken:  , 6  to 8 units  subcutaneous premeal  · 	Aspir 81 oral delayed release tablet: Last Dose Taken:  , 1 tab(s) orally once a day  · 	Plavix 75 mg oral tablet: Last Dose Taken:  , one tab orally   daily   · 	famotidine 40 mg oral tablet: Last Dose Taken:  , one tab orally three times a day  · 	Aldactone 25 mg oral tablet: Last Dose Taken:  , 1 tab(s) orally  daily  · 	Trelegy Ellipta inhalation powder: Last Dose Taken:  , 1 puff(s) inhaled once a day  · 	Crestor 5 mg oral tablet: Last Dose Taken:  , 1 tab(s) orally once a day  · 	Wellbutrin: Last Dose Taken:  , 400  orally  · 	Deplin: Last Dose Taken:  , 150 milligram(s) orally once a day  · 	magnesium citrate: Last Dose Taken:  , 400 milligram(s) orally once a day  · 	Valium: Last Dose Taken:  , 0.5 milligram(s) orally 2 times a day, As Needed  · 	traMADol 50 mg oral tablet: Last Dose Taken:  , 1 tab(s) orally 2 times a day, As Needed  · 	Melatonin 5 mg oral tablet, disintegrating: Last Dose Taken:  , 1 tab(s) orally once a day (at bedtime)  · 	hydrOXYzine hydrochloride 50 mg oral tablet: Last Dose Taken:  , 1 tab(s) orally 3 times a day, As Needed  · 	torsemide 20 mg oral tablet: Last Dose Taken:  , 1 tab(s) orally 3 times a day  · 	metoprolol tartrate 25 mg oral tablet: Last Dose Taken:  , 1/2 tab ( half tab...12.5 mg ) orally daily    MEDICATIONS  (STANDING):  aspirin enteric coated 81 milliGRAM(s) Oral daily  atorvastatin 20 milliGRAM(s) Oral at bedtime  budesonide  80 MICROgram(s)/formoterol 4.5 MICROgram(s) Inhaler 2 Puff(s) Inhalation two times a day  buPROPion XL (24-Hour) . 300 milliGRAM(s) Oral daily  clopidogrel Tablet 75 milliGRAM(s) Oral daily  dextrose 40% Gel 15 Gram(s) Oral once  dextrose 5%. 1000 milliLiter(s) (50 mL/Hr) IV Continuous <Continuous>  dextrose 5%. 1000 milliLiter(s) (100 mL/Hr) IV Continuous <Continuous>  dextrose 50% Injectable 25 Gram(s) IV Push once  dextrose 50% Injectable 12.5 Gram(s) IV Push once  dextrose 50% Injectable 25 Gram(s) IV Push once  famotidine    Tablet 40 milliGRAM(s) Oral two times a day  gabapentin 100 milliGRAM(s) Oral three times a day  glucagon  Injectable 1 milliGRAM(s) IntraMuscular once  insulin lispro (ADMELOG) corrective regimen sliding scale   SubCutaneous three times a day before meals  insulin lispro (ADMELOG) corrective regimen sliding scale   SubCutaneous at bedtime  melatonin 3 milliGRAM(s) Oral at bedtime  metoprolol tartrate 12.5 milliGRAM(s) Oral daily  simethicone 80 milliGRAM(s) Chew daily  tamsulosin 0.8 milliGRAM(s) Oral at bedtime  tiotropium 18 MICROgram(s) Capsule 1 Capsule(s) Inhalation daily    MEDICATIONS  (PRN):  acetaminophen   Tablet .. 650 milliGRAM(s) Oral every 6 hours PRN Mild Pain (1 - 3), Moderate Pain (4 - 6)  diazepam    Tablet 0.5 milliGRAM(s) Oral two times a day PRN anxiety  polyethylene glycol 3350 17 Gram(s) Oral daily PRN Constipation      Allergies  No Known Allergies    Review of Systems:  Constitutional: No fever  Eyes: No blurry vision  Neuro: No tremors  HEENT: No pain  Cardiovascular: No chest pain, palpitations  Respiratory: No SOB, no cough  GI: No nausea, vomiting, abdominal pain  : No dysuria  Skin: no rash  Endocrine: no polyuria, polydipsia    ALL OTHER SYSTEMS REVIEWED AND NEGATIVE    PHYSICAL EXAM:  VITALS: T(C): 36.2 (06-02-21 @ 13:32)  T(F): 97.2 (06-02-21 @ 13:32), Max: 98 (06-02-21 @ 05:45)  HR: 71 (06-02-21 @ 13:32) (66 - 71)  BP: 121/61 (06-02-21 @ 13:32) (121/61 - 131/72)  RR:  (16 - 17)  SpO2:  (98% - 100%)  Wt(kg): --  GENERAL: NAD, well-developed  EYES: No proptosis, anicteric  HEENT:  Atraumatic, Normocephalic  THYROID: Normal size, no palpable nodules  RESPIRATORY: Clear to auscultation bilaterally; No rales, rhonchi, wheezing  CARDIOVASCULAR: Regular rate and rhythm; No murmurs; no peripheral edema  GI: Soft, nontender, non distended, normal bowel sounds  SKIN: Dry, intact, No rashes or lesions  PSYCH: Alert and oriented x 3, reactive affect, euthymic mood       POCT Blood Glucose.: 256 mg/dL (06-02-21 @ 12:34)  POCT Blood Glucose.: 184 mg/dL (06-02-21 @ 08:40)  POCT Blood Glucose.: 269 mg/dL (06-02-21 @ 05:31)  POCT Blood Glucose.: 193 mg/dL (06-02-21 @ 01:43)  POCT Blood Glucose.: 193 mg/dL (06-02-21 @ 01:15)  POCT Blood Glucose.: 96 mg/dL (06-01-21 @ 23:58)  POCT Blood Glucose.: 134 mg/dL (06-01-21 @ 19:08)  POCT Blood Glucose.: 139 mg/dL (06-01-21 @ 19:03)                          10.5   9.28  )-----------( 315      ( 02 Jun 2021 07:16 )             35.8       06-02    134<L>  |  97<L>  |  38<H>  ----------------------------<  224<H>  4.3   |  24  |  1.54<H>    EGFR if : 53<L>  EGFR if non : 45<L>    Ca    9.6      06-02  Mg     2.3     06-02    TPro  6.7  /  Alb  3.9  /  TBili  0.2  /  DBili  x   /  AST  20  /  ALT  21  /  AlkPhos  101  06-02      Thyroid Function Tests:  06-02 @ 07:16 TSH 1.96 FreeT4 -- T3 -- Anti TPO -- Anti Thyroglobulin Ab -- TSI --      HbA1c not available                HPI:  Patient is a 71 year old man with PMH including HTN, HLD, CVA, CAD s/p stents/CABG s/p PPM, adrenalectomy, DM2 on insulin presenting from neurologist office for feeling faint, after found to have hypoglycemia to the 40's. Consult called for evaluation of hypoglycemia and DM2. Patient known to endocrine service from prior admissions, last seen in Dec 2020. He was diagnosed with DM2 over 20 years ago, follows with endocrinologist Dr. Lal. He is on Novolog 5-6 units before meals as well as Soliqua 55 units qhs (last dose on Monday night) and Metformin 1 gram BID. He checks his BG several times a day at home and notes that his BG usually ranges from 120 to 170 mg/dL. Yesterday morning, his fasting BG was around 160 mg/dL - he had cantelope and oatmeal for breakfast. His BG before lunch was around 140 mg/dL - he had orange juice, cantelope as well as cottage cheese and then went to his doctor's office. There, he had a presyncopal episode and was noted to have BG in the 40's, which was treated in the field and he was brought to the ED. Noted to have NIKUNJ here, and per his nurse at the bedside, was retaining urine earlier. He has neuropathy, CAD as well as history of CVA.     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;20 years ago    S/P CABG X 4  ~25 years ago    History of Appendectomy    S/P Hernia Repair  incisional hernia repair on 1/19/17    S/P Coronary Angiogram  12/26/2010 &amp; 2015 at [place], 4/2019 @[place]    History of lumbar laminectomy  L1-2 &amp; L4-5   2015    History of permanent cardiac pacemaker placement  placed Medtronic [place] PPM Model# [id number] , implanted April 4, 2019 ([facility])    Stented coronary artery  ~ last 2 stents placed at [facility] in 04/2021      FAMILY HISTORY:  Family history of early CAD    DM2 in parents     Social History:  no cigarette use  no alcohol use  Rabbi    Outpatient Medications:  · 	tamsulosin 0.4 mg oral capsule: Last Dose Taken:  , 1 cap(s) orally once a day (at bedtime)  · 	gabapentin 100 mg oral capsule: Last Dose Taken:  , 1 cap(s) orally 3 times a day  · 	simethicone 80 mg oral tablet, chewable: Last Dose Taken:  , 1 tab(s) orally once a day  · 	metFORMIN 1000 mg oral tablet: Last Dose Taken:  , 1 tab(s) orally 2 times a day  · 	Soliqua 100/33 subcutaneous solution: Last Dose Taken:  , 55 units subcutaneous at night  · 	NovoLOG FlexPen 100 units/mL injectable solution: Last Dose Taken:  , 6  to 8 units  subcutaneous premeal  · 	Aspir 81 oral delayed release tablet: Last Dose Taken:  , 1 tab(s) orally once a day  · 	Plavix 75 mg oral tablet: Last Dose Taken:  , one tab orally   daily   · 	famotidine 40 mg oral tablet: Last Dose Taken:  , one tab orally three times a day  · 	Aldactone 25 mg oral tablet: Last Dose Taken:  , 1 tab(s) orally  daily  · 	Trelegy Ellipta inhalation powder: Last Dose Taken:  , 1 puff(s) inhaled once a day  · 	Crestor 5 mg oral tablet: Last Dose Taken:  , 1 tab(s) orally once a day  · 	Wellbutrin: Last Dose Taken:  , 400  orally  · 	Deplin: Last Dose Taken:  , 150 milligram(s) orally once a day  · 	magnesium citrate: Last Dose Taken:  , 400 milligram(s) orally once a day  · 	Valium: Last Dose Taken:  , 0.5 milligram(s) orally 2 times a day, As Needed  · 	traMADol 50 mg oral tablet: Last Dose Taken:  , 1 tab(s) orally 2 times a day, As Needed  · 	Melatonin 5 mg oral tablet, disintegrating: Last Dose Taken:  , 1 tab(s) orally once a day (at bedtime)  · 	hydrOXYzine hydrochloride 50 mg oral tablet: Last Dose Taken:  , 1 tab(s) orally 3 times a day, As Needed  · 	torsemide 20 mg oral tablet: Last Dose Taken:  , 1 tab(s) orally 3 times a day  · 	metoprolol tartrate 25 mg oral tablet: Last Dose Taken:  , 1/2 tab ( half tab...12.5 mg ) orally daily    MEDICATIONS  (STANDING):  aspirin enteric coated 81 milliGRAM(s) Oral daily  atorvastatin 20 milliGRAM(s) Oral at bedtime  budesonide  80 MICROgram(s)/formoterol 4.5 MICROgram(s) Inhaler 2 Puff(s) Inhalation two times a day  buPROPion XL (24-Hour) . 300 milliGRAM(s) Oral daily  clopidogrel Tablet 75 milliGRAM(s) Oral daily  dextrose 40% Gel 15 Gram(s) Oral once  dextrose 5%. 1000 milliLiter(s) (50 mL/Hr) IV Continuous <Continuous>  dextrose 5%. 1000 milliLiter(s) (100 mL/Hr) IV Continuous <Continuous>  dextrose 50% Injectable 25 Gram(s) IV Push once  dextrose 50% Injectable 12.5 Gram(s) IV Push once  dextrose 50% Injectable 25 Gram(s) IV Push once  famotidine    Tablet 40 milliGRAM(s) Oral two times a day  gabapentin 100 milliGRAM(s) Oral three times a day  glucagon  Injectable 1 milliGRAM(s) IntraMuscular once  insulin lispro (ADMELOG) corrective regimen sliding scale   SubCutaneous three times a day before meals  insulin lispro (ADMELOG) corrective regimen sliding scale   SubCutaneous at bedtime  melatonin 3 milliGRAM(s) Oral at bedtime  metoprolol tartrate 12.5 milliGRAM(s) Oral daily  simethicone 80 milliGRAM(s) Chew daily  tamsulosin 0.8 milliGRAM(s) Oral at bedtime  tiotropium 18 MICROgram(s) Capsule 1 Capsule(s) Inhalation daily    MEDICATIONS  (PRN):  acetaminophen   Tablet .. 650 milliGRAM(s) Oral every 6 hours PRN Mild Pain (1 - 3), Moderate Pain (4 - 6)  diazepam    Tablet 0.5 milliGRAM(s) Oral two times a day PRN anxiety  polyethylene glycol 3350 17 Gram(s) Oral daily PRN Constipation      Allergies  No Known Allergies    Review of Systems:  Constitutional: No fever  Eyes: No blurry vision  Neuro: No tremors  HEENT: No pain  Cardiovascular: No chest pain, palpitations  Respiratory: No SOB, no cough  GI: No nausea, vomiting, abdominal pain  : No dysuria  Skin: no rash  Endocrine: no polyuria, polydipsia    ALL OTHER SYSTEMS REVIEWED AND NEGATIVE    PHYSICAL EXAM:  VITALS: T(C): 36.2 (06-02-21 @ 13:32)  T(F): 97.2 (06-02-21 @ 13:32), Max: 98 (06-02-21 @ 05:45)  HR: 71 (06-02-21 @ 13:32) (66 - 71)  BP: 121/61 (06-02-21 @ 13:32) (121/61 - 131/72)  RR:  (16 - 17)  SpO2:  (98% - 100%)  Wt(kg): --  GENERAL: NAD, well-developed  EYES: No proptosis, anicteric  HEENT:  Atraumatic, Normocephalic  THYROID: Normal size, no palpable nodules  RESPIRATORY: Clear to auscultation bilaterally; No rales, rhonchi, wheezing  CARDIOVASCULAR: Regular rate and rhythm; No murmurs; no peripheral edema  GI: Soft, nontender, non distended, normal bowel sounds  SKIN: Dry, intact, No rashes or lesions  PSYCH: Alert and oriented x 3, reactive affect, euthymic mood       POCT Blood Glucose.: 256 mg/dL (06-02-21 @ 12:34) A 3   POCT Blood Glucose.: 184 mg/dL (06-02-21 @ 08:40) A 1   POCT Blood Glucose.: 269 mg/dL (06-02-21 @ 05:31) L 35  POCT Blood Glucose.: 193 mg/dL (06-02-21 @ 01:43)  POCT Blood Glucose.: 193 mg/dL (06-02-21 @ 01:15)  POCT Blood Glucose.: 96 mg/dL (06-01-21 @ 23:58)  POCT Blood Glucose.: 134 mg/dL (06-01-21 @ 19:08)  POCT Blood Glucose.: 139 mg/dL (06-01-21 @ 19:03)                          10.5   9.28  )-----------( 315      ( 02 Jun 2021 07:16 )             35.8       06-02    134<L>  |  97<L>  |  38<H>  ----------------------------<  224<H>  4.3   |  24  |  1.54<H>    EGFR if : 53<L>  EGFR if non : 45<L>    Ca    9.6      06-02  Mg     2.3     06-02    TPro  6.7  /  Alb  3.9  /  TBili  0.2  /  DBili  x   /  AST  20  /  ALT  21  /  AlkPhos  101  06-02      Thyroid Function Tests:  06-02 @ 07:16 TSH 1.96 FreeT4 -- T3 -- Anti TPO -- Anti Thyroglobulin Ab -- TSI --      HbA1c not available

## 2021-06-02 NOTE — PHYSICAL THERAPY INITIAL EVALUATION ADULT - ADDITIONAL COMMENTS
Pt. lives in an apartment, has elevator access. Pt. reports owning a rolling walker, bathroom grab bars, and bed rails. Pt. has been going to Outpatient Physical Therapy for past 3 weeks for LE strengthening.     Pt. was left seated at edge of bed post PT Evaluation, no apparent distress, all lines intact, call bell within reach. RN present. Pt. lives in an apartment, has elevator access. Pt. reports owning a rolling walker, bathroom grab bars, and bed rails. Pt. has been going to Outpatient Physical Therapy for past 3 weeks for LE strengthening. Pt. has A services.     Pt. was left seated at edge of bed post PT Evaluation, no apparent distress, all lines intact, call bell within reach. RN present.

## 2021-06-02 NOTE — H&P ADULT - PROBLEM SELECTOR PLAN 6
Continue with home medications, asa and plavix  cw home beta blocker  hold diuretics for now given NIKUNJ DVT: on asa, Plavix. give scds  diet: CC  dispo: pending clinical evaluation

## 2021-06-02 NOTE — H&P ADULT - PROBLEM SELECTOR PLAN 1
Patient presenting after near syncope with elevated WBC and Hypothermia. No obvious signs of infection at this time- no complaints of urinary symptoms, no acute findings of consolidation on CXR. Mildly elevated lactate.   - received vanc/zosyn in Ed. Will hold off on further antibiotics at this time.   - follow up blood cultures Patient felt faint at neurology office - likely 2/2 hypoglycemia from diabetic management. Other ddx includes dehydration vs primary cardiac (trops overall negative) vs PPM dysfunction (less likely) vs thyroid vs adrenal  - monitor fingersticks; avoid hypoglycemic episodes. May be 2/2 too tight of blood sugar control from patient.  - check orthostatics  - ensure optimal hydration  - monitor on tele   - EKG without any signs of ischemia, v-paced.

## 2021-06-02 NOTE — CONSULT NOTE ADULT - SUBJECTIVE AND OBJECTIVE BOX
Neurology consult    BHUPENDRA THOMPSON71yMale     Patient is a 71y old  Male who presents with a chief complaint of Hypoglycemia (2021 00:56)      HPI:  "71M with PMH including HTN, HLD, CVA, CAD s/p stents/CABG s/p PPM, adrenalectomy, DM on insulin presenting from neurologist office for feeling faint. Reportedly became lethargic while sitting down, almost passed out. EMS found pt to be hypoglycemic to 40 with borderline hypotension to 90s systolic. In route to hospital pt began to experience mild non-radiating right-sided chest pain, which he experienced a few months ago prior to last cardiac stents. Chest pain has since resolved. Patient state he has felt fine over the last few days. He monitors his blood sugars quite closely. States before this event he took 6 unts of premeal and had glass of orange juice and canteloupe because he did not want his blood sugars to be low. Notes that this has never happened before - feels that if anything over the last year since his back surgeries he has been on the higher side as his last a1c was 8.3. Currently no fevers, chills, chest pain, sob, abdominal pain - though some bloating. no urinary symptoms, no LE edema.     In the ED patient noted to be hypothermic. Labs notable for Lynne and elevated WBC. he was given vanc/zosyn.  (2021 00:56)"    Sent by Dr. Velazquez. seen in office for neuropathy, now developed hypoglycemia episode.    REVIEW OF SYSTEMS:    see HPI  MEDICATIONS    aspirin enteric coated 81 milliGRAM(s) Oral daily  atorvastatin 20 milliGRAM(s) Oral at bedtime  budesonide  80 MICROgram(s)/formoterol 4.5 MICROgram(s) Inhaler 2 Puff(s) Inhalation two times a day  buPROPion XL (24-Hour) . 300 milliGRAM(s) Oral daily  clopidogrel Tablet 75 milliGRAM(s) Oral daily  dextrose 40% Gel 15 Gram(s) Oral once  dextrose 5%. 1000 milliLiter(s) IV Continuous <Continuous>  dextrose 5%. 1000 milliLiter(s) IV Continuous <Continuous>  dextrose 50% Injectable 25 Gram(s) IV Push once  dextrose 50% Injectable 12.5 Gram(s) IV Push once  dextrose 50% Injectable 25 Gram(s) IV Push once  diazepam    Tablet 0.5 milliGRAM(s) Oral two times a day PRN  famotidine    Tablet 40 milliGRAM(s) Oral two times a day  gabapentin 100 milliGRAM(s) Oral three times a day  glucagon  Injectable 1 milliGRAM(s) IntraMuscular once  insulin lispro (ADMELOG) corrective regimen sliding scale   SubCutaneous three times a day before meals  insulin lispro (ADMELOG) corrective regimen sliding scale   SubCutaneous at bedtime  melatonin 3 milliGRAM(s) Oral at bedtime  metoprolol tartrate 12.5 milliGRAM(s) Oral daily  simethicone 80 milliGRAM(s) Chew daily  tamsulosin 0.4 milliGRAM(s) Oral at bedtime  tiotropium 18 MICROgram(s) Capsule 1 Capsule(s) Inhalation daily      PMH: Diabetes    HTN (Hypertension)    Benign Tumor of Adrenal Gland    Adrenal Insufficiency    Gout    Acid Reflux    Depression    Anxiety    CAD (coronary artery disease)    DM type 2 (diabetes mellitus, type 2)    Spinal stenosis of lumbar region    Diabetic neuropathy    CVA (cerebrovascular accident)    Hernia    Cardiac pacemaker    FREEDOM (obstructive sleep apnea)         PSH: Sudden Adrenal Gland Insufficiency    S/P CABG X 4    History of Appendectomy    S/P Hernia Repair    S/P Coronary Angiogram    History of lumbar laminectomy    Presence of permanent cardiac pacemaker    History of permanent cardiac pacemaker placement    Stented coronary artery        Family history: No history of dementia, strokes, or seizures   FAMILY HISTORY:  Family history of early CAD    Family history of borderline diabetes mellitus        SOCIAL HISTORY:  No history of tobacco or alcohol use     Allergies    No Known Allergies    Intolerances        Height (cm): 175.3 ( @ 19:03)    Vital Signs Last 24 Hrs  T(C): 36.7 (2021 05:45), Max: 36.7 (2021 05:45)  T(F): 98 (2021 05:45), Max: 98 (2021 05:45)  HR: 66 (2021 05:45) (66 - 71)  BP: 131/72 (2021 05:45) (124/72 - 131/72)  BP(mean): --  RR: 17 (2021 05:45) (16 - 17)  SpO2: 100% (2021 05:45) (98% - 100%)      On Neurological Examination:    Mental Status - Patient is alert, awake, oriented X3. fluent, names, no dysarthria no aphasia Follows commands well and able to answer questions appropriately. Mood and affect  normal    Cranial Nerves - PERRL, EOMI, VFF, V1-V3 intact, no gross facial asymmetry, tongue/uvula midline    Motor Exam -   5/5 except mild distal atrophy    Sensory    distal decreased distally    Coord: FTN intact bilaterally     Gait deferred                                      LABS:  CBC Full  -  ( 2021 07:16 )  WBC Count : 9.28 K/uL  RBC Count : 4.23 M/uL  Hemoglobin : 10.5 g/dL  Hematocrit : 35.8 %  Platelet Count - Automated : 315 K/uL  Mean Cell Volume : 84.6 fL  Mean Cell Hemoglobin : 24.8 pg  Mean Cell Hemoglobin Concentration : 29.3 gm/dL  Auto Neutrophil # : 6.19 K/uL  Auto Lymphocyte # : 1.88 K/uL  Auto Monocyte # : 0.82 K/uL  Auto Eosinophil # : 0.29 K/uL  Auto Basophil # : 0.07 K/uL  Auto Neutrophil % : 66.7 %  Auto Lymphocyte % : 20.3 %  Auto Monocyte % : 8.8 %  Auto Eosinophil % : 3.1 %  Auto Basophil % : 0.8 %    Urinalysis Basic - ( 2021 07:16 )    Color: Light Yellow / Appearance: Clear / S.013 / pH: x  Gluc: x / Ketone: Negative  / Bili: Negative / Urobili: <2 mg/dL   Blood: x / Protein: Negative / Nitrite: Negative   Leuk Esterase: Negative / RBC: x / WBC x   Sq Epi: x / Non Sq Epi: x / Bacteria: x      06-02    134<L>  |  97<L>  |  38<H>  ----------------------------<  224<H>  4.3   |  24  |  1.54<H>    Ca    9.6      2021 07:16  Mg     2.3     06-02    TPro  6.7  /  Alb  3.9  /  TBili  0.2  /  DBili  x   /  AST  20  /  ALT  21  /  AlkPhos  101  06-02    LIVER FUNCTIONS - ( 2021 07:16 )  Alb: 3.9 g/dL / Pro: 6.7 g/dL / ALK PHOS: 101 U/L / ALT: 21 U/L / AST: 20 U/L / GGT: x           Hemoglobin A1C:             RADIOLOGY  < from: CT Cervical Spine No Cont (21 @ 19:32) >  IMPRESSION:  1. Multilevel degenerative disc disease is present with mild to moderate central canal stenosis between C3 and C7.  2. There is variable neural foraminal stenosis.  3. Appearance is similar to the prior.      < end of copied text >

## 2021-06-02 NOTE — H&P ADULT - NSHPSOCIALHISTORY_GEN_ALL_CORE
SOCIAL HISTORY:    Marital Status:  (  )    (  ) Single        (  )        (  )        ( x )   Occupation:     Rabbi/Teacher  Lives with:        ( x ) Alone       (  ) Spouse      (  ) Children        (  ) Parents           (  ) Other    No history of smoking  No history of alcohol abuse  No history of illegal drug use

## 2021-06-03 ENCOUNTER — TRANSCRIPTION ENCOUNTER (OUTPATIENT)
Age: 71
End: 2021-06-03

## 2021-06-03 VITALS
TEMPERATURE: 98 F | DIASTOLIC BLOOD PRESSURE: 74 MMHG | SYSTOLIC BLOOD PRESSURE: 120 MMHG | HEART RATE: 74 BPM | OXYGEN SATURATION: 99 % | RESPIRATION RATE: 18 BRPM

## 2021-06-03 LAB
24R-OH-CALCIDIOL SERPL-MCNC: 39.4 NG/ML — SIGNIFICANT CHANGE UP (ref 30–80)
A1C WITH ESTIMATED AVERAGE GLUCOSE RESULT: 8.9 % — HIGH (ref 4–5.6)
ANION GAP SERPL CALC-SCNC: 13 MMOL/L — SIGNIFICANT CHANGE UP (ref 7–14)
BASOPHILS # BLD AUTO: 0.04 K/UL — SIGNIFICANT CHANGE UP (ref 0–0.2)
BASOPHILS NFR BLD AUTO: 0.5 % — SIGNIFICANT CHANGE UP (ref 0–2)
BUN SERPL-MCNC: 27 MG/DL — HIGH (ref 7–23)
CALCIUM SERPL-MCNC: 9.1 MG/DL — SIGNIFICANT CHANGE UP (ref 8.4–10.5)
CHLORIDE SERPL-SCNC: 102 MMOL/L — SIGNIFICANT CHANGE UP (ref 98–107)
CO2 SERPL-SCNC: 23 MMOL/L — SIGNIFICANT CHANGE UP (ref 22–31)
COVID-19 SPIKE DOMAIN AB INTERP: POSITIVE
COVID-19 SPIKE DOMAIN ANTIBODY RESULT: 16.5 U/ML — HIGH
CREAT SERPL-MCNC: 1.24 MG/DL — SIGNIFICANT CHANGE UP (ref 0.5–1.3)
EOSINOPHIL # BLD AUTO: 0.24 K/UL — SIGNIFICANT CHANGE UP (ref 0–0.5)
EOSINOPHIL NFR BLD AUTO: 3.1 % — SIGNIFICANT CHANGE UP (ref 0–6)
ESTIMATED AVERAGE GLUCOSE: 209 MG/DL — HIGH (ref 68–114)
GLUCOSE BLDC GLUCOMTR-MCNC: 210 MG/DL — HIGH (ref 70–99)
GLUCOSE BLDC GLUCOMTR-MCNC: 296 MG/DL — HIGH (ref 70–99)
GLUCOSE BLDC GLUCOMTR-MCNC: 350 MG/DL — HIGH (ref 70–99)
GLUCOSE SERPL-MCNC: 208 MG/DL — HIGH (ref 70–99)
HCT VFR BLD CALC: 33.6 % — LOW (ref 39–50)
HGB BLD-MCNC: 10.2 G/DL — LOW (ref 13–17)
IANC: 4.94 K/UL — SIGNIFICANT CHANGE UP (ref 1.5–8.5)
IMM GRANULOCYTES NFR BLD AUTO: 0.4 % — SIGNIFICANT CHANGE UP (ref 0–1.5)
LYMPHOCYTES # BLD AUTO: 1.79 K/UL — SIGNIFICANT CHANGE UP (ref 1–3.3)
LYMPHOCYTES # BLD AUTO: 22.9 % — SIGNIFICANT CHANGE UP (ref 13–44)
MAGNESIUM SERPL-MCNC: 2.1 MG/DL — SIGNIFICANT CHANGE UP (ref 1.6–2.6)
MCHC RBC-ENTMCNC: 25.4 PG — LOW (ref 27–34)
MCHC RBC-ENTMCNC: 30.4 GM/DL — LOW (ref 32–36)
MCV RBC AUTO: 83.6 FL — SIGNIFICANT CHANGE UP (ref 80–100)
MONOCYTES # BLD AUTO: 0.79 K/UL — SIGNIFICANT CHANGE UP (ref 0–0.9)
MONOCYTES NFR BLD AUTO: 10.1 % — SIGNIFICANT CHANGE UP (ref 2–14)
NEUTROPHILS # BLD AUTO: 4.94 K/UL — SIGNIFICANT CHANGE UP (ref 1.8–7.4)
NEUTROPHILS NFR BLD AUTO: 63 % — SIGNIFICANT CHANGE UP (ref 43–77)
NRBC # BLD: 0 /100 WBCS — SIGNIFICANT CHANGE UP
NRBC # FLD: 0 K/UL — SIGNIFICANT CHANGE UP
PHOSPHATE SERPL-MCNC: 3 MG/DL — SIGNIFICANT CHANGE UP (ref 2.5–4.5)
PLATELET # BLD AUTO: 310 K/UL — SIGNIFICANT CHANGE UP (ref 150–400)
POTASSIUM SERPL-MCNC: 4.3 MMOL/L — SIGNIFICANT CHANGE UP (ref 3.5–5.3)
POTASSIUM SERPL-SCNC: 4.3 MMOL/L — SIGNIFICANT CHANGE UP (ref 3.5–5.3)
RBC # BLD: 4.02 M/UL — LOW (ref 4.2–5.8)
RBC # FLD: 16 % — HIGH (ref 10.3–14.5)
SARS-COV-2 IGG+IGM SERPL QL IA: 16.5 U/ML — HIGH
SARS-COV-2 IGG+IGM SERPL QL IA: POSITIVE
SODIUM SERPL-SCNC: 138 MMOL/L — SIGNIFICANT CHANGE UP (ref 135–145)
WBC # BLD: 7.83 K/UL — SIGNIFICANT CHANGE UP (ref 3.8–10.5)
WBC # FLD AUTO: 7.83 K/UL — SIGNIFICANT CHANGE UP (ref 3.8–10.5)

## 2021-06-03 PROCEDURE — 99239 HOSP IP/OBS DSCHRG MGMT >30: CPT

## 2021-06-03 PROCEDURE — 99232 SBSQ HOSP IP/OBS MODERATE 35: CPT

## 2021-06-03 RX ORDER — HYDROXYZINE HCL 10 MG
50 TABLET ORAL ONCE
Refills: 0 | Status: COMPLETED | OUTPATIENT
Start: 2021-06-03 | End: 2021-06-03

## 2021-06-03 RX ORDER — TAMSULOSIN HYDROCHLORIDE 0.4 MG/1
2 CAPSULE ORAL
Qty: 60 | Refills: 0
Start: 2021-06-03 | End: 2021-07-02

## 2021-06-03 RX ADMIN — Medication 5 UNIT(S): at 08:57

## 2021-06-03 RX ADMIN — BUPROPION HYDROCHLORIDE 300 MILLIGRAM(S): 150 TABLET, EXTENDED RELEASE ORAL at 11:42

## 2021-06-03 RX ADMIN — Medication 0.5 MILLIGRAM(S): at 06:25

## 2021-06-03 RX ADMIN — Medication 650 MILLIGRAM(S): at 14:59

## 2021-06-03 RX ADMIN — HUMAN INSULIN 15 UNIT(S): 100 INJECTION, SUSPENSION SUBCUTANEOUS at 08:58

## 2021-06-03 RX ADMIN — Medication 50 MILLIGRAM(S): at 06:26

## 2021-06-03 RX ADMIN — SIMETHICONE 80 MILLIGRAM(S): 80 TABLET, CHEWABLE ORAL at 11:42

## 2021-06-03 RX ADMIN — Medication 650 MILLIGRAM(S): at 14:29

## 2021-06-03 RX ADMIN — TIOTROPIUM BROMIDE 1 CAPSULE(S): 18 CAPSULE ORAL; RESPIRATORY (INHALATION) at 11:40

## 2021-06-03 RX ADMIN — Medication 12.5 MILLIGRAM(S): at 06:26

## 2021-06-03 RX ADMIN — CLOPIDOGREL BISULFATE 75 MILLIGRAM(S): 75 TABLET, FILM COATED ORAL at 11:43

## 2021-06-03 RX ADMIN — Medication 200 MILLIGRAM(S): at 00:06

## 2021-06-03 RX ADMIN — Medication 4: at 13:02

## 2021-06-03 RX ADMIN — FAMOTIDINE 40 MILLIGRAM(S): 10 INJECTION INTRAVENOUS at 06:28

## 2021-06-03 RX ADMIN — GABAPENTIN 100 MILLIGRAM(S): 400 CAPSULE ORAL at 14:29

## 2021-06-03 RX ADMIN — Medication 0.5 MILLIGRAM(S): at 14:30

## 2021-06-03 RX ADMIN — Medication 81 MILLIGRAM(S): at 11:43

## 2021-06-03 RX ADMIN — GABAPENTIN 100 MILLIGRAM(S): 400 CAPSULE ORAL at 00:06

## 2021-06-03 RX ADMIN — GABAPENTIN 100 MILLIGRAM(S): 400 CAPSULE ORAL at 06:28

## 2021-06-03 RX ADMIN — Medication 5 UNIT(S): at 13:02

## 2021-06-03 RX ADMIN — Medication 2: at 08:56

## 2021-06-03 RX ADMIN — BUDESONIDE AND FORMOTEROL FUMARATE DIHYDRATE 2 PUFF(S): 160; 4.5 AEROSOL RESPIRATORY (INHALATION) at 08:59

## 2021-06-03 NOTE — DISCHARGE NOTE PROVIDER - CARE PROVIDER_API CALL
Joycelyn Lal  ENDOCRINOLOGY/METAB/DIABETES  [address] [place] [address] [address]  [place] [address]  Phone: [phone]  Fax: [phone]  Follow Up Time:

## 2021-06-03 NOTE — PROGRESS NOTE ADULT - ASSESSMENT
71 year old man with PMH including HTN, HLD, CVA, CAD s/p stents/CABG s/p PPM, adrenalectomy, DM2 on insulin presenting from neurologist office for feeling faint, after found to have hypoglycemia to the 40's. Consult called for evaluation of hypoglycemia and DM2. BG goal 100-180 mg/dL.
71M with PMH including HTN, HLD, CVA, CAD s/p stents/CABG in late 1990s s/p PPM, adrenalectomy for adrenal adenoma in early 2000, DM on insulin presenting from neurologist office for feeling faint likely 2/2 hypoglycemia.
71M with PMH including HTN, HLD, CVA, CAD s/p stents/CABG in late 1990s s/p PPM, adrenalectomy for adrenal adenoma in early 2000, DM on insulin presenting from neurologist office for feeling faint likely 2/2 hypoglycemia.

## 2021-06-03 NOTE — DISCHARGE NOTE PROVIDER - NSDCCPCAREPLAN_GEN_ALL_CORE_FT
PRINCIPAL DISCHARGE DIAGNOSIS  Diagnosis: Hypoglycemia  Assessment and Plan of Treatment: You presented with low sugar levels. You will be discharged on your regular home insulin regimen. You may follow up with Dr Lal your outpatient endocrinologist.   Follow up with endocrinology within 1-2 weeks of discharge.      SECONDARY DISCHARGE DIAGNOSES  Diagnosis: NIKUNJ (acute kidney injury)  Assessment and Plan of Treatment: You were noted to have elevated kidney injury on this admission. This was likely secondary to urinary retention. Your kidney tests have improved. You may follow up with your outpatient nephrologist Dr Yanez.

## 2021-06-03 NOTE — PROGRESS NOTE ADULT - PROBLEM SELECTOR PLAN 3
- c/w Lipitor 20 mg qhs    Plan conveyed to RAFAEL Parra.     Tevin Llamas MD   Pager # [phone]  On evenings and weekends, please call the office at [phone] or page endocrine fellow on call. Please note that this patient may be followed by different provider tomorrow. If no answer, contact the office.
Patient presenting after near syncope with elevated WBC and Hypothermia. No obvious signs of infection at this time- no complaints of urinary symptoms, no acute findings of consolidation on CXR. Mildly elevated lactate.   - received vanc/zosyn in ED. Will hold off on further antibiotics at this time.   - follow up blood cultures  - evaluate for any other signs of overt infection
Patient presenting after near syncope with elevated WBC and Hypothermia. No obvious signs of infection at this time- no complaints of urinary symptoms, no acute findings of consolidation on CXR. Mildly elevated lactate.   - received vanc/zosyn in ED. Will hold off on further antibiotics at this time.   - follow up blood cultures  - evaluate for any other signs of overt infection

## 2021-06-03 NOTE — PROGRESS NOTE ADULT - PROBLEM SELECTOR PLAN 5
- pt with angio and 2 stents placed in April 2021.  Continue with home medications, asa and Plavix  cw home beta blocker  hold diuretics for now given NIKUNJ
- pt with angio and 2 stents placed in April 2021.  Continue with home medications, asa and Plavix  cw home beta blocker  - resume torsemide

## 2021-06-03 NOTE — PROGRESS NOTE ADULT - PROBLEM SELECTOR PLAN 6
DVT: on asa, Plavix. give scds  diet: CC  dispo: pending clinical evaluation
DVT: on asa, Plavix. give scds  diet: CC  dispo:  discharge planning and coordination ~ 45mins.

## 2021-06-03 NOTE — PROGRESS NOTE ADULT - PROBLEM SELECTOR PLAN 2
Finger-stick = 41 at Neurologist's office  At home on 6-8 units Novolog pre-meal, Soliqua 55 units qhs, and metformin 1000 BID.   - start diet for patient, monitor FS and ISS  - appreciate endo recs - resume home regimen as outlined in note from earlier - pt to follow up with outpt endo on d/c.

## 2021-06-03 NOTE — DISCHARGE NOTE PROVIDER - HOSPITAL COURSE
72 y/o male with PMHx of HTN, HLD, CVA, CAD s/p stents/CABG s/p PPM, adrenalectomy, DM2 on insulin who presented from his neurologist office for feeling faint, found to have FS of 40. Patient was seen by endocrinology team. Adjustments made while inpatient but will discharge on his home regimen as per endocrinology recommendations. He is to follow up with Dr Lal his private endocrinologist.   He was noted to have elevated WBC and hypothermia on admission without obvious sign of infection. CXR clear, no urinary symptoms and BCx negative.   He was noted to have NIKUNJ on this admission. Baseline creatinine ~1.1. He underwent straight cath protocol x2 with normalized Creatinine. He continues to void. Renal US no hydronephrosis. f/u with Dr Yanez Renal [phone].

## 2021-06-03 NOTE — DISCHARGE NOTE PROVIDER - NSDCFUADDAPPT_GEN_ALL_CORE_FT
Follow up with PCP within 1-2 weeks of discharge.   Follow up with endocrinology within 1-2 weeks of discharge.

## 2021-06-03 NOTE — PROGRESS NOTE ADULT - PROBLEM SELECTOR PLAN 4
BUN/Cr = 41/1.67  Patient states his baseline is around 0.8; pt sees - Dr. Yanez Renal [phone], [phone] - as per renal his baseline is around 1.1 in 3/2021   - PVR now within acceptable range - pt voiding comfortably  - UA neg   - monitor SCr; avoid nephrotoxins  - renal US neg    - ensure po hydration  - Cr is downtrending - pt to follow up with outpt renal/pcp within a week for repeat labs   - Dr. Manolo Rice -[phone], [phone]

## 2021-06-03 NOTE — PROGRESS NOTE ADULT - SUBJECTIVE AND OBJECTIVE BOX
Chief Complaint: f/u DM2    History:  Patient seen at bedside this morning. Tolerating po, does not have nausea, vomiting, abdominal pain. Creatinine improving. Spoke with primary team, he is being discharged home today.    MEDICATIONS  (STANDING):  aspirin enteric coated 81 milliGRAM(s) Oral daily  atorvastatin 20 milliGRAM(s) Oral at bedtime  budesonide  80 MICROgram(s)/formoterol 4.5 MICROgram(s) Inhaler 2 Puff(s) Inhalation two times a day  buPROPion XL (24-Hour) . 300 milliGRAM(s) Oral daily  clopidogrel Tablet 75 milliGRAM(s) Oral daily  dextrose 40% Gel 15 Gram(s) Oral once  dextrose 5%. 1000 milliLiter(s) (50 mL/Hr) IV Continuous <Continuous>  dextrose 5%. 1000 milliLiter(s) (100 mL/Hr) IV Continuous <Continuous>  dextrose 50% Injectable 25 Gram(s) IV Push once  dextrose 50% Injectable 12.5 Gram(s) IV Push once  dextrose 50% Injectable 25 Gram(s) IV Push once  famotidine    Tablet 40 milliGRAM(s) Oral two times a day  gabapentin 100 milliGRAM(s) Oral three times a day  glucagon  Injectable 1 milliGRAM(s) IntraMuscular once  insulin glargine Injectable (LANTUS) 35 Unit(s) SubCutaneous at bedtime  insulin lispro (ADMELOG) corrective regimen sliding scale   SubCutaneous three times a day before meals  insulin lispro (ADMELOG) corrective regimen sliding scale   SubCutaneous at bedtime  insulin lispro Injectable (ADMELOG) 5 Unit(s) SubCutaneous three times a day before meals  melatonin 3 milliGRAM(s) Oral at bedtime  metoprolol tartrate 12.5 milliGRAM(s) Oral daily  senna 2 Tablet(s) Oral at bedtime  simethicone 80 milliGRAM(s) Chew daily  tamsulosin 0.8 milliGRAM(s) Oral at bedtime  tiotropium 18 MICROgram(s) Capsule 1 Capsule(s) Inhalation daily    MEDICATIONS  (PRN):  acetaminophen   Tablet .. 650 milliGRAM(s) Oral every 6 hours PRN Mild Pain (1 - 3), Moderate Pain (4 - 6)  diazepam    Tablet 0.5 milliGRAM(s) Oral two times a day PRN anxiety  polyethylene glycol 3350 17 Gram(s) Oral daily PRN Constipation      Allergies  No Known Allergies    Review of Systems:  ALL OTHER SYSTEMS REVIEWED AND NEGATIVE    PHYSICAL EXAM:  VITALS: T(C): 36.7 (06-03-21 @ 06:15)  T(F): 98 (06-03-21 @ 06:15), Max: 98 (06-03-21 @ 06:15)  HR: 71 (06-03-21 @ 06:15) (70 - 71)  BP: 121/75 (06-03-21 @ 06:15) (120/58 - 127/63)  RR:  (16 - 17)  SpO2:  (96% - 100%)  Wt(kg): --  GENERAL: NAD, well-developed  EYES: No proptosis, anicteric  HEENT:  Atraumatic, Normocephalic  RESPIRATORY: + air movement bilaterally, no respiratory distress   PSYCH: Alert and oriented x 3, reactive affect, euthymic mood     POCT Blood Glucose.: 210 mg/dL (06-03-21 @ 08:28) NPH 15, A 5, A 2  POCT Blood Glucose.: 242 mg/dL (06-02-21 @ 22:20)  POCT Blood Glucose.: 319 mg/dL (06-02-21 @ 17:29) A 5, A 4   POCT Blood Glucose.: 256 mg/dL (06-02-21 @ 12:34) A 3  POCT Blood Glucose.: 184 mg/dL (06-02-21 @ 08:40) A 1  POCT Blood Glucose.: 269 mg/dL (06-02-21 @ 05:31) L 35   POCT Blood Glucose.: 193 mg/dL (06-02-21 @ 01:43)  POCT Blood Glucose.: 193 mg/dL (06-02-21 @ 01:15)  POCT Blood Glucose.: 96 mg/dL (06-01-21 @ 23:58)  POCT Blood Glucose.: 134 mg/dL (06-01-21 @ 19:08)  POCT Blood Glucose.: 139 mg/dL (06-01-21 @ 19:03)      06-03    138  |  102  |  27<H>  ----------------------------<  208<H>  4.3   |  23  |  1.24    EGFR if : 68  EGFR if non : 59<L>    Ca    9.1      06-03  Mg     2.1     06-03  Phos  3.0     06-03    TPro  6.7  /  Alb  3.9  /  TBili  0.2  /  DBili  x   /  AST  20  /  ALT  21  /  AlkPhos  101  06-02          Thyroid Function Tests:  06-02 @ 07:16 TSH 1.96 FreeT4 -- T3 -- Anti TPO -- Anti Thyroglobulin Ab -- TSI --

## 2021-06-03 NOTE — DISCHARGE NOTE NURSING/CASE MANAGEMENT/SOCIAL WORK - PATIENT PORTAL LINK FT
You can access the FollowMyHealth Patient Portal offered by [facility] by registering at the following website: [email or web address]. By joining [employer]’s FollowMyHealth portal, you will also be able to view your health information using other applications (apps) compatible with our system.

## 2021-06-03 NOTE — PROGRESS NOTE ADULT - SUBJECTIVE AND OBJECTIVE BOX
[facility] Division of Hospital Medicine  Sujatha Parker MD  Pager (M-F, 8A-5P): [phone]  Other Times:      Patient is a 71y old  Male who presents with a chief complaint of Hypoglycemia (2021 12:28)    SUBJECTIVE / OVERNIGHT EVENTS:  no complaints.  pt voiding with no issues.      MEDICATIONS  (STANDING):  aspirin enteric coated 81 milliGRAM(s) Oral daily  atorvastatin 20 milliGRAM(s) Oral at bedtime  budesonide  80 MICROgram(s)/formoterol 4.5 MICROgram(s) Inhaler 2 Puff(s) Inhalation two times a day  buPROPion XL (24-Hour) . 300 milliGRAM(s) Oral daily  clopidogrel Tablet 75 milliGRAM(s) Oral daily  dextrose 40% Gel 15 Gram(s) Oral once  dextrose 5%. 1000 milliLiter(s) (50 mL/Hr) IV Continuous <Continuous>  dextrose 5%. 1000 milliLiter(s) (100 mL/Hr) IV Continuous <Continuous>  dextrose 50% Injectable 25 Gram(s) IV Push once  dextrose 50% Injectable 12.5 Gram(s) IV Push once  dextrose 50% Injectable 25 Gram(s) IV Push once  famotidine    Tablet 40 milliGRAM(s) Oral two times a day  gabapentin 100 milliGRAM(s) Oral three times a day  glucagon  Injectable 1 milliGRAM(s) IntraMuscular once  insulin glargine Injectable (LANTUS) 35 Unit(s) SubCutaneous at bedtime  insulin lispro (ADMELOG) corrective regimen sliding scale   SubCutaneous three times a day before meals  insulin lispro (ADMELOG) corrective regimen sliding scale   SubCutaneous at bedtime  insulin lispro Injectable (ADMELOG) 5 Unit(s) SubCutaneous three times a day before meals  melatonin 3 milliGRAM(s) Oral at bedtime  metoprolol tartrate 12.5 milliGRAM(s) Oral daily  senna 2 Tablet(s) Oral at bedtime  simethicone 80 milliGRAM(s) Chew daily  tamsulosin 0.8 milliGRAM(s) Oral at bedtime  tiotropium 18 MICROgram(s) Capsule 1 Capsule(s) Inhalation daily    MEDICATIONS  (PRN):  acetaminophen   Tablet .. 650 milliGRAM(s) Oral every 6 hours PRN Mild Pain (1 - 3), Moderate Pain (4 - 6)  diazepam    Tablet 0.5 milliGRAM(s) Oral two times a day PRN anxiety  polyethylene glycol 3350 17 Gram(s) Oral daily PRN Constipation      CAPILLARY BLOOD GLUCOSE      POCT Blood Glucose.: 350 mg/dL (2021 12:42)  POCT Blood Glucose.: 210 mg/dL (2021 08:28)  POCT Blood Glucose.: 242 mg/dL (2021 22:20)  POCT Blood Glucose.: 319 mg/dL (2021 17:29)    I&O's Summary    2021 07:01  -  2021 07:00  --------------------------------------------------------  IN: 0 mL / OUT: 500 mL / NET: -500 mL        PHYSICAL EXAM:  Vital Signs Last 24 Hrs  T(C): 36.7 (2021 06:15), Max: 36.7 (2021 06:15)  T(F): 98 (2021 06:15), Max: 98 (2021 06:15)  HR: 71 (2021 06:15) (70 - 71)  BP: 121/75 (2021 06:15) (120/58 - 127/63)  BP(mean): --  RR: 16 (2021 06:15) (16 - 17)  SpO2: 98% (2021 06:15) (96% - 99%)    CONSTITUTIONAL: NAD, well-developed, well-groomed  EYES: EOMI; conjunctiva and sclera clear  ENMT: Moist oral mucosa  RESPIRATORY: Normal respiratory effort; lungs are clear to auscultation bilaterally  CARDIOVASCULAR: Regular rate and rhythm, normal S1 and S2, no murmur; No lower extremity edema  ABDOMEN: Nontender to palpation, normoactive bowel sounds, no rebound/guarding  MUSCULOSKELETAL:   no clubbing or cyanosis of digits; no joint swelling or tenderness to palpation  PSYCH: A+O to person, place, and time; affect appropriate  NEUROLOGY: CN 2-12 are intact and symmetric; no gross sensory deficits   SKIN: No rashes; no palpable lesions    LABS:                        10.2   7.83  )-----------( 310      ( 2021 08:11 )             33.6     06-03    138  |  102  |  27<H>  ----------------------------<  208<H>  4.3   |  23  |  1.24    Ca    9.1      2021 08:11  Phos  3.0     06-03  Mg     2.1     06-03    TPro  6.7  /  Alb  3.9  /  TBili  0.2  /  DBili  x   /  AST  20  /  ALT  21  /  AlkPhos  101  06-02      CARDIAC MARKERS ( 2021 07:16 )  x     / x     / 91 U/L / x     / x      CARDIAC MARKERS ( 2021 20:00 )  x     / x     / x     / x     / 4.9 ng/mL      Urinalysis Basic - ( 2021 07:16 )    Color: Light Yellow / Appearance: Clear / S.013 / pH: x  Gluc: x / Ketone: Negative  / Bili: Negative / Urobili: <2 mg/dL   Blood: x / Protein: Negative / Nitrite: Negative   Leuk Esterase: Negative / RBC: x / WBC x   Sq Epi: x / Non Sq Epi: x / Bacteria: x        Culture - Blood (collected 2021 02:19)  Source: .Blood Blood-Peripheral  Preliminary Report (2021 03:02):    No growth to date.    Culture - Blood (collected 2021 02:19)  Source: .Blood Blood-Venous  Preliminary Report (2021 03:02):    No growth to date.      RADIOLOGY & ADDITIONAL TESTS:  Results Reviewed:   Imaging Personally Reviewed:  Electrocardiogram Personally Reviewed:    COORDINATION OF CARE:  Care Discussed with Consultants/Other Providers [Y/N]: Y  Prior or Outpatient Records Reviewed [Y/N]: Y

## 2021-06-03 NOTE — PROGRESS NOTE ADULT - PROBLEM SELECTOR PLAN 1
- HbA1c 8.9%  - suspect hypoglycemia in the field 2/2 NIKUNJ, NIKUNJ now resolving   - if remains inpatient, can increase Admelog to 7 units before meals and increase Lantus to 42 units qhs  - low correction scale qac and qhs  - consistent carb diet  - check FS qac and qhs  - will follow  - for discharge: can discharge on home regimen of Novolog 6-8 units before meals and Soliqua 55 units qhs. HbA1c 8.9%, uncontrolled. He states today that he spoke with his endocrinologist Dr. Lal and will be following up with him on discharge.

## 2021-06-03 NOTE — PROGRESS NOTE ADULT - PROBLEM SELECTOR PROBLEM 2
Hypoglycemia associated with type 2 diabetes mellitus
Essential hypertension
Hypoglycemia associated with type 2 diabetes mellitus

## 2021-06-03 NOTE — PROGRESS NOTE ADULT - PROBLEM SELECTOR PROBLEM 3
Hyperlipidemia, unspecified hyperlipidemia type
SIRS (systemic inflammatory response syndrome)
SIRS (systemic inflammatory response syndrome)

## 2021-06-03 NOTE — DISCHARGE NOTE PROVIDER - NSDCMRMEDTOKEN_GEN_ALL_CORE_FT
Aldactone 25 mg oral tablet: 1 tab(s) orally  daily  Aspir 81 oral delayed release tablet: 1 tab(s) orally once a day  Crestor 5 mg oral tablet: 1 tab(s) orally once a day  Deplin: 150 milligram(s) orally once a day  famotidine 40 mg oral tablet: one tab orally three times a day  gabapentin 100 mg oral capsule: 1 cap(s) orally 3 times a day  hydrOXYzine hydrochloride 50 mg oral tablet: 1 tab(s) orally 3 times a day, As Needed  magnesium citrate: 400 milligram(s) orally once a day  Melatonin 5 mg oral tablet, disintegratin tab(s) orally once a day (at bedtime)  metFORMIN 1000 mg oral tablet: 1 tab(s) orally 2 times a day  metoprolol tartrate 25 mg oral tablet: 1/2 tab ( half tab...12.5 mg ) orally daily  NovoLOG FlexPen 100 units/mL injectable solution: 6  to 8 units  subcutaneous premeal  Plavix 75 mg oral tablet: one tab orally   daily   simethicone 80 mg oral tablet, chewable: 1 tab(s) orally once a day  Soliqua 100/33 subcutaneous solution: 55 units subcutaneous at night  tamsulosin 0.4 mg oral capsule: 2 cap(s) orally once a day (at bedtime)  traMADol 50 mg oral tablet: 1 tab(s) orally 2 times a day, As Needed  Trelegy Ellipta inhalation powder: 1 puff(s) inhaled once a day  Valium: 0.5 milligram(s) orally 2 times a day, As Needed  Wellbutrin: 400  orally   Aldactone 25 mg oral tablet: 1 tab(s) orally  daily  Aspir 81 oral delayed release tablet: 1 tab(s) orally once a day  Crestor 5 mg oral tablet: 1 tab(s) orally once a day  Deplin: 150 milligram(s) orally once a day  famotidine 40 mg oral tablet: one tab orally three times a day  gabapentin 100 mg oral capsule: 1 cap(s) orally 3 times a day  hydrOXYzine hydrochloride 50 mg oral tablet: 1 tab(s) orally 3 times a day, As Needed  magnesium citrate: 400 milligram(s) orally once a day  Melatonin 5 mg oral tablet, disintegratin tab(s) orally once a day (at bedtime)  metFORMIN 1000 mg oral tablet: 1 tab(s) orally 2 times a day  metoprolol tartrate 25 mg oral tablet: 1/2 tab ( half tab...12.5 mg ) orally daily  NovoLOG FlexPen 100 units/mL injectable solution: 6  to 8 units  subcutaneous premeal  Plavix 75 mg oral tablet: one tab orally   daily   simethicone 80 mg oral tablet, chewable: 1 tab(s) orally once a day  Soliqua 100/33 subcutaneous solution: 55 units subcutaneous at night  tamsulosin 0.4 mg oral capsule: 2 cap(s) orally once a day (at bedtime)  torsemide 20 mg oral tablet: 1 tab(s) orally 3 times a day  traMADol 50 mg oral tablet: 1 tab(s) orally 2 times a day, As Needed  Trelegy Ellipta inhalation powder: 1 puff(s) inhaled once a day  Valium: 0.5 milligram(s) orally 2 times a day, As Needed  Wellbutrin: 400  orally

## 2021-06-03 NOTE — PROGRESS NOTE ADULT - PROBLEM SELECTOR PLAN 1
Patient felt faint at neurology office - likely 2/2 hypoglycemia from diabetic management. Pt also with significant cardiac issues - recent cath and 2 new stents place 6 weeks ago  - pt ambulating without issues   - ensure optimal hydration  - EKG without any signs of ischemia, v-paced

## 2021-06-16 ENCOUNTER — APPOINTMENT (OUTPATIENT)
Dept: ORTHOPEDIC SURGERY | Facility: CLINIC | Age: 71
End: 2021-06-16
Payer: MEDICARE

## 2021-06-16 VITALS — HEIGHT: 71 IN | BODY MASS INDEX: 29.4 KG/M2 | WEIGHT: 210 LBS

## 2021-06-16 PROCEDURE — 72082 X-RAY EXAM ENTIRE SPI 2/3 VW: CPT

## 2021-06-16 PROCEDURE — 99024 POSTOP FOLLOW-UP VISIT: CPT

## 2021-06-16 NOTE — DISCUSSION/SUMMARY
[de-identified] : We discussed further treatment options both nonsurgical and surgical. She would like to continue with nonsurgical treatment for her cervical spine. He will continue with some physical therapy. Follow-up in 3 months or sooner with any changes or worsening of his symptoms.

## 2021-06-16 NOTE — HISTORY OF PRESENT ILLNESS
[de-identified] : Mr. Leon presents to the office s/p revision T10 - pelvis fusion on 11/19/20. He is here to review his cervical CT.  He continues to have low back pain and imbalance.

## 2021-06-16 NOTE — PHYSICAL EXAM
[Stooped] : stooped [Walker] : ambulates with walker [de-identified] : His incision is healed.  Stable neurologic exam.  He does appear to have slight ulnar escape on the right.  He also has slow alternating hand movements. [de-identified] : AP lateral scoliosis x-rays taken today reveals revision construct now with 3 rods.  He has some slight proximal junctional kyphosis. His P JK is grossly unchanged.\par \par Review of his cervical spine CT scan does reveal some increased stenosis.

## 2021-06-17 RX ORDER — GABAPENTIN 100 MG/1
100 CAPSULE ORAL
Qty: 90 | Refills: 0 | Status: ACTIVE | COMMUNITY
Start: 2021-06-17 | End: 1900-01-01

## 2021-07-29 RX ORDER — GABAPENTIN 100 MG/1
100 CAPSULE ORAL 3 TIMES DAILY
Qty: 90 | Refills: 0 | Status: ACTIVE | COMMUNITY
Start: 2021-01-04 | End: 1900-01-01

## 2021-07-30 RX ORDER — GABAPENTIN 100 MG/1
100 CAPSULE ORAL
Qty: 90 | Refills: 0 | Status: ACTIVE | COMMUNITY
Start: 2021-01-07 | End: 1900-01-01

## 2021-08-18 ENCOUNTER — APPOINTMENT (OUTPATIENT)
Dept: ORTHOPEDIC SURGERY | Facility: CLINIC | Age: 71
End: 2021-08-18
Payer: MEDICARE

## 2021-08-18 PROCEDURE — 72082 X-RAY EXAM ENTIRE SPI 2/3 VW: CPT

## 2021-08-18 PROCEDURE — 99214 OFFICE O/P EST MOD 30 MIN: CPT

## 2021-08-18 NOTE — DISCUSSION/SUMMARY
[de-identified] : We discussed further treatment options both nonsurgical and surgical.  This point he wished to continue with nonsurgical treatment.  He also follow-up with neurology.  Follow-up in 3 months time or sooner with any changes or worsening of his symptoms.

## 2021-08-18 NOTE — HISTORY OF PRESENT ILLNESS
[de-identified] : Mr. Leon presents to the office s/p revision T10 - pelvis fusion on 11/19/20.  He feels his walking is better but he still continues to have a lot of muscular pain in his back that makes transitions difficult.

## 2021-08-18 NOTE — PHYSICAL EXAM
[Stooped] : stooped [Walker] : ambulates with walker [de-identified] : His incision is healed.  Stable neurologic exam.  He does appear to have slight ulnar escape on the right.  He also has slow alternating hand movements. [de-identified] : AP lateral scoliosis x-rays taken today reveals revision construct now with 3 rods.  He continues to have some P JK with possible slight increase compared to prior imaging more so with regards to increased upper thoracic kyphosis.\par Review of his cervical spine CT scan does reveal some increased stenosis.

## 2021-09-08 NOTE — OCCUPATIONAL THERAPY INITIAL EVALUATION ADULT - MD ORDER
9/8/2021 Cardiac Rehab: Called Mr. Shira Leone to check on him after his pacer removal so he could have radiation tomorrow, 9/9/2021. He said he feels ok, except not being able to use his left arm and he has \"all this stuff on me. \" I told him we were thinking of him and to call us when he was able to return.   Misty Salcedo
OT evaluate and treat

## 2021-11-29 ENCOUNTER — APPOINTMENT (OUTPATIENT)
Dept: ORTHOPEDIC SURGERY | Facility: CLINIC | Age: 71
End: 2021-11-29
Payer: MEDICARE

## 2021-11-29 PROCEDURE — 72082 X-RAY EXAM ENTIRE SPI 2/3 VW: CPT

## 2021-11-29 PROCEDURE — 99214 OFFICE O/P EST MOD 30 MIN: CPT

## 2021-11-29 NOTE — DISCUSSION/SUMMARY
[de-identified] : We discussed further treatment options both nonsurgical and surgical.  We discussed the role of revision surgery.  This point he wished to continue with nonsurgical treatment.  He will continue with some physical therapy.  Follow-up in 2 to 3 months time or sooner with any changes or worsening of his symptoms

## 2021-11-29 NOTE — PHYSICAL EXAM
[Stooped] : stooped [Walker] : ambulates with walker [de-identified] : His incision is healed.  Stable neurologic exam.  He does appear to have slight ulnar escape on the right.  He also has slow alternating hand movements. [de-identified] : AP lateral scoliosis x-rays taken today reveals revision construct now with 3 rods.  He has P JK with positive sagittal balance.  He also appears to have fracture of one of the distal rods.  However this is bypassed with the third brandt.\par Review of his cervical spine CT scan does reveal some increased stenosis.\par  Denies chest pain, SOB, N/V/D and fevers, Denies palpitations or diaphoresis. Denies Numbness, Tingling, Blurry Vision and HA.   Denies recent falls, trauma and injuries. Denies pain or any other medical complaints.

## 2021-11-29 NOTE — HISTORY OF PRESENT ILLNESS
[de-identified] : Mr. Leon presents to the office s/p revision T10 - pelvis fusion on 11/19/20.  He is complaining of thoracic tightness and pain.  He has fallen a great deal lately (9 times).  He was in the hospital the month of October secondary to sepsis.
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
## 2022-04-14 ENCOUNTER — NON-APPOINTMENT (OUTPATIENT)
Age: 72
End: 2022-04-14

## 2022-04-18 ENCOUNTER — APPOINTMENT (OUTPATIENT)
Dept: ORTHOPEDIC SURGERY | Facility: CLINIC | Age: 72
End: 2022-04-18
Payer: MEDICARE

## 2022-04-18 VITALS
BODY MASS INDEX: 29.4 KG/M2 | DIASTOLIC BLOOD PRESSURE: 77 MMHG | HEIGHT: 71 IN | HEART RATE: 86 BPM | WEIGHT: 210 LBS | SYSTOLIC BLOOD PRESSURE: 147 MMHG

## 2022-04-18 PROCEDURE — 72082 X-RAY EXAM ENTIRE SPI 2/3 VW: CPT

## 2022-04-18 PROCEDURE — 99214 OFFICE O/P EST MOD 30 MIN: CPT

## 2022-04-18 NOTE — DISCUSSION/SUMMARY
[de-identified] : We discussed further treatment options both nonsurgical and surgical.  This point he is happy with his progress and wished to continue with nonsurgical treatment.  He will follow up in 4 to 6 months time or sooner with any changes or worsening of his symptoms.

## 2022-04-18 NOTE — PHYSICAL EXAM
[Stooped] : stooped [Walker] : ambulates with walker [de-identified] : Incision is well-healed.  Stable neurologic exam. [de-identified] : AP lateral scoliosis x-rays taken today reveals revision construct now with 3 rods.  His P JK appears to have stabilized.  He also appears to have fracture of one of the distal rods.  However this is bypassed with the third brandt.  No gross change from prior.\par Review of his cervical spine CT scan does reveal some increased stenosis.\par

## 2022-04-18 NOTE — HISTORY OF PRESENT ILLNESS
[de-identified] : Mr. Leon presents to the office s/p revision T10 - pelvis fusion on 11/19/20.  He is complaining of thoracic and lumbar  tightness and pain.  He did have a fall in the beginning of February.  He is using valium for the spasm.  He will use Tylenol for the pain and tramadol as needed.

## 2022-05-26 NOTE — H&P PST ADULT - SOURCE OF INFORMATION, PROFILE
I reviewed with the resident the medical history and the resident's findings on the physical examination. I discussed with the resident the patient's diagnosis and concur with the plan. patient

## 2022-09-09 NOTE — OCCUPATIONAL THERAPY INITIAL EVALUATION ADULT - SIT-TO-STAND BALANCE
Medication:   Requested Prescriptions     Pending Prescriptions Disp Refills    valsartan-hydroCHLOROthiazide (DIOVAN-HCT) 320-25 MG per tablet 90 tablet 1     Last Filled:  3.14.22    Last appt: 6/13/2022   Next appt: Visit date not found    Last OARRS: No flowsheet data found. poor balance
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
## 2022-12-07 ENCOUNTER — APPOINTMENT (OUTPATIENT)
Dept: ORTHOPEDIC SURGERY | Facility: CLINIC | Age: 72
End: 2022-12-07

## 2022-12-08 NOTE — DISCHARGE NOTE PROVIDER - PROVIDER TOKENS
Department of Orthopedic Surgery     Progress Note    Subjective:   Admit Day:12/7/2022    Patient is comfortable appearing. Denies chest pain, shortness of air or calf pain. Tolerating PO. Objective[de-identified]    height is 5' 7\" (1.702 m) and weight is 161 lb 9.6 oz (73.3 kg). His oral temperature is 98.1 °F (36.7 °C). His blood pressure is 106/65 and his pulse is 69. His respiration is 18 and oxygen saturation is 95%. General:  No acute distress. Operative Incision:  Dressing is clean, dry and intact. Operative Extremity:  - Motor function intact to Tibialis Anterior, Gastroc-Soleus Complex, Extensor Hallucis Longus, and Flexor Hallucis Longus.  -  Sensation intact to light touch in sural, saphenous, superficial peroneal, deep peroneal and tibial nerve distributions. -  Toes are wwp with a palpable dorsalis pedis pulse. Negative Tyshawn's Sign Bilaterally    Labs:  Lab Results   Component Value Date/Time    HGB 12.4 12/08/2022 06:18 AM    HCT 34.4 12/08/2022 06:18 AM       No results found for: INR    Lab Results   Component Value Date/Time     12/07/2022 08:31 PM    K 4.3 12/07/2022 08:31 PM    CO2 22 12/07/2022 08:31 PM    BUN 13 12/07/2022 08:31 PM    CREATININE 0.9 12/07/2022 08:31 PM    CALCIUM 8.9 12/07/2022 08:31 PM       Assessment:   Principal Problem:    Osteoarthritis of right hip, unspecified osteoarthritis type  Resolved Problems:    * No resolved hospital problems. *      POD # 1 s/p Right total hip arthroplasty. Doing well. Plan:   1. Weightbearing as tolerated   2. DVT Prophylaxis:  None - Hemophilia patient  3. PT/OT per protocol  4. Pain control: per protocol    Disposition:  Home pending mobility/progress with therapy and medical stability. PROVIDER:[TOKEN:[[id number]],ESTABLISHEDPATIENT:[T]],PROVIDER:[TOKEN:[[id number]],ESTABLISHEDPATIENT:[T]]

## 2023-01-25 NOTE — PROGRESS NOTE ADULT - PROBLEM SELECTOR PLAN 1
Patient was pale and had hypotension but repeat H/H improved on 11/28 so wasn't transfused.  Today Hb continues to improve, hypotension resolved, and skin pink so holding off on transfusion for now.  Hypotension was most likely due to dehydration. Mirvaso Pregnancy And Lactation Text: This medication has not been assigned a Pregnancy Risk Category. It is unknown if the medication is excreted in breast milk.

## 2023-02-01 ENCOUNTER — APPOINTMENT (OUTPATIENT)
Dept: ORTHOPEDIC SURGERY | Facility: CLINIC | Age: 73
End: 2023-02-01
Payer: MEDICARE

## 2023-02-01 VITALS
BODY MASS INDEX: 27.44 KG/M2 | WEIGHT: 196 LBS | OXYGEN SATURATION: 99 % | HEART RATE: 88 BPM | TEMPERATURE: 97.2 F | HEIGHT: 71 IN

## 2023-02-01 DIAGNOSIS — M41.9 SCOLIOSIS, UNSPECIFIED: ICD-10-CM

## 2023-02-01 PROCEDURE — 99214 OFFICE O/P EST MOD 30 MIN: CPT

## 2023-02-01 PROCEDURE — 72082 X-RAY EXAM ENTIRE SPI 2/3 VW: CPT

## 2023-02-01 NOTE — PHYSICAL EXAM
[Stooped] : stooped [Walker] : ambulates with walker [de-identified] : Incision is well-healed.  Stable neurologic exam. [de-identified] : AP lateral scoliosis x-rays taken today reveals revision construct now with 3 rods.  His PJK appears stable.  He also appears to have fracture of one of the distal rods.  However this is bypassed with the third brandt.  No gross change from prior.\par Review of his cervical spine CT scan does reveal some increased stenosis.\par

## 2023-02-01 NOTE — HISTORY OF PRESENT ILLNESS
[de-identified] : Mr. Leon presents to the office s/p revision T10 - pelvis fusion on 11/19/20.  He is complaining of thoracic and lumbar  spasms.  He has had 5 hospitalizations since his last visit for numerous medical issues.  He has also had multiple falls.  The last one was 2 months ago.

## 2023-02-01 NOTE — DISCUSSION/SUMMARY
[de-identified] : We discussed further treatment options.  He is seeing a neurologist.  He does feel that his balance symptoms have improved since some falls.  Back pain is manageable with Tylenol.  We reviewed his imaging.  We discussed nonsurgical and surgical options.  This point he will continue with conservative measures.  Follow-up in 6 months time or sooner with any changes or worsening of symptoms.

## 2023-03-02 NOTE — DIETITIAN INITIAL EVALUATION ADULT. - OTHER INFO
Per chart review patient with medical history of T2DM, HTN, CABGx4, HLD, CVA (no residual -2013), depression, FREEDOM, S/P lumbar laminectomy (2015) & T1 spinal fusion in January 2020 presenting with complaint of lower back & buttock pain. Now s/p T10-Pelvis Exploration of rev PSF w/flap. Patient reports appetite is still "not great" in-house. Tolerating meals. Dislikes some meal options available on Kosher diet which is also impacting intake. No GI distress (nausea/vomiting/diarrhea/constipation) noted at this time. Encouraged PO intake and discussed importance of protein intake s/p surgery.     Patient reports weight loss prior to admission. Reports ~3.6kg weight loss in 3 weeks, ~3% weight change. Admit weight 95.3kg. Weight varies in-house, recent weight noted to be 101.6kg - change possibly 2/2 fluid shifts. Patient amenable to trying Glucerna Shake in-house to supplement PO intake. Finger sticks elevated in-house - Endocrinology following. Unable to provide diet education as patient preoccupied with discussing  issues. Prednisone Counseling:  I discussed with the patient the risks of prolonged use of prednisone including but not limited to weight gain, insomnia, osteoporosis, mood changes, diabetes, susceptibility to infection, glaucoma and high blood pressure.  In cases where prednisone use is prolonged, patients should be monitored with blood pressure checks, serum glucose levels and an eye exam.  Additionally, the patient may need to be placed on GI prophylaxis, PCP prophylaxis, and calcium and vitamin D supplementation and/or a bisphosphonate.  The patient verbalized understanding of the proper use and the possible adverse effects of prednisone.  All of the patient's questions and concerns were addressed.

## 2023-04-13 NOTE — ASU PATIENT PROFILE, ADULT - CIGARETTES, NUMBER OF YRS
10

## 2023-04-28 NOTE — PHYSICAL THERAPY INITIAL EVALUATION ADULT - PHYSICAL ASSIST/NONPHYSICAL ASSIST: GAIT, REHAB EVAL
supervision Xolair Counseling:  Patient informed of potential adverse effects including but not limited to fever, muscle aches, rash and allergic reactions.  The patient verbalized understanding of the proper use and possible adverse effects of Xolair.  All of the patient's questions and concerns were addressed.

## 2023-05-09 NOTE — DIETITIAN INITIAL EVALUATION ADULT. - PHYSICAL APPEARANCE
Luis Eduardo Batres is calling to request a refill on the following medication(s):    Medication Request:  Requested Prescriptions     Pending Prescriptions Disp Refills    apixaban (ELIQUIS) 5 MG TABS tablet 60 tablet 0     Sig: Take 1 tablet by mouth 2 times daily       Last Visit Date (If Applicable):  8/58/7290    Next Visit Date:    7/11/2023 well nourished/overweight/other (specify) Height: 71 inches, Weight: 203.9 pounds (dosing)  BMI: 28.4 kg/m2 IBW: 172 pounds (+/-10%), %IBW: 119%  Pertinent Info: No edema noted, no pressure injuries noted at this time in nursing flow sheet.

## 2023-10-03 NOTE — PROGRESS NOTE ADULT - SUBJECTIVE AND OBJECTIVE BOX
Orthopaedic Surgery Progress Note    Subjective:   Patient seen and examined. No acute events overnight. Pain well controlled. Requesting Acute rehab when discharged.    Objective:  Vital Signs Last 24 Hrs  T(C): 37.3 (23 Nov 2020 06:12), Max: 37.6 (22 Nov 2020 21:40)  T(F): 99.2 (23 Nov 2020 06:12), Max: 99.7 (22 Nov 2020 21:40)  HR: 64 (23 Nov 2020 06:12) (64 - 82)  BP: 118/67 (23 Nov 2020 06:12) (118/67 - 133/74)  BP(mean): --  RR: 18 (23 Nov 2020 06:12) (17 - 20)  SpO2: 100% (23 Nov 2020 06:12) (83% - 100%)    PE  Gen: NAD, resting in bed  Back:   dressing C/D/I, mild appropriate rina-incisional ttp  HMV in place w/ serosanguinous output  Neuro:  RLE IP 5/5 HS 5/5 Q 5/5 GS 5/5 TA 5/5 EHL 5/5   SILT L2-S1  LLE IP 5/5 HS 5/5 Q 5/5 GS 5/5 TA 5/5 EHL 5/5  SILT L2-S1  WWP BLE                                                  9.0    12.61 )-----------( 175      ( 22 Nov 2020 05:16 )             28.3     11-22    136  |  102  |  22  ----------------------------<  300<H>  4.4   |  24  |  0.96    Ca    8.8      22 Nov 2020 05:16              A/P: 70M s/p T10-Pelvis Exploration of rev PSF w/flap    Neuro: Pain control  Resp: IS  GI: Regular diet, bowel reg  MSK: WBAT, PT/OT  Heme: DVT PPX: Venodynes  HV  TAMIKA  Prevena   Dispo: Acute Rehab   Presence Of Scar Tissue (For Histology): present

## 2024-01-30 NOTE — H&P PST ADULT - RESPIRATORY RATE (BREATHS/MIN)
Thank you for enrolling in [facility]. Please follow the instructions below to securely access your online medical record. [facility] allows you to send messages to your doctor, view your test results, renew your prescriptions, schedule appointments, and more.     How Do I Sign Up?  In your Internet browser, go to [email or web address]  Click on the Sign Up Now link in the Sign In box. You will see the New Member Sign Up page.  Enter your [facility] Access Code exactly as it appears below. You will not need to use this code after you’ve completed the sign-up process. If you do not sign up before the expiration date, you must request a new code.  [facility] Access Code: Activation code not generated  Current [facility] Status: Active    Enter your Social Security Number ([id number]) and Date of Birth (mm/dd/yyyy) as indicated and click Submit. You will be taken to the next sign-up page.  Create a [facility] ID. This will be your [facility] login ID and cannot be changed, so think of one that is secure and easy to remember.  Create a [facility] password. You can change your password at any time.  Enter your Password Reset Question and Answer. This can be used at a later time if you forget your password.   Enter your e-mail address. You will receive e-mail notification when new information is available in [facility].  Click Sign Up. You can now view your medical record.     Additional Information  If you have questions, please contact your physician practice where you receive care. Remember, [facility] is NOT to be used for urgent needs. For medical emergencies, dial 911.     15

## 2024-04-05 ENCOUNTER — APPOINTMENT (OUTPATIENT)
Dept: ORTHOPEDIC SURGERY | Facility: CLINIC | Age: 74
End: 2024-04-05
Payer: MEDICARE

## 2024-04-05 DIAGNOSIS — M40.209 UNSPECIFIED KYPHOSIS, SITE UNSPECIFIED: ICD-10-CM

## 2024-04-05 DIAGNOSIS — M48.02 SPINAL STENOSIS, CERVICAL REGION: ICD-10-CM

## 2024-04-05 PROCEDURE — 99214 OFFICE O/P EST MOD 30 MIN: CPT

## 2024-04-05 PROCEDURE — 72100 X-RAY EXAM L-S SPINE 2/3 VWS: CPT

## 2024-04-05 PROCEDURE — 72070 X-RAY EXAM THORAC SPINE 2VWS: CPT

## 2024-04-05 NOTE — PHYSICAL EXAM
[Stooped] : stooped [Wheelchair] : uses a wheelchair [de-identified] : Incision is well-healed.  Stable neurologic exam. [de-identified] : AP lateral thoracic and lumbar x-rays taken today reveals revision construct now with 3 rods.  His PJK appears stable.  He also appears to have fracture of one of the distal rods.  However this is bypassed with the third brandt.  No gross change from prior.  Review of his cervical spine CT scan does reveal some increased stenosis.

## 2024-04-05 NOTE — DISCUSSION/SUMMARY
[de-identified] : We discussed further treatment options.  Symptoms are overall stable.  His main concern is regarding neck posture.  He would like to try a light cervical orthosis.  This was ordered today.  Me know of any changes or worsening of his symptoms.
normal affect/anxious

## 2024-04-05 NOTE — HISTORY OF PRESENT ILLNESS
[de-identified] : Mr. Leon presents to the office s/p revision T10 - pelvis fusion on 11/19/20.  He is complaining of low back pain and difficult holding his head up which causes neck pain.

## 2024-04-22 NOTE — OCCUPATIONAL THERAPY INITIAL EVALUATION ADULT - BATHING, PREVIOUS LEVEL OF FUNCTION, OT EVAL
Detail Level: Detailed Detail Level: Zone Detail Level: Simple shower chair, grab bars/needs device/independent

## 2024-04-29 NOTE — PHYSICAL THERAPY INITIAL EVALUATION ADULT - IMPAIRMENTS FOUND, PT EVAL
- Continue regular diet.  - Increase ambulation.  - Continue motrin, tylenol, oxycodone PRN for pain control    Mary Jo Duncan PGY1 gait, locomotion, and balance/muscle strength normal...

## 2024-05-06 NOTE — PROGRESS NOTE ADULT - PROVIDER SPECIALTY LIST ADULT
- much better controlled   - stress yumi dn echo within normal limits   - follow up as needed    Endocrinology

## 2024-06-05 NOTE — ASU PATIENT PROFILE, ADULT - AS SC BRADEN MOBILITY
PAST MEDICAL HISTORY:  Calculus of gallbladder without cholecystitis without obstruction     Hypothyroid     PCOS (polycystic ovarian syndrome)      (4) no limitation PAST MEDICAL HISTORY:  Anemia     Anxiety and depression     Calculus of gallbladder without cholecystitis without obstruction     Factor V Leiden     History of DVT (deep vein thrombosis)     Hypothyroid     Mild asthma     Obese     PCOS (polycystic ovarian syndrome)      PAST MEDICAL HISTORY:  Anemia     Anxiety and depression     Calculus of gallbladder without cholecystitis without obstruction     Factor V Leiden     History of DVT (deep vein thrombosis)     Hypothyroid     Mild asthma     Obese     PCOS (polycystic ovarian syndrome)     Renal insufficiency

## 2024-08-20 NOTE — DISCHARGE NOTE PROVIDER - PROVIDER TOKENS
Pt tolerated bone marrow biopsy. Bandaid to site. Pt being transported back to [facility] for recovery.    PROVIDER:[TOKEN:[[id number]]] PROVIDER:[TOKEN:[[id number]]],PROVIDER:[TOKEN:[[id number]]],PROVIDER:[TOKEN:[[id number]]] PROVIDER:[TOKEN:[[id number]]],PROVIDER:[TOKEN:[[id number]]],PROVIDER:[TOKEN:[[id number]]],PROVIDER:[TOKEN:[[id number]]],PROVIDER:[TOKEN:[[id number]]]

## 2024-11-08 NOTE — H&P PST ADULT - NEGATIVE NEUROLOGICAL SYMPTOMS
Appointment scheduled.   no transient paralysis/no paresthesias/no vertigo/no difficulty walking/no confusion/no facial palsy/no syncope/no loss of consciousness/no generalized seizures/no headache/no focal seizures/no tremors/no weakness

## 2025-03-13 NOTE — DISCHARGE NOTE PROVIDER - NSDCCPTREATMENT_GEN_ALL_CORE_FT
Subjective:       Patient ID: Abdullahi Urias is a . male.    Chief Complaint: Multiple issues see below        HPI  prediab d/wd better a1c     Iron def anemia iron rxd dxd [facility] 5/ [facility]. Egd colon 7/24 no obv source; ua few months no blood  [facility] also rxd iron;nl ferritin now. 11/24 vce some bleeding then sb enteroscopy - few non-bleeding angioectasias in the                          duodenum. Treated with argon plasma coagulation       Cad [facility] card     gerd on meds    Asthma  see above    Hep c hx says was cured w yenny;hep C rna negative summer 2020    Prost ca now sees [facility] urol ;prev  [facility] s/p prostectomy    Gout:  No recent flares. Tolerating medication     Cri /nephrol;    Cad/pvd recent dx via dr yovani Radford foot burning . hx gabapentin    \3 weeks yellow prod cough. There has been no fever.saw [facility] albut rec. No abx      Cardiovascular: no chest pain  Chest: no shortness of breath  Abd: no abd pain  Remainder review of systems negative    Objective:      Physical Exam  gen nad a and o x 3  bilat ac tm clear  Neck no bruit  cvrrr  Chestwheeze    Assessment:    Iron def anemia  Prost ca hx      2. History of CVA in adulthood    3. Coronary artery disease, angina presence unspecified, unspecified vessel or lesion type, unspecified whether native or transplanted heart    4. Chronic gout involving toe without tophus, unspecified cause, unspecified laterality      Asthma  gerd  Cri   Lung nod hx  bronchitis  Plan:   F/u , urol, radonc, pain mgmt and nephrol when due  F/u card due      Has plan abril ldct via pul summer      hx;ambien via [facility]       If no better 3-4 days followup sooner if any worsening or change in symptoms or lack of resolution  Req cough med./ states cynthia perles no help    Iron ferrtin w June lab  Can stop iron    Msg dr wilkins staff about doxy , has sami in about 8-9 days    Primary hypertension    Coronary artery disease involving native coronary artery of native heart without angina  pectoris    Idiopathic chronic gout without tophus, unspecified site    Prostate cancer    History of prostate cancer    PAD (peripheral artery disease)    Moderate persistent asthma without complication    Prediabetes    Nonrheumatic aortic valve stenosis    Stage 3b chronic kidney disease    Iron deficiency anemia, unspecified iron deficiency anemia type  -     CBC Auto Differential; Future; Expected date: 06/09/2025  -     Ferritin; Future; Expected date: 06/09/2025    Other orders  -     doxycycline (VIBRAMYCIN) 100 MG Cap; Take 1 capsule (100 mg total) by mouth 2 (two) times daily. for 10 days  Dispense: 20 capsule; Refill: 0  -     promethazine-dextromethorphan (PROMETHAZINE-DM) 6.25-15 mg/5 mL Syrp; Take 5 mLs by mouth every 4 (four) hours as needed.  Dispense: 118 mL; Refill: 0            PRINCIPAL PROCEDURE  Procedure: Fusion, spine, lumbar  Findings and Treatment:

## 2025-04-03 NOTE — ED ADULT NURSE NOTE - PMH
Render Note In Bullet Format When Appropriate: No Duration Of Freeze Thaw-Cycle (Seconds): 0 Detail Level: Detailed Consent: The patient's consent was obtained including but not limited to risks of crusting, scabbing, blistering, scarring, darker or lighter pigmentary change, recurrence, incomplete removal and infection. Post-Care Instructions: I reviewed with the patient in detail post-care instructions. Patient is to wear sunprotection, and avoid picking at any of the treated lesions. Pt may apply Vaseline to crusted or scabbing areas. Show Applicator Variable?: Yes Acid Reflux    Anxiety    Benign Tumor of Adrenal Gland    CAD (coronary artery disease)    CVA (cerebrovascular accident)  04/8/2013- no residual effects  Depression    Diabetic neuropathy    DM type 2 (diabetes mellitus, type 2)    Gout    Hernia  umbilical, inguinal, abdominal wall  HTN (Hypertension)    Spinal stenosis of lumbar region

## 2025-06-09 ENCOUNTER — NON-APPOINTMENT (OUTPATIENT)
Age: 75
End: 2025-06-09

## 2025-06-09 ENCOUNTER — APPOINTMENT (OUTPATIENT)
Dept: ORTHOPEDIC SURGERY | Facility: CLINIC | Age: 75
End: 2025-06-09
Payer: MEDICARE

## 2025-06-09 VITALS — WEIGHT: 178 LBS | BODY MASS INDEX: 24.92 KG/M2 | HEIGHT: 71 IN

## 2025-06-09 PROCEDURE — 99214 OFFICE O/P EST MOD 30 MIN: CPT

## 2025-06-09 PROCEDURE — 72082 X-RAY EXAM ENTIRE SPI 2/3 VW: CPT

## 2025-07-24 NOTE — H&P PST ADULT - NEGATIVE SKIN SYMPTOMS
[Scheduled Procedure ___] : a [unfilled] [Date of Surgery ___] : on [unfilled] [Surgeon Name ___] : surgeon: [unfilled] [Stable] : Stable [FreeTextEntry1] :  HINA HANNA comes today for evaluation. He was advised to undergo a complete cardiac evaluation. He denies chest pains shortness of breath or loss of consciousness.  no rash/no dryness/no change in size/color of mole/no tumor/no itching